# Patient Record
Sex: FEMALE | Race: BLACK OR AFRICAN AMERICAN | NOT HISPANIC OR LATINO | URBAN - METROPOLITAN AREA
[De-identification: names, ages, dates, MRNs, and addresses within clinical notes are randomized per-mention and may not be internally consistent; named-entity substitution may affect disease eponyms.]

---

## 2017-10-15 ENCOUNTER — INPATIENT (INPATIENT)
Facility: HOSPITAL | Age: 72
LOS: 4 days | Discharge: NURSING, REHAB OR LONG-TERM CARE | DRG: 190 | End: 2017-10-20
Attending: INTERNAL MEDICINE | Admitting: INTERNAL MEDICINE
Payer: MEDICAID

## 2017-10-15 VITALS
TEMPERATURE: 103 F | RESPIRATION RATE: 20 BRPM | DIASTOLIC BLOOD PRESSURE: 62 MMHG | HEART RATE: 85 BPM | HEIGHT: 63 IN | WEIGHT: 166.89 LBS | OXYGEN SATURATION: 95 % | SYSTOLIC BLOOD PRESSURE: 107 MMHG

## 2017-10-15 DIAGNOSIS — I10 ESSENTIAL (PRIMARY) HYPERTENSION: ICD-10-CM

## 2017-10-15 DIAGNOSIS — J44.9 CHRONIC OBSTRUCTIVE PULMONARY DISEASE, UNSPECIFIED: ICD-10-CM

## 2017-10-15 DIAGNOSIS — J18.9 PNEUMONIA, UNSPECIFIED ORGANISM: ICD-10-CM

## 2017-10-15 DIAGNOSIS — Z29.9 ENCOUNTER FOR PROPHYLACTIC MEASURES, UNSPECIFIED: ICD-10-CM

## 2017-10-15 DIAGNOSIS — E11.9 TYPE 2 DIABETES MELLITUS WITHOUT COMPLICATIONS: ICD-10-CM

## 2017-10-15 DIAGNOSIS — C34.90 MALIGNANT NEOPLASM OF UNSPECIFIED PART OF UNSPECIFIED BRONCHUS OR LUNG: ICD-10-CM

## 2017-10-15 DIAGNOSIS — Z98.89 OTHER SPECIFIED POSTPROCEDURAL STATES: Chronic | ICD-10-CM

## 2017-10-15 DIAGNOSIS — K21.9 GASTRO-ESOPHAGEAL REFLUX DISEASE WITHOUT ESOPHAGITIS: ICD-10-CM

## 2017-10-15 LAB
ALBUMIN SERPL ELPH-MCNC: 3.5 G/DL — SIGNIFICANT CHANGE UP (ref 3.5–5)
ALP SERPL-CCNC: 69 U/L — SIGNIFICANT CHANGE UP (ref 40–120)
ALT FLD-CCNC: 21 U/L DA — SIGNIFICANT CHANGE UP (ref 10–60)
ANION GAP SERPL CALC-SCNC: 7 MMOL/L — SIGNIFICANT CHANGE UP (ref 5–17)
APPEARANCE UR: CLEAR — SIGNIFICANT CHANGE UP
AST SERPL-CCNC: 22 U/L — SIGNIFICANT CHANGE UP (ref 10–40)
BACTERIA # UR AUTO: ABNORMAL /HPF
BASOPHILS # BLD AUTO: 0 K/UL — SIGNIFICANT CHANGE UP (ref 0–0.2)
BASOPHILS NFR BLD AUTO: 0.2 % — SIGNIFICANT CHANGE UP (ref 0–2)
BILIRUB SERPL-MCNC: 0.5 MG/DL — SIGNIFICANT CHANGE UP (ref 0.2–1.2)
BILIRUB UR-MCNC: NEGATIVE — SIGNIFICANT CHANGE UP
BUN SERPL-MCNC: 17 MG/DL — SIGNIFICANT CHANGE UP (ref 7–18)
CALCIUM SERPL-MCNC: 9 MG/DL — SIGNIFICANT CHANGE UP (ref 8.4–10.5)
CHLORIDE SERPL-SCNC: 105 MMOL/L — SIGNIFICANT CHANGE UP (ref 96–108)
CO2 SERPL-SCNC: 28 MMOL/L — SIGNIFICANT CHANGE UP (ref 22–31)
COLOR SPEC: YELLOW — SIGNIFICANT CHANGE UP
CREAT SERPL-MCNC: 1.27 MG/DL — SIGNIFICANT CHANGE UP (ref 0.5–1.3)
DIFF PNL FLD: ABNORMAL
EOSINOPHIL # BLD AUTO: 0 K/UL — SIGNIFICANT CHANGE UP (ref 0–0.5)
EOSINOPHIL NFR BLD AUTO: 0.4 % — SIGNIFICANT CHANGE UP (ref 0–6)
EPI CELLS # UR: SIGNIFICANT CHANGE UP
GLUCOSE BLDC GLUCOMTR-MCNC: 233 MG/DL — HIGH (ref 70–99)
GLUCOSE BLDC GLUCOMTR-MCNC: 437 MG/DL — HIGH (ref 70–99)
GLUCOSE SERPL-MCNC: 136 MG/DL — HIGH (ref 70–99)
GLUCOSE UR QL: NEGATIVE — SIGNIFICANT CHANGE UP
HCT VFR BLD CALC: 35.4 % — SIGNIFICANT CHANGE UP (ref 34.5–45)
HGB BLD-MCNC: 10.6 G/DL — LOW (ref 11.5–15.5)
KETONES UR-MCNC: NEGATIVE — SIGNIFICANT CHANGE UP
LACTATE SERPL-SCNC: 1.2 MMOL/L — SIGNIFICANT CHANGE UP (ref 0.7–2)
LEUKOCYTE ESTERASE UR-ACNC: ABNORMAL
LYMPHOCYTES # BLD AUTO: 0.7 K/UL — LOW (ref 1–3.3)
LYMPHOCYTES # BLD AUTO: 8 % — LOW (ref 13–44)
MCHC RBC-ENTMCNC: 27.7 PG — SIGNIFICANT CHANGE UP (ref 27–34)
MCHC RBC-ENTMCNC: 30 GM/DL — LOW (ref 32–36)
MCV RBC AUTO: 92.2 FL — SIGNIFICANT CHANGE UP (ref 80–100)
MONOCYTES # BLD AUTO: 0.7 K/UL — SIGNIFICANT CHANGE UP (ref 0–0.9)
MONOCYTES NFR BLD AUTO: 7.2 % — SIGNIFICANT CHANGE UP (ref 2–14)
NEUTROPHILS # BLD AUTO: 7.8 K/UL — HIGH (ref 1.8–7.4)
NEUTROPHILS NFR BLD AUTO: 84.2 % — HIGH (ref 43–77)
NITRITE UR-MCNC: NEGATIVE — SIGNIFICANT CHANGE UP
PH UR: 7 — SIGNIFICANT CHANGE UP (ref 5–8)
PLATELET # BLD AUTO: 144 K/UL — LOW (ref 150–400)
POTASSIUM SERPL-MCNC: 4.2 MMOL/L — SIGNIFICANT CHANGE UP (ref 3.5–5.3)
POTASSIUM SERPL-SCNC: 4.2 MMOL/L — SIGNIFICANT CHANGE UP (ref 3.5–5.3)
PROT SERPL-MCNC: 7.3 G/DL — SIGNIFICANT CHANGE UP (ref 6–8.3)
PROT UR-MCNC: 100
RBC # BLD: 3.84 M/UL — SIGNIFICANT CHANGE UP (ref 3.8–5.2)
RBC # FLD: 15.4 % — HIGH (ref 10.3–14.5)
RBC CASTS # UR COMP ASSIST: ABNORMAL /HPF (ref 0–2)
SODIUM SERPL-SCNC: 140 MMOL/L — SIGNIFICANT CHANGE UP (ref 135–145)
SP GR SPEC: 1 — LOW (ref 1.01–1.02)
UROBILINOGEN FLD QL: NEGATIVE — SIGNIFICANT CHANGE UP
WBC # BLD: 9.3 K/UL — SIGNIFICANT CHANGE UP (ref 3.8–10.5)
WBC # FLD AUTO: 9.3 K/UL — SIGNIFICANT CHANGE UP (ref 3.8–10.5)
WBC UR QL: SIGNIFICANT CHANGE UP /HPF (ref 0–5)

## 2017-10-15 PROCEDURE — 71010: CPT | Mod: 26

## 2017-10-15 PROCEDURE — 74177 CT ABD & PELVIS W/CONTRAST: CPT | Mod: 26

## 2017-10-15 PROCEDURE — 99285 EMERGENCY DEPT VISIT HI MDM: CPT

## 2017-10-15 RX ORDER — PIPERACILLIN AND TAZOBACTAM 4; .5 G/20ML; G/20ML
3.38 INJECTION, POWDER, LYOPHILIZED, FOR SOLUTION INTRAVENOUS EVERY 8 HOURS
Qty: 0 | Refills: 0 | Status: DISCONTINUED | OUTPATIENT
Start: 2017-10-15 | End: 2017-10-16

## 2017-10-15 RX ORDER — IPRATROPIUM/ALBUTEROL SULFATE 18-103MCG
3 AEROSOL WITH ADAPTER (GRAM) INHALATION EVERY 6 HOURS
Qty: 0 | Refills: 0 | Status: DISCONTINUED | OUTPATIENT
Start: 2017-10-15 | End: 2017-10-20

## 2017-10-15 RX ORDER — FOLIC ACID 0.8 MG
1 TABLET ORAL DAILY
Qty: 0 | Refills: 0 | Status: DISCONTINUED | OUTPATIENT
Start: 2017-10-15 | End: 2017-10-20

## 2017-10-15 RX ORDER — INSULIN LISPRO 100/ML
6 VIAL (ML) SUBCUTANEOUS ONCE
Qty: 0 | Refills: 0 | Status: COMPLETED | OUTPATIENT
Start: 2017-10-15 | End: 2017-10-15

## 2017-10-15 RX ORDER — BUDESONIDE AND FORMOTEROL FUMARATE DIHYDRATE 160; 4.5 UG/1; UG/1
2 AEROSOL RESPIRATORY (INHALATION)
Qty: 0 | Refills: 0 | Status: DISCONTINUED | OUTPATIENT
Start: 2017-10-15 | End: 2017-10-20

## 2017-10-15 RX ORDER — SODIUM CHLORIDE 0.65 %
1 AEROSOL, SPRAY (ML) NASAL THREE TIMES A DAY
Qty: 0 | Refills: 0 | Status: DISCONTINUED | OUTPATIENT
Start: 2017-10-15 | End: 2017-10-20

## 2017-10-15 RX ORDER — LACTULOSE 10 G/15ML
10 SOLUTION ORAL
Qty: 0 | Refills: 0 | Status: DISCONTINUED | OUTPATIENT
Start: 2017-10-15 | End: 2017-10-18

## 2017-10-15 RX ORDER — INSULIN LISPRO 100/ML
VIAL (ML) SUBCUTANEOUS
Qty: 0 | Refills: 0 | Status: DISCONTINUED | OUTPATIENT
Start: 2017-10-15 | End: 2017-10-20

## 2017-10-15 RX ORDER — FERROUS SULFATE 325(65) MG
325 TABLET ORAL DAILY
Qty: 0 | Refills: 0 | Status: DISCONTINUED | OUTPATIENT
Start: 2017-10-15 | End: 2017-10-20

## 2017-10-15 RX ORDER — INSULIN LISPRO 100/ML
VIAL (ML) SUBCUTANEOUS
Qty: 0 | Refills: 0 | Status: DISCONTINUED | OUTPATIENT
Start: 2017-10-15 | End: 2017-10-15

## 2017-10-15 RX ORDER — IPRATROPIUM/ALBUTEROL SULFATE 18-103MCG
3 AEROSOL WITH ADAPTER (GRAM) INHALATION
Qty: 0 | Refills: 0 | Status: COMPLETED | OUTPATIENT
Start: 2017-10-15 | End: 2017-10-15

## 2017-10-15 RX ORDER — AZITHROMYCIN 500 MG/1
500 TABLET, FILM COATED ORAL ONCE
Qty: 0 | Refills: 0 | Status: COMPLETED | OUTPATIENT
Start: 2017-10-15 | End: 2017-10-15

## 2017-10-15 RX ORDER — SODIUM CHLORIDE 9 MG/ML
500 INJECTION INTRAMUSCULAR; INTRAVENOUS; SUBCUTANEOUS
Qty: 0 | Refills: 0 | Status: COMPLETED | OUTPATIENT
Start: 2017-10-15 | End: 2017-10-15

## 2017-10-15 RX ORDER — PIPERACILLIN AND TAZOBACTAM 4; .5 G/20ML; G/20ML
3.38 INJECTION, POWDER, LYOPHILIZED, FOR SOLUTION INTRAVENOUS ONCE
Qty: 0 | Refills: 0 | Status: COMPLETED | OUTPATIENT
Start: 2017-10-15 | End: 2017-10-15

## 2017-10-15 RX ORDER — ERGOCALCIFEROL 1.25 MG/1
50000 CAPSULE ORAL
Qty: 0 | Refills: 0 | Status: DISCONTINUED | OUTPATIENT
Start: 2017-10-15 | End: 2017-10-20

## 2017-10-15 RX ORDER — LATANOPROST 0.05 MG/ML
1 SOLUTION/ DROPS OPHTHALMIC; TOPICAL AT BEDTIME
Qty: 0 | Refills: 0 | Status: DISCONTINUED | OUTPATIENT
Start: 2017-10-15 | End: 2017-10-20

## 2017-10-15 RX ORDER — CEFTRIAXONE 500 MG/1
1 INJECTION, POWDER, FOR SOLUTION INTRAMUSCULAR; INTRAVENOUS ONCE
Qty: 0 | Refills: 0 | Status: COMPLETED | OUTPATIENT
Start: 2017-10-15 | End: 2017-10-15

## 2017-10-15 RX ORDER — ASPIRIN/CALCIUM CARB/MAGNESIUM 324 MG
81 TABLET ORAL DAILY
Qty: 0 | Refills: 0 | Status: DISCONTINUED | OUTPATIENT
Start: 2017-10-15 | End: 2017-10-20

## 2017-10-15 RX ORDER — ATORVASTATIN CALCIUM 80 MG/1
20 TABLET, FILM COATED ORAL AT BEDTIME
Qty: 0 | Refills: 0 | Status: DISCONTINUED | OUTPATIENT
Start: 2017-10-15 | End: 2017-10-20

## 2017-10-15 RX ORDER — SODIUM CHLORIDE 9 MG/ML
3 INJECTION INTRAMUSCULAR; INTRAVENOUS; SUBCUTANEOUS ONCE
Qty: 0 | Refills: 0 | Status: COMPLETED | OUTPATIENT
Start: 2017-10-15 | End: 2017-10-15

## 2017-10-15 RX ORDER — ACETAMINOPHEN 500 MG
650 TABLET ORAL ONCE
Qty: 0 | Refills: 0 | Status: COMPLETED | OUTPATIENT
Start: 2017-10-15 | End: 2017-10-15

## 2017-10-15 RX ADMIN — Medication 1 MILLIGRAM(S): at 18:06

## 2017-10-15 RX ADMIN — Medication 40 MILLIGRAM(S): at 21:26

## 2017-10-15 RX ADMIN — Medication 6: at 23:28

## 2017-10-15 RX ADMIN — SODIUM CHLORIDE 2000 MILLILITER(S): 9 INJECTION INTRAMUSCULAR; INTRAVENOUS; SUBCUTANEOUS at 11:10

## 2017-10-15 RX ADMIN — ERGOCALCIFEROL 50000 UNIT(S): 1.25 CAPSULE ORAL at 18:53

## 2017-10-15 RX ADMIN — Medication 1 APPLICATION(S): at 21:26

## 2017-10-15 RX ADMIN — Medication 325 MILLIGRAM(S): at 18:06

## 2017-10-15 RX ADMIN — Medication 1 TABLET(S): at 18:06

## 2017-10-15 RX ADMIN — ATORVASTATIN CALCIUM 20 MILLIGRAM(S): 80 TABLET, FILM COATED ORAL at 21:26

## 2017-10-15 RX ADMIN — Medication 3 MILLILITER(S): at 13:36

## 2017-10-15 RX ADMIN — SODIUM CHLORIDE 2000 MILLILITER(S): 9 INJECTION INTRAMUSCULAR; INTRAVENOUS; SUBCUTANEOUS at 12:47

## 2017-10-15 RX ADMIN — AZITHROMYCIN 250 MILLIGRAM(S): 500 TABLET, FILM COATED ORAL at 13:39

## 2017-10-15 RX ADMIN — SODIUM CHLORIDE 2000 MILLILITER(S): 9 INJECTION INTRAMUSCULAR; INTRAVENOUS; SUBCUTANEOUS at 13:34

## 2017-10-15 RX ADMIN — Medication 2: at 16:35

## 2017-10-15 RX ADMIN — Medication 3 MILLILITER(S): at 13:37

## 2017-10-15 RX ADMIN — LACTULOSE 10 GRAM(S): 10 SOLUTION ORAL at 18:07

## 2017-10-15 RX ADMIN — SODIUM CHLORIDE 2000 MILLILITER(S): 9 INJECTION INTRAMUSCULAR; INTRAVENOUS; SUBCUTANEOUS at 13:35

## 2017-10-15 RX ADMIN — Medication 6 UNIT(S): at 23:30

## 2017-10-15 RX ADMIN — Medication 3 MILLILITER(S): at 20:22

## 2017-10-15 RX ADMIN — PIPERACILLIN AND TAZOBACTAM 200 GRAM(S): 4; .5 INJECTION, POWDER, LYOPHILIZED, FOR SOLUTION INTRAVENOUS at 18:06

## 2017-10-15 RX ADMIN — Medication 125 MILLIGRAM(S): at 12:55

## 2017-10-15 RX ADMIN — PIPERACILLIN AND TAZOBACTAM 25 GRAM(S): 4; .5 INJECTION, POWDER, LYOPHILIZED, FOR SOLUTION INTRAVENOUS at 21:26

## 2017-10-15 RX ADMIN — LATANOPROST 1 DROP(S): 0.05 SOLUTION/ DROPS OPHTHALMIC; TOPICAL at 21:26

## 2017-10-15 RX ADMIN — Medication 650 MILLIGRAM(S): at 11:15

## 2017-10-15 RX ADMIN — BUDESONIDE AND FORMOTEROL FUMARATE DIHYDRATE 2 PUFF(S): 160; 4.5 AEROSOL RESPIRATORY (INHALATION) at 23:28

## 2017-10-15 RX ADMIN — SODIUM CHLORIDE 3 MILLILITER(S): 9 INJECTION INTRAMUSCULAR; INTRAVENOUS; SUBCUTANEOUS at 11:07

## 2017-10-15 RX ADMIN — SODIUM CHLORIDE 2000 MILLILITER(S): 9 INJECTION INTRAMUSCULAR; INTRAVENOUS; SUBCUTANEOUS at 12:35

## 2017-10-15 RX ADMIN — CEFTRIAXONE 100 GRAM(S): 500 INJECTION, POWDER, FOR SOLUTION INTRAMUSCULAR; INTRAVENOUS at 13:20

## 2017-10-15 RX ADMIN — Medication 81 MILLIGRAM(S): at 18:06

## 2017-10-15 NOTE — ED PROVIDER NOTE - PMH
ACS (acute coronary syndrome)    Bronchoalveolar carcinoma    CAD (coronary artery disease)    Cataract    Constipation    COPD (chronic obstructive pulmonary disease)    DM (diabetes mellitus)    GERD (gastroesophageal reflux disease)    HTN (hypertension)    Hypercholesteremia    Lung cancer  wedge resection of left lung 2013  OA (osteoarthritis)    Seborrheic dermatitis

## 2017-10-15 NOTE — PATIENT PROFILE ADULT. - VISION (WITH CORRECTIVE LENSES IF THE PATIENT USUALLY WEARS THEM):
Normal vision: sees adequately in most situations; can see medication labels, newsprint Normal vision: sees adequately in most situations; can see medication labels, newsprint/with glasses

## 2017-10-15 NOTE — H&P ADULT - PROBLEM SELECTOR PLAN 2
-Patient minimally wheezing on exam s/p Solu-medrol 125 in ED  -Will start Solumedrol 40 q8  -Taper steroid as clinically indicated

## 2017-10-15 NOTE — H&P ADULT - PROBLEM SELECTOR PLAN 3
-Started sliding scale and follow FG  -Basal insulin not started as blood glucose is 136  -F/u Hba1c -Holding antihypertensive as patient is on risk of becoming septic and current BP is 110/60, Hr 80

## 2017-10-15 NOTE — H&P ADULT - PROBLEM SELECTOR PLAN 1
-Patient presented with cough and fever, in setting of h/o lung cancer. Will treat for possible obstructive pneumonia.  -Labs negative for leucocytosis but left shift is present  -Lactate 1.2  -CXR showed L lung opacity.   -Started Zosyn  -F/u Blood cultures  -ID consulted (Dr Duval) -Patient presented with cough and fever, in setting of h/o lung cancer. Will treat for possible post-obstructive pneumonia.  -Labs negative for leucocytosis but left shift is present  -qSofa score: 0  -Lactate 1.2  -CXR showed L lung opacity.   -Started Zosyn  -F/u Blood cultures  -F/u Procalcitonin, Legionella and streptococcal antigen  -ID consulted (Dr Duval) -Patient presented with cough and fever, in setting of h/o lung cancer. Will treat for possible post-obstructive pneumonia.  -Labs negative for leucocytosis but left shift is present  -qSofa score: 0  -Lactate 1.2  -CXR showed L lung opacity.   -Started Zosyn  -F/u Blood cultures  -F/u Procalcitonin, Legionella and streptococcal antigen  -Xray showed patchy opacities, patient will need CT chest with contrast to rule out reoccurrence of carcinoma, but could not be done for 48 hours as patient already have CT abdomen with iv contrast done on admission.    -ID consulted (Dr Duval)

## 2017-10-15 NOTE — H&P ADULT - PROBLEM SELECTOR PLAN 5
-S/p resection, currently on chemotherapy -Continue Protonix  -CT scan showed gastric antrum thickening  -Patient will likely need endoscopy, can be done as outpatient.

## 2017-10-15 NOTE — ED PROVIDER NOTE - OBJECTIVE STATEMENT
72 y/o F pt with PMHx of ACS, bronchoalveolar CA, CAD, COPD (3 Ls at home), DM, GERD, HTN, HLD, presents to ED c/o fever, cough producing white sputum, and upper abd pain x yesterday. Pt reports her abd pain is worsened with cough. Pt denies chest pain, palpitations, nausea, vomiting, diarrhea, dysuria, urinary frequency, hematuria, or any other complaints. NKDA.

## 2017-10-15 NOTE — H&P ADULT - ASSESSMENT
71 F from [facility] [place] walks with a walker with PMH of CAD, Bronchoalveolar Ca (s/p Wedge resection in 2013), IDDM, COPD (never intubated on O2 3L), GERD, HLD and HTN was sent from [place] for fever and cough since last 2 days. 71 F from [facility] AL walks with a walker with PMH of CAD, A Fib(not on AC or rate control medication) Bronchoalveolar Ca (s/p Wedge resection in 2013), IDDM, COPD (never intubated on O2 3L), GERD, HLD and HTN was sent from [place] for fever and cough since last 2 days.

## 2017-10-15 NOTE — H&P ADULT - PROBLEM SELECTOR PLAN 4
-Continue Protonix  -CT scan showed gastric antrum thickening  -Patient will likely need endoscopy, can be done as outpatient. -Started sliding scale and follow FG  -Basal insulin not started as blood glucose is 136  -F/u Hba1c

## 2017-10-15 NOTE — ED PROVIDER NOTE - CHPI ED SYMPTOMS NEG
no chest pain, no palpitations, no nausea, no vomiting, no diarrhea, no dysuria, no urinary frequency, no hematuria

## 2017-10-15 NOTE — H&P ADULT - HISTORY OF PRESENT ILLNESS
71 F from [facility], walks with a walker [facility] CAD, Bronchoalveolar Ca (s/p Wedge resection in 2013), IDDM, COPD (occupational induced, never intubated on O2 3L), GERD, HLD and HTN.fever, cough producing white sputum, and upper abd pain x yesterday. 71 F from [facility] walks with a walker with PMH of CAD, Bronchoalveolar Ca (s/p Wedge resection in 2013), IDDM, COPD (never intubated on O2 3L), GERD, HLD and HTN was sent from [place] for fever and cough since last 2 days. Patient states that she started to have cough associated with mild whitish sputum. Patient also had a fever in [place]. Patient also complaints of mild upper abdominal discomfort while coughing.   Denies chest pain, SOB, nausea, vomiting, diarrhea, abdominal pain, diarrhea or constipation.     SH: Lives in [place], non smoker non alcoholic, denies illicit drug use.   FH: Denies significant family history including h/o lung cancer. 71 F from [facility] walks with a walker with PMH of CAD, A Fib(not on AC or rate control medication), Bronchoalveolar Ca (s/p Wedge resection in 2013), IDDM, COPD (never intubated on O2 3L), GERD, HLD and HTN was sent from [place] for fever and cough since last 2 days. Patient states that she started to have cough associated with mild whitish sputum. Patient also had a fever in [place]. Patient also complaints of mild upper abdominal discomfort while coughing.   Denies chest pain, SOB, nausea, vomiting, diarrhea, abdominal pain, diarrhea or constipation.     SH: Lives in [place], non smoker non alcoholic, denies illicit drug use.   FH: Denies significant family history including h/o lung cancer.

## 2017-10-15 NOTE — H&P ADULT - NSHPSOCIALHISTORY_GEN_ALL_CORE
SH: Lives in [place], non smoker non alcoholic, denies illicit drug use.   FH: Denies significant family history including h/o lung cancer.

## 2017-10-15 NOTE — ED PROVIDER NOTE - MEDICAL DECISION MAKING DETAILS
72 y/o F pt with fever/sepsis likely due to PNA. Will do cultures, CXR, UA and reassess. Anticipate admission.

## 2017-10-15 NOTE — H&P ADULT - PROBLEM SELECTOR PLAN 6
-Lovenox,   -Improve score 3, given age, h/o cancer and immobilisation -S/p resection, currently on chemotherapy  -Holding oral medication while inpatient.   -Restart on discharge.

## 2017-10-16 LAB
24R-OH-CALCIDIOL SERPL-MCNC: 56 NG/ML — SIGNIFICANT CHANGE UP (ref 30–80)
ALBUMIN SERPL ELPH-MCNC: 2.9 G/DL — LOW (ref 3.5–5)
ALP SERPL-CCNC: 61 U/L — SIGNIFICANT CHANGE UP (ref 40–120)
ALT FLD-CCNC: 22 U/L DA — SIGNIFICANT CHANGE UP (ref 10–60)
ANION GAP SERPL CALC-SCNC: 7 MMOL/L — SIGNIFICANT CHANGE UP (ref 5–17)
AST SERPL-CCNC: 22 U/L — SIGNIFICANT CHANGE UP (ref 10–40)
BASOPHILS # BLD AUTO: 0 K/UL — SIGNIFICANT CHANGE UP (ref 0–0.2)
BASOPHILS NFR BLD AUTO: 0.2 % — SIGNIFICANT CHANGE UP (ref 0–2)
BILIRUB SERPL-MCNC: 0.3 MG/DL — SIGNIFICANT CHANGE UP (ref 0.2–1.2)
BUN SERPL-MCNC: 21 MG/DL — HIGH (ref 7–18)
CALCIUM SERPL-MCNC: 8.8 MG/DL — SIGNIFICANT CHANGE UP (ref 8.4–10.5)
CHLORIDE SERPL-SCNC: 107 MMOL/L — SIGNIFICANT CHANGE UP (ref 96–108)
CHOLEST SERPL-MCNC: 103 MG/DL — SIGNIFICANT CHANGE UP (ref 10–199)
CO2 SERPL-SCNC: 26 MMOL/L — SIGNIFICANT CHANGE UP (ref 22–31)
CREAT SERPL-MCNC: 1.32 MG/DL — HIGH (ref 0.5–1.3)
CULTURE RESULTS: SIGNIFICANT CHANGE UP
EOSINOPHIL # BLD AUTO: 0 K/UL — SIGNIFICANT CHANGE UP (ref 0–0.5)
EOSINOPHIL NFR BLD AUTO: 0 % — SIGNIFICANT CHANGE UP (ref 0–6)
GLUCOSE BLDC GLUCOMTR-MCNC: 342 MG/DL — HIGH (ref 70–99)
GLUCOSE BLDC GLUCOMTR-MCNC: 428 MG/DL — HIGH (ref 70–99)
GLUCOSE SERPL-MCNC: 294 MG/DL — HIGH (ref 70–99)
HBA1C BLD-MCNC: 6.4 % — HIGH (ref 4–5.6)
HCT VFR BLD CALC: 32.8 % — LOW (ref 34.5–45)
HDLC SERPL-MCNC: 81 MG/DL — SIGNIFICANT CHANGE UP (ref 40–125)
HGB BLD-MCNC: 10.2 G/DL — LOW (ref 11.5–15.5)
LIPID PNL WITH DIRECT LDL SERPL: 16 MG/DL — SIGNIFICANT CHANGE UP
LYMPHOCYTES # BLD AUTO: 0.9 K/UL — LOW (ref 1–3.3)
LYMPHOCYTES # BLD AUTO: 5.6 % — LOW (ref 13–44)
MAGNESIUM SERPL-MCNC: 2.2 MG/DL — SIGNIFICANT CHANGE UP (ref 1.6–2.6)
MCHC RBC-ENTMCNC: 28.6 PG — SIGNIFICANT CHANGE UP (ref 27–34)
MCHC RBC-ENTMCNC: 31 GM/DL — LOW (ref 32–36)
MCV RBC AUTO: 92.4 FL — SIGNIFICANT CHANGE UP (ref 80–100)
MONOCYTES # BLD AUTO: 0.6 K/UL — SIGNIFICANT CHANGE UP (ref 0–0.9)
MONOCYTES NFR BLD AUTO: 3.5 % — SIGNIFICANT CHANGE UP (ref 2–14)
NEUTROPHILS # BLD AUTO: 14.5 K/UL — HIGH (ref 1.8–7.4)
NEUTROPHILS NFR BLD AUTO: 90.7 % — HIGH (ref 43–77)
PHOSPHATE SERPL-MCNC: 2 MG/DL — LOW (ref 2.5–4.5)
PLATELET # BLD AUTO: 128 K/UL — LOW (ref 150–400)
POTASSIUM SERPL-MCNC: 4.3 MMOL/L — SIGNIFICANT CHANGE UP (ref 3.5–5.3)
POTASSIUM SERPL-SCNC: 4.3 MMOL/L — SIGNIFICANT CHANGE UP (ref 3.5–5.3)
PROCALCITONIN SERPL-MCNC: 13.07 NG/ML — HIGH (ref 0–0.04)
PROT SERPL-MCNC: 6.6 G/DL — SIGNIFICANT CHANGE UP (ref 6–8.3)
RBC # BLD: 3.55 M/UL — LOW (ref 3.8–5.2)
RBC # FLD: 15.8 % — HIGH (ref 10.3–14.5)
SODIUM SERPL-SCNC: 140 MMOL/L — SIGNIFICANT CHANGE UP (ref 135–145)
SPECIMEN SOURCE: SIGNIFICANT CHANGE UP
TOTAL CHOLESTEROL/HDL RATIO MEASUREMENT: 1.3 RATIO — LOW (ref 3.3–7.1)
TRIGL SERPL-MCNC: 30 MG/DL — SIGNIFICANT CHANGE UP (ref 10–149)
TSH SERPL-MCNC: 0.33 UU/ML — LOW (ref 0.34–4.82)
VIT B12 SERPL-MCNC: 425 PG/ML — SIGNIFICANT CHANGE UP (ref 243–894)
WBC # BLD: 16 K/UL — HIGH (ref 3.8–10.5)
WBC # FLD AUTO: 16 K/UL — HIGH (ref 3.8–10.5)

## 2017-10-16 RX ORDER — INSULIN LISPRO 100/ML
3 VIAL (ML) SUBCUTANEOUS
Qty: 0 | Refills: 0 | Status: DISCONTINUED | OUTPATIENT
Start: 2017-10-16 | End: 2017-10-17

## 2017-10-16 RX ORDER — BROMPHENIRAMIN/PSEUDOEPHEDRINE 10MG-120MG
20 CAPSULE, EXTENDED RELEASE 12 HR ORAL EVERY 4 HOURS
Qty: 0 | Refills: 0 | Status: DISCONTINUED | OUTPATIENT
Start: 2017-10-16 | End: 2017-10-16

## 2017-10-16 RX ORDER — AZITHROMYCIN 500 MG/1
250 TABLET, FILM COATED ORAL DAILY
Qty: 0 | Refills: 0 | Status: COMPLETED | OUTPATIENT
Start: 2017-10-16 | End: 2017-10-20

## 2017-10-16 RX ORDER — CEFTRIAXONE 500 MG/1
1 INJECTION, POWDER, FOR SOLUTION INTRAMUSCULAR; INTRAVENOUS ONCE
Qty: 0 | Refills: 0 | Status: COMPLETED | OUTPATIENT
Start: 2017-10-16 | End: 2017-10-16

## 2017-10-16 RX ORDER — BENZOCAINE AND MENTHOL 5; 1 G/100ML; G/100ML
1 LIQUID ORAL EVERY 6 HOURS
Qty: 0 | Refills: 0 | Status: DISCONTINUED | OUTPATIENT
Start: 2017-10-16 | End: 2017-10-20

## 2017-10-16 RX ORDER — CEFTRIAXONE 500 MG/1
1 INJECTION, POWDER, FOR SOLUTION INTRAMUSCULAR; INTRAVENOUS EVERY 24 HOURS
Qty: 0 | Refills: 0 | Status: DISCONTINUED | OUTPATIENT
Start: 2017-10-17 | End: 2017-10-20

## 2017-10-16 RX ORDER — CEFTRIAXONE 500 MG/1
INJECTION, POWDER, FOR SOLUTION INTRAMUSCULAR; INTRAVENOUS
Qty: 0 | Refills: 0 | Status: DISCONTINUED | OUTPATIENT
Start: 2017-10-16 | End: 2017-10-20

## 2017-10-16 RX ORDER — INSULIN GLARGINE 100 [IU]/ML
8 INJECTION, SOLUTION SUBCUTANEOUS AT BEDTIME
Qty: 0 | Refills: 0 | Status: DISCONTINUED | OUTPATIENT
Start: 2017-10-16 | End: 2017-10-17

## 2017-10-16 RX ORDER — POTASSIUM PHOSPHATE, MONOBASIC POTASSIUM PHOSPHATE, DIBASIC 236; 224 MG/ML; MG/ML
15 INJECTION, SOLUTION INTRAVENOUS ONCE
Qty: 0 | Refills: 0 | Status: COMPLETED | OUTPATIENT
Start: 2017-10-16 | End: 2017-10-16

## 2017-10-16 RX ADMIN — PIPERACILLIN AND TAZOBACTAM 25 GRAM(S): 4; .5 INJECTION, POWDER, LYOPHILIZED, FOR SOLUTION INTRAVENOUS at 06:09

## 2017-10-16 RX ADMIN — Medication 40 MILLIGRAM(S): at 21:55

## 2017-10-16 RX ADMIN — Medication 325 MILLIGRAM(S): at 13:44

## 2017-10-16 RX ADMIN — BUDESONIDE AND FORMOTEROL FUMARATE DIHYDRATE 2 PUFF(S): 160; 4.5 AEROSOL RESPIRATORY (INHALATION) at 13:43

## 2017-10-16 RX ADMIN — Medication 40 MILLIGRAM(S): at 13:44

## 2017-10-16 RX ADMIN — BUDESONIDE AND FORMOTEROL FUMARATE DIHYDRATE 2 PUFF(S): 160; 4.5 AEROSOL RESPIRATORY (INHALATION) at 21:56

## 2017-10-16 RX ADMIN — LACTULOSE 10 GRAM(S): 10 SOLUTION ORAL at 17:32

## 2017-10-16 RX ADMIN — Medication: at 08:59

## 2017-10-16 RX ADMIN — ATORVASTATIN CALCIUM 20 MILLIGRAM(S): 80 TABLET, FILM COATED ORAL at 21:55

## 2017-10-16 RX ADMIN — CEFTRIAXONE 100 GRAM(S): 500 INJECTION, POWDER, FOR SOLUTION INTRAMUSCULAR; INTRAVENOUS at 17:31

## 2017-10-16 RX ADMIN — PIPERACILLIN AND TAZOBACTAM 25 GRAM(S): 4; .5 INJECTION, POWDER, LYOPHILIZED, FOR SOLUTION INTRAVENOUS at 13:43

## 2017-10-16 RX ADMIN — Medication 81 MILLIGRAM(S): at 14:41

## 2017-10-16 RX ADMIN — Medication 3 MILLILITER(S): at 08:55

## 2017-10-16 RX ADMIN — Medication 4: at 21:57

## 2017-10-16 RX ADMIN — LATANOPROST 1 DROP(S): 0.05 SOLUTION/ DROPS OPHTHALMIC; TOPICAL at 21:56

## 2017-10-16 RX ADMIN — INSULIN GLARGINE 8 UNIT(S): 100 INJECTION, SOLUTION SUBCUTANEOUS at 21:55

## 2017-10-16 RX ADMIN — Medication 4: at 17:32

## 2017-10-16 RX ADMIN — POTASSIUM PHOSPHATE, MONOBASIC POTASSIUM PHOSPHATE, DIBASIC 62.5 MILLIMOLE(S): 236; 224 INJECTION, SOLUTION INTRAVENOUS at 13:43

## 2017-10-16 RX ADMIN — Medication 1 TABLET(S): at 13:45

## 2017-10-16 RX ADMIN — Medication 1 APPLICATION(S): at 06:08

## 2017-10-16 RX ADMIN — LACTULOSE 10 GRAM(S): 10 SOLUTION ORAL at 07:15

## 2017-10-16 RX ADMIN — Medication 3 MILLILITER(S): at 21:06

## 2017-10-16 RX ADMIN — Medication 1 APPLICATION(S): at 13:43

## 2017-10-16 RX ADMIN — Medication 1 APPLICATION(S): at 21:56

## 2017-10-16 RX ADMIN — Medication 3 UNIT(S): at 17:32

## 2017-10-16 RX ADMIN — Medication 1 MILLIGRAM(S): at 13:45

## 2017-10-16 RX ADMIN — Medication 40 MILLIGRAM(S): at 06:08

## 2017-10-16 RX ADMIN — AZITHROMYCIN 250 MILLIGRAM(S): 500 TABLET, FILM COATED ORAL at 17:31

## 2017-10-16 RX ADMIN — Medication 3 MILLILITER(S): at 14:23

## 2017-10-16 RX ADMIN — BENZOCAINE AND MENTHOL 1 LOZENGE: 5; 1 LIQUID ORAL at 17:31

## 2017-10-16 RX ADMIN — Medication 4: at 13:44

## 2017-10-16 NOTE — CONSULT NOTE ADULT - ASSESSMENT
1.	Pneumonia ?post-obstructive vs HCAP  2.	Fevers  3.	Leukocytosis - likely from steroids  ·	continue zosyn 3.375gm IV q8h D2  ·	awaiting culture results 1.	Pneumonia ?post-obstructive vs CAP  2.	Fevers  3.	Leukocytosis - likely from steroids  ·	dc zosyn   ·	started azithromycin 250mg PO daily  ·	started rocephin 1gm IV daily  ·	awaiting culture results

## 2017-10-16 NOTE — PROGRESS NOTE ADULT - PROBLEM SELECTOR PLAN 7
-Lovenox,   -Improve score 3, given age, h/o cancer and immobilisation    DISPOSITION: To be discharged tomorrow to [facility] if clinically stable.

## 2017-10-16 NOTE — PROGRESS NOTE ADULT - ASSESSMENT
71 F from [facility] [place] walks with a walker with PMH of CAD, A Fib(not on AC or rate control medication) Bronchoalveolar Ca (s/p Wedge resection in 2013), IDDM, COPD (never intubated on O2 3L), GERD, HLD and HTN was sent from [place] for fever and cough since last 2 days.

## 2017-10-16 NOTE — CONSULT NOTE ADULT - SUBJECTIVE AND OBJECTIVE BOX
NOT COMPLETE        HPI:  71 F from [facility] [place] walks with a walker with PMH of CAD, A Fib(not on AC or rate control medication), Bronchoalveolar Ca (s/p Wedge resection in ), IDDM, COPD (never intubated on O2 3L), GERD, HLD and HTN was sent from [place] for fever and cough since last 2 days. Patient states that she started to have cough associated with mild whitish sputum. Patient also [place] a fever in [place]. Patient also complaints of mild upper abdominal discomfort while coughing.     ID consult was called to evaluate for post-obstructive pneumonia secondary to history of lung CA.  +CT for L>R basilar opacities.  Has not had any recurrent fevers for past 24 hours; wbc has increased to 16.0, but also started on steroids yesterday.    REVIEW OF SYSTEMS:  [  ] Not able to illicit  General:	  Chest:	  GI:	  :  Skin:	  Musculoskeletal:	  Neuro:    PAST MEDICAL & SURGICAL HISTORY:  Lung cancer: wedge resection of left lung   Seborrheic dermatitis  OA (osteoarthritis)  Hypercholesteremia  HTN (hypertension)  GERD (gastroesophageal reflux disease)  Constipation  DM (diabetes mellitus)  Cataract  CAD (coronary artery disease)  Bronchoalveolar carcinoma  ACS (acute coronary syndrome)  COPD (chronic obstructive pulmonary disease)  S/P ovarian cystectomy      ALLERGIES: No Known Allergies      MEDS:  ALBUTerol/ipratropium for Nebulization 3 milliLiter(s) Nebulizer every 6 hours  aspirin enteric coated 81 milliGRAM(s) Oral daily  atorvastatin 20 milliGRAM(s) Oral at bedtime  buDESOnide 160 MICROgram(s)/formoterol 4.5 MICROgram(s) Inhaler 2 Puff(s) Inhalation two times a day  ergocalciferol 83545 Unit(s) Oral <User Schedule>  ferrous    sulfate 325 milliGRAM(s) Oral daily  folic acid 1 milliGRAM(s) Oral daily  insulin lispro (HumaLOG) corrective regimen sliding scale   SubCutaneous Before meals and at bedtime  lactulose Syrup 10 Gram(s) Oral two times a day  latanoprost 0.005% Ophthalmic Solution 1 Drop(s) Both EYES at bedtime  methylPREDNISolone sodium succinate Injectable 40 milliGRAM(s) IV Push every 8 hours  multivitamin 1 Tablet(s) Oral daily  petrolatum Ophthalmic Ointment 1 Application(s) Both EYES three times a day  piperacillin/tazobactam IVPB. 3.375 Gram(s) IV Intermittent every 8 hours (10/15)  potassium phosphate IVPB 15 milliMole(s) IV Intermittent once  sodium chloride 0.65% Nasal 1 Spray(s) Both Nostrils three times a day PRN      SOCIAL HISTORY:  Smoker: none    FAMILY HISTORY:  No pertinent family history in first degree relatives      VITALS:  Vital Signs Last 24 Hrs  T(C): 36.5 (16 Oct 2017 05:17), Max: 39.2 (15 Oct 2017 10:52)  T(F): 97.7 (16 Oct 2017 05:17), Max: 102.5 (15 Oct 2017 10:52)  HR: 75 (16 Oct 2017 05:17) (18 - 88)  BP: 141/49 (16 Oct 2017 05:17) (107/62 - 141/49)  BP(mean): --  RR: 169 (16 Oct 2017 05:17) (17 - 169)  SpO2: 97% (16 Oct 2017 05:17) (95% - 100%)    PHYSICAL EXAM:  Constitutional:  HEENT:  Neck:  Respiratory:  Cardiovascular:  Gastrointestinal:  Extremities:  Skin:  Ortho:  Neuro:    LABS/DIAGNOSTIC TESTS:                        10.2   16.0  )-----------( 128      ( 16 Oct 2017 07:10 )             32.8     WBC Count: 16.0 K/uL (10-16 @ 07:10)  WBC Count: 9.3 K/uL (10-15 @ 11:29)    10-16    140  |  107  |  21<H>  ----------------------------<  294<H>  4.3   |  26  |  1.32<H> 1.27    Ca    8.8      16 Oct 2017 07:10  Phos  2.0     10-16  Mg     2.2     10-16    TPro  6.6  /  Alb  2.9<L>  /  TBili  0.3  /  DBili  x   /  AST  22  /  ALT  22  /  AlkPhos  61  10-16    Urinalysis Basic - ( 15 Oct 2017 11:48 )    Color: Yellow / Appearance: Clear / S.005 / pH: x  Gluc: x / Ketone: Negative  / Bili: Negative / Urobili: Negative   Blood: x / Protein: 100 / Nitrite: Negative   Leuk Esterase: Small / RBC: 2-5 /HPF / WBC 3-5 /HPF   Sq Epi: x / Non Sq Epi: Few / Bacteria: Few /HPF      LIVER FUNCTIONS - ( 16 Oct 2017 07:10 )  Alb: 2.9 g/dL / Pro: 6.6 g/dL / ALK PHOS: 61 U/L / ALT: 22 U/L DA / AST: 22 U/L / GGT: x             Lactate, Blood: 1.2 mmol/L (10-15 @ 11:29)    HgbA1C - pending      CULTURES:   10/15 BC - pending  10/15 UC - pending      RADIOLOGY:  EXAM:  CT ABDOMEN AND PELVIS IC                        PROCEDURE DATE:  10/15/2017    INTERPRETATION:  CT ABDOMEN AND PELVIS WITH IV CONTRAST    CLINICAL STATEMENT: upper abd pain, fever.    COMPARISON: CT abdomen pelvis 2016.    TECHNIQUE: CT scan of the abdomen and pelvis was performed with IV,   without oral contrast. Multiplanar reformations were made.    FINDINGS:  Heterogeneous opacities in the lung bases, worst in the left lower lobe,   likely infectious/inflammatory. Trace left pleural effusion. Mitral   annular calcifications. Questionable mediastinal lymph node.    The liver, gallbladder, pancreas, spleen and adrenal glands are   unremarkable.    Kidneys enhance symmetrically. No hydroureteronephrosis. Subcentimeter  hypodensity in the left kidney upper pole is too small to characterize.    There is underdistention of the stomach, but there appears to be   thickening of the gastric antrum and soft tissue nodularity; correlation   with upper endoscopy is recommended. GI tract is unobstructed. Fluid in   the right colon; stool in the left colon. Appendix is unremarkable. No   free air or ascites.    No bulky adenopathy. Normal caliber abdominal aorta. Mild atherosclerosis.    Urinary bladder is unremarkable. Uterus and adnexae are grossly   unremarkable. No free pelvic fluid.    Bones are osteopenic with degenerative changes in the spine. Tiny   fat-containing umbilical hernia.    IMPRESSION:  Despite underdistention of the stomach, there appears to be thickening of   the gastric antrum and soft tissue nodularity; correlation with upper   endoscopy is recommended.    Heterogeneous opacities in the lung bases, worst in the left lower lobe,   likely infectious/inflammatory.          XAM:  CHEST SINGLE AP OR PA                        PROCEDURE DATE:  10/15/2017    INTERPRETATION:  PORTABLE CHEST X-RAY    HISTORY: sepsis.    COMPARISON: 2016.    FINDINGS/  IMPRESSION:  Improved, though persistent, left basilar patchy opacities, likely   infectious. Right lung opacities have improved. Mild diffuse interstitial   prominence.    Possible trace left pleural effusion.    No pneumothorax.    The cardiac silhouette is unchanged. HPI:  71 F from [facility] walks with a walker with PMH of CAD, A Fib(not on AC or rate control medication), Bronchoalveolar Ca (s/p Wedge resection in ), IDDM, COPD (never intubated on O2 3L), GERD, HLD and HTN was sent from [place] for fever and cough since last 2 days. Patient states that she started to have cough associated with mild whitish sputum. Patient [place] a fever in [place]. Patient also complaints of mild upper abdominal discomfort while coughing.     ID consult was called to evaluate for post-obstructive pneumonia secondary to history of lung CA.  +CT for L>R basilar opacities.  Has not had any recurrent fevers for past 24 hours; wbc has increased to 16.0, but also started on steroids yesterday.  At present patient is doing better, but still c/o lingering cough with whitish sputum production along with mild dyspnea with exertion..    REVIEW OF SYSTEMS:  [  ] Not able to illicit  General: no fevers no malaise	  Chest: +cough +GARCIA no CP  GI: no nvd no abdominal pain	  : no urinary symptoms   Skin: no rashes	  Musculoskeletal: no LBP no trauma	  Neuro: no ha's no dizziness    PAST MEDICAL & SURGICAL HISTORY:  Lung cancer: wedge resection of left lung   Seborrheic dermatitis  OA (osteoarthritis)  Hypercholesteremia  HTN (hypertension)  GERD (gastroesophageal reflux disease)  Constipation  DM (diabetes mellitus)  Cataract  CAD (coronary artery disease)  Bronchoalveolar carcinoma  ACS (acute coronary syndrome)  COPD (chronic obstructive pulmonary disease)  S/P ovarian cystectomy      ALLERGIES: No Known Allergies      MEDS:  ALBUTerol/ipratropium for Nebulization 3 milliLiter(s) Nebulizer every 6 hours  aspirin enteric coated 81 milliGRAM(s) Oral daily  atorvastatin 20 milliGRAM(s) Oral at bedtime  buDESOnide 160 MICROgram(s)/formoterol 4.5 MICROgram(s) Inhaler 2 Puff(s) Inhalation two times a day  ergocalciferol 63371 Unit(s) Oral <User Schedule>  ferrous    sulfate 325 milliGRAM(s) Oral daily  folic acid 1 milliGRAM(s) Oral daily  insulin lispro (HumaLOG) corrective regimen sliding scale   SubCutaneous Before meals and at bedtime  lactulose Syrup 10 Gram(s) Oral two times a day  latanoprost 0.005% Ophthalmic Solution 1 Drop(s) Both EYES at bedtime  methylPREDNISolone sodium succinate Injectable 40 milliGRAM(s) IV Push every 8 hours  multivitamin 1 Tablet(s) Oral daily  petrolatum Ophthalmic Ointment 1 Application(s) Both EYES three times a day  piperacillin/tazobactam IVPB. 3.375 Gram(s) IV Intermittent every 8 hours (10/15)  potassium phosphate IVPB 15 milliMole(s) IV Intermittent once  sodium chloride 0.65% Nasal 1 Spray(s) Both Nostrils three times a day PRN      SOCIAL HISTORY:  Smoker: none    FAMILY HISTORY:  No pertinent family history in first degree relatives      VITALS:  Vital Signs Last 24 Hrs  T(C): 36.5 (16 Oct 2017 05:17), Max: 39.2 (15 Oct 2017 10:52)  T(F): 97.7 (16 Oct 2017 05:17), Max: 102.5 (15 Oct 2017 10:52)  HR: 75 (16 Oct 2017 05:17) (18 - 88)  BP: 141/49 (16 Oct 2017 05:17) (107/62 - 141/49)  BP(mean): --  RR: 169 (16 Oct 2017 05:17) (17 - 169)  SpO2: 97% (16 Oct 2017 05:17) (95% - 100%)    PHYSICAL EXAM:  Constitutional: well developed female in NAD  HEENT: normocephalic with moist oral mucosa  Neck: supple no LN's no JVD  Respiratory: +rales of right mid to lower lung zones, no BS and dullness of left lung base  Cardiovascular: S1 S2 reg +loud systolic murmur   Gastrointestinal: soft, nondistended abdomen with +BS; nontender  Extremities: no edema  Skin: no rashes  Ortho: no jt swelling  Neuro: AAO x 3    LABS/DIAGNOSTIC TESTS:                        10.2   16.0  )-----------( 128      ( 16 Oct 2017 07:10 )             32.8     WBC Count: 16.0 K/uL (10-16 @ 07:10)  WBC Count: 9.3 K/uL (10-15 @ 11:29)    10-16    140  |  107  |  21<H>  ----------------------------<  294<H>  4.3   |  26  |  1.32<H> 1.27    Ca    8.8      16 Oct 2017 07:10  Phos  2.0     10  Mg     2.2     10-16    TPro  6.6  /  Alb  2.9<L>  /  TBili  0.3  /  DBili  x   /  AST  22  /  ALT  22  /  AlkPhos  61  10-16    Urinalysis Basic - ( 15 Oct 2017 11:48 )    Color: Yellow / Appearance: Clear / S.005 / pH: x  Gluc: x / Ketone: Negative  / Bili: Negative / Urobili: Negative   Blood: x / Protein: 100 / Nitrite: Negative   Leuk Esterase: Small / RBC: 2-5 /HPF / WBC 3-5 /HPF   Sq Epi: x / Non Sq Epi: Few / Bacteria: Few /HPF      LIVER FUNCTIONS - ( 16 Oct 2017 07:10 )  Alb: 2.9 g/dL / Pro: 6.6 g/dL / ALK PHOS: 61 U/L / ALT: 22 U/L DA / AST: 22 U/L / GGT: x             Lactate, Blood: 1.2 mmol/L (10-15 @ 11:29)    Hemoglobin A1C, Whole Blood (10..17 @ 10:58)    Hemoglobin A1C, Whole Blood: 6.4      CULTURES:   10/15 BC - pending  10/15 UC - pending      RADIOLOGY:  EXAM:  CT ABDOMEN AND PELVIS IC                        PROCEDURE DATE:  10/15/2017    INTERPRETATION:  CT ABDOMEN AND PELVIS WITH IV CONTRAST    CLINICAL STATEMENT: upper abd pain, fever.    COMPARISON: CT abdomen pelvis 2016.    TECHNIQUE: CT scan of the abdomen and pelvis was performed with IV,   without oral contrast. Multiplanar reformations were made.    FINDINGS:  Heterogeneous opacities in the lung bases, worst in the left lower lobe,   likely infectious/inflammatory. Trace left pleural effusion. Mitral   annular calcifications. Questionable mediastinal lymph node.    The liver, gallbladder, pancreas, spleen and adrenal glands are   unremarkable.    Kidneys enhance symmetrically. No hydroureteronephrosis. Subcentimeter  hypodensity in the left kidney upper pole is too small to characterize.    There is underdistention of the stomach, but there appears to be   thickening of the gastric antrum and soft tissue nodularity; correlation   with upper endoscopy is recommended. GI tract is unobstructed. Fluid in   the right colon; stool in the left colon. Appendix is unremarkable. No   free air or ascites.    No bulky adenopathy. Normal caliber abdominal aorta. Mild atherosclerosis.    Urinary bladder is unremarkable. Uterus and adnexae are grossly   unremarkable. No free pelvic fluid.    Bones are osteopenic with degenerative changes in the spine. Tiny   fat-containing umbilical hernia.    IMPRESSION:  Despite underdistention of the stomach, there appears to be thickening of   the gastric antrum and soft tissue nodularity; correlation with upper   endoscopy is recommended.    Heterogeneous opacities in the lung bases, worst in the left lower lobe,   likely infectious/inflammatory.          XAM:  CHEST SINGLE AP OR PA                        PROCEDURE DATE:  10/15/2017    INTERPRETATION:  PORTABLE CHEST X-RAY    HISTORY: sepsis.    COMPARISON: 2016.    FINDINGS/  IMPRESSION:  Improved, though persistent, left basilar patchy opacities, likely   infectious. Right lung opacities have improved. Mild diffuse interstitial   prominence.    Possible trace left pleural effusion.    No pneumothorax.    The cardiac silhouette is unchanged. HPI:  71 F from [facility] walks with a walker with PMH of CAD, A Fib(not on AC or rate control medication), Bronchoalveolar Ca (s/p Wedge resection in ), IDDM, COPD (never intubated on O2 3L), GERD, HLD and HTN was sent from [place] for fever and cough since last 2 days. Patient states that she started to have cough associated with mild whitish sputum. Patient also [place] a fever in [place]. Patient also complaints of mild upper abdominal discomfort while coughing.     ID consult was called to evaluate for post-obstructive pneumonia secondary to history of lung CA.  +CT for L>R basilar opacities.  Has not had any recurrent fevers for past 24 hours; wbc has increased to 16.0, but also started on steroids yesterday.  At present patient is doing better, but still c/o lingering cough with whitish sputum production along with mild dyspnea with exertion.  Admits abdominal pain from cough has resolved.    REVIEW OF SYSTEMS:  [  ] Not able to illicit  General: no fevers no malaise	  Chest: +cough +GARCIA no CP  GI: no nvd no abdominal pain	  : no urinary symptoms   Skin: no rashes	  Musculoskeletal: no LBP no trauma	  Neuro: no ha's no dizziness    PAST MEDICAL & SURGICAL HISTORY:  Lung cancer: wedge resection of left lung   Seborrheic dermatitis  OA (osteoarthritis)  Hypercholesteremia  HTN (hypertension)  GERD (gastroesophageal reflux disease)  Constipation  DM (diabetes mellitus)  Cataract  CAD (coronary artery disease)  Bronchoalveolar carcinoma  ACS (acute coronary syndrome)  COPD (chronic obstructive pulmonary disease)  S/P ovarian cystectomy      ALLERGIES: No Known Allergies      MEDS:  ALBUTerol/ipratropium for Nebulization 3 milliLiter(s) Nebulizer every 6 hours  aspirin enteric coated 81 milliGRAM(s) Oral daily  atorvastatin 20 milliGRAM(s) Oral at bedtime  buDESOnide 160 MICROgram(s)/formoterol 4.5 MICROgram(s) Inhaler 2 Puff(s) Inhalation two times a day  ergocalciferol 99807 Unit(s) Oral <User Schedule>  ferrous    sulfate 325 milliGRAM(s) Oral daily  folic acid 1 milliGRAM(s) Oral daily  insulin lispro (HumaLOG) corrective regimen sliding scale   SubCutaneous Before meals and at bedtime  lactulose Syrup 10 Gram(s) Oral two times a day  latanoprost 0.005% Ophthalmic Solution 1 Drop(s) Both EYES at bedtime  methylPREDNISolone sodium succinate Injectable 40 milliGRAM(s) IV Push every 8 hours  multivitamin 1 Tablet(s) Oral daily  petrolatum Ophthalmic Ointment 1 Application(s) Both EYES three times a day  piperacillin/tazobactam IVPB. 3.375 Gram(s) IV Intermittent every 8 hours (10/15)  potassium phosphate IVPB 15 milliMole(s) IV Intermittent once  sodium chloride 0.65% Nasal 1 Spray(s) Both Nostrils three times a day PRN      SOCIAL HISTORY:  Smoker: none    FAMILY HISTORY:  No pertinent family history in first degree relatives      VITALS:  Vital Signs Last 24 Hrs  T(C): 36.5 (16 Oct 2017 05:17), Max: 39.2 (15 Oct 2017 10:52)  T(F): 97.7 (16 Oct 2017 05:17), Max: 102.5 (15 Oct 2017 10:52)  HR: 75 (16 Oct 2017 05:17) (18 - 88)  BP: 141/49 (16 Oct 2017 05:17) (107/62 - 141/49)  BP(mean): --  RR: 169 (16 Oct 2017 05:17) (17 - 169)  SpO2: 97% (16 Oct 2017 05:17) (95% - 100%)    PHYSICAL EXAM:  Constitutional: well developed female in NAD  HEENT: normocephalic with moist oral mucosa  Neck: supple no LN's no JVD  Respiratory: +rales of right mid to lower lung zones, no BS and dullness of left lung base  Cardiovascular: S1 S2 reg +loud systolic murmur   Gastrointestinal: soft, nondistended abdomen with +BS; nontender  Extremities: no edema  Skin: no rashes  Ortho: no jt swelling  Neuro: AAO x 3    LABS/DIAGNOSTIC TESTS:                        10.2   16.0  )-----------( 128      ( 16 Oct 2017 07:10 )             32.8     WBC Count: 16.0 K/uL (10-16 @ 07:10)  WBC Count: 9.3 K/uL (10-15 @ 11:29)    10-16    140  |  107  |  21<H>  ----------------------------<  294<H>  4.3   |  26  |  1.32<H> 1.27    Ca    8.8      16 Oct 2017 07:10  Phos  2.0     10-  Mg     2.2     10-    TPro  6.6  /  Alb  2.9<L>  /  TBili  0.3  /  DBili  x   /  AST  22  /  ALT  22  /  AlkPhos  61  10-    Urinalysis Basic - ( 15 Oct 2017 11:48 )    Color: Yellow / Appearance: Clear / S.005 / pH: x  Gluc: x / Ketone: Negative  / Bili: Negative / Urobili: Negative   Blood: x / Protein: 100 / Nitrite: Negative   Leuk Esterase: Small / RBC: 2-5 /HPF / WBC 3-5 /HPF   Sq Epi: x / Non Sq Epi: Few / Bacteria: Few /HPF      LIVER FUNCTIONS - ( 16 Oct 2017 07:10 )  Alb: 2.9 g/dL / Pro: 6.6 g/dL / ALK PHOS: 61 U/L / ALT: 22 U/L DA / AST: 22 U/L / GGT: x             Lactate, Blood: 1.2 mmol/L (10-15 @ 11:29)    Hemoglobin A1C, Whole Blood (10.16.17 @ 10:58)    Hemoglobin A1C, Whole Blood: 6.4      CULTURES:   10/15 BC - pending  10/15 UC - pending      RADIOLOGY:  EXAM:  CT ABDOMEN AND PELVIS IC                        PROCEDURE DATE:  10/15/2017    INTERPRETATION:  CT ABDOMEN AND PELVIS WITH IV CONTRAST    CLINICAL STATEMENT: upper abd pain, fever.    COMPARISON: CT abdomen pelvis 2016.    TECHNIQUE: CT scan of the abdomen and pelvis was performed with IV,   without oral contrast. Multiplanar reformations were made.    FINDINGS:  Heterogeneous opacities in the lung bases, worst in the left lower lobe,   likely infectious/inflammatory. Trace left pleural effusion. Mitral   annular calcifications. Questionable mediastinal lymph node.    The liver, gallbladder, pancreas, spleen and adrenal glands are   unremarkable.    Kidneys enhance symmetrically. No hydroureteronephrosis. Subcentimeter  hypodensity in the left kidney upper pole is too small to characterize.    There is underdistention of the stomach, but there appears to be   thickening of the gastric antrum and soft tissue nodularity; correlation   with upper endoscopy is recommended. GI tract is unobstructed. Fluid in   the right colon; stool in the left colon. Appendix is unremarkable. No   free air or ascites.    No bulky adenopathy. Normal caliber abdominal aorta. Mild atherosclerosis.    Urinary bladder is unremarkable. Uterus and adnexae are grossly   unremarkable. No free pelvic fluid.    Bones are osteopenic with degenerative changes in the spine. Tiny   fat-containing umbilical hernia.    IMPRESSION:  Despite underdistention of the stomach, there appears to be thickening of   the gastric antrum and soft tissue nodularity; correlation with upper   endoscopy is recommended.    Heterogeneous opacities in the lung bases, worst in the left lower lobe,   likely infectious/inflammatory.          XAM:  CHEST SINGLE AP OR PA                        PROCEDURE DATE:  10/15/2017    INTERPRETATION:  PORTABLE CHEST X-RAY    HISTORY: sepsis.    COMPARISON: 2016.    FINDINGS/  IMPRESSION:  Improved, though persistent, left basilar patchy opacities, likely   infectious. Right lung opacities have improved. Mild diffuse interstitial   prominence.    Possible trace left pleural effusion.    No pneumothorax.    The cardiac silhouette is unchanged. HPI:  71 F from [facility] walks with a walker with PMH of CAD, A Fib(not on AC or rate control medication), Bronchoalveolar Ca (s/p Wedge resection in ), IDDM, COPD (never intubated on O2 3L), GERD, HLD and HTN was sent [place] for fever and cough since last 2 days. Patient states that she started to have cough associated with mild whitish sputum. Patient [place] a fever in [place]. Patient also complaints of mild upper abdominal discomfort while coughing.     ID consult was called to evaluate for post-obstructive pneumonia secondary to history of lung CA.  +CT for L>R basilar opacities.  Has not had any recurrent fevers for past 24 hours; wbc has increased to 16.0, but also started on steroids yesterday.  At present patient is doing better, but still c/o lingering cough with whitish sputum production along with mild dyspnea with exertion.  Admits abdominal pain from cough has resolved.    REVIEW OF SYSTEMS:  [  ] Not able to illicit  General: no fevers no malaise	  Chest: +cough +GARCIA no CP  GI: no nvd no abdominal pain	  : no urinary symptoms   Skin: no rashes	  Musculoskeletal: no LBP no trauma	  Neuro: no ha's no dizziness    PAST MEDICAL & SURGICAL HISTORY:  Lung cancer: wedge resection of left lung   Seborrheic dermatitis  OA (osteoarthritis)  Hypercholesteremia  HTN (hypertension)  GERD (gastroesophageal reflux disease)  Constipation  DM (diabetes mellitus)  Cataract  CAD (coronary artery disease)  Bronchoalveolar carcinoma  ACS (acute coronary syndrome)  COPD (chronic obstructive pulmonary disease)  S/P ovarian cystectomy      ALLERGIES: No Known Allergies      MEDS:  ALBUTerol/ipratropium for Nebulization 3 milliLiter(s) Nebulizer every 6 hours  aspirin enteric coated 81 milliGRAM(s) Oral daily  atorvastatin 20 milliGRAM(s) Oral at bedtime  buDESOnide 160 MICROgram(s)/formoterol 4.5 MICROgram(s) Inhaler 2 Puff(s) Inhalation two times a day  ergocalciferol 28160 Unit(s) Oral <User Schedule>  ferrous    sulfate 325 milliGRAM(s) Oral daily  folic acid 1 milliGRAM(s) Oral daily  insulin lispro (HumaLOG) corrective regimen sliding scale   SubCutaneous Before meals and at bedtime  lactulose Syrup 10 Gram(s) Oral two times a day  latanoprost 0.005% Ophthalmic Solution 1 Drop(s) Both EYES at bedtime  methylPREDNISolone sodium succinate Injectable 40 milliGRAM(s) IV Push every 8 hours  multivitamin 1 Tablet(s) Oral daily  petrolatum Ophthalmic Ointment 1 Application(s) Both EYES three times a day  piperacillin/tazobactam IVPB. 3.375 Gram(s) IV Intermittent every 8 hours (10/15)  potassium phosphate IVPB 15 milliMole(s) IV Intermittent once  sodium chloride 0.65% Nasal 1 Spray(s) Both Nostrils three times a day PRN      SOCIAL HISTORY:  Smoker: none    FAMILY HISTORY:  No pertinent family history in first degree relatives      VITALS:  Vital Signs Last 24 Hrs  T(C): 36.5 (16 Oct 2017 05:17), Max: 39.2 (15 Oct 2017 10:52)  T(F): 97.7 (16 Oct 2017 05:17), Max: 102.5 (15 Oct 2017 10:52)  HR: 75 (16 Oct 2017 05:17) (18 - 88)  BP: 141/49 (16 Oct 2017 05:17) (107/62 - 141/49)  BP(mean): --  RR: 169 (16 Oct 2017 05:17) (17 - 169)  SpO2: 97% (16 Oct 2017 05:17) (95% - 100%)    PHYSICAL EXAM:  Constitutional: well developed female in NAD  HEENT: normocephalic with moist oral mucosa  Neck: supple no LN's no JVD  Respiratory: +rales of right mid to lower lung zones and left mid lung zone; no BS of left lung base  Cardiovascular: S1 S2 reg +loud systolic murmur   Gastrointestinal: soft, nondistended abdomen with +BS; nontender  Extremities: no edema  Skin: no rashes  Ortho: no jt swelling  Neuro: AAO x 3    LABS/DIAGNOSTIC TESTS:                        10.2   16.0  )-----------( 128      ( 16 Oct 2017 07:10 )             32.8     WBC Count: 16.0 K/uL (10-16 @ 07:10)  WBC Count: 9.3 K/uL (10-15 @ 11:29)    10-16    140  |  107  |  21<H>  ----------------------------<  294<H>  4.3   |  26  |  1.32<H> 1.27    Ca    8.8      16 Oct 2017 07:10  Phos  2.0     10-16  Mg     2.2     10-    TPro  6.6  /  Alb  2.9<L>  /  TBili  0.3  /  DBili  x   /  AST  22  /  ALT  22  /  AlkPhos  61  10-    Urinalysis Basic - ( 15 Oct 2017 11:48 )    Color: Yellow / Appearance: Clear / S.005 / pH: x  Gluc: x / Ketone: Negative  / Bili: Negative / Urobili: Negative   Blood: x / Protein: 100 / Nitrite: Negative   Leuk Esterase: Small / RBC: 2-5 /HPF / WBC 3-5 /HPF   Sq Epi: x / Non Sq Epi: Few / Bacteria: Few /HPF      LIVER FUNCTIONS - ( 16 Oct 2017 07:10 )  Alb: 2.9 g/dL / Pro: 6.6 g/dL / ALK PHOS: 61 U/L / ALT: 22 U/L DA / AST: 22 U/L / GGT: x             Lactate, Blood: 1.2 mmol/L (10-15 @ 11:29)    Hemoglobin A1C, Whole Blood (10.16.17 @ 10:58)    Hemoglobin A1C, Whole Blood: 6.4      CULTURES:   10/15 BC - pending  10/15 UC - pending      RADIOLOGY:  EXAM:  CT ABDOMEN AND PELVIS IC                        PROCEDURE DATE:  10/15/2017    INTERPRETATION:  CT ABDOMEN AND PELVIS WITH IV CONTRAST    CLINICAL STATEMENT: upper abd pain, fever.    COMPARISON: CT abdomen pelvis 2016.    TECHNIQUE: CT scan of the abdomen and pelvis was performed with IV,   without oral contrast. Multiplanar reformations were made.    FINDINGS:  Heterogeneous opacities in the lung bases, worst in the left lower lobe,   likely infectious/inflammatory. Trace left pleural effusion. Mitral   annular calcifications. Questionable mediastinal lymph node.    The liver, gallbladder, pancreas, spleen and adrenal glands are   unremarkable.    Kidneys enhance symmetrically. No hydroureteronephrosis. Subcentimeter  hypodensity in the left kidney upper pole is too small to characterize.    There is underdistention of the stomach, but there appears to be   thickening of the gastric antrum and soft tissue nodularity; correlation   with upper endoscopy is recommended. GI tract is unobstructed. Fluid in   the right colon; stool in the left colon. Appendix is unremarkable. No   free air or ascites.    No bulky adenopathy. Normal caliber abdominal aorta. Mild atherosclerosis.    Urinary bladder is unremarkable. Uterus and adnexae are grossly   unremarkable. No free pelvic fluid.    Bones are osteopenic with degenerative changes in the spine. Tiny   fat-containing umbilical hernia.    IMPRESSION:  Despite underdistention of the stomach, there appears to be thickening of   the gastric antrum and soft tissue nodularity; correlation with upper   endoscopy is recommended.    Heterogeneous opacities in the lung bases, worst in the left lower lobe,   likely infectious/inflammatory.          XAM:  CHEST SINGLE AP OR PA                        PROCEDURE DATE:  10/15/2017    INTERPRETATION:  PORTABLE CHEST X-RAY    HISTORY: sepsis.    COMPARISON: 2016.    FINDINGS/  IMPRESSION:  Improved, though persistent, left basilar patchy opacities, likely   infectious. Right lung opacities have improved. Mild diffuse interstitial   prominence.    Possible trace left pleural effusion.    No pneumothorax.    The cardiac silhouette is unchanged.

## 2017-10-16 NOTE — PROGRESS NOTE ADULT - PROBLEM SELECTOR PLAN 1
-Patient presented with cough and fever, in setting of h/o lung cancer. Will treat for possible post-obstructive pneumonia.  -Labs negative for leucocytosis but left shift is present  -qSofa score: 0  -Lactate 1.2  -CXR showed L lung opacity.   -Continue with Zosyn day#2  -Blood cultures negative  -F/u Procalcitonin, Legionella and streptococcal antigen  -Xray showed patchy opacities, patient will need CT chest with contrast to rule out reoccurrence of carcinoma, but could not be done for 48 hours as patient already have CT abdomen with iv contrast done on admission.    -Dr Duval Possible post-obstructive pneumonia vS HCAP  -CXR showed L lung opacity.   -Continue with Zosyn day#2  -Blood cultures negative  -F/u Procalcitonin, Legionella and streptococcal antigen  -Xray showed patchy opacities, patient will need CT chest with contrast to rule out reoccurrence of carcinoma, but could not be done for 48 hours as patient already have CT abdomen with iv contrast done on admission.    -Dr Duval

## 2017-10-16 NOTE — PROGRESS NOTE ADULT - PROBLEM SELECTOR PLAN 6
-S/p resection, currently on chemotherapy  -Holding oral medication while inpatient.   -Restart on discharge.

## 2017-10-16 NOTE — PROGRESS NOTE ADULT - PROBLEM SELECTOR PLAN 2
-Patient minimally wheezing on exam s/p Solu-medrol 125 in ED  -Will start Solumedrol 40 q8  -Taper steroid as clinically indicated -Solumedrol 40 q8  -Taper steroid as clinically indicated

## 2017-10-16 NOTE — PROGRESS NOTE ADULT - SUBJECTIVE AND OBJECTIVE BOX
PGY 1 Note discussed with supervising resident and primary attending    Patient is a 71y old  Female who presents with a chief complaint of Cough and Fever (15 Oct 2017 14:32)      INTERVAL HPI/OVERNIGHT EVENTS: Patient seen ad examined at bedside, no overnight events. Lying comfortably in bed. No wheezing appreciated upon auscultation of lungs. However, inspiratory crackles were appreciated in left lung base.  Procalcitonin levels pending, Streptococcal Ag pending, Legionella Ag        MEDICATIONS  (STANDING):  ALBUTerol/ipratropium for Nebulization 3 milliLiter(s) Nebulizer every 6 hours  aspirin enteric coated 81 milliGRAM(s) Oral daily  atorvastatin 20 milliGRAM(s) Oral at bedtime  buDESOnide 160 MICROgram(s)/formoterol 4.5 MICROgram(s) Inhaler 2 Puff(s) Inhalation two times a day  ergocalciferol 51776 Unit(s) Oral <User Schedule>  ferrous    sulfate 325 milliGRAM(s) Oral daily  folic acid 1 milliGRAM(s) Oral daily  insulin lispro (HumaLOG) corrective regimen sliding scale   SubCutaneous Before meals and at bedtime  lactulose Syrup 10 Gram(s) Oral two times a day  latanoprost 0.005% Ophthalmic Solution 1 Drop(s) Both EYES at bedtime  methylPREDNISolone sodium succinate Injectable 40 milliGRAM(s) IV Push every 8 hours  multivitamin 1 Tablet(s) Oral daily  petrolatum Ophthalmic Ointment 1 Application(s) Both EYES three times a day  piperacillin/tazobactam IVPB. 3.375 Gram(s) IV Intermittent every 8 hours    MEDICATIONS  (PRN):  benzocaine 15 mG/menthol 3.6 mG Lozenge 1 Lozenge Oral every 6 hours PRN Sore Throat  guaiFENesin/dextromethorphan  Syrup 10 milliLiter(s) Oral every 4 hours PRN Cough  sodium chloride 0.65% Nasal 1 Spray(s) Both Nostrils three times a day PRN Nasal Congestion      __________________________________________________  REVIEW OF SYSTEMS:    CONSTITUTIONAL: No fever,   EYES: no acute visual disturbances  NECK: No pain or stiffness  RESPIRATORY: No cough; No shortness of breath  CARDIOVASCULAR: No chest pain, no palpitations  GASTROINTESTINAL: No pain. No nausea or vomiting; No diarrhea   NEUROLOGICAL: No headache or numbness, no tremors  MUSCULOSKELETAL: No joint pain, no muscle pain  GENITOURINARY: no dysuria, no frequency, no hesitancy  PSYCHIATRY: no depression , no anxiety  ALL OTHER  ROS negative        Vital Signs Last 24 Hrs  T(C): 37.5 (16 Oct 2017 14:11), Max: 37.5 (16 Oct 2017 14:11)  T(F): 99.5 (16 Oct 2017 14:11), Max: 99.5 (16 Oct 2017 14:11)  HR: 87 (16 Oct 2017 14:11) (18 - 88)  BP: 159/73 (16 Oct 2017 14:11) (122/54 - 159/73)  BP(mean): --  RR: 16 (16 Oct 2017 14:11) (16 - 169)  SpO2: 95% (16 Oct 2017 14:11) (95% - 100%)    ________________________________________________  PHYSICAL EXAM:  GENERAL: NAD  HEENT: Normocephalic;  conjunctivae and sclerae clear; moist mucous membranes;   NECK : supple  CHEST/LUNG: left lower lung crackles, no wheezing.  HEART: S1 S2  nL, no M/G/R, RRR  ABDOMEN: Soft, Nontender, Nondistended; Bowel sounds present  EXTREMITIES: no cyanosis; no edema; no calf tenderness  SKIN: warm and dry; no rash  NERVOUS SYSTEM:  AAOx3    _________________________________________________  LABS:                        10.2   16.0  )-----------( 128      ( 16 Oct 2017 07:10 )             32.8     10-16    140  |  107  |  21<H>  ----------------------------<  294<H>  4.3   |  26  |  1.32<H>    Ca    8.8      16 Oct 2017 07:10  Phos  2.0     10-  Mg     2.2     10-16    TPro  6.6  /  Alb  2.9<L>  /  TBili  0.3  /  DBili  x   /  AST  22  /  ALT  22  /  AlkPhos  61  10-      Urinalysis Basic - ( 15 Oct 2017 11:48 )    Color: Yellow / Appearance: Clear / S.005 / pH: x  Gluc: x / Ketone: Negative  / Bili: Negative / Urobili: Negative   Blood: x / Protein: 100 / Nitrite: Negative   Leuk Esterase: Small / RBC: 2-5 /HPF / WBC 3-5 /HPF   Sq Epi: x / Non Sq Epi: Few / Bacteria: Few /HPF      CAPILLARY BLOOD GLUCOSE  336 (16 Oct 2017 11:50)  251 (16 Oct 2017 08:41)      POCT Blood Glucose.: 437 mg/dL (15 Oct 2017 21:21)  POCT Blood Glucose.: 233 mg/dL (15 Oct 2017 16:06)        RADIOLOGY & ADDITIONAL TESTS:    Imaging Personally Reviewed:  YES    Consultant(s) Notes Reviewed:   YES    Care Discussed with Consultants : YES    Plan of care was discussed with patient and /or primary care giver; all questions and concerns were addressed and care was aligned with patient's wishes. YES

## 2017-10-16 NOTE — PROGRESS NOTE ADULT - PROBLEM SELECTOR PLAN 3
-Holding antihypertensive as patient is on risk of becoming septic   -Reinstate BP medications if clinically indicated

## 2017-10-16 NOTE — PROGRESS NOTE ADULT - PROBLEM SELECTOR PLAN 5
-Continue Protonix  -CT scan showed gastric antrum thickening  -Patient will likely need endoscopy, can be done as outpatient.

## 2017-10-16 NOTE — PROGRESS NOTE ADULT - PROBLEM SELECTOR PLAN 4
-Started sliding scale and follow FG  -Basal insulin not started as blood glucose was 136 on admission  -Will monitor FS today and start basal insulin if indicated, tomorrow.  -Hba1c: 6.4% -HSS, FS AC/HS  -3U Humalog TID before meals, 8U Lantus at bedtime.  -Hba1c: 6.4%

## 2017-10-17 LAB
ANION GAP SERPL CALC-SCNC: 7 MMOL/L — SIGNIFICANT CHANGE UP (ref 5–17)
ANION GAP SERPL CALC-SCNC: 8 MMOL/L — SIGNIFICANT CHANGE UP (ref 5–17)
BASOPHILS # BLD AUTO: 0 K/UL — SIGNIFICANT CHANGE UP (ref 0–0.2)
BASOPHILS NFR BLD AUTO: 0.1 % — SIGNIFICANT CHANGE UP (ref 0–2)
BUN SERPL-MCNC: 22 MG/DL — HIGH (ref 7–18)
BUN SERPL-MCNC: 23 MG/DL — HIGH (ref 7–18)
CALCIUM SERPL-MCNC: 9.2 MG/DL — SIGNIFICANT CHANGE UP (ref 8.4–10.5)
CALCIUM SERPL-MCNC: 9.9 MG/DL — SIGNIFICANT CHANGE UP (ref 8.4–10.5)
CHLORIDE SERPL-SCNC: 107 MMOL/L — SIGNIFICANT CHANGE UP (ref 96–108)
CHLORIDE SERPL-SCNC: 108 MMOL/L — SIGNIFICANT CHANGE UP (ref 96–108)
CO2 SERPL-SCNC: 25 MMOL/L — SIGNIFICANT CHANGE UP (ref 22–31)
CO2 SERPL-SCNC: 26 MMOL/L — SIGNIFICANT CHANGE UP (ref 22–31)
CREAT SERPL-MCNC: 1.34 MG/DL — HIGH (ref 0.5–1.3)
CREAT SERPL-MCNC: 1.41 MG/DL — HIGH (ref 0.5–1.3)
EOSINOPHIL # BLD AUTO: 0 K/UL — SIGNIFICANT CHANGE UP (ref 0–0.5)
EOSINOPHIL NFR BLD AUTO: 0 % — SIGNIFICANT CHANGE UP (ref 0–6)
GLUCOSE BLDC GLUCOMTR-MCNC: 182 MG/DL — HIGH (ref 70–99)
GLUCOSE BLDC GLUCOMTR-MCNC: 266 MG/DL — HIGH (ref 70–99)
GLUCOSE BLDC GLUCOMTR-MCNC: 287 MG/DL — HIGH (ref 70–99)
GLUCOSE BLDC GLUCOMTR-MCNC: 308 MG/DL — HIGH (ref 70–99)
GLUCOSE BLDC GLUCOMTR-MCNC: 332 MG/DL — HIGH (ref 70–99)
GLUCOSE BLDC GLUCOMTR-MCNC: 333 MG/DL — HIGH (ref 70–99)
GLUCOSE SERPL-MCNC: 346 MG/DL — HIGH (ref 70–99)
GLUCOSE SERPL-MCNC: 359 MG/DL — HIGH (ref 70–99)
HCT VFR BLD CALC: 34.8 % — SIGNIFICANT CHANGE UP (ref 34.5–45)
HGB BLD-MCNC: 10.3 G/DL — LOW (ref 11.5–15.5)
LYMPHOCYTES # BLD AUTO: 0.7 K/UL — LOW (ref 1–3.3)
LYMPHOCYTES # BLD AUTO: 4.2 % — LOW (ref 13–44)
MAGNESIUM SERPL-MCNC: 2.4 MG/DL — SIGNIFICANT CHANGE UP (ref 1.6–2.6)
MCHC RBC-ENTMCNC: 27.6 PG — SIGNIFICANT CHANGE UP (ref 27–34)
MCHC RBC-ENTMCNC: 29.6 GM/DL — LOW (ref 32–36)
MCV RBC AUTO: 93.2 FL — SIGNIFICANT CHANGE UP (ref 80–100)
MONOCYTES # BLD AUTO: 0.3 K/UL — SIGNIFICANT CHANGE UP (ref 0–0.9)
MONOCYTES NFR BLD AUTO: 2 % — SIGNIFICANT CHANGE UP (ref 2–14)
NEUTROPHILS # BLD AUTO: 15.8 K/UL — HIGH (ref 1.8–7.4)
NEUTROPHILS NFR BLD AUTO: 93.6 % — HIGH (ref 43–77)
PHOSPHATE SERPL-MCNC: 3.2 MG/DL — SIGNIFICANT CHANGE UP (ref 2.5–4.5)
PLATELET # BLD AUTO: 157 K/UL — SIGNIFICANT CHANGE UP (ref 150–400)
POTASSIUM SERPL-MCNC: 4.5 MMOL/L — SIGNIFICANT CHANGE UP (ref 3.5–5.3)
POTASSIUM SERPL-MCNC: 5.4 MMOL/L — HIGH (ref 3.5–5.3)
POTASSIUM SERPL-SCNC: 4.5 MMOL/L — SIGNIFICANT CHANGE UP (ref 3.5–5.3)
POTASSIUM SERPL-SCNC: 5.4 MMOL/L — HIGH (ref 3.5–5.3)
RBC # BLD: 3.74 M/UL — LOW (ref 3.8–5.2)
RBC # FLD: 16.2 % — HIGH (ref 10.3–14.5)
S PNEUM AG SER QL: SIGNIFICANT CHANGE UP
SODIUM SERPL-SCNC: 140 MMOL/L — SIGNIFICANT CHANGE UP (ref 135–145)
SODIUM SERPL-SCNC: 141 MMOL/L — SIGNIFICANT CHANGE UP (ref 135–145)
T4 FREE SERPL-MCNC: 0.9 NG/DL — SIGNIFICANT CHANGE UP (ref 0.9–1.8)
WBC # BLD: 16.8 K/UL — HIGH (ref 3.8–10.5)
WBC # FLD AUTO: 16.8 K/UL — HIGH (ref 3.8–10.5)

## 2017-10-17 RX ORDER — INSULIN HUMAN 100 [IU]/ML
10 INJECTION, SOLUTION SUBCUTANEOUS ONCE
Qty: 0 | Refills: 0 | Status: COMPLETED | OUTPATIENT
Start: 2017-10-17 | End: 2017-10-17

## 2017-10-17 RX ORDER — AMLODIPINE BESYLATE 2.5 MG/1
5 TABLET ORAL ONCE
Qty: 0 | Refills: 0 | Status: COMPLETED | OUTPATIENT
Start: 2017-10-17 | End: 2017-10-17

## 2017-10-17 RX ORDER — SODIUM POLYSTYRENE SULFONATE 4.1 MEQ/G
30 POWDER, FOR SUSPENSION ORAL ONCE
Qty: 0 | Refills: 0 | Status: COMPLETED | OUTPATIENT
Start: 2017-10-17 | End: 2017-10-17

## 2017-10-17 RX ORDER — CALCIUM GLUCONATE 100 MG/ML
1 VIAL (ML) INTRAVENOUS ONCE
Qty: 0 | Refills: 0 | Status: COMPLETED | OUTPATIENT
Start: 2017-10-17 | End: 2017-10-17

## 2017-10-17 RX ORDER — INSULIN GLARGINE 100 [IU]/ML
10 INJECTION, SOLUTION SUBCUTANEOUS AT BEDTIME
Qty: 0 | Refills: 0 | Status: DISCONTINUED | OUTPATIENT
Start: 2017-10-17 | End: 2017-10-20

## 2017-10-17 RX ORDER — INSULIN LISPRO 100/ML
5 VIAL (ML) SUBCUTANEOUS
Qty: 0 | Refills: 0 | Status: DISCONTINUED | OUTPATIENT
Start: 2017-10-17 | End: 2017-10-20

## 2017-10-17 RX ORDER — IPRATROPIUM/ALBUTEROL SULFATE 18-103MCG
3 AEROSOL WITH ADAPTER (GRAM) INHALATION ONCE
Qty: 0 | Refills: 0 | Status: COMPLETED | OUTPATIENT
Start: 2017-10-17 | End: 2017-10-17

## 2017-10-17 RX ADMIN — INSULIN HUMAN 10 UNIT(S): 100 INJECTION, SOLUTION SUBCUTANEOUS at 09:30

## 2017-10-17 RX ADMIN — Medication 325 MILLIGRAM(S): at 11:36

## 2017-10-17 RX ADMIN — Medication 3 MILLILITER(S): at 09:44

## 2017-10-17 RX ADMIN — Medication 3 MILLILITER(S): at 20:50

## 2017-10-17 RX ADMIN — ATORVASTATIN CALCIUM 20 MILLIGRAM(S): 80 TABLET, FILM COATED ORAL at 21:21

## 2017-10-17 RX ADMIN — Medication 200 GRAM(S): at 11:34

## 2017-10-17 RX ADMIN — Medication 40 MILLIGRAM(S): at 17:01

## 2017-10-17 RX ADMIN — Medication 1 APPLICATION(S): at 05:05

## 2017-10-17 RX ADMIN — Medication 1 TABLET(S): at 11:36

## 2017-10-17 RX ADMIN — INSULIN GLARGINE 10 UNIT(S): 100 INJECTION, SOLUTION SUBCUTANEOUS at 21:21

## 2017-10-17 RX ADMIN — Medication 1 MILLIGRAM(S): at 11:36

## 2017-10-17 RX ADMIN — CEFTRIAXONE 100 GRAM(S): 500 INJECTION, POWDER, FOR SOLUTION INTRAMUSCULAR; INTRAVENOUS at 17:00

## 2017-10-17 RX ADMIN — Medication 40 MILLIGRAM(S): at 05:05

## 2017-10-17 RX ADMIN — Medication 5 UNIT(S): at 11:35

## 2017-10-17 RX ADMIN — Medication 81 MILLIGRAM(S): at 11:39

## 2017-10-17 RX ADMIN — BUDESONIDE AND FORMOTEROL FUMARATE DIHYDRATE 2 PUFF(S): 160; 4.5 AEROSOL RESPIRATORY (INHALATION) at 11:36

## 2017-10-17 RX ADMIN — LATANOPROST 1 DROP(S): 0.05 SOLUTION/ DROPS OPHTHALMIC; TOPICAL at 21:21

## 2017-10-17 RX ADMIN — Medication 3 UNIT(S): at 08:14

## 2017-10-17 RX ADMIN — Medication 1 APPLICATION(S): at 21:20

## 2017-10-17 RX ADMIN — SODIUM POLYSTYRENE SULFONATE 30 GRAM(S): 4.1 POWDER, FOR SUSPENSION ORAL at 11:33

## 2017-10-17 RX ADMIN — Medication 5 UNIT(S): at 17:01

## 2017-10-17 RX ADMIN — AMLODIPINE BESYLATE 5 MILLIGRAM(S): 2.5 TABLET ORAL at 23:04

## 2017-10-17 RX ADMIN — Medication 4: at 11:34

## 2017-10-17 RX ADMIN — Medication 3 MILLILITER(S): at 14:18

## 2017-10-17 RX ADMIN — AZITHROMYCIN 250 MILLIGRAM(S): 500 TABLET, FILM COATED ORAL at 11:36

## 2017-10-17 RX ADMIN — LACTULOSE 10 GRAM(S): 10 SOLUTION ORAL at 05:05

## 2017-10-17 RX ADMIN — Medication 1: at 17:01

## 2017-10-17 RX ADMIN — Medication 3 MILLILITER(S): at 08:49

## 2017-10-17 RX ADMIN — Medication 1 APPLICATION(S): at 13:10

## 2017-10-17 RX ADMIN — Medication 3: at 21:24

## 2017-10-17 RX ADMIN — Medication 4: at 08:14

## 2017-10-17 RX ADMIN — BUDESONIDE AND FORMOTEROL FUMARATE DIHYDRATE 2 PUFF(S): 160; 4.5 AEROSOL RESPIRATORY (INHALATION) at 21:20

## 2017-10-17 NOTE — PROGRESS NOTE ADULT - SUBJECTIVE AND OBJECTIVE BOX
Patient is a 71y old  Female who presents with a chief complaint of cough productive of white sputum and fever (15 Oct 2017 14:32)    Patient is a 71 year old female, with PMH of CAD, ACS, atrial fibrillation, bronchoaveolar cancer (s/p wedge resection ), DM, COPD (on 3L O2 @ assisted living facility; no past intubations), HLD, HTN, and GERD, admitted to the general medical floor for the treatment of left lower lobe pneumonia from the ED, where she presented with a history of fever and cough productive of white sputum. Patient lives at [facility] and is able to ambulate normally with a walker.     ED - Patient arrived at ED 10-15 from [place] home complaining of fever, cough productive of white sputum, and abdominal     INTERVAL HPI/OVERNIGHT EVENTS:    MEDICATIONS  (STANDING):  ALBUTerol/ipratropium for Nebulization 3 milliLiter(s) Nebulizer every 6 hours  aspirin enteric coated 81 milliGRAM(s) Oral daily  atorvastatin 20 milliGRAM(s) Oral at bedtime  azithromycin   Tablet 250 milliGRAM(s) Oral daily  buDESOnide 160 MICROgram(s)/formoterol 4.5 MICROgram(s) Inhaler 2 Puff(s) Inhalation two times a day  calcium gluconate IVPB 1 Gram(s) IV Intermittent once  cefTRIAXone   IVPB 1 Gram(s) IV Intermittent every 24 hours  cefTRIAXone   IVPB      ergocalciferol 63651 Unit(s) Oral <User Schedule>  ferrous    sulfate 325 milliGRAM(s) Oral daily  folic acid 1 milliGRAM(s) Oral daily  insulin glargine Injectable (LANTUS) 10 Unit(s) SubCutaneous at bedtime  insulin lispro (HumaLOG) corrective regimen sliding scale   SubCutaneous Before meals and at bedtime  insulin lispro Injectable (HumaLOG) 5 Unit(s) SubCutaneous three times a day before meals  insulin regular  human recombinant. 10 Unit(s) SubCutaneous once  lactulose Syrup 10 Gram(s) Oral two times a day  latanoprost 0.005% Ophthalmic Solution 1 Drop(s) Both EYES at bedtime  methylPREDNISolone sodium succinate Injectable 40 milliGRAM(s) IV Push every 8 hours  multivitamin 1 Tablet(s) Oral daily  petrolatum Ophthalmic Ointment 1 Application(s) Both EYES three times a day  sodium polystyrene sulfonate Suspension 30 Gram(s) Oral once    MEDICATIONS  (PRN):  benzocaine 15 mG/menthol 3.6 mG Lozenge 1 Lozenge Oral every 6 hours PRN Sore Throat  guaiFENesin/dextromethorphan  Syrup 10 milliLiter(s) Oral every 4 hours PRN Cough  sodium chloride 0.65% Nasal 1 Spray(s) Both Nostrils three times a day PRN Nasal Congestion      Allergies    No Known Allergies    Intolerances        REVIEW OF SYSTEMS:  CONSTITUTIONAL: No fever, weight loss, or fatigue  EYES: No eye pain, visual disturbances, or discharge  ENMT:  No difficulty hearing, tinnitus, vertigo; No sinus or throat pain  NECK: No pain or stiffness  BREASTS: No pain, masses, or nipple discharge  RESPIRATORY: No cough, wheezing, chills or hemoptysis; No shortness of breath  CARDIOVASCULAR: No chest pain, palpitations, dizziness, or leg swelling  GASTROINTESTINAL: No abdominal or epigastric pain. No nausea, vomiting, or hematemesis; No diarrhea or constipation. No melena or hematochezia.  GENITOURINARY: No dysuria, frequency, hematuria, or incontinence  NEUROLOGICAL: No headaches, memory loss, loss of strength, numbness, or tremors  SKIN: No itching, burning, rashes, or lesions   LYMPH NODES: No enlarged glands  ENDOCRINE: No heat or cold intolerance; No hair loss  MUSCULOSKELETAL: No joint pain or swelling; No muscle, back, or extremity pain  PSYCHIATRIC: No depression, anxiety, mood swings, or difficulty sleeping  HEME/LYMPH: No easy bruising, or bleeding gums  ALLERGY AND IMMUNOLOGIC: No hives or eczema    Vital Signs Last 24 Hrs  T(C): 37 (17 Oct 2017 05:20), Max: 37.5 (16 Oct 2017 14:11)  T(F): 98.6 (17 Oct 2017 05:20), Max: 99.5 (16 Oct 2017 14:11)  HR: 90 (17 Oct 2017 05:20) (87 - 92)  BP: 154/72 (17 Oct 2017 05:20) (140/65 - 159/73)  BP(mean): --  RR: 18 (17 Oct 2017 05:20) (16 - 18)  SpO2: 100% (17 Oct 2017 05:20) (95% - 100%)    PHYSICAL EXAM:  GENERAL: NAD, well-groomed, well-developed  HEAD:  Atraumatic, Normocephalic  EYES: EOMI, PERRLA, conjunctiva and sclera clear  ENMT: No tonsillar erythema, exudates, or enlargement; Moist mucous membranes, Good dentition, No lesions  NECK: Supple, No JVD, Normal thyroid  NERVOUS SYSTEM:  Alert & Oriented X3, Good concentration; Motor Strength 5/5 B/L upper and lower extremities; DTRs 2+ intact and symmetric  CHEST/LUNG: Clear to percussion bilaterally; No rales, rhonchi, wheezing, or rubs  HEART: Regular rate and rhythm; No murmurs, rubs, or gallops  ABDOMEN: Soft, Nontender, Nondistended; Bowel sounds present  EXTREMITIES:  2+ Peripheral Pulses, No clubbing, cyanosis, or edema  LYMPH: No lymphadenopathy noted  SKIN: No rashes or lesions    LABS:                        10.3   16.8  )-----------( 157      ( 17 Oct 2017 06:33 )             34.8     10-    141  |  108  |  22<H>  ----------------------------<  346<H>  5.4<H>   |  26  |  1.34<H>    Ca    9.2      17 Oct 2017 06:33  Phos  3.2     10-  Mg     2.4     10-    TPro  6.6  /  Alb  2.9<L>  /  TBili  0.3  /  DBili  x   /  AST  22  /  ALT  22  /  AlkPhos  61  10-16      Urinalysis Basic - ( 15 Oct 2017 11:48 )    Color: Yellow / Appearance: Clear / S.005 / pH: x  Gluc: x / Ketone: Negative  / Bili: Negative / Urobili: Negative   Blood: x / Protein: 100 / Nitrite: Negative   Leuk Esterase: Small / RBC: 2-5 /HPF / WBC 3-5 /HPF   Sq Epi: x / Non Sq Epi: Few / Bacteria: Few /HPF      CAPILLARY BLOOD GLUCOSE  336 (16 Oct 2017 11:50)      POCT Blood Glucose.: 308 mg/dL (17 Oct 2017 09:11)  POCT Blood Glucose.: 333 mg/dL (17 Oct 2017 08:08)  POCT Blood Glucose.: 342 mg/dL (16 Oct 2017 21:21)  POCT Blood Glucose.: 428 mg/dL (16 Oct 2017 15:48)      RADIOLOGY & ADDITIONAL TESTS:    Imaging Personally Reviewed:  [ ] YES  [ ] NO    Consultant(s) Notes Reviewed:  [ ] YES  [ ] NO    Care Discussed with Consultants/Other Providers [ ] YES  [ ] NO    Plan of Care discussed with Housestaff [ ]YES [ ] NO Patient is a 71y old  Female who presents with a chief complaint of cough productive of white sputum and fever (15 Oct 2017 14:32)    Patient is a 71 year old female, with PMH of CAD, ACS, atrial fibrillation, bronchoaveolar cancer (s/p wedge resection ), DM, COPD (on 3L O2 @ assisted living facility; no past intubations), HLD, HTN, and GERD, admitted to the general medical floor for the treatment of  pneumonia from the ED, where she presented with a history of fever and cough productive of white sputum. Patient lives at [facility] and is able to ambulate normally with a walker.     Hospital Course  ED - Patient arrived at ED 10-15 from [place] home complaining of 1 day history of fever, cough productive of white sputum, and upper abdominal pain that is worsened with cough. Denied CP, palpitations, nausea, vomiting, diarrhea, urinary symptoms. Temperature 102.5 F, HR 85, /62, SPO2 95% on supplemental O2. AOx3, rhonchi b/l, heart RRR, Abdomen soft, non-tender. Patient started on duonebs, iv steroids, fluids. NPO. Lactate 1.2, Hb 10.6, WBC 9.3, UA few bacteria, CXR left basilar patchy opacities and right lung opacities.  CT abdomen - gastric antrum thickening and bibasilar consolidation. IV Zosyn started. Patient started on insulin sliding scale. HTN meds held due to risk of sepsis and normotensive.  Lovenox started. Admitted for pneumonia    INTERVAL HPI/OVERNIGHT EVENTS:  Patient is resting comfortably in bed, eating breakfast. Patient reports that she is feeling better overall. Patient states that she still has a cough, that is still productive of white sputum, but much less than before. Patient states that she experiences shortness of breath when she gets up from the bed, which is similar to her baseline function. Patient states that she had one loose stool last night. Patient denies chest pain, palpitations, headache,  dizziness, urinary symptoms.     Medical History:  Acute coronary syndrome  Coronary artery disefase  Atrial fibrillation (not on AC or rate control medication)  bronchoaveolar cancer (s/p wedge resection in )  Diabetes mellitus (patient states that she does not take insulin at assisted living, only "one pill" for DM)  COPD ( on 3L O2 at assisted living facility, no history of intubations)  Hyperlipidemia  Hypertension  Gerd  Osteoarthritis  Seborrheic dermatitis    MEDICATIONS  (STANDING):  ALBUTerol/ipratropium for Nebulization 3 milliLiter(s) Nebulizer every 6 hours  aspirin enteric coated 81 milliGRAM(s) Oral daily  atorvastatin 20 milliGRAM(s) Oral at bedtime  azithromycin   Tablet 250 milliGRAM(s) Oral daily  buDESOnide 160 MICROgram(s)/formoterol 4.5 MICROgram(s) Inhaler 2 Puff(s) Inhalation two times a day  calcium gluconate IVPB 1 Gram(s) IV Intermittent once  cefTRIAXone   IVPB 1 Gram(s) IV Intermittent every 24 hours  cefTRIAXone   IVPB      ergocalciferol 03256 Unit(s) Oral <User Schedule>  ferrous    sulfate 325 milliGRAM(s) Oral daily  folic acid 1 milliGRAM(s) Oral daily  insulin glargine Injectable (LANTUS) 10 Unit(s) SubCutaneous at bedtime  insulin lispro (HumaLOG) corrective regimen sliding scale   SubCutaneous Before meals and at bedtime  insulin lispro Injectable (HumaLOG) 5 Unit(s) SubCutaneous three times a day before meals  insulin regular  human recombinant. 10 Unit(s) SubCutaneous once  lactulose Syrup 10 Gram(s) Oral two times a day  latanoprost 0.005% Ophthalmic Solution 1 Drop(s) Both EYES at bedtime  methylPREDNISolone sodium succinate Injectable 40 milliGRAM(s) IV Push every 8 hours  multivitamin 1 Tablet(s) Oral daily  petrolatum Ophthalmic Ointment 1 Application(s) Both EYES three times a day  sodium polystyrene sulfonate Suspension 30 Gram(s) Oral once    MEDICATIONS  (PRN):  benzocaine 15 mG/menthol 3.6 mG Lozenge 1 Lozenge Oral every 6 hours PRN Sore Throat  guaiFENesin/dextromethorphan  Syrup 10 milliLiter(s) Oral every 4 hours PRN Cough  sodium chloride 0.65% Nasal 1 Spray(s) Both Nostrils three times a day PRN Nasal Congestion      Allergies:  No known allergies    Surgical History:  S/p ovarian cystectomy  Wedge resection ()    Family History:    Social History:          Vital Signs Last 24 Hrs  T(C): 37 (17 Oct 2017 05:20), Max: 37.5 (16 Oct 2017 14:11)  T(F): 98.6 (17 Oct 2017 05:20), Max: 99.5 (16 Oct 2017 14:11)  HR: 90 (17 Oct 2017 05:20) (87 - 92)  BP: 154/72 (17 Oct 2017 05:20) (140/65 - 159/73)  BP(mean): --  RR: 18 (17 Oct 2017 05:20) (16 - 18)  SpO2: 100% (17 Oct 2017 05:20) (95% - 100%)    PHYSICAL EXAM:  GENERAL: NAD, AOx3  NERVOUS SYSTEM: Good concentration.   CHEST/LUNG: Rales b/l in mid lung fields. no wheezes or rhonchi  HEART: +S1S2, Grade III systolic murmur, RRR  ABDOMEN: BSx4, Soft, non-tender, non-distended  EXTREMITIES:  non-tender, non-erythematous, non-edematous      LABS:                        10.3   16.8  )-----------( 157      ( 17 Oct 2017 06:33 )             34.8     10-    141  |  108  |  22<H>  ----------------------------<  346<H>  5.4<H>   |  26  |  1.34<H>    Ca    9.2      17 Oct 2017 06:33  Phos  3.2     10-17  Mg     2.4     10-17    TPro  6.6  /  Alb  2.9<L>  /  TBili  0.3  /  DBili  x   /  AST  22  /  ALT  22  /  AlkPhos  61  10-16      Urinalysis Basic - ( 15 Oct 2017 11:48 )    Color: Yellow / Appearance: Clear / S.005 / pH: x  Gluc: x / Ketone: Negative  / Bili: Negative / Urobili: Negative   Blood: x / Protein: 100 / Nitrite: Negative   Leuk Esterase: Small / RBC: 2-5 /HPF / WBC 3-5 /HPF   Sq Epi: x / Non Sq Epi: Few / Bacteria: Few /HPF      CAPILLARY BLOOD GLUCOSE  336 (16 Oct 2017 11:50)      POCT Blood Glucose.: 308 mg/dL (17 Oct 2017 09:11)  POCT Blood Glucose.: 333 mg/dL (17 Oct 2017 08:08)  POCT Blood Glucose.: 342 mg/dL (16 Oct 2017 21:21)  POCT Blood Glucose.: 428 mg/dL (16 Oct 2017 15:48)    Urine Culture (10-15) -     RADIOLOGY & ADDITIONAL TESTS: Patient is a 71y old  Female who presents with a chief complaint of cough productive of white sputum and fever (15 Oct 2017 14:32)    Patient is a 71 year old female, with PMH of CAD, ACS, atrial fibrillation, bronchoaveolar cancer (s/p wedge resection ), DM, COPD (on 3L O2 @ assisted living facility; no past intubations), HLD, HTN, and GERD, admitted to the general medical floor for the treatment of  pneumonia from the ED, where she presented with a history of fever and cough productive of white sputum. Patient lives at [facility] and is able to ambulate normally with a walker.     Hospital Course  ED - Patient arrived at ED 10-15 from [place] home complaining of 1 day history of fever, cough productive of white sputum, and upper abdominal pain that is worsened with cough. Denied CP, palpitations, nausea, vomiting, diarrhea, urinary symptoms. Temperature 102.5 F, HR 85, /62, SPO2 95% on supplemental O2. AOx3, rhonchi b/l, heart RRR, Abdomen soft, non-tender. Patient started on duonebs, iv steroids, fluids. NPO. Lactate 1.2, Hb 10.6, WBC 9.3, UA few bacteria, CXR left basilar patchy opacities and right lung opacities.  CT abdomen - gastric antrum thickening and bibasilar consolidation. IV Zosyn started. Patient started on insulin sliding scale. HTN meds held due to risk of sepsis and normotensive.  Lovenox started. Admitted for pneumonia    INTERVAL HPI/OVERNIGHT EVENTS:  Patient is resting comfortably in bed, eating breakfast. Patient reports that she is feeling better overall. Patient states that she still has a cough, that is still productive of white sputum, but much less than before. Patient states that she experiences shortness of breath when she gets up from the bed, which is similar to her baseline function. Patient states that she had one loose stool last night. Patient denies chest pain, palpitations, headache,  dizziness, urinary symptoms.     Medical History:  Acute coronary syndrome  Coronary artery disefase  Atrial fibrillation (not on AC or rate control medication)  bronchoaveolar cancer (s/p wedge resection in )  Diabetes mellitus (patient states that she does not take insulin at assisted living, only "one pill" for DM)  COPD ( on 3L O2 at assisted living facility, no history of intubations)  Hyperlipidemia  Hypertension  Gerd  Osteoarthritis  Seborrheic dermatitis    MEDICATIONS  (STANDING):  ALBUTerol/ipratropium for Nebulization 3 milliLiter(s) Nebulizer every 6 hours  aspirin enteric coated 81 milliGRAM(s) Oral daily  atorvastatin 20 milliGRAM(s) Oral at bedtime  azithromycin   Tablet 250 milliGRAM(s) Oral daily  buDESOnide 160 MICROgram(s)/formoterol 4.5 MICROgram(s) Inhaler 2 Puff(s) Inhalation two times a day  calcium gluconate IVPB 1 Gram(s) IV Intermittent once  cefTRIAXone   IVPB 1 Gram(s) IV Intermittent every 24 hours  cefTRIAXone   IVPB      ergocalciferol 84819 Unit(s) Oral <User Schedule>  ferrous    sulfate 325 milliGRAM(s) Oral daily  folic acid 1 milliGRAM(s) Oral daily  insulin glargine Injectable (LANTUS) 10 Unit(s) SubCutaneous at bedtime  insulin lispro (HumaLOG) corrective regimen sliding scale   SubCutaneous Before meals and at bedtime  insulin lispro Injectable (HumaLOG) 5 Unit(s) SubCutaneous three times a day before meals  insulin regular  human recombinant. 10 Unit(s) SubCutaneous once  lactulose Syrup 10 Gram(s) Oral two times a day  latanoprost 0.005% Ophthalmic Solution 1 Drop(s) Both EYES at bedtime  methylPREDNISolone sodium succinate Injectable 40 milliGRAM(s) IV Push every 8 hours  multivitamin 1 Tablet(s) Oral daily  petrolatum Ophthalmic Ointment 1 Application(s) Both EYES three times a day  sodium polystyrene sulfonate Suspension 30 Gram(s) Oral once    MEDICATIONS  (PRN):  benzocaine 15 mG/menthol 3.6 mG Lozenge 1 Lozenge Oral every 6 hours PRN Sore Throat  guaiFENesin/dextromethorphan  Syrup 10 milliLiter(s) Oral every 4 hours PRN Cough  sodium chloride 0.65% Nasal 1 Spray(s) Both Nostrils three times a day PRN Nasal Congestion      Allergies:  No known allergies    Surgical History:  S/p ovarian cystectomy  Wedge resection ()    Family History:    Social History:          Vital Signs Last 24 Hrs  T(C): 37 (17 Oct 2017 05:20), Max: 37.5 (16 Oct 2017 14:11)  T(F): 98.6 (17 Oct 2017 05:20), Max: 99.5 (16 Oct 2017 14:11)  HR: 90 (17 Oct 2017 05:20) (87 - 92)  BP: 154/72 (17 Oct 2017 05:20) (140/65 - 159/73)  BP(mean): --  RR: 18 (17 Oct 2017 05:20) (16 - 18)  SpO2: 100% (17 Oct 2017 05:20) (95% - 100%)    PHYSICAL EXAM:  GENERAL: NAD, AOx3  NERVOUS SYSTEM: Good concentration.   CHEST/LUNG: Rales b/l in mid lung fields. no wheezes or rhonchi  HEART: +S1S2, Grade III systolic murmur, RRR  ABDOMEN: BSx4, Soft, non-tender, non-distended  EXTREMITIES:  non-tender, non-erythematous, non-edematous      LABS:                        10.3   16.8  )-----------( 157      ( 17 Oct 2017 06:33 )             34.8     10-    141  |  108  |  22<H>  ----------------------------<  346<H>  5.4<H>   |  26  |  1.34<H>    Ca    9.2      17 Oct 2017 06:33  Phos  3.2     10-  Mg     2.4     10-17    TPro  6.6  /  Alb  2.9<L>  /  TBili  0.3  /  DBili  x   /  AST  22  /  ALT  22  /  AlkPhos  61  10-16      Urinalysis Basic - ( 15 Oct 2017 11:48 )  Color: Yellow / Appearance: Clear / S.005 / pH: x  Gluc: x / Ketone: Negative  / Bili: Negative / Urobili: Negative   Blood: x / Protein: 100 / Nitrite: Negative   Leuk Esterase: Small / RBC: 2-5 /HPF / WBC 3-5 /HPF   Sq Epi: x / Non Sq Epi: Few / Bacteria: Few /HPF      CAPILLARY BLOOD GLUCOSE  336 (16 Oct 2017 11:50)      POCT Blood Glucose.: 308 mg/dL (17 Oct 2017 09:11)  POCT Blood Glucose.: 333 mg/dL (17 Oct 2017 08:08)  POCT Blood Glucose.: 342 mg/dL (16 Oct 2017 21:21)  POCT Blood Glucose.: 428 mg/dL (16 Oct 2017 15:48)    BCx2 (10-15) - prelim - no growth  Urine Culture (10-15) - Final - collection contamination, retest recommended.     10-  HbA1c - 6.4  TSH - 0.33      RADIOLOGY & ADDITIONAL TESTS:    Ventricular Rate 83 BPM  Atrial Rate 83 BPM  P-R Interval 138 ms  QRS Duration 82 ms   ms  QTc 418 ms  P Axis 49 degrees  R Axis -46 degrees  T Axis 82 degrees  Diagnosis Line Normal sinus rhythm  Left anterior fascicular block  Abnormal ECG      EXAM:  CT ABDOMEN AND PELVIS IC                        PROCEDURE DATE:  10/15/2017    INTERPRETATION:  CT ABDOMEN AND PELVIS WITH IV CONTRAST  CLINICAL STATEMENT: upper abd pain, fever.  COMPARISON: CT abdomen pelvis 2016.  TECHNIQUE: CT scan of the abdomen and pelvis was performed with IV,   without oral contrast. Multiplanar reformations were made.  FINDINGS:  Heterogeneous opacities in the lung bases, worst in the left lower lobe,   likely infectious/inflammatory. Trace left pleural effusion. Mitral   annular calcifications. Questionable mediastinal lymph node.    The liver, gallbladder, pancreas, spleen and adrenal glands are   unremarkable.    Kidneys enhance symmetrically. No hydroureteronephrosis. Subcentimeter   hypodensity in the left kidney upper pole is too small to characterize.    There is underdistention of the stomach, but there appears to be   thickening of the gastric antrum and soft tissue nodularity; correlation   with upper endoscopy is recommended. GI tract is unobstructed. Fluid in   the right colon; stool in the left colon. Appendix is unremarkable. No   free air or ascites.  No bulky adenopathy. Normal caliber abdominal aorta. Mild atherosclerosis.  Urinary bladder is unremarkable. Uterus and adnexae are grossly   unremarkable. No free pelvic fluid.  Bones are osteopenic with degenerative changes in the spine. Tiny   fat-containing umbilical hernia.    IMPRESSION:  Despite underdistention of the stomach, there appears to be thickening of   the gastric antrum and soft tissue nodularity; correlation with upper   endoscopy is recommended.  Heterogeneous opacities in the lung bases, worst in the left lower lobe,   likely infectious/inflammatory.      Assessment and Plan  Patient is a 71 year old female, with PMH of CAD, ACS, atrial fibrillation (not on home AC or rate control medication), bronchoaveolar cancer (s/p wedge resection ), DM, COPD (on 3L O2 @ assisted living facility; no past intubations), HLD, HTN, and GERD, admitted to the general medical floor for the treatment of  pneumonia from the ED. Patient's vital signs are stable and within normal limits. Cough and sputum production better as per patient. Patient denies HA, dizziness, CP, palpitations, shortness of breath worse than baseline, nausea, vomiting, diarrhea, urinary symptoms. On physical exam, patient is AOx3, NAD, heart is RRR but with Grade III systolic murmur, lung auscultation significant for b/l rales. Leukocytosis (16.8 WBC, neutrophil% 93.6%), patient on steroids. Procalcitonin 13.07. Creatinine 1.34. Patient is a 71y old  Female who presents with a chief complaint of cough productive of white sputum and fever (15 Oct 2017 14:32)    Patient is a 71 year old female, with PMH of CAD, ACS, atrial fibrillation, bronchoaveolar cancer (s/p wedge resection ), DM, COPD (on 3L O2 @ assisted living facility; no past intubations), HLD, HTN, and GERD, admitted to the general medical floor for the treatment of  pneumonia from the ED, where she presented with a history of fever and cough productive of white sputum. Patient lives at [facility] and is able to ambulate normally with a walker.     Hospital Course  ED - Patient arrived at ED 10-15 from [place] home complaining of 1 day history of fever, cough productive of white sputum, and upper abdominal pain that is worsened with cough. Denied CP, palpitations, nausea, vomiting, diarrhea, urinary symptoms. Temperature 102.5 F, HR 85, /62, SPO2 95% on supplemental O2. AOx3, rhonchi b/l, heart RRR, Abdomen soft, non-tender. Patient started on duonebs, iv steroids, fluids. NPO. Lactate 1.2, Hb 10.6, WBC 9.3, UA few bacteria, CXR left basilar patchy opacities and right lung opacities.  CT abdomen - gastric antrum thickening and bibasilar consolidation. IV Zosyn started. Patient started on insulin sliding scale. HTN meds held due to risk of sepsis and normotensive.  Lovenox started. Admitted for pneumonia    INTERVAL HPI/OVERNIGHT EVENTS:  Patient is resting comfortably in bed, eating breakfast. Patient reports that she is feeling better overall. Patient states that she still has a cough, that is still productive of white sputum, but much less than before. Patient states that she experiences shortness of breath when she gets up from the bed, which is similar to her baseline function. Patient states that she had one loose stool last night. Patient denies chest pain, palpitations, headache,  dizziness, urinary symptoms.     Medical History:  Acute coronary syndrome  Coronary artery disefase  Atrial fibrillation (not on AC or rate control medication)  bronchoaveolar cancer (s/p wedge resection in )  Diabetes mellitus (patient states that she does not take insulin at assisted living, only "one pill" for DM)  COPD ( on 3L O2 at assisted living facility, no history of intubations)  Hyperlipidemia  Hypertension  Gerd  Osteoarthritis  Seborrheic dermatitis    MEDICATIONS  (STANDING):  ALBUTerol/ipratropium for Nebulization 3 milliLiter(s) Nebulizer every 6 hours  aspirin enteric coated 81 milliGRAM(s) Oral daily  atorvastatin 20 milliGRAM(s) Oral at bedtime  azithromycin   Tablet 250 milliGRAM(s) Oral daily  buDESOnide 160 MICROgram(s)/formoterol 4.5 MICROgram(s) Inhaler 2 Puff(s) Inhalation two times a day  calcium gluconate IVPB 1 Gram(s) IV Intermittent once  cefTRIAXone   IVPB 1 Gram(s) IV Intermittent every 24 hours  cefTRIAXone   IVPB      ergocalciferol 32360 Unit(s) Oral <User Schedule>  ferrous    sulfate 325 milliGRAM(s) Oral daily  folic acid 1 milliGRAM(s) Oral daily  insulin glargine Injectable (LANTUS) 10 Unit(s) SubCutaneous at bedtime  insulin lispro (HumaLOG) corrective regimen sliding scale   SubCutaneous Before meals and at bedtime  insulin lispro Injectable (HumaLOG) 5 Unit(s) SubCutaneous three times a day before meals  insulin regular  human recombinant. 10 Unit(s) SubCutaneous once  lactulose Syrup 10 Gram(s) Oral two times a day  latanoprost 0.005% Ophthalmic Solution 1 Drop(s) Both EYES at bedtime  methylPREDNISolone sodium succinate Injectable 40 milliGRAM(s) IV Push every 8 hours  multivitamin 1 Tablet(s) Oral daily  petrolatum Ophthalmic Ointment 1 Application(s) Both EYES three times a day  sodium polystyrene sulfonate Suspension 30 Gram(s) Oral once  Enoxaparin q8hr    MEDICATIONS  (PRN):  benzocaine 15 mG/menthol 3.6 mG Lozenge 1 Lozenge Oral every 6 hours PRN Sore Throat  guaiFENesin/dextromethorphan  Syrup 10 milliLiter(s) Oral every 4 hours PRN Cough  sodium chloride 0.65% Nasal 1 Spray(s) Both Nostrils three times a day PRN Nasal Congestion      Allergies:  No known allergies    Surgical History:  S/p ovarian cystectomy  Wedge resection ()    Family History:    Social History:          Vital Signs Last 24 Hrs  T(C): 37 (17 Oct 2017 05:20), Max: 37.5 (16 Oct 2017 14:11)  T(F): 98.6 (17 Oct 2017 05:20), Max: 99.5 (16 Oct 2017 14:11)  HR: 90 (17 Oct 2017 05:20) (87 - 92)  BP: 154/72 (17 Oct 2017 05:20) (140/65 - 159/73)  BP(mean): --  RR: 18 (17 Oct 2017 05:20) (16 - 18)  SpO2: 100% (17 Oct 2017 05:20) (95% - 100%)    PHYSICAL EXAM:  GENERAL: NAD, AOx3  NERVOUS SYSTEM: Good concentration.   CHEST/LUNG: Rales b/l in mid lung fields. no wheezes or rhonchi  HEART: +S1S2, Grade III systolic murmur, RRR  ABDOMEN: BSx4, Soft, non-tender, non-distended  EXTREMITIES:  non-tender, non-erythematous, non-edematous      LABS:                        10.3   16.8  )-----------( 157      ( 17 Oct 2017 06:33 )             34.8     10-17    141  |  108  |  22<H>  ----------------------------<  346<H>  5.4<H>   |  26  |  1.34<H>    Ca    9.2      17 Oct 2017 06:33  Phos  3.2     10-  Mg     2.4     10-17    TPro  6.6  /  Alb  2.9<L>  /  TBili  0.3  /  DBili  x   /  AST  22  /  ALT  22  /  AlkPhos  61  10-16      Urinalysis Basic - ( 15 Oct 2017 11:48 )  Color: Yellow / Appearance: Clear / S.005 / pH: x  Gluc: x / Ketone: Negative  / Bili: Negative / Urobili: Negative   Blood: x / Protein: 100 / Nitrite: Negative   Leuk Esterase: Small / RBC: 2-5 /HPF / WBC 3-5 /HPF   Sq Epi: x / Non Sq Epi: Few / Bacteria: Few /HPF      CAPILLARY BLOOD GLUCOSE  336 (16 Oct 2017 11:50)      POCT Blood Glucose.: 308 mg/dL (17 Oct 2017 09:11)  POCT Blood Glucose.: 333 mg/dL (17 Oct 2017 08:08)  POCT Blood Glucose.: 342 mg/dL (16 Oct 2017 21:21)  POCT Blood Glucose.: 428 mg/dL (16 Oct 2017 15:48)    BCx2 (10-15) - prelim - no growth  Urine Culture (10-15) - Final - collection contamination, retest recommended.     10-16  HbA1c - 6.4  TSH - 0.33      RADIOLOGY & ADDITIONAL TESTS:    Ventricular Rate 83 BPM  Atrial Rate 83 BPM  P-R Interval 138 ms  QRS Duration 82 ms   ms  QTc 418 ms  P Axis 49 degrees  R Axis -46 degrees  T Axis 82 degrees  Diagnosis Line Normal sinus rhythm  Left anterior fascicular block  Abnormal ECG      EXAM:  CT ABDOMEN AND PELVIS IC                        PROCEDURE DATE:  10/15/2017    INTERPRETATION:  CT ABDOMEN AND PELVIS WITH IV CONTRAST  CLINICAL STATEMENT: upper abd pain, fever.  COMPARISON: CT abdomen pelvis 2016.  TECHNIQUE: CT scan of the abdomen and pelvis was performed with IV,   without oral contrast. Multiplanar reformations were made.  FINDINGS:  Heterogeneous opacities in the lung bases, worst in the left lower lobe,   likely infectious/inflammatory. Trace left pleural effusion. Mitral   annular calcifications. Questionable mediastinal lymph node.    The liver, gallbladder, pancreas, spleen and adrenal glands are   unremarkable.    Kidneys enhance symmetrically. No hydroureteronephrosis. Subcentimeter   hypodensity in the left kidney upper pole is too small to characterize.    There is underdistention of the stomach, but there appears to be   thickening of the gastric antrum and soft tissue nodularity; correlation   with upper endoscopy is recommended. GI tract is unobstructed. Fluid in   the right colon; stool in the left colon. Appendix is unremarkable. No   free air or ascites.  No bulky adenopathy. Normal caliber abdominal aorta. Mild atherosclerosis.  Urinary bladder is unremarkable. Uterus and adnexae are grossly   unremarkable. No free pelvic fluid.  Bones are osteopenic with degenerative changes in the spine. Tiny   fat-containing umbilical hernia.    IMPRESSION:  Despite underdistention of the stomach, there appears to be thickening of   the gastric antrum and soft tissue nodularity; correlation with upper   endoscopy is recommended.  Heterogeneous opacities in the lung bases, worst in the left lower lobe,   likely infectious/inflammatory.      Assessment and Plan  Patient is a 71 year old female, with PMH of CAD, ACS, atrial fibrillation (not on home AC or rate control medication), bronchoaveolar cancer (s/p wedge resection ), DM, COPD (on 3L O2 @ assisted living facility; no past intubations), HLD, HTN, and GERD, admitted to the general medical floor for the treatment of  pneumonia from the ED. Patient's vital signs are stable and within normal limits. Cough and sputum production better as per patient. Patient denies HA, dizziness, CP, palpitations, shortness of breath worse than baseline, nausea, vomiting, diarrhea, urinary symptoms. On physical exam, patient is AOx3, NAD, heart is RRR but with Grade III systolic murmur, lung auscultation significant for b/l rales. Leukocytosis (16.8 WBC, neutrophil% 93.6%), patient on steroids. Procalcitonin 13.07. Creatinine 1.34. Potassium 5.4    Pneumonia  - CXR (10-15) - impressive for bilateral basilar consolidation (L>R)  - Temperature of 102.5 on 10-15  - WBC 16.8, Neutrophil % 93.6%  - Patient on glucocorticoid medication  - Procalcitonin 13.07  - History of fever, cough productive of white sputum  - Vital signs stable and within normal limits  - Patient reports that cough and sputum production improving   - Patient denies unusual dyspnea  - bilateral rales on physical examination  - Infectious disease consulted (Dr. Duval)  - Pending BCx2 final results. Prelim - no growth  - UC - final - contaminated  Plan  - Patient clinically improving  - Leukocytosis likely due to glucocorticoid regimen  - Continue azithromycin 250mg  PO and ceftriaxone IV 1 g daily, as per infectious disease recommendation   - guaiFENesin/dextromethorphan  Syrup for cough  - NaCl spray for nasal congestion    Diabetes mellitus  - HbA1c - 6.4  - Blood glucose 300's - 400's  - Patient on glucocorticoid regimen  - Patient on insulin sliding scale  Plan  - Repeat fingersticks  - Continue glucocorticoid regimen  - Increase bedtime Lantus from 8 to 10 units  - Increase pre-meal Humalog insulin to 5 units  - Change insulin sliding scale to moderate dose    Hyperkalemia  - Potassium 5.4  Plan  - Patient given sodium polystrene  - Patient given 10 units recombinant insulin  - Patient given 1g calcium gluconate  - Potassium 4.5 - hyperkalemia resolved    Atrial Fibrillation  - not on any home AC or rate control regimen  - Patient denies palpitations, CP, HA, dizziness  - EKG -   Plan  - aspirin 81 mg per day  -     Systolic Murmur  - Grade III  - Patient dies HA, CP, palpitations, dizziness  Plan  - Transthoracic echocardiogram    Prophylaxis  - Hx of cancer  - Decreased mobility  - Atrial fibrillation  Plan  - Lovenox sub-cutaneous q8hr Patient is a 71y old  Female who presents with a chief complaint of cough productive of white sputum and fever (15 Oct 2017 14:32)    Patient is a 71 year old female, with PMH of CAD, ACS, atrial fibrillation, bronchoaveolar cancer (s/p wedge resection ), DM, COPD (on 3L O2 @ assisted living facility; no past intubations), HLD, HTN, and GERD, admitted to the general medical floor for the treatment of  pneumonia from the ED, where she presented with a history of fever and cough productive of white sputum. Patient lives at [facility] and is able to ambulate normally with a walker.     Hospital Course  ED - Patient arrived at ED 10-15 from [place] home complaining of 1 day history of fever, cough productive of white sputum, and upper abdominal pain that is worsened with cough. Denied CP, palpitations, nausea, vomiting, diarrhea, urinary symptoms. Temperature 102.5 F, HR 85, /62, SPO2 95% on supplemental O2. AOx3, rhonchi b/l, heart RRR, Abdomen soft, non-tender. Patient started on duonebs, iv steroids, fluids. NPO. Lactate 1.2, Hb 10.6, WBC 9.3, UA few bacteria, CXR left basilar patchy opacities and right lung opacities.  CT abdomen - gastric antrum thickening and bibasilar consolidation. IV Zosyn started. Patient started on insulin sliding scale. HTN meds held due to risk of sepsis and normotensive.  Lovenox started. Admitted for pneumonia    INTERVAL HPI/OVERNIGHT EVENTS:  Patient is resting comfortably in bed, eating breakfast. Patient reports that she is feeling better overall. Patient states that she still has a cough, that is still productive of white sputum, but much less than before. Patient states that she experiences shortness of breath when she gets up from the bed, which is similar to her baseline function. Patient states that she had one loose stool last night. Patient denies chest pain, palpitations, headache,  dizziness, urinary symptoms.     Medical History:  Acute coronary syndrome  Coronary artery disefase  Atrial fibrillation (not on AC or rate control medication)  bronchoaveolar cancer (s/p wedge resection in )  Diabetes mellitus (patient states that she does not take insulin at assisted living, only "one pill" for DM)  COPD ( on 3L O2 at assisted living facility, no history of intubations)  Hyperlipidemia  Hypertension  Gerd  Osteoarthritis  Seborrheic dermatitis    MEDICATIONS  (STANDING):  ALBUTerol/ipratropium for Nebulization 3 milliLiter(s) Nebulizer every 6 hours  aspirin enteric coated 81 milliGRAM(s) Oral daily  atorvastatin 20 milliGRAM(s) Oral at bedtime  azithromycin   Tablet 250 milliGRAM(s) Oral daily  buDESOnide 160 MICROgram(s)/formoterol 4.5 MICROgram(s) Inhaler 2 Puff(s) Inhalation two times a day  calcium gluconate IVPB 1 Gram(s) IV Intermittent once  cefTRIAXone   IVPB 1 Gram(s) IV Intermittent every 24 hours  cefTRIAXone   IVPB      ergocalciferol 41385 Unit(s) Oral <User Schedule>  ferrous    sulfate 325 milliGRAM(s) Oral daily  folic acid 1 milliGRAM(s) Oral daily  insulin glargine Injectable (LANTUS) 10 Unit(s) SubCutaneous at bedtime  insulin lispro (HumaLOG) corrective regimen sliding scale   SubCutaneous Before meals and at bedtime  insulin lispro Injectable (HumaLOG) 5 Unit(s) SubCutaneous three times a day before meals  insulin regular  human recombinant. 10 Unit(s) SubCutaneous once  lactulose Syrup 10 Gram(s) Oral two times a day  latanoprost 0.005% Ophthalmic Solution 1 Drop(s) Both EYES at bedtime  methylPREDNISolone sodium succinate Injectable 40 milliGRAM(s) IV Push every 8 hours  multivitamin 1 Tablet(s) Oral daily  petrolatum Ophthalmic Ointment 1 Application(s) Both EYES three times a day  sodium polystyrene sulfonate Suspension 30 Gram(s) Oral once  Enoxaparin q8hr    MEDICATIONS  (PRN):  benzocaine 15 mG/menthol 3.6 mG Lozenge 1 Lozenge Oral every 6 hours PRN Sore Throat  guaiFENesin/dextromethorphan  Syrup 10 milliLiter(s) Oral every 4 hours PRN Cough  sodium chloride 0.65% Nasal 1 Spray(s) Both Nostrils three times a day PRN Nasal Congestion      Allergies:  No known allergies    Surgical History:  S/p ovarian cystectomy  Wedge resection ()    Family History:    Social History:          Vital Signs Last 24 Hrs  T(C): 37 (17 Oct 2017 05:20), Max: 37.5 (16 Oct 2017 14:11)  T(F): 98.6 (17 Oct 2017 05:20), Max: 99.5 (16 Oct 2017 14:11)  HR: 90 (17 Oct 2017 05:20) (87 - 92)  BP: 154/72 (17 Oct 2017 05:20) (140/65 - 159/73)  BP(mean): --  RR: 18 (17 Oct 2017 05:20) (16 - 18)  SpO2: 100% (17 Oct 2017 05:20) (95% - 100%)    PHYSICAL EXAM:  GENERAL: NAD, AOx3  NERVOUS SYSTEM: Good concentration.   CHEST/LUNG: Rales b/l in mid lung fields. no wheezes or rhonchi  HEART: +S1S2, Grade III systolic murmur, RRR  ABDOMEN: BSx4, Soft, non-tender, non-distended  EXTREMITIES:  non-tender, non-erythematous, non-edematous      LABS:                        10.3   16.8  )-----------( 157      ( 17 Oct 2017 06:33 )             34.8     10-17    141  |  108  |  22<H>  ----------------------------<  346<H>  5.4<H>   |  26  |  1.34<H>    Ca    9.2      17 Oct 2017 06:33  Phos  3.2     10-  Mg     2.4     10-17    TPro  6.6  /  Alb  2.9<L>  /  TBili  0.3  /  DBili  x   /  AST  22  /  ALT  22  /  AlkPhos  61  10-16      Urinalysis Basic - ( 15 Oct 2017 11:48 )  Color: Yellow / Appearance: Clear / S.005 / pH: x  Gluc: x / Ketone: Negative  / Bili: Negative / Urobili: Negative   Blood: x / Protein: 100 / Nitrite: Negative   Leuk Esterase: Small / RBC: 2-5 /HPF / WBC 3-5 /HPF   Sq Epi: x / Non Sq Epi: Few / Bacteria: Few /HPF      CAPILLARY BLOOD GLUCOSE  336 (16 Oct 2017 11:50)      POCT Blood Glucose.: 308 mg/dL (17 Oct 2017 09:11)  POCT Blood Glucose.: 333 mg/dL (17 Oct 2017 08:08)  POCT Blood Glucose.: 342 mg/dL (16 Oct 2017 21:21)  POCT Blood Glucose.: 428 mg/dL (16 Oct 2017 15:48)    BCx2 (10-15) - prelim - no growth  Urine Culture (10-15) - Final - collection contamination, retest recommended.     10-16  HbA1c - 6.4  TSH - 0.33      RADIOLOGY & ADDITIONAL TESTS:    Ventricular Rate 83 BPM  Atrial Rate 83 BPM  P-R Interval 138 ms  QRS Duration 82 ms   ms  QTc 418 ms  P Axis 49 degrees  R Axis -46 degrees  T Axis 82 degrees  Diagnosis Line Normal sinus rhythm  Left anterior fascicular block  Abnormal ECG      EXAM:  CT ABDOMEN AND PELVIS IC                        PROCEDURE DATE:  10/15/2017    INTERPRETATION:  CT ABDOMEN AND PELVIS WITH IV CONTRAST  CLINICAL STATEMENT: upper abd pain, fever.  COMPARISON: CT abdomen pelvis 2016.  TECHNIQUE: CT scan of the abdomen and pelvis was performed with IV,   without oral contrast. Multiplanar reformations were made.  FINDINGS:  Heterogeneous opacities in the lung bases, worst in the left lower lobe,   likely infectious/inflammatory. Trace left pleural effusion. Mitral   annular calcifications. Questionable mediastinal lymph node.    The liver, gallbladder, pancreas, spleen and adrenal glands are   unremarkable.    Kidneys enhance symmetrically. No hydroureteronephrosis. Subcentimeter   hypodensity in the left kidney upper pole is too small to characterize.    There is underdistention of the stomach, but there appears to be   thickening of the gastric antrum and soft tissue nodularity; correlation   with upper endoscopy is recommended. GI tract is unobstructed. Fluid in   the right colon; stool in the left colon. Appendix is unremarkable. No   free air or ascites.  No bulky adenopathy. Normal caliber abdominal aorta. Mild atherosclerosis.  Urinary bladder is unremarkable. Uterus and adnexae are grossly   unremarkable. No free pelvic fluid.  Bones are osteopenic with degenerative changes in the spine. Tiny   fat-containing umbilical hernia.    IMPRESSION:  Despite underdistention of the stomach, there appears to be thickening of   the gastric antrum and soft tissue nodularity; correlation with upper   endoscopy is recommended.  Heterogeneous opacities in the lung bases, worst in the left lower lobe,   likely infectious/inflammatory.      Assessment and Plan  Patient is a 71 year old female, with PMH of CAD, ACS, atrial fibrillation (not on home AC or rate control medication), bronchoaveolar cancer (s/p wedge resection ), DM, COPD (on 3L O2 @ assisted living facility; no past intubations), HLD, HTN, and GERD, admitted to the general medical floor for the treatment of  pneumonia from the ED. Patient's vital signs are stable and within normal limits. Cough and sputum production better as per patient. Patient denies HA, dizziness, CP, palpitations, shortness of breath worse than baseline, nausea, vomiting, diarrhea, urinary symptoms. On physical exam, patient is AOx3, NAD, heart is RRR but with Grade III systolic murmur, lung auscultation significant for b/l rales. Leukocytosis (16.8 WBC, neutrophil% 93.6%), patient on steroids. Procalcitonin 13.07. Creatinine 1.34. Potassium 5.4    Pneumonia  - CXR (10-15) - impressive for bilateral basilar consolidation (L>R)  - Temperature of 102.5 on 10-15  - WBC 16.8, Neutrophil % 93.6%  - Patient on glucocorticoid medication  - Procalcitonin 13.07  - History of fever, cough productive of white sputum  - Vital signs stable and within normal limits  - Patient reports that cough and sputum production improving   - Patient denies unusual dyspnea  - bilateral rales on physical examination  - Infectious disease consulted (Dr. Duval)  - Pending BCx2 final results. Prelim - no growth  - UC - final - contaminated  Plan  - Patient clinically improving  - Leukocytosis likely due to glucocorticoid regimen  - Continue azithromycin 250mg  PO and ceftriaxone IV 1 g daily, as per infectious disease recommendation   - guaiFENesin/dextromethorphan  Syrup for cough  - NaCl spray for nasal congestion    COPD  - Patient reports that dyspnea is not worse than baseline  - SPO2 99% on supplemental oxygen  - Lungs clear for wheezes and rhonchi  - Cough improved as per patient  - Sputum is white, less than before  Plant  - Continue albuterol/ipratropium 3 ml q6hrs  - Continue budesonide 160 mcg/formoterol 4.5 mcg 2 puffs bid  - Continue methylprednisolone sodium succinate 40 mg injection q8hrs    Diabetes mellitus  - HbA1c - 6.4  - Blood glucose 300's - 400's  - Patient on glucocorticoid regimen  - Patient on insulin sliding scale  Plan  - Repeat fingersticks  - Continue glucocorticoid regimen  - Increase bedtime Lantus from 8 to 10 units  - Increase pre-meal Humalog insulin to 5 units  - Change insulin sliding scale to moderate dose    Hyperkalemia  - Potassium 5.4  Plan  - Patient given sodium polystrene  - Patient given 10 units recombinant insulin  - Patient given 1g calcium gluconate  - Potassium 4.5 - hyperkalemia resolved    Atrial Fibrillation  - not on any home AC or rate control regimen  - Patient denies palpitations, CP, HA, dizziness  - EKG - NSR, CT interval 140, QRS 86, QT/QTc - 334/430  Plan  - aspirin 81 mg per day    Systolic Murmur  - Grade III  - Patient dies HA, CP, palpitations, dizziness  Plan  - Transthoracic echocardiogram    Hyperlipidemia  - Atorvastatin 20 mg qd before bed    Hypertension  - Monitor blood pressure    Prophylaxis  - Hx of cancer  - Decreased mobility  - Atrial fibrillation  Plan  - Lovenox sub-cutaneous q8hr Patient is a 71y old  Female who presents with a chief complaint of cough productive of white sputum and fever (15 Oct 2017 14:32)    Patient is a 71 year old female, with PMH of CAD, ACS, atrial fibrillation, bronchoaveolar cancer (s/p wedge resection ), DM, COPD (on 3L O2 @ assisted living facility; no past intubations), HLD, HTN, and GERD, admitted to the general medical floor for the treatment of  pneumonia from the ED, where she presented with a history of fever and cough productive of white sputum. Patient lives at [facility] and is able to ambulate normally with a walker.     Hospital Course  ED - Patient arrived at ED 10-15 from [place] home complaining of 1 day history of fever, cough productive of white sputum, and upper abdominal pain that is worsened with cough. Denied CP, palpitations, nausea, vomiting, diarrhea, urinary symptoms. Temperature 102.5 F, HR 85, /62, SPO2 95% on supplemental O2. AOx3, rhonchi b/l, heart RRR, Abdomen soft, non-tender. Patient started on duonebs, iv steroids, fluids. NPO. Lactate 1.2, Hb 10.6, WBC 9.3, UA few bacteria, CXR left basilar patchy opacities and right lung opacities.  CT abdomen - gastric antrum thickening and bibasilar consolidation. IV Zosyn started. Patient started on insulin sliding scale. HTN meds held due to risk of sepsis and normotensive.  Lovenox started. Admitted for pneumonia    INTERVAL HPI/OVERNIGHT EVENTS:  Patient is resting comfortably in bed, eating breakfast. Patient reports that she is feeling better overall. Patient states that she still has a cough, that is still productive of white sputum, but much less than before. Patient states that she experiences shortness of breath when she gets up from the bed, which is similar to her baseline function. Patient states that she had one loose stool last night. Patient denies chest pain, palpitations, headache,  dizziness, urinary symptoms.     Medical History:  Acute coronary syndrome  Coronary artery disefase  Atrial fibrillation (not on AC or rate control medication)  bronchoaveolar cancer (s/p wedge resection in )  Diabetes mellitus (patient states that she does not take insulin at assisted living, only "one pill" for DM)  COPD ( on 3L O2 at assisted living facility, no history of intubations)  Hyperlipidemia  Hypertension  Gerd  Osteoarthritis  Seborrheic dermatitis    MEDICATIONS  (STANDING):  ALBUTerol/ipratropium for Nebulization 3 milliLiter(s) Nebulizer every 6 hours  aspirin enteric coated 81 milliGRAM(s) Oral daily  atorvastatin 20 milliGRAM(s) Oral at bedtime  azithromycin   Tablet 250 milliGRAM(s) Oral daily  buDESOnide 160 MICROgram(s)/formoterol 4.5 MICROgram(s) Inhaler 2 Puff(s) Inhalation two times a day  calcium gluconate IVPB 1 Gram(s) IV Intermittent once  cefTRIAXone   IVPB 1 Gram(s) IV Intermittent every 24 hours  cefTRIAXone   IVPB      ergocalciferol 84887 Unit(s) Oral <User Schedule>  ferrous    sulfate 325 milliGRAM(s) Oral daily  folic acid 1 milliGRAM(s) Oral daily  insulin glargine Injectable (LANTUS) 10 Unit(s) SubCutaneous at bedtime  insulin lispro (HumaLOG) corrective regimen sliding scale   SubCutaneous Before meals and at bedtime  insulin lispro Injectable (HumaLOG) 5 Unit(s) SubCutaneous three times a day before meals  insulin regular  human recombinant. 10 Unit(s) SubCutaneous once  lactulose Syrup 10 Gram(s) Oral two times a day  latanoprost 0.005% Ophthalmic Solution 1 Drop(s) Both EYES at bedtime  methylPREDNISolone sodium succinate Injectable 40 milliGRAM(s) IV Push every 8 hours  multivitamin 1 Tablet(s) Oral daily  petrolatum Ophthalmic Ointment 1 Application(s) Both EYES three times a day  sodium polystyrene sulfonate Suspension 30 Gram(s) Oral once  Enoxaparin q8hr    MEDICATIONS  (PRN):  benzocaine 15 mG/menthol 3.6 mG Lozenge 1 Lozenge Oral every 6 hours PRN Sore Throat  guaiFENesin/dextromethorphan  Syrup 10 milliLiter(s) Oral every 4 hours PRN Cough  sodium chloride 0.65% Nasal 1 Spray(s) Both Nostrils three times a day PRN Nasal Congestion      Allergies:  No known allergies    Surgical History:  S/p ovarian cystectomy  Wedge resection ()    Family History:    Social History:          Vital Signs Last 24 Hrs  T(C): 37 (17 Oct 2017 05:20), Max: 37.5 (16 Oct 2017 14:11)  T(F): 98.6 (17 Oct 2017 05:20), Max: 99.5 (16 Oct 2017 14:11)  HR: 90 (17 Oct 2017 05:20) (87 - 92)  BP: 154/72 (17 Oct 2017 05:20) (140/65 - 159/73)  BP(mean): --  RR: 18 (17 Oct 2017 05:20) (16 - 18)  SpO2: 100% (17 Oct 2017 05:20) (95% - 100%)    PHYSICAL EXAM:  GENERAL: NAD, AOx3  NERVOUS SYSTEM: Good concentration.   CHEST/LUNG: Rales b/l in mid lung fields. no wheezes or rhonchi  HEART: +S1S2, Grade III systolic murmur, RRR  ABDOMEN: BSx4, Soft, non-tender, non-distended  EXTREMITIES:  non-tender, non-erythematous, non-edematous      LABS:                        10.3   16.8  )-----------( 157      ( 17 Oct 2017 06:33 )             34.8     10-17    141  |  108  |  22<H>  ----------------------------<  346<H>  5.4<H>   |  26  |  1.34<H>    Ca    9.2      17 Oct 2017 06:33  Phos  3.2     10-  Mg     2.4     10-17    TPro  6.6  /  Alb  2.9<L>  /  TBili  0.3  /  DBili  x   /  AST  22  /  ALT  22  /  AlkPhos  61  10-16      Urinalysis Basic - ( 15 Oct 2017 11:48 )  Color: Yellow / Appearance: Clear / S.005 / pH: x  Gluc: x / Ketone: Negative  / Bili: Negative / Urobili: Negative   Blood: x / Protein: 100 / Nitrite: Negative   Leuk Esterase: Small / RBC: 2-5 /HPF / WBC 3-5 /HPF   Sq Epi: x / Non Sq Epi: Few / Bacteria: Few /HPF      CAPILLARY BLOOD GLUCOSE  336 (16 Oct 2017 11:50)      POCT Blood Glucose.: 308 mg/dL (17 Oct 2017 09:11)  POCT Blood Glucose.: 333 mg/dL (17 Oct 2017 08:08)  POCT Blood Glucose.: 342 mg/dL (16 Oct 2017 21:21)  POCT Blood Glucose.: 428 mg/dL (16 Oct 2017 15:48)    BCx2 (10-15) - prelim - no growth  Urine Culture (10-15) - Final - collection contamination, retest recommended.     10-16  HbA1c - 6.4  TSH - 0.33      RADIOLOGY & ADDITIONAL TESTS:    Ventricular Rate 83 BPM  Atrial Rate 83 BPM  P-R Interval 138 ms  QRS Duration 82 ms   ms  QTc 418 ms  P Axis 49 degrees  R Axis -46 degrees  T Axis 82 degrees  Diagnosis Line Normal sinus rhythm  Left anterior fascicular block  Abnormal ECG      EXAM:  CT ABDOMEN AND PELVIS IC                        PROCEDURE DATE:  10/15/2017    INTERPRETATION:  CT ABDOMEN AND PELVIS WITH IV CONTRAST  CLINICAL STATEMENT: upper abd pain, fever.  COMPARISON: CT abdomen pelvis 2016.  TECHNIQUE: CT scan of the abdomen and pelvis was performed with IV,   without oral contrast. Multiplanar reformations were made.  FINDINGS:  Heterogeneous opacities in the lung bases, worst in the left lower lobe,   likely infectious/inflammatory. Trace left pleural effusion. Mitral   annular calcifications. Questionable mediastinal lymph node.    The liver, gallbladder, pancreas, spleen and adrenal glands are   unremarkable.    Kidneys enhance symmetrically. No hydroureteronephrosis. Subcentimeter   hypodensity in the left kidney upper pole is too small to characterize.    There is underdistention of the stomach, but there appears to be   thickening of the gastric antrum and soft tissue nodularity; correlation   with upper endoscopy is recommended. GI tract is unobstructed. Fluid in   the right colon; stool in the left colon. Appendix is unremarkable. No   free air or ascites.  No bulky adenopathy. Normal caliber abdominal aorta. Mild atherosclerosis.  Urinary bladder is unremarkable. Uterus and adnexae are grossly   unremarkable. No free pelvic fluid.  Bones are osteopenic with degenerative changes in the spine. Tiny   fat-containing umbilical hernia.    IMPRESSION:  Despite underdistention of the stomach, there appears to be thickening of   the gastric antrum and soft tissue nodularity; correlation with upper   endoscopy is recommended.  Heterogeneous opacities in the lung bases, worst in the left lower lobe,   likely infectious/inflammatory.      Assessment and Plan  Patient is a 71 year old female, with PMH of CAD, ACS, atrial fibrillation (not on home AC or rate control medication), bronchoaveolar cancer (s/p wedge resection ), DM, COPD (on 3L O2 @ assisted living facility; no past intubations), HLD, HTN, and GERD, admitted to the general medical floor for the treatment of  pneumonia from the ED. Patient's vital signs are stable and within normal limits. Cough and sputum production better as per patient. Patient denies HA, dizziness, CP, palpitations, shortness of breath worse than baseline, nausea, vomiting, diarrhea, urinary symptoms. On physical exam, patient is AOx3, NAD, heart is RRR but with Grade III systolic murmur, lung auscultation significant for b/l rales. Leukocytosis (16.8 WBC, neutrophil% 93.6%), patient on steroids. Procalcitonin 13.07. Creatinine 1.34. Potassium 5.4    Pneumonia  - CXR (10-15) - impressive for bilateral basilar consolidation (L>R)  - Temperature of 102.5 on 10-15  - WBC 16.8, Neutrophil % 93.6%  - Patient on glucocorticoid medication  - Procalcitonin 13.07  - History of fever, cough productive of white sputum  - Vital signs stable and within normal limits  - Patient reports that cough and sputum production improving   - Patient denies unusual dyspnea  - bilateral rales on physical examination  - Infectious disease consulted (Dr. Duval)  - Pending BCx2 final results. Prelim - no growth  - UC - final - contaminated  Plan  - Patient clinically improving  - Leukocytosis likely due to glucocorticoid regimen  - Continue azithromycin 250mg  PO and ceftriaxone IV 1 g daily, as per infectious disease recommendation   - guaiFENesin/dextromethorphan  Syrup for cough  - NaCl spray for nasal congestion    COPD  - Patient reports that dyspnea is not worse than baseline  - SPO2 99% on supplemental oxygen  - Lungs clear for wheezes and rhonchi  - Cough improved as per patient  - Sputum is white, less than before  Plant  - Continue albuterol/ipratropium 3 ml q6hrs  - Continue budesonide 160 mcg/formoterol 4.5 mcg 2 puffs bid  - Continue methylprednisolone sodium succinate 40 mg injection q8hrs    Diabetes mellitus  - HbA1c - 6.4  - Blood glucose 300's - 400's  - Patient on glucocorticoid regimen  - Patient on insulin sliding scale  Plan  - Repeat fingersticks  - Continue glucocorticoid regimen  - Increase bedtime Lantus from 8 to 10 units  - Increase pre-meal Humalog insulin to 5 units  - Change insulin sliding scale to moderate dose    Hyperkalemia  - Potassium 5.4  Plan  - Patient given sodium polystrene  - Patient given 10 units recombinant insulin  - Patient given 1g calcium gluconate  - Potassium 4.5 - hyperkalemia resolved    Atrial Fibrillation  - not on any home AC or rate control regimen  - Patient denies palpitations, CP, HA, dizziness  - EKG - NSR, HI interval 140, QRS 86, QT/QTc - 334/430  Plan  - aspirin 81 mg per day    Systolic Murmur  - Grade III  - Patient dies HA, CP, palpitations, dizziness  Plan  - Transthoracic echocardiogram    Hyperlipidemia  - Atorvastatin 20 mg qd before bed    Hypertension  - Monitor blood pressure    Prophylaxis  - Hx of cancer  - Decreased mobility  - Atrial fibrillation  Plan  - Lovenox sub-cutaneous once a day

## 2017-10-18 LAB
GLUCOSE BLDC GLUCOMTR-MCNC: 217 MG/DL — HIGH (ref 70–99)
GLUCOSE BLDC GLUCOMTR-MCNC: 240 MG/DL — HIGH (ref 70–99)
LEGIONELLA AG UR QL: NEGATIVE — SIGNIFICANT CHANGE UP

## 2017-10-18 RX ORDER — VANCOMYCIN HCL 1 G
125 VIAL (EA) INTRAVENOUS EVERY 6 HOURS
Qty: 0 | Refills: 0 | Status: DISCONTINUED | OUTPATIENT
Start: 2017-10-18 | End: 2017-10-18

## 2017-10-18 RX ORDER — ENOXAPARIN SODIUM 100 MG/ML
40 INJECTION SUBCUTANEOUS DAILY
Qty: 0 | Refills: 0 | Status: DISCONTINUED | OUTPATIENT
Start: 2017-10-18 | End: 2017-10-20

## 2017-10-18 RX ADMIN — Medication 5 UNIT(S): at 11:29

## 2017-10-18 RX ADMIN — Medication 1 APPLICATION(S): at 21:47

## 2017-10-18 RX ADMIN — Medication 3 MILLILITER(S): at 15:05

## 2017-10-18 RX ADMIN — BENZOCAINE AND MENTHOL 1 LOZENGE: 5; 1 LIQUID ORAL at 15:12

## 2017-10-18 RX ADMIN — Medication 2: at 11:28

## 2017-10-18 RX ADMIN — Medication 2: at 16:37

## 2017-10-18 RX ADMIN — CEFTRIAXONE 100 GRAM(S): 500 INJECTION, POWDER, FOR SOLUTION INTRAMUSCULAR; INTRAVENOUS at 16:37

## 2017-10-18 RX ADMIN — BUDESONIDE AND FORMOTEROL FUMARATE DIHYDRATE 2 PUFF(S): 160; 4.5 AEROSOL RESPIRATORY (INHALATION) at 21:49

## 2017-10-18 RX ADMIN — Medication 1 TABLET(S): at 11:28

## 2017-10-18 RX ADMIN — Medication 1 APPLICATION(S): at 05:27

## 2017-10-18 RX ADMIN — BENZOCAINE AND MENTHOL 1 LOZENGE: 5; 1 LIQUID ORAL at 21:49

## 2017-10-18 RX ADMIN — INSULIN GLARGINE 10 UNIT(S): 100 INJECTION, SOLUTION SUBCUTANEOUS at 21:48

## 2017-10-18 RX ADMIN — Medication 2: at 21:48

## 2017-10-18 RX ADMIN — Medication 5 UNIT(S): at 16:37

## 2017-10-18 RX ADMIN — LATANOPROST 1 DROP(S): 0.05 SOLUTION/ DROPS OPHTHALMIC; TOPICAL at 21:47

## 2017-10-18 RX ADMIN — Medication 40 MILLIGRAM(S): at 05:27

## 2017-10-18 RX ADMIN — ATORVASTATIN CALCIUM 20 MILLIGRAM(S): 80 TABLET, FILM COATED ORAL at 21:48

## 2017-10-18 RX ADMIN — AZITHROMYCIN 250 MILLIGRAM(S): 500 TABLET, FILM COATED ORAL at 11:28

## 2017-10-18 RX ADMIN — Medication 1 MILLIGRAM(S): at 11:28

## 2017-10-18 RX ADMIN — ENOXAPARIN SODIUM 40 MILLIGRAM(S): 100 INJECTION SUBCUTANEOUS at 11:29

## 2017-10-18 RX ADMIN — Medication 1 APPLICATION(S): at 13:50

## 2017-10-18 RX ADMIN — Medication 81 MILLIGRAM(S): at 11:28

## 2017-10-18 RX ADMIN — BUDESONIDE AND FORMOTEROL FUMARATE DIHYDRATE 2 PUFF(S): 160; 4.5 AEROSOL RESPIRATORY (INHALATION) at 11:29

## 2017-10-18 RX ADMIN — Medication 325 MILLIGRAM(S): at 11:28

## 2017-10-18 RX ADMIN — Medication 5 UNIT(S): at 08:04

## 2017-10-18 RX ADMIN — Medication 4: at 08:04

## 2017-10-18 NOTE — PROGRESS NOTE ADULT - PROBLEM SELECTOR PLAN 2
-Discontinued Solu-medrol as patient improving and sugars uncontrolled . switched to prednisone  - continue with duonebs

## 2017-10-18 NOTE — PROGRESS NOTE ADULT - SUBJECTIVE AND OBJECTIVE BOX
72 y/o female is under our care for pneumonia and fevers.  Has not had any recent fevers and wbc count is stable. Patient reports that she began with diarrhea today and has had 3 episodes already.  Apparently patient was also on lactulose, but it was dc'ed today.  Still has cough with whitish sputum production and dyspnea only with exertion.  Possible dc planning tomorrow if diarrhea improved.    REVIEW OF SYSTEMS:  [  ] Not able to illicit  General: no fevers no malaise	  Chest: +cough +GARCIA no CP  GI: +diarrhea no nv no abdominal pain	  Skin: no rashes		  Neuro: no ha's no dizziness    ALLERGIES: No Known Allergies    MEDS:  azithromycin   Tablet 250 milliGRAM(s) Oral daily  cefTRIAXone   IVPB 1 Gram(s) IV Intermittent every 24 hours  predniSONE   Tablet 40 milliGRAM(s) Oral daily    VITALS:  Vital Signs Last 24 Hrs  T(C): 36.9 (18 Oct 2017 14:37), Max: 36.9 (18 Oct 2017 14:37)  T(F): 98.5 (18 Oct 2017 14:37), Max: 98.5 (18 Oct 2017 14:37)  HR: 94 (18 Oct 2017 14:37) (83 - 102)  BP: 168/78 (18 Oct 2017 14:37) (138/70 - 173/82)  BP(mean): --  RR: 18 (18 Oct 2017 14:37) (18 - 18)  SpO2: 98% (18 Oct 2017 14:37) (98% - 100%)    PHYSICAL EXAM:  HEENT: n/a  Neck: supple no LN's   Respiratory: bilateral diffuse fine rales  Cardiovascular: S1 S2 reg no murmurs   Gastrointestinal: soft, nondistended abdomen with +hyperactive BS; nontender  Extremities: no edema  Skin: no rashes  Ortho: n/a  Neuro: AAO x 3    LABS/DIAGNOSTIC TESTS:  No new labs                        10.3   16.8  )-----------( 157      ( 17 Oct 2017 06:33 )             34.8   10-17 WBC - 16.8 < 16.0 < 9.3    140  |  107  |  23<H>  ----------------------------<  359<H>  4.5   |  25  |  1.41<H>    Ca    9.9      17 Oct 2017 12:04  Phos  3.2     10-17  Mg     2.4     10-17      CULTURES:   Culture - Urine (10.15.17 @ 21:55)    Specimen Source: .Urine Clean Catch (Midstream)    Culture Results:   Culture grew 3 or more types of organisms which indicate  collection contamination; consider recollection only if clinically  indicated.    Culture - Blood (10.15.17 @ 14:23)    Specimen Source: .Blood Blood-Peripheral    Culture Results:   No growth to date.    Culture - Blood (10.15.17 @ 14:23)    Specimen Source: .Blood Blood-Peripheral    Culture Results:   No growth to date.      RADIOLOGY:  EXAM:  CT ABDOMEN AND PELVIS IC                        PROCEDURE DATE:  10/15/2017    INTERPRETATION:  CT ABDOMEN AND PELVIS WITH IV CONTRAST    CLINICAL STATEMENT: upper abd pain, fever.    COMPARISON: CT abdomen pelvis 9/30/2016.    TECHNIQUE: CT scan of the abdomen and pelvis was performed with IV,   without oral contrast. Multiplanar reformations were made.    FINDINGS:  Heterogeneous opacities in the lung bases, worst in the left lower lobe,   likely infectious/inflammatory. Trace left pleural effusion. Mitral   annular calcifications. Questionable mediastinal lymph node.    The liver, gallbladder, pancreas, spleen and adrenal glands are   unremarkable.    Kidneys enhance symmetrically. No hydroureteronephrosis. Subcentimeter  hypodensity in the left kidney upper pole is too small to characterize.    There is underdistention of the stomach, but there appears to be   thickening of the gastric antrum and soft tissue nodularity; correlation   with upper endoscopy is recommended. GI tract is unobstructed. Fluid in   the right colon; stool in the left colon. Appendix is unremarkable. No   free air or ascites.    No bulky adenopathy. Normal caliber abdominal aorta. Mild atherosclerosis.    Urinary bladder is unremarkable. Uterus and adnexae are grossly   unremarkable. No free pelvic fluid.    Bones are osteopenic with degenerative changes in the spine. Tiny   fat-containing umbilical hernia.    IMPRESSION:  Despite underdistention of the stomach, there appears to be thickening of   the gastric antrum and soft tissue nodularity; correlation with upper   endoscopy is recommended.    Heterogeneous opacities in the lung bases, worst in the left lower lobe,   likely infectious/inflammatory.          XAM:  CHEST SINGLE AP OR PA                        PROCEDURE DATE:  10/15/2017    INTERPRETATION:  PORTABLE CHEST X-RAY    HISTORY: sepsis.    COMPARISON: 9/30/2016.    FINDINGS/  IMPRESSION:  Improved, though persistent, left basilar patchy opacities, likely   infectious. Right lung opacities have improved. Mild diffuse interstitial   prominence.    Possible trace left pleural effusion.    No pneumothorax.    The cardiac silhouette is unchanged.

## 2017-10-18 NOTE — PROGRESS NOTE ADULT - PROBLEM SELECTOR PLAN 1
- Improving on rocephin and azithromycin day 4  -CXR showed L lung opacity.   -Blood cultures negative   -ID consulted (Dr Duval)

## 2017-10-18 NOTE — PROGRESS NOTE ADULT - ASSESSMENT
Patient is a 71y old  Female who presents with a chief complaint of Cough and Fever (15 Oct 2017 14:32)      INTERVAL HPI/OVERNIGHT EVENTS:    MEDICATIONS  (STANDING):  ALBUTerol/ipratropium for Nebulization 3 milliLiter(s) Nebulizer every 6 hours  aspirin enteric coated 81 milliGRAM(s) Oral daily  atorvastatin 20 milliGRAM(s) Oral at bedtime  azithromycin   Tablet 250 milliGRAM(s) Oral daily  buDESOnide 160 MICROgram(s)/formoterol 4.5 MICROgram(s) Inhaler 2 Puff(s) Inhalation two times a day  cefTRIAXone   IVPB 1 Gram(s) IV Intermittent every 24 hours  cefTRIAXone   IVPB      enoxaparin Injectable 40 milliGRAM(s) SubCutaneous daily  ergocalciferol 40641 Unit(s) Oral <User Schedule>  ferrous    sulfate 325 milliGRAM(s) Oral daily  folic acid 1 milliGRAM(s) Oral daily  insulin glargine Injectable (LANTUS) 10 Unit(s) SubCutaneous at bedtime  insulin lispro (HumaLOG) corrective regimen sliding scale   SubCutaneous Before meals and at bedtime  insulin lispro Injectable (HumaLOG) 5 Unit(s) SubCutaneous three times a day before meals  lactulose Syrup 10 Gram(s) Oral two times a day  latanoprost 0.005% Ophthalmic Solution 1 Drop(s) Both EYES at bedtime  multivitamin 1 Tablet(s) Oral daily  petrolatum Ophthalmic Ointment 1 Application(s) Both EYES three times a day  predniSONE   Tablet 40 milliGRAM(s) Oral daily    MEDICATIONS  (PRN):  benzocaine 15 mG/menthol 3.6 mG Lozenge 1 Lozenge Oral every 6 hours PRN Sore Throat  guaiFENesin/dextromethorphan  Syrup 10 milliLiter(s) Oral every 4 hours PRN Cough  sodium chloride 0.65% Nasal 1 Spray(s) Both Nostrils three times a day PRN Nasal Congestion      Allergies    No Known Allergies    Intolerances        REVIEW OF SYSTEMS:  CONSTITUTIONAL: No fever, weight loss, or fatigue  EYES: No eye pain, visual disturbances, or discharge  ENMT:  No difficulty hearing, tinnitus, vertigo; No sinus or throat pain  NECK: No pain or stiffness  BREASTS: No pain, masses, or nipple discharge  RESPIRATORY: No cough, wheezing, chills or hemoptysis; No shortness of breath  CARDIOVASCULAR: No chest pain, palpitations, dizziness, or leg swelling  GASTROINTESTINAL: No abdominal or epigastric pain. No nausea, vomiting, or hematemesis; No diarrhea or constipation. No melena or hematochezia.  GENITOURINARY: No dysuria, frequency, hematuria, or incontinence  NEUROLOGICAL: No headaches, memory loss, loss of strength, numbness, or tremors  SKIN: No itching, burning, rashes, or lesions   LYMPH NODES: No enlarged glands  ENDOCRINE: No heat or cold intolerance; No hair loss  MUSCULOSKELETAL: No joint pain or swelling; No muscle, back, or extremity pain  PSYCHIATRIC: No depression, anxiety, mood swings, or difficulty sleeping  HEME/LYMPH: No easy bruising, or bleeding gums  ALLERGY AND IMMUNOLOGIC: No hives or eczema    Vital Signs Last 24 Hrs  T(C): 36.3 (18 Oct 2017 05:30), Max: 36.9 (17 Oct 2017 14:06)  T(F): 97.3 (18 Oct 2017 05:30), Max: 98.5 (17 Oct 2017 14:06)  HR: 83 (18 Oct 2017 05:30) (83 - 110)  BP: 147/62 (18 Oct 2017 05:30) (138/70 - 173/82)  BP(mean): --  RR: 18 (18 Oct 2017 05:30) (17 - 18)  SpO2: 100% (18 Oct 2017 05:30) (100% - 100%)    PHYSICAL EXAM:  GENERAL: NAD, well-groomed, well-developed  HEAD:  Atraumatic, Normocephalic  EYES: EOMI, PERRLA, conjunctiva and sclera clear  ENMT: No tonsillar erythema, exudates, or enlargement; Moist mucous membranes, Good dentition, No lesions  NECK: Supple, No JVD, Normal thyroid  NERVOUS SYSTEM:  Alert & Oriented X3, Good concentration; Motor Strength 5/5 B/L upper and lower extremities; DTRs 2+ intact and symmetric  CHEST/LUNG: Clear to percussion bilaterally; No rales, rhonchi, wheezing, or rubs  HEART: Regular rate and rhythm; No murmurs, rubs, or gallops  ABDOMEN: Soft, Nontender, Nondistended; Bowel sounds present  EXTREMITIES:  2+ Peripheral Pulses, No clubbing, cyanosis, or edema  LYMPH: No lymphadenopathy noted  SKIN: No rashes or lesions    LABS:                        10.3   16.8  )-----------( 157      ( 17 Oct 2017 06:33 )             34.8     10-17    140  |  107  |  23<H>  ----------------------------<  359<H>  4.5   |  25  |  1.41<H>    Ca    9.9      17 Oct 2017 12:04  Phos  3.2     10-17  Mg     2.4     10-17          CAPILLARY BLOOD GLUCOSE  301 (18 Oct 2017 08:07)  332 (17 Oct 2017 11:28)      POCT Blood Glucose.: 287 mg/dL (17 Oct 2017 20:56)  POCT Blood Glucose.: 182 mg/dL (17 Oct 2017 16:45)  POCT Blood Glucose.: 266 mg/dL (17 Oct 2017 13:06)  POCT Blood Glucose.: 332 mg/dL (17 Oct 2017 11:25)      RADIOLOGY & ADDITIONAL TESTS:    Imaging Personally Reviewed:  [ ] YES  [ ] NO    Consultant(s) Notes Reviewed:  [ ] YES  [ ] NO    Care Discussed with Consultants/Other Providers [ ] YES  [ ] NO

## 2017-10-18 NOTE — PROGRESS NOTE ADULT - SUBJECTIVE AND OBJECTIVE BOX
Patient is a 71y old  Female who presents with a chief complaint of cough productive of white sputum and fever (15 Oct 2017 14:32)    Patient is a 71 year old female, with PMH of CAD, ACS, atrial fibrillation, bronchoaveolar cancer (s/p wedge resection 2013), DM, COPD (on 3L O2 @ assisted living facility; no past intubations), HLD, HTN, and GERD, admitted to the general medical floor for the treatment of  pneumonia from the ED, where she presented with a history of fever and cough productive of white sputum. Patient lives at [facility] and is able to ambulate normally with a walker.     Hospital Course  ED - Patient arrived at ED 10-15 from [place] home complaining of 1 day history of fever, cough productive of white sputum, and upper abdominal pain that is worsened with cough. Denied CP, palpitations, nausea, vomiting, diarrhea, urinary symptoms. Temperature 102.5 F, HR 85, /62, SPO2 95% on supplemental O2. AOx3, rhonchi b/l, heart RRR, Abdomen soft, non-tender. Patient started on duonebs, iv steroids, fluids. NPO. Lactate 1.2, Hb 10.6, WBC 9.3, UA few bacteria, CXR left basilar patchy opacities and right lung opacities.  CT abdomen - gastric antrum thickening and bibasilar consolidation. IV Zosyn started. Patient started on insulin sliding scale. HTN meds held due to risk of sepsis and normotensive.  Lovenox started. Admitted for pneumonia    INTERVAL HPI/OVERNIGHT EVENTS:  Patient is resting comfortably, eating breakfast. Patient reports that she is feeling better overall. Patient reports that she has had 8 loose/watery bowel movments since last night until this morning. Patient does not believe stool was bloody. Patient states that she's been feeling a little dizzy when she gets up to move to the bathroom or chair during episodes of diarrhea, but that dizziness has now resolved. Patient denies associated abdominal pain. Patient states that she still has a cough, that is still productive of white sputum, but much less than before. Patient states that she experiences shortness of breath when she gets up from the bed, but is the same as her baseline function. Patient denies chest pain, palpitations, headache,  abdominal pain, urinary symptoms.     Medical History:  Acute coronary syndrome  Coronary artery disefase  Atrial fibrillation (not on AC or rate control medication)  bronchoaveolar cancer (s/p wedge resection in 2013)  Diabetes mellitus (patient states that she does not take insulin at assisted living, only "one pill" for DM)  COPD ( on 3L O2 at assisted living facility, no history of intubations)  Hyperlipidemia  Hypertension  Gerd  Osteoarthritis  Seborrheic dermatitis      MEDICATIONS  (STANDING):  ALBUTerol/ipratropium for Nebulization 3 milliLiter(s) Nebulizer every 6 hours  aspirin enteric coated 81 milliGRAM(s) Oral daily  atorvastatin 20 milliGRAM(s) Oral at bedtime  azithromycin   Tablet 250 milliGRAM(s) Oral daily  buDESOnide 160 MICROgram(s)/formoterol 4.5 MICROgram(s) Inhaler 2 Puff(s) Inhalation two times a day  cefTRIAXone   IVPB 1 Gram(s) IV Intermittent every 24 hours  cefTRIAXone   IVPB      enoxaparin Injectable 40 milliGRAM(s) SubCutaneous daily  ergocalciferol 78250 Unit(s) Oral <User Schedule>  ferrous    sulfate 325 milliGRAM(s) Oral daily  folic acid 1 milliGRAM(s) Oral daily  insulin glargine Injectable (LANTUS) 10 Unit(s) SubCutaneous at bedtime  insulin lispro (HumaLOG) corrective regimen sliding scale   SubCutaneous Before meals and at bedtime  insulin lispro Injectable (HumaLOG) 5 Unit(s) SubCutaneous three times a day before meals  latanoprost 0.005% Ophthalmic Solution 1 Drop(s) Both EYES at bedtime  multivitamin 1 Tablet(s) Oral daily  petrolatum Ophthalmic Ointment 1 Application(s) Both EYES three times a day  predniSONE   Tablet 40 milliGRAM(s) Oral daily    MEDICATIONS  (PRN):  benzocaine 15 mG/menthol 3.6 mG Lozenge 1 Lozenge Oral every 6 hours PRN Sore Throat  guaiFENesin/dextromethorphan  Syrup 10 milliLiter(s) Oral every 4 hours PRN Cough  sodium chloride 0.65% Nasal 1 Spray(s) Both Nostrils three times a day PRN Nasal Congestion      Allergies:  No known allergies    Surgical History:  S/p ovarian cystectomy  Wedge resection (2013)    Family History:    Social History:        Vital Signs Last 24 Hrs  T(C): 36.3 (18 Oct 2017 05:30), Max: 36.9 (17 Oct 2017 14:06)  T(F): 97.3 (18 Oct 2017 05:30), Max: 98.5 (17 Oct 2017 14:06)  HR: 83 (18 Oct 2017 05:30) (83 - 110)  BP: 147/62 (18 Oct 2017 05:30) (138/70 - 173/82)  BP(mean): --  RR: 18 (18 Oct 2017 05:30) (17 - 18)  SpO2: 100% (18 Oct 2017 05:30) (100% - 100%)    PHYSICAL EXAM:  GENERAL: NAD, AOx3  NERVOUS SYSTEM: Good concentration.   CHEST/LUNG: Rales left lower lung field. No wheezes or rhonchi  HEART: +S1S2, Grade III systolic murmur, RRR  ABDOMEN: BSx4, Soft, non-tender, non-distended  EXTREMITIES:  non-tender, non-erythematous, non-edematous        LABS:                        10.3   16.8  )-----------( 157      ( 17 Oct 2017 06:33 )             34.8     10-17    140  |  107  |  23<H>  ----------------------------<  359<H>  4.5   |  25  |  1.41<H>    Ca    9.9      17 Oct 2017 12:04  Phos  3.2     10-17  Mg     2.4     10-17          CAPILLARY BLOOD GLUCOSE  301 (18 Oct 2017 08:07)  332 (17 Oct 2017 11:28)      POCT Blood Glucose.: 287 mg/dL (17 Oct 2017 20:56)  POCT Blood Glucose.: 182 mg/dL (17 Oct 2017 16:45)  POCT Blood Glucose.: 266 mg/dL (17 Oct 2017 13:06)  POCT Blood Glucose.: 332 mg/dL (17 Oct 2017 11:25)    BCx2 (10-15) - prelim - no growth  Urine Culture (10-15) - Final - collection contamination, retest recommended.     10-16  HbA1c - 6.4  TSH - 0.33    10-17 - Rapid HIV - negative    RADIOLOGY & ADDITIONAL TESTS:    Ventricular Rate 83 BPM  Atrial Rate 83 BPM  P-R Interval 138 ms  QRS Duration 82 ms   ms  QTc 418 ms  P Axis 49 degrees  R Axis -46 degrees  T Axis 82 degrees  Diagnosis Line Normal sinus rhythm  Left anterior fascicular block  Abnormal ECG      EXAM:  CT ABDOMEN AND PELVIS IC                        PROCEDURE DATE:  10/15/2017    INTERPRETATION:  CT ABDOMEN AND PELVIS WITH IV CONTRAST  CLINICAL STATEMENT: upper abd pain, fever.  COMPARISON: CT abdomen pelvis 9/30/2016.  TECHNIQUE: CT scan of the abdomen and pelvis was performed with IV,   without oral contrast. Multiplanar reformations were made.  FINDINGS:  Heterogeneous opacities in the lung bases, worst in the left lower lobe,   likely infectious/inflammatory. Trace left pleural effusion. Mitral   annular calcifications. Questionable mediastinal lymph node.    The liver, gallbladder, pancreas, spleen and adrenal glands are   unremarkable.    Kidneys enhance symmetrically. No hydroureteronephrosis. Subcentimeter   hypodensity in the left kidney upper pole is too small to characterize.    There is underdistention of the stomach, but there appears to be   thickening of the gastric antrum and soft tissue nodularity; correlation   with upper endoscopy is recommended. GI tract is unobstructed. Fluid in   the right colon; stool in the left colon. Appendix is unremarkable. No   free air or ascites.  No bulky adenopathy. Normal caliber abdominal aorta. Mild atherosclerosis.  Urinary bladder is unremarkable. Uterus and adnexae are grossly   unremarkable. No free pelvic fluid.  Bones are osteopenic with degenerative changes in the spine. Tiny   fat-containing umbilical hernia.    IMPRESSION:  Despite underdistention of the stomach, there appears to be thickening of   the gastric antrum and soft tissue nodularity; correlation with upper   endoscopy is recommended.  Heterogeneous opacities in the lung bases, worst in the left lower lobe,   likely infectious/inflammatory.      Assessment and Plan  Patient is a 71 year old female, with PMH of CAD, ACS, atrial fibrillation (not on home AC or rate control medication), bronchoaveolar cancer (s/p wedge resection 2013), DM, COPD (on 3L O2 @ assisted living facility; no past intubations), HLD, HTN, and GERD, admitted to the general medical floor for the treatment of  pneumonia from the ED. Patient's vital signs are stable and within normal limits, with one reading of tachycardia 102 bpm last night and BP of 172/82 at the same time. Patient reports that she had 8 episodes of loose or watery stool intermittently last night until this morning, associated with mild dizziness and not associated with any abdominal pain. Cough and sputum production better as per patient. Patient currently denies HA, dizziness, CP, palpitations, shortness of breath worse than baseline, nausea, vomiting, urinary symptoms. On physical exam, patient is AOx3, NAD, heart is RRR but with Grade III systolic murmur, lung auscultation significant for left sided rales, no wheezes or rhonchi. Leukocytosis (16.8 WBC, neutrophil% 93.6%), patient on steroids. Blood glucose levels trending down but still elevated. Procalcitonin 13.07. Creatinine 1.34. Potassium 5.4    Pneumonia  - CXR (10-15) - impressive for bilateral basilar consolidation (L>R)  - Temperature of 102.5 on 10-15  - WBC 16.8, Neutrophil % 93.6%  - Patient on glucocorticoid medication  - Procalcitonin 13.07  - History of fever, cough productive of white sputum  - Vital signs stable and within normal limits  - Patient reports that cough and sputum production improving   - Patient denies unusual dyspnea  - bilateral rales on physical examination  - Infectious disease consulted (Dr. Duval)  - Pending BCx2 final results. Prelim - no growth  - UC - final - contaminated  Plan  - Patient clinically improving  - Leukocytosis likely due to glucocorticoid regimen. Glucocorticoid tapering starting today.  - Continue azithromycin 250mg  PO and ceftriaxone IV 1 g daily, as per infectious disease recommendation. Currently day 3 of 5 of ABx.    - guaiFENesin/dextromethorphan  Syrup for cough  - NaCl spray for nasal congestion    COPD  - Patient reports that dyspnea is not worse than baseline  - SPO2 99%-100% on supplemental oxygen  - Lungs clear for wheezes and rhonchi  - Cough improved as per patient  - Sputum is white, less than before  Plant  - Continue albuterol/ipratropium 3 ml q6hrs  - Continue budesonide 160 mcg/formoterol 4.5 mcg 2 puffs bid  - Begin tapering off of methylprednisolone sodium succinate 40 mg injection q8hrs    Diabetes mellitus  - HbA1c - 6.4  - Blood glucose 300's - 400's  - Patient on glucocorticoid regimen  - Patient on insulin sliding scale  Plan  - Repeat fingersticks  - Glucocorticoid tapering beginning today  - Bedtime  10 units  - Pre-meal Humalog insulin 5 units  - Moderate dose insulin sliding scale    Hyperkalemia  - Potassium 4.5 as of 10-17  Plan  - Hyperkalemia resolved    Atrial Fibrillation  - not on any home AC or rate control regimen  - Patient denies palpitations, CP, HA, dizziness  - EKG - NSR, KS interval 140, QRS 86, QT/QTc - 334/430  Plan  - aspirin 81 mg per day    Systolic Murmur  - Grade III  - Patient dies HA, CP, palpitations, current dizziness  Plan  - Transthoracic echocardiogram    Hyperlipidemia  - Atorvastatin 20 mg qd before bed    Hypertension  - Monitor blood pressure    Prophylaxis  - Hx of cancer  - Decreased mobility  - Atrial fibrillation  Plan  - Lovenox sub-cutaneous once a day Patient is a 71y old  Female who presents with a chief complaint of cough productive of white sputum and fever (15 Oct 2017 14:32)    Patient is a 71 year old female, with PMH of CAD, ACS, atrial fibrillation, bronchoaveolar cancer (s/p wedge resection 2013), DM, COPD (on 3L O2 @ assisted living facility; no past intubations), HLD, HTN, and GERD, admitted to the general medical floor for the treatment of  pneumonia from the ED, where she presented with a history of fever and cough productive of white sputum. Patient lives at [facility] and is able to ambulate normally with a walker.     Hospital Course  ED - Patient arrived at ED 10-15 from [place] home complaining of 1 day history of fever, cough productive of white sputum, and upper abdominal pain that is worsened with cough. Denied CP, palpitations, nausea, vomiting, diarrhea, urinary symptoms. Temperature 102.5 F, HR 85, /62, SPO2 95% on supplemental O2. AOx3, rhonchi b/l, heart RRR, Abdomen soft, non-tender. Patient started on duonebs, iv steroids, fluids. NPO. Lactate 1.2, Hb 10.6, WBC 9.3, UA few bacteria, CXR left basilar patchy opacities and right lung opacities.  CT abdomen - gastric antrum thickening and bibasilar consolidation. IV Zosyn started. Patient started on insulin sliding scale. HTN meds held due to risk of sepsis and normotensive.  Lovenox started. Admitted for pneumonia    INTERVAL HPI/OVERNIGHT EVENTS:  Patient is resting comfortably, eating breakfast. Patient reports that she is feeling better overall. Patient reports that she has had 8 loose/watery bowel movments since last night until this morning. Patient does not believe stool was bloody. Patient states that she's been feeling a little dizzy when she gets up to move to the bathroom or chair during episodes of diarrhea, but that dizziness has now resolved. Patient denies associated abdominal pain. Patient states that she still has a cough, that is still productive of white sputum, but much less than before. Patient states that she experiences shortness of breath when she gets up from the bed, but is the same as her baseline function. Patient denies chest pain, palpitations, headache,  abdominal pain, urinary symptoms.     Medical History:  Acute coronary syndrome  Coronary artery disefase  Atrial fibrillation (not on AC or rate control medication)  bronchoaveolar cancer (s/p wedge resection in 2013)  Diabetes mellitus (patient states that she does not take insulin at assisted living, only "one pill" for DM)  COPD ( on 3L O2 at assisted living facility, no history of intubations)  Hyperlipidemia  Hypertension  Gerd  Osteoarthritis  Seborrheic dermatitis      MEDICATIONS  (STANDING):  ALBUTerol/ipratropium for Nebulization 3 milliLiter(s) Nebulizer every 6 hours  aspirin enteric coated 81 milliGRAM(s) Oral daily  atorvastatin 20 milliGRAM(s) Oral at bedtime  azithromycin   Tablet 250 milliGRAM(s) Oral daily  buDESOnide 160 MICROgram(s)/formoterol 4.5 MICROgram(s) Inhaler 2 Puff(s) Inhalation two times a day  cefTRIAXone   IVPB 1 Gram(s) IV Intermittent every 24 hours  cefTRIAXone   IVPB      enoxaparin Injectable 40 milliGRAM(s) SubCutaneous daily  ergocalciferol 56087 Unit(s) Oral <User Schedule>  ferrous    sulfate 325 milliGRAM(s) Oral daily  folic acid 1 milliGRAM(s) Oral daily  insulin glargine Injectable (LANTUS) 10 Unit(s) SubCutaneous at bedtime  insulin lispro (HumaLOG) corrective regimen sliding scale   SubCutaneous Before meals and at bedtime  insulin lispro Injectable (HumaLOG) 5 Unit(s) SubCutaneous three times a day before meals  latanoprost 0.005% Ophthalmic Solution 1 Drop(s) Both EYES at bedtime  multivitamin 1 Tablet(s) Oral daily  petrolatum Ophthalmic Ointment 1 Application(s) Both EYES three times a day  predniSONE   Tablet 40 milliGRAM(s) Oral daily    MEDICATIONS  (PRN):  benzocaine 15 mG/menthol 3.6 mG Lozenge 1 Lozenge Oral every 6 hours PRN Sore Throat  guaiFENesin/dextromethorphan  Syrup 10 milliLiter(s) Oral every 4 hours PRN Cough  sodium chloride 0.65% Nasal 1 Spray(s) Both Nostrils three times a day PRN Nasal Congestion      Allergies:  No known allergies    Surgical History:  S/p ovarian cystectomy  Wedge resection (2013)    Family History:    Social History:        Vital Signs Last 24 Hrs  T(C): 36.3 (18 Oct 2017 05:30), Max: 36.9 (17 Oct 2017 14:06)  T(F): 97.3 (18 Oct 2017 05:30), Max: 98.5 (17 Oct 2017 14:06)  HR: 83 (18 Oct 2017 05:30) (83 - 110)  BP: 147/62 (18 Oct 2017 05:30) (138/70 - 173/82)  BP(mean): --  RR: 18 (18 Oct 2017 05:30) (17 - 18)  SpO2: 100% (18 Oct 2017 05:30) (100% - 100%)    PHYSICAL EXAM:  GENERAL: NAD, AOx3  NERVOUS SYSTEM: Good concentration.   CHEST/LUNG: Rales left lower lung field. No wheezes or rhonchi  HEART: +S1S2, Grade III systolic murmur, RRR  ABDOMEN: BSx4, Soft, non-tender, non-distended  EXTREMITIES:  non-tender, non-erythematous, non-edematous        LABS:                        10.3   16.8  )-----------( 157      ( 17 Oct 2017 06:33 )             34.8     10-17    140  |  107  |  23<H>  ----------------------------<  359<H>  4.5   |  25  |  1.41<H>    Ca    9.9      17 Oct 2017 12:04  Phos  3.2     10-17  Mg     2.4     10-17          CAPILLARY BLOOD GLUCOSE  301 (18 Oct 2017 08:07)  332 (17 Oct 2017 11:28)      POCT Blood Glucose.: 287 mg/dL (17 Oct 2017 20:56)  POCT Blood Glucose.: 182 mg/dL (17 Oct 2017 16:45)  POCT Blood Glucose.: 266 mg/dL (17 Oct 2017 13:06)  POCT Blood Glucose.: 332 mg/dL (17 Oct 2017 11:25)    BCx2 (10-15) - prelim - no growth  Urine Culture (10-15) - Final - collection contamination, retest recommended.     10-16  HbA1c - 6.4  TSH - 0.33    10-17 - Rapid HIV - negative    RADIOLOGY & ADDITIONAL TESTS:    Ventricular Rate 83 BPM  Atrial Rate 83 BPM  P-R Interval 138 ms  QRS Duration 82 ms   ms  QTc 418 ms  P Axis 49 degrees  R Axis -46 degrees  T Axis 82 degrees  Diagnosis Line Normal sinus rhythm  Left anterior fascicular block  Abnormal ECG      EXAM:  CT ABDOMEN AND PELVIS IC                        PROCEDURE DATE:  10/15/2017    INTERPRETATION:  CT ABDOMEN AND PELVIS WITH IV CONTRAST  CLINICAL STATEMENT: upper abd pain, fever.  COMPARISON: CT abdomen pelvis 9/30/2016.  TECHNIQUE: CT scan of the abdomen and pelvis was performed with IV,   without oral contrast. Multiplanar reformations were made.  FINDINGS:  Heterogeneous opacities in the lung bases, worst in the left lower lobe,   likely infectious/inflammatory. Trace left pleural effusion. Mitral   annular calcifications. Questionable mediastinal lymph node.    The liver, gallbladder, pancreas, spleen and adrenal glands are   unremarkable.    Kidneys enhance symmetrically. No hydroureteronephrosis. Subcentimeter   hypodensity in the left kidney upper pole is too small to characterize.    There is underdistention of the stomach, but there appears to be   thickening of the gastric antrum and soft tissue nodularity; correlation   with upper endoscopy is recommended. GI tract is unobstructed. Fluid in   the right colon; stool in the left colon. Appendix is unremarkable. No   free air or ascites.  No bulky adenopathy. Normal caliber abdominal aorta. Mild atherosclerosis.  Urinary bladder is unremarkable. Uterus and adnexae are grossly   unremarkable. No free pelvic fluid.  Bones are osteopenic with degenerative changes in the spine. Tiny   fat-containing umbilical hernia.    IMPRESSION:  Despite underdistention of the stomach, there appears to be thickening of   the gastric antrum and soft tissue nodularity; correlation with upper   endoscopy is recommended.  Heterogeneous opacities in the lung bases, worst in the left lower lobe,   likely infectious/inflammatory.      Assessment and Plan  Patient is a 71 year old female, with PMH of CAD, ACS, atrial fibrillation (not on home AC or rate control medication), bronchoaveolar cancer (s/p wedge resection 2013), DM, COPD (on 3L O2 @ assisted living facility; no past intubations), HLD, HTN, and GERD, admitted to the general medical floor for the treatment of  pneumonia from the ED. Patient's vital signs are stable and within normal limits, with one reading of tachycardia 102 bpm last night and BP of 172/82 at the same time. Patient reports that she had 8 episodes of loose or watery stool intermittently last night until this morning, associated with mild dizziness and not associated with any abdominal pain. Cough and sputum production better as per patient. Patient currently denies HA, dizziness, CP, palpitations, shortness of breath worse than baseline, nausea, vomiting, urinary symptoms. On physical exam, patient is AOx3, NAD, heart is RRR but with Grade III systolic murmur, lung auscultation significant for left sided rales, no wheezes or rhonchi. Leukocytosis (16.8 WBC, neutrophil% 93.6%), patient on steroids. Blood glucose levels trending down but still elevated. Procalcitonin 13.07. Creatinine 1.34. Potassium 5.4    Pneumonia  - CXR (10-15) - impressive for bilateral basilar consolidation (L>R)  - Temperature of 102.5 on 10-15  - WBC 16.8, Neutrophil % 93.6%  - Patient on glucocorticoid medication  - Procalcitonin 13.07  - History of fever, cough productive of white sputum  - Vital signs stable and within normal limits  - Patient reports that cough and sputum production improving   - Patient denies unusual dyspnea  - bilateral rales on physical examination  - Infectious disease consulted (Dr. Duval)  - Pending BCx2 final results. Prelim - no growth  - UC - final - contaminated  Plan  - Patient clinically improving  - Leukocytosis likely due to glucocorticoid regimen. Glucocorticoid tapering starting today.  - Continue azithromycin 250mg  PO and ceftriaxone IV 1 g daily, as per infectious disease recommendation. Currently day 3 of 5 of ABx.    - guaiFENesin/dextromethorphan  Syrup for cough  - NaCl spray for nasal congestion    COPD  - Patient reports that dyspnea is not worse than baseline  - SPO2 99%-100% on supplemental oxygen  - Lungs clear for wheezes and rhonchi  - Cough improved as per patient  - Sputum is white, less than before  Plant  - Continue albuterol/ipratropium 3 ml q6hrs  - Continue budesonide 160 mcg/formoterol 4.5 mcg 2 puffs bid  - Begin tapering off of glucocorticoids. Solumedrol discontinued. Prednisone 40 mg PO started    Diabetes mellitus  - HbA1c - 6.4  - Blood glucose 300's - 400's  - Patient on glucocorticoid regimen  - Patient on insulin sliding scale  Plan  - Repeat fingersticks  - Glucocorticoid tapering beginning today  - Bedtime  10 units  - Pre-meal Humalog insulin 5 units  - Moderate dose insulin sliding scale    Hyperkalemia  - Potassium 4.5 as of 10-17  Plan  - Hyperkalemia resolved    Atrial Fibrillation  - not on any home AC or rate control regimen  - Patient denies palpitations, CP, HA, dizziness  - EKG - NSR, [place] interval 140, QRS 86, QT/QTc - 334/430  Plan  - aspirin 81 mg per day    Systolic Murmur  - Grade III  - Patient dies HA, CP, palpitations, current dizziness  Plan  - Transthoracic echocardiogram    Hyperlipidemia  - Atorvastatin 20 mg qd before bed    Hypertension  - Monitor blood pressure    GERD  - Gastric antrum thickening on CC  Plan  - outpatient f/u    Prophylaxis  - Hx of cancer  - Decreased mobility  - Atrial fibrillation  Plan  - Lovenox sub-cutaneous once a day Patient is a 71y old  Female who presents with a chief complaint of cough productive of white sputum and fever (15 Oct 2017 14:32)    Patient is a 71 year old female, with PMH of CAD, ACS, atrial fibrillation, bronchoaveolar cancer (s/p wedge resection 2013), DM, COPD (on 3L O2 @ assisted living facility; no past intubations), HLD, HTN, and GERD, admitted to the general medical floor for the treatment of  pneumonia from the ED, where she presented with a history of fever and cough productive of white sputum. Patient lives at [facility] and is able to ambulate normally with a walker.     Hospital Course  ED - Patient arrived at ED 10-15 from [place] home complaining of 1 day history of fever, cough productive of white sputum, and upper abdominal pain that is worsened with cough. Denied CP, palpitations, nausea, vomiting, diarrhea, urinary symptoms. Temperature 102.5 F, HR 85, /62, SPO2 95% on supplemental O2. AOx3, rhonchi b/l, heart RRR, Abdomen soft, non-tender. Patient started on duonebs, iv steroids, fluids. NPO. Lactate 1.2, Hb 10.6, WBC 9.3, UA few bacteria, CXR left basilar patchy opacities and right lung opacities.  CT abdomen - gastric antrum thickening and bibasilar consolidation. IV Zosyn started. Patient started on insulin sliding scale. HTN meds held due to risk of sepsis and normotensive.  Lovenox started. Admitted for pneumonia    INTERVAL HPI/OVERNIGHT EVENTS:  Patient is resting comfortably, eating breakfast. Patient reports that she is feeling better overall. Patient reports that she has had 8 loose/watery bowel movments since last night until this morning. Patient does not believe stool was bloody. Patient states that she's been feeling a little dizzy when she gets up to move to the bathroom or chair during episodes of diarrhea, but that dizziness has now resolved. Patient denies associated abdominal pain. Patient states that she still has a cough, that is still productive of white sputum, but much less than before. Patient states that she experiences shortness of breath when she gets up from the bed, but is the same as her baseline function. Patient denies chest pain, palpitations, headache,  abdominal pain, urinary symptoms.     Medical History:  Acute coronary syndrome  Coronary artery disefase  Atrial fibrillation (not on AC or rate control medication)  bronchoaveolar cancer (s/p wedge resection in 2013)  Diabetes mellitus (patient states that she does not take insulin at assisted living, only "one pill" for DM)  COPD ( on 3L O2 at assisted living facility, no history of intubations)  Hyperlipidemia  Hypertension  Gerd  Osteoarthritis  Seborrheic dermatitis      MEDICATIONS  (STANDING):  ALBUTerol/ipratropium for Nebulization 3 milliLiter(s) Nebulizer every 6 hours  aspirin enteric coated 81 milliGRAM(s) Oral daily  atorvastatin 20 milliGRAM(s) Oral at bedtime  azithromycin   Tablet 250 milliGRAM(s) Oral daily  buDESOnide 160 MICROgram(s)/formoterol 4.5 MICROgram(s) Inhaler 2 Puff(s) Inhalation two times a day  cefTRIAXone   IVPB 1 Gram(s) IV Intermittent every 24 hours  cefTRIAXone   IVPB      enoxaparin Injectable 40 milliGRAM(s) SubCutaneous daily  ergocalciferol 93645 Unit(s) Oral <User Schedule>  ferrous    sulfate 325 milliGRAM(s) Oral daily  folic acid 1 milliGRAM(s) Oral daily  insulin glargine Injectable (LANTUS) 10 Unit(s) SubCutaneous at bedtime  insulin lispro (HumaLOG) corrective regimen sliding scale   SubCutaneous Before meals and at bedtime  insulin lispro Injectable (HumaLOG) 5 Unit(s) SubCutaneous three times a day before meals  latanoprost 0.005% Ophthalmic Solution 1 Drop(s) Both EYES at bedtime  multivitamin 1 Tablet(s) Oral daily  petrolatum Ophthalmic Ointment 1 Application(s) Both EYES three times a day  predniSONE   Tablet 40 milliGRAM(s) Oral daily    MEDICATIONS  (PRN):  benzocaine 15 mG/menthol 3.6 mG Lozenge 1 Lozenge Oral every 6 hours PRN Sore Throat  guaiFENesin/dextromethorphan  Syrup 10 milliLiter(s) Oral every 4 hours PRN Cough  sodium chloride 0.65% Nasal 1 Spray(s) Both Nostrils three times a day PRN Nasal Congestion      Allergies:  No known allergies    Surgical History:  S/p ovarian cystectomy  Wedge resection (2013)    Family History:    Social History:        Vital Signs Last 24 Hrs  T(C): 36.3 (18 Oct 2017 05:30), Max: 36.9 (17 Oct 2017 14:06)  T(F): 97.3 (18 Oct 2017 05:30), Max: 98.5 (17 Oct 2017 14:06)  HR: 83 (18 Oct 2017 05:30) (83 - 110)  BP: 147/62 (18 Oct 2017 05:30) (138/70 - 173/82)  BP(mean): --  RR: 18 (18 Oct 2017 05:30) (17 - 18)  SpO2: 100% (18 Oct 2017 05:30) (100% - 100%)    PHYSICAL EXAM:  GENERAL: NAD, AOx3  NERVOUS SYSTEM: Good concentration.   CHEST/LUNG: Rales left lower lung field. No wheezes or rhonchi  HEART: +S1S2, Grade III systolic murmur, RRR  ABDOMEN: BSx4, Soft, non-tender, non-distended  EXTREMITIES:  non-tender, non-erythematous, non-edematous        LABS:                        10.3   16.8  )-----------( 157      ( 17 Oct 2017 06:33 )             34.8     10-17    140  |  107  |  23<H>  ----------------------------<  359<H>  4.5   |  25  |  1.41<H>    Ca    9.9      17 Oct 2017 12:04  Phos  3.2     10-17  Mg     2.4     10-17          CAPILLARY BLOOD GLUCOSE  301 (18 Oct 2017 08:07)  332 (17 Oct 2017 11:28)      POCT Blood Glucose.: 287 mg/dL (17 Oct 2017 20:56)  POCT Blood Glucose.: 182 mg/dL (17 Oct 2017 16:45)  POCT Blood Glucose.: 266 mg/dL (17 Oct 2017 13:06)  POCT Blood Glucose.: 332 mg/dL (17 Oct 2017 11:25)    BCx2 (10-15) - prelim - no growth  Urine Culture (10-15) - Final - collection contamination, retest recommended.     10-16  HbA1c - 6.4  TSH - 0.33    10-17 - Rapid HIV - negative    RADIOLOGY & ADDITIONAL TESTS:    Ventricular Rate 83 BPM  Atrial Rate 83 BPM  P-R Interval 138 ms  QRS Duration 82 ms   ms  QTc 418 ms  P Axis 49 degrees  R Axis -46 degrees  T Axis 82 degrees  Diagnosis Line Normal sinus rhythm  Left anterior fascicular block  Abnormal ECG      EXAM:  CT ABDOMEN AND PELVIS IC                        PROCEDURE DATE:  10/15/2017    INTERPRETATION:  CT ABDOMEN AND PELVIS WITH IV CONTRAST  CLINICAL STATEMENT: upper abd pain, fever.  COMPARISON: CT abdomen pelvis 9/30/2016.  TECHNIQUE: CT scan of the abdomen and pelvis was performed with IV,   without oral contrast. Multiplanar reformations were made.  FINDINGS:  Heterogeneous opacities in the lung bases, worst in the left lower lobe,   likely infectious/inflammatory. Trace left pleural effusion. Mitral   annular calcifications. Questionable mediastinal lymph node.    The liver, gallbladder, pancreas, spleen and adrenal glands are   unremarkable.    Kidneys enhance symmetrically. No hydroureteronephrosis. Subcentimeter   hypodensity in the left kidney upper pole is too small to characterize.    There is underdistention of the stomach, but there appears to be   thickening of the gastric antrum and soft tissue nodularity; correlation   with upper endoscopy is recommended. GI tract is unobstructed. Fluid in   the right colon; stool in the left colon. Appendix is unremarkable. No   free air or ascites.  No bulky adenopathy. Normal caliber abdominal aorta. Mild atherosclerosis.  Urinary bladder is unremarkable. Uterus and adnexae are grossly   unremarkable. No free pelvic fluid.  Bones are osteopenic with degenerative changes in the spine. Tiny   fat-containing umbilical hernia.    IMPRESSION:  Despite underdistention of the stomach, there appears to be thickening of   the gastric antrum and soft tissue nodularity; correlation with upper   endoscopy is recommended.  Heterogeneous opacities in the lung bases, worst in the left lower lobe,   likely infectious/inflammatory.      Assessment and Plan  Patient is a 71 year old female, with PMH of CAD, ACS, atrial fibrillation (not on home AC or rate control medication), bronchoaveolar cancer (s/p wedge resection 2013), DM, COPD (on 3L O2 @ assisted living facility; no past intubations), HLD, HTN, and GERD, admitted to the general medical floor for the treatment of  pneumonia from the ED. Patient's vital signs are stable and within normal limits, with one reading of tachycardia 102 bpm last night and BP of 172/82 at the same time. Patient reports that she had 8 episodes of loose or watery stool intermittently last night until this morning, associated with mild dizziness and not associated with any abdominal pain. Cough and sputum production better as per patient. Patient currently denies HA, dizziness, CP, palpitations, shortness of breath worse than baseline, nausea, vomiting, urinary symptoms. On physical exam, patient is AOx3, NAD, heart is RRR but with Grade III systolic murmur, lung auscultation significant for left sided rales, no wheezes or rhonchi. Leukocytosis (16.8 WBC, neutrophil% 93.6%), patient on steroids. Blood glucose levels trending down but still elevated. Procalcitonin 13.07. Creatinine 1.34. Potassium 5.4    Pneumonia  - CXR (10-15) - impressive for bilateral basilar consolidation (L>R)  - Temperature of 102.5 on 10-15  - WBC 16.8, Neutrophil % 93.6%  - Patient on glucocorticoid medication  - Procalcitonin 13.07  - History of fever, cough productive of white sputum  - Vital signs stable and within normal limits  - Patient reports that cough and sputum production improving   - Patient denies unusual dyspnea  - bilateral rales on physical examination  - Infectious disease consulted (Dr. Duval)  - Pending BCx2 final results. Prelim - no growth  - UC - final - contaminated  Plan  - Patient clinically improving  - Leukocytosis likely due to glucocorticoid regimen. Glucocorticoid tapering starting today.  - Continue azithromycin 250mg  PO and ceftriaxone IV 1 g daily, as per infectious disease recommendation. Currently day 3 of 5 of ABx.    - guaiFENesin/dextromethorphan  Syrup for cough  - NaCl spray for nasal congestion    COPD  - Patient reports that dyspnea is not worse than baseline  - SPO2 99%-100% on supplemental oxygen  - Lungs clear for wheezes and rhonchi  - Cough improved as per patient  - Sputum is white, less than before  Plant  - Continue albuterol/ipratropium 3 ml q6hrs  - Continue budesonide 160 mcg/formoterol 4.5 mcg 2 puffs bid  - Begin tapering off of glucocorticoids. Solumedrol discontinued. Prednisone 40 mg PO started    Diabetes mellitus  - HbA1c - 6.4  - Blood glucose 300's - 400's  - Patient on glucocorticoid regimen  - Patient on insulin sliding scale  Plan  - Repeat fingersticks  - Glucocorticoid tapering beginning today  - Bedtime  10 units  - Pre-meal Humalog insulin 5 units  - Moderate dose insulin sliding scale    Hyperkalemia  - Potassium 4.5 as of 10-17  Plan  - Hyperkalemia resolved    Atrial Fibrillation  - not on any home AC or rate control regimen  - Patient denies palpitations, CP, HA, dizziness  - EKG - NSR, NV interval 140, QRS 86, QT/QTc - 334/430  Plan  - aspirin 81 mg per day    Systolic Murmur  - Grade III  - Patient dies HA, CP, palpitations, current dizziness  Plan  - Transthoracic echocardiogram    Hyperlipidemia  - Atorvastatin 20 mg qd before bed    Hypertension  - Monitor blood pressure    GERD  - Gastric antrum thickening on CC  Plan  - Continue Protonix  - outpatient f/u    Malignant neoplasm  - History of bronchoalveolar cancer  - s/p wedge resection 2013  Plan  - Restart home chemotherapy drugs at home    Prophylaxis  - Hx of cancer  - Decreased mobility  - Atrial fibrillation  Plan  - Lovenox sub-cutaneous once a day

## 2017-10-18 NOTE — PROGRESS NOTE ADULT - ASSESSMENT
1.	Pneumonia (CAP)  2.	Diarrhea - ?lactulose  3.	Leukocytosis - steroid use  ·	cont azithromycin 250mg PO daily D3  ·	cont rocephin 1gm IV daily D3  ·	possible dc planning tomorrow if diarrhea improves

## 2017-10-18 NOTE — PROGRESS NOTE ADULT - SUBJECTIVE AND OBJECTIVE BOX
Patient is a 71y old  Female who presents with a chief complaint of Cough and Fever (15 Oct 2017 14:32)      INTERVAL HPI/OVERNIGHT EVENTS:No acute events overnight    MEDICATIONS  (STANDING):  ALBUTerol/ipratropium for Nebulization 3 milliLiter(s) Nebulizer every 6 hours  aspirin enteric coated 81 milliGRAM(s) Oral daily  atorvastatin 20 milliGRAM(s) Oral at bedtime  azithromycin   Tablet 250 milliGRAM(s) Oral daily  buDESOnide 160 MICROgram(s)/formoterol 4.5 MICROgram(s) Inhaler 2 Puff(s) Inhalation two times a day  cefTRIAXone   IVPB 1 Gram(s) IV Intermittent every 24 hours  cefTRIAXone   IVPB      enoxaparin Injectable 40 milliGRAM(s) SubCutaneous daily  ergocalciferol 78139 Unit(s) Oral <User Schedule>  ferrous    sulfate 325 milliGRAM(s) Oral daily  folic acid 1 milliGRAM(s) Oral daily  insulin glargine Injectable (LANTUS) 10 Unit(s) SubCutaneous at bedtime  insulin lispro (HumaLOG) corrective regimen sliding scale   SubCutaneous Before meals and at bedtime  insulin lispro Injectable (HumaLOG) 5 Unit(s) SubCutaneous three times a day before meals  lactulose Syrup 10 Gram(s) Oral two times a day  latanoprost 0.005% Ophthalmic Solution 1 Drop(s) Both EYES at bedtime  multivitamin 1 Tablet(s) Oral daily  petrolatum Ophthalmic Ointment 1 Application(s) Both EYES three times a day  predniSONE   Tablet 40 milliGRAM(s) Oral daily    MEDICATIONS  (PRN):  benzocaine 15 mG/menthol 3.6 mG Lozenge 1 Lozenge Oral every 6 hours PRN Sore Throat  guaiFENesin/dextromethorphan  Syrup 10 milliLiter(s) Oral every 4 hours PRN Cough  sodium chloride 0.65% Nasal 1 Spray(s) Both Nostrils three times a day PRN Nasal Congestion      Allergies    No Known Allergies    Intolerances        REVIEW OF SYSTEMS:  CONSTITUTIONAL: No fever, weight loss, or fatigue  EYES: No eye pain, visual disturbances, or discharge  ENMT:  No difficulty hearing, tinnitus, vertigo; No sinus or throat pain  NECK: No pain or stiffness  BREASTS: No pain, masses, or nipple discharge  RESPIRATORY: No cough, wheezing, chills or hemoptysis; No shortness of breath  CARDIOVASCULAR: No chest pain, palpitations, dizziness, or leg swelling  GASTROINTESTINAL: No abdominal or epigastric pain. No nausea, vomiting, or hematemesis; No diarrhea or constipation. No melena or hematochezia.  GENITOURINARY: No dysuria, frequency, hematuria, or incontinence  NEUROLOGICAL: No headaches, memory loss, loss of strength, numbness, or tremors  SKIN: No itching, burning, rashes, or lesions   LYMPH NODES: No enlarged glands  ENDOCRINE: No heat or cold intolerance; No hair loss  MUSCULOSKELETAL: No joint pain or swelling; No muscle, back, or extremity pain  PSYCHIATRIC: No depression, anxiety, mood swings, or difficulty sleeping  HEME/LYMPH: No easy bruising, or bleeding gums  ALLERGY AND IMMUNOLOGIC: No hives or eczema    Vital Signs Last 24 Hrs  T(C): 36.3 (18 Oct 2017 05:30), Max: 36.9 (17 Oct 2017 14:06)  T(F): 97.3 (18 Oct 2017 05:30), Max: 98.5 (17 Oct 2017 14:06)  HR: 83 (18 Oct 2017 05:30) (83 - 110)  BP: 147/62 (18 Oct 2017 05:30) (138/70 - 173/82)  BP(mean): --  RR: 18 (18 Oct 2017 05:30) (17 - 18)  SpO2: 100% (18 Oct 2017 05:30) (100% - 100%)    PHYSICAL EXAM:  GENERAL: NAD, well-groomed, well-developed  HEAD:  Atraumatic, Normocephalic  EYES: EOMI, PERRLA, conjunctiva and sclera clear  ENMT: No tonsillar erythema, exudates, or enlargement; Moist mucous membranes, Good dentition, No lesions  NECK: Supple, No JVD, Normal thyroid  NERVOUS SYSTEM:  Alert & Oriented X3, Good concentration; Motor Strength 5/5 B/L upper and lower extremities; DTRs 2+ intact and symmetric  CHEST/LUNG: Clear to percussion bilaterally; No rales, rhonchi, wheezing, or rubs  HEART: Regular rate and rhythm; No murmurs, rubs, or gallops  ABDOMEN: Soft, Nontender, Nondistended; Bowel sounds present  EXTREMITIES:  2+ Peripheral Pulses, No clubbing, cyanosis, or edema  LYMPH: No lymphadenopathy noted  SKIN: No rashes or lesions    LABS:                        10.3   16.8  )-----------( 157      ( 17 Oct 2017 06:33 )             34.8     10-17    140  |  107  |  23<H>  ----------------------------<  359<H>  4.5   |  25  |  1.41<H>    Ca    9.9      17 Oct 2017 12:04  Phos  3.2     10-17  Mg     2.4     10-17          CAPILLARY BLOOD GLUCOSE  301 (18 Oct 2017 08:07)  332 (17 Oct 2017 11:28)      POCT Blood Glucose.: 287 mg/dL (17 Oct 2017 20:56)  POCT Blood Glucose.: 182 mg/dL (17 Oct 2017 16:45)  POCT Blood Glucose.: 266 mg/dL (17 Oct 2017 13:06)  POCT Blood Glucose.: 332 mg/dL (17 Oct 2017 11:25)      RADIOLOGY & ADDITIONAL TESTS:    Imaging Personally Reviewed:  [ ] YES  [ ] NO    Consultant(s) Notes Reviewed:  [ ] YES  [ ] NO    Care Discussed with Consultants/Other Providers [ ] YES  [ ] NO

## 2017-10-19 DIAGNOSIS — R19.7 DIARRHEA, UNSPECIFIED: ICD-10-CM

## 2017-10-19 LAB
GLUCOSE BLDC GLUCOMTR-MCNC: 218 MG/DL — HIGH (ref 70–99)
GLUCOSE BLDC GLUCOMTR-MCNC: 312 MG/DL — HIGH (ref 70–99)

## 2017-10-19 RX ORDER — VANCOMYCIN HCL 1 G
125 VIAL (EA) INTRAVENOUS EVERY 6 HOURS
Qty: 0 | Refills: 0 | Status: DISCONTINUED | OUTPATIENT
Start: 2017-10-19 | End: 2017-10-20

## 2017-10-19 RX ADMIN — BUDESONIDE AND FORMOTEROL FUMARATE DIHYDRATE 2 PUFF(S): 160; 4.5 AEROSOL RESPIRATORY (INHALATION) at 11:44

## 2017-10-19 RX ADMIN — BUDESONIDE AND FORMOTEROL FUMARATE DIHYDRATE 2 PUFF(S): 160; 4.5 AEROSOL RESPIRATORY (INHALATION) at 21:59

## 2017-10-19 RX ADMIN — ATORVASTATIN CALCIUM 20 MILLIGRAM(S): 80 TABLET, FILM COATED ORAL at 21:59

## 2017-10-19 RX ADMIN — Medication 81 MILLIGRAM(S): at 11:44

## 2017-10-19 RX ADMIN — Medication 125 MILLIGRAM(S): at 12:03

## 2017-10-19 RX ADMIN — Medication 5 UNIT(S): at 11:45

## 2017-10-19 RX ADMIN — Medication 5 UNIT(S): at 17:31

## 2017-10-19 RX ADMIN — CEFTRIAXONE 100 GRAM(S): 500 INJECTION, POWDER, FOR SOLUTION INTRAMUSCULAR; INTRAVENOUS at 18:06

## 2017-10-19 RX ADMIN — ENOXAPARIN SODIUM 40 MILLIGRAM(S): 100 INJECTION SUBCUTANEOUS at 11:44

## 2017-10-19 RX ADMIN — Medication 1 TABLET(S): at 11:44

## 2017-10-19 RX ADMIN — Medication 1 APPLICATION(S): at 14:52

## 2017-10-19 RX ADMIN — Medication 125 MILLIGRAM(S): at 17:30

## 2017-10-19 RX ADMIN — Medication 4: at 17:30

## 2017-10-19 RX ADMIN — Medication 2: at 22:06

## 2017-10-19 RX ADMIN — Medication 325 MILLIGRAM(S): at 11:44

## 2017-10-19 RX ADMIN — Medication 1 MILLIGRAM(S): at 11:43

## 2017-10-19 RX ADMIN — Medication 5 UNIT(S): at 08:00

## 2017-10-19 RX ADMIN — Medication 3 MILLILITER(S): at 15:27

## 2017-10-19 RX ADMIN — LATANOPROST 1 DROP(S): 0.05 SOLUTION/ DROPS OPHTHALMIC; TOPICAL at 22:01

## 2017-10-19 RX ADMIN — Medication 5: at 11:44

## 2017-10-19 RX ADMIN — BENZOCAINE AND MENTHOL 1 LOZENGE: 5; 1 LIQUID ORAL at 11:48

## 2017-10-19 RX ADMIN — Medication 40 MILLIGRAM(S): at 05:21

## 2017-10-19 RX ADMIN — AZITHROMYCIN 250 MILLIGRAM(S): 500 TABLET, FILM COATED ORAL at 11:43

## 2017-10-19 RX ADMIN — INSULIN GLARGINE 10 UNIT(S): 100 INJECTION, SOLUTION SUBCUTANEOUS at 22:04

## 2017-10-19 RX ADMIN — Medication 1 APPLICATION(S): at 21:58

## 2017-10-19 RX ADMIN — Medication 1 APPLICATION(S): at 05:21

## 2017-10-19 NOTE — PROGRESS NOTE ADULT - SUBJECTIVE AND OBJECTIVE BOX
Patient is a 71y old  Female who presents with a chief complaint of Cough and Fever (15 Oct 2017 14:32)      INTERVAL HPI/OVERNIGHT EVENTS:As per patient 7 epsiodes of watery bowel movements yesterday    MEDICATIONS  (STANDING):  ALBUTerol/ipratropium for Nebulization 3 milliLiter(s) Nebulizer every 6 hours  aspirin enteric coated 81 milliGRAM(s) Oral daily  atorvastatin 20 milliGRAM(s) Oral at bedtime  azithromycin   Tablet 250 milliGRAM(s) Oral daily  buDESOnide 160 MICROgram(s)/formoterol 4.5 MICROgram(s) Inhaler 2 Puff(s) Inhalation two times a day  cefTRIAXone   IVPB 1 Gram(s) IV Intermittent every 24 hours  cefTRIAXone   IVPB      enoxaparin Injectable 40 milliGRAM(s) SubCutaneous daily  ergocalciferol 48810 Unit(s) Oral <User Schedule>  ferrous    sulfate 325 milliGRAM(s) Oral daily  folic acid 1 milliGRAM(s) Oral daily  insulin glargine Injectable (LANTUS) 10 Unit(s) SubCutaneous at bedtime  insulin lispro (HumaLOG) corrective regimen sliding scale   SubCutaneous Before meals and at bedtime  insulin lispro Injectable (HumaLOG) 5 Unit(s) SubCutaneous three times a day before meals  latanoprost 0.005% Ophthalmic Solution 1 Drop(s) Both EYES at bedtime  multivitamin 1 Tablet(s) Oral daily  petrolatum Ophthalmic Ointment 1 Application(s) Both EYES three times a day  predniSONE   Tablet 40 milliGRAM(s) Oral daily  vancomycin    Solution 125 milliGRAM(s) Oral every 6 hours    MEDICATIONS  (PRN):  benzocaine 15 mG/menthol 3.6 mG Lozenge 1 Lozenge Oral every 6 hours PRN Sore Throat  guaiFENesin/dextromethorphan  Syrup 10 milliLiter(s) Oral every 4 hours PRN Cough  sodium chloride 0.65% Nasal 1 Spray(s) Both Nostrils three times a day PRN Nasal Congestion        REVIEW OF SYSTEMS:  CONSTITUTIONAL: No fevers overnight   RESPIRATORY:  dry cough +; No shortness of breath  CARDIOVASCULAR: No chest pain, palpitations, dizziness, or leg swelling  GASTROINTESTINAL: No abdominal or epigastric pain. Watery diarrhea +  Patient is a poor historian    Vital Signs Last 24 Hrs  T(C): 36.4 (19 Oct 2017 05:08), Max: 36.9 (18 Oct 2017 14:37)  T(F): 97.6 (19 Oct 2017 05:08), Max: 98.5 (18 Oct 2017 14:37)  HR: 75 (19 Oct 2017 05:08) (75 - 94)  BP: 160/84 (19 Oct 2017 05:08) (150/79 - 168/78)  BP(mean): --  RR: 16 (19 Oct 2017 05:08) (16 - 18)  SpO2: 99% (19 Oct 2017 05:08) (96% - 99%)    PHYSICAL EXAM:  GENERAL: NAD, well-groomed, well-developed  NERVOUS SYSTEM:  Alert & Oriented X3, Good concentration;   CHEST/LUNG: Clear to percussion bilaterally; No rales, rhonchi, wheezing, or rubs  HEART: Regular rate and rhythm; No murmurs, rubs, or gallops  ABDOMEN: Soft, Nontender, Nondistended; Bowel sounds present  EXTREMITIES:  2+ Peripheral Pulses, No clubbing, cyanosis, or edema        CAPILLARY BLOOD GLUCOSE  363 (19 Oct 2017 11:15)  132 (19 Oct 2017 08:00)  240 (18 Oct 2017 21:44)      POCT Blood Glucose.: 240 mg/dL (18 Oct 2017 21:35)  POCT Blood Glucose.: 217 mg/dL (18 Oct 2017 15:49)      RADIOLOGY & ADDITIONAL TESTS:    Imaging Personally Reviewed:  [ ] YES  [ ] NO    Consultant(s) Notes Reviewed:  [ ] YES  [ ] NO    Care Discussed with Consultants/Other Providers [ ] YES  [ ] NO

## 2017-10-19 NOTE — PROGRESS NOTE ADULT - PROBLEM SELECTOR PROBLEM 4
HISTORY OF PRESENT ILLNESS:   This patient presents for discussion of possible radiotherapy for a recently diagnosed adenocarcinoma of the prostate.     Mr. Jacobo initially presented to Dr. Dawn in May of this year for evaluation of intermittent perineal and scrotal pain.  MARILYN revealed a non tender 50 gm prostate without palpable nodules.  PSA drawn in February was elevated at 22.6 ng/ml.  The patient subsequently underwent TRUS and biopsy of his prostate on 7/3/17.  The prostate measured 27 gm.  There were no hypoechoic areas noted.  Biopsies from the Lt. base revealed Deric 9 (4+5) adenocarcinoma involving 30% of the specimen.  Biopsies from the Rt. base, Lt. apex and Lt. mid gland revealed [place] 8 (4+4) adenocarcinoma involving 50% of the specimens.  The Rt. apex revealed [place] 6 (3+3) disease involving < 5% of the specimen.  The patient underwent further work up with CT of the abdomen and pelvis and bone scan on 7/24/17.  CT was essentially negative.  Bone scan revealed focal intense uptake in the Rt. supraorbital region along with a smaller area in the Rt. posterior aspect of T9.  An MRI of the prostate was obtained on 9/8/17.  This revealed a large Lt. sided prostate cancer (PIRADS 5) lesion with extraprostatic extension along the posterior lateral margin and invasion of the Lt. neurovascular bundle.  The was an enlarged Rt. pelvic sidewall node at 1.3 cm and a Lt. internal iliac node at 1.2 x 1.1 cm.  The patient was given a Rx for Casodex earlier this week.  He presents today for discussion of possible radiotherapy.  Today, the patient states he feels well.  No  complaints. He has a history of chronic low back pain.       REVIEW OF SYSTEMS:   Review of Systems   Constitutional: Negative for chills, diaphoresis, fever, malaise/fatigue and weight loss.   Respiratory: Negative for cough, sputum production and shortness of breath.    Cardiovascular: Negative for chest pain and palpitations.    Gastrointestinal: Negative for abdominal pain, diarrhea, nausea and vomiting.   Genitourinary: Negative for dysuria, frequency, hematuria and urgency.         PAST MEDICAL HISTORY:  Past Medical History:   Diagnosis Date    COPD (chronic obstructive pulmonary disease) 7/30/2013    Coronary artery disease 7/30/2013    Emphysema of lung     Glaucoma (increased eye pressure) 8/27/2013    Headache(784.0)     Mitral regurgitation 7/30/2013    Mixed hyperlipidemia 7/30/2013    Neuropathy     Osteoarthritis of spine with radiculopathy, lumbar region 7/21/2015    Other and unspecified hyperlipidemia        PAST SURGICAL HISTORY:  Past Surgical History:   Procedure Laterality Date    COLONOSCOPY N/A 3/21/2016    Procedure: COLONOSCOPY;  Surgeon: Melvin Pearce MD;  Location: [facility];  Service: Endoscopy;  Laterality: N/A;    CORONARY STENT PLACEMENT      times 2    SHOULDER ARTHROSCOPY Left     SPINE SURGERY      neck fusion x2       ALLERGIES:   Review of patient's allergies indicates:   Allergen Reactions    Avelox  [moxifloxacin] Rash       MEDICATIONS:  Current Outpatient Prescriptions   Medication Sig    aspirin (ECOTRIN) 81 MG EC tablet Take by mouth. 1 Tablet, Delayed Release (E.C.) Oral Every day    atorvastatin (LIPITOR) 40 MG tablet Take one tablet by mouth in the evening    bicalutamide (CASODEX) 50 MG Tab Take 1 tablet (50 mg total) by mouth once daily.    calcium-vitamin D (CALCIUM 600 + D,3,) 600 mg(1,500mg) -400 unit Tab Take by mouth. 1 Tablet Oral Twice a day    fluticasone (FLONASE) 50 mcg/actuation nasal spray 1 spray by Each Nare route once daily. (Patient taking differently: 1 spray by Each Nare route daily as needed. )    gabapentin (NEURONTIN) 300 MG capsule Take 300 mg by mouth once daily.     methocarbamol (ROBAXIN) 750 MG Tab Take 1,500 mg (two tablets) four times daily for first 2 days followed by 750mg (one tablet) four times/day.    metoprolol succinate (TOPROL-XL)  50 MG 24 hr tablet Take one tablet by mouth one time daily    nitroGLYCERIN (NITROSTAT) 0.4 MG SL tablet Place 1 tablet (0.4 mg total) under the tongue every 5 (five) minutes as needed for Chest pain.    omeprazole (PRILOSEC) 40 MG capsule Take 1 capsule (40 mg total) by mouth once daily.    travoprost (TRAVATAN Z) 0.004 % Drop Place 1 drop into both eyes every evening. 1 Drops Ophthalmic At bedtime    budesonide-formoterol 160-4.5 mcg (SYMBICORT) 160-4.5 mcg/actuation HFAA Inhale 2 puffs into the lungs every 12 (twelve) hours.     No current facility-administered medications for this visit.        SOCIAL HISTORY:  Social History     Social History    Marital status:      Spouse name: N/A    Number of children: 3    Years of education: N/A     Occupational History    chemical plant      retired     Social History Main Topics    Smoking status: Former Smoker     Packs/day: 0.25     Years: 25.00     Types: Cigarettes    Smokeless tobacco: Never Used      Comment: started back then quit again 6m ago    Alcohol use 0.0 oz/week      Comment: occasionally    Drug use: No    Sexual activity: Yes     Other Topics Concern    Not on file     Social History Narrative    Wife and son       FAMILY HISTORY:  Family History   Problem Relation Age of Onset    Cataracts Mother     Kidney disease Mother     Cancer Father      Stomach    Blindness Maternal Grandmother     Diabetes Sister     Hypertension Sister     Diabetes Sister     Hypertension Sister     Diabetes Sister     Hypertension Sister     Hypertension Sister     Hypertension Sister          PHYSICAL EXAMINATION:  Vitals:    09/22/17 0946   BP: 126/76   Pulse: 77   Resp: 18   Temp: 97 °F (36.1 °C)     Physical Exam   Constitutional: He is well-developed, well-nourished, and in no distress.   Neck: Normal range of motion. Neck supple.   Pulmonary/Chest: Effort normal. No respiratory distress.   Abdominal: Soft. He exhibits no distension.  "      ASSESSMENT/PLAN:  At least stage JAMARCUS (T3a, N1) prostate cancer.     ECO    I had a long discussion with the patient and his wife.  We reviewed the prognostic factors associated with prostate cancer.  The patient understands he is considered to have "high risk" disease.  We discussed the implication of his Deric score as well as the significance of his enlarged pelvic node and bone scan changes.  We discussed the role of radiotherapy in the setting of locally advanced prostate cancer.  Explained that if the bone scan changes are not related to metastatic prostate, I would consider a course of combined external beam therapy and 2 - 3 years of hormonal deprivation therapy.  If the bone scan changes prove to be metastatic prostate cancer, I would not recommend local therapy to the prostate at this time.  Discussed the procedures, risks and benefits of radiotherapy.  Discussed the pros and cons of treatment.  Will plan to obtain plain films of his skull and consider MRI of the T9 vertebral body depending on the result.  The patient was agreeable with the plan. Will obtain plain films of the skull today.      Psychosocial Distress screening score of Distress Score: 0 noted and reviewed. No intervention indicated.      I spent approximately 40 minutes reviewing the available records and evaluating the patient, out of which over 50% of the time was spent face to face with the patient in counseling and coordinating this patient's care.      " Hypertension

## 2017-10-19 NOTE — PROGRESS NOTE ADULT - PROBLEM SELECTOR PLAN 2
Patient reported 7 days of watery diarrhea yesterday and today . Last dose of laxative was 48 hrs ago . will send stool for c diff and satrt po vanc

## 2017-10-19 NOTE — PROGRESS NOTE ADULT - ASSESSMENT
Patient is a 71y old  Female who presents with a chief complaint of Cough and Fever (15 Oct 2017 14:32)

## 2017-10-19 NOTE — PROGRESS NOTE ADULT - SUBJECTIVE AND OBJECTIVE BOX
70 y/o female is under our care for pneumonia.  Patient is still having watery diarrhea. Reports she had about 7 episodes overnight and 1 episode this am. Upon her next episode the specimen will be sent for c. diff.  Denies any abdominal pain. Patient also states that cough is stable with scant whitish sputum production today.    REVIEW OF SYSTEMS:  [  ] Not able to illicit  General: no fevers no malaise	  Chest: +cough no cough no CP  GI: +diarrhea no nv no abdominal pain	  Skin: no rashes		    ALLERGIES: No Known Allergies    MEDS:  azithromycin   Tablet 250 milliGRAM(s) Oral daily  cefTRIAXone   IVPB 1 Gram(s) IV Intermittent every 24 hours  predniSONE   Tablet 40 milliGRAM(s) Oral daily    VITALS:  Vital Signs Last 24 Hrs  T(C): 36.6 (19 Oct 2017 14:38), Max: 36.6 (19 Oct 2017 14:38)  T(F): 97.9 (19 Oct 2017 14:38), Max: 97.9 (19 Oct 2017 14:38)  HR: 78 (19 Oct 2017 14:38) (75 - 90)  BP: 165/78 (19 Oct 2017 14:38) (150/79 - 165/78)  BP(mean): --  RR: 18 (19 Oct 2017 14:38) (16 - 18)  SpO2: 99% (19 Oct 2017 14:38) (96% - 99%)    PHYSICAL EXAM:  HEENT: n/a  Neck: supple no LN's   Respiratory: bilateral diffuse fine rales  Cardiovascular: S1 S2 reg no murmurs   Gastrointestinal: soft, nondistended abdomen with +hyperactive BS; nontender  Extremities: no edema  Skin: no rashes  Ortho: n/a  Neuro: AAO x 3    LABS/DIAGNOSTIC TESTS:  No new labs      CULTURES:   Culture - Urine (10.15.17 @ 21:55)    Specimen Source: .Urine Clean Catch (Midstream)    Culture Results:   Culture grew 3 or more types of organisms which indicate  collection contamination; consider recollection only if clinically  indicated.    Culture - Blood (10.15.17 @ 14:23)    Specimen Source: .Blood Blood-Peripheral    Culture Results:   No growth to date.    Culture - Blood (10.15.17 @ 14:23)    Specimen Source: .Blood Blood-Peripheral    Culture Results:   No growth to date.      RADIOLOGY:  No new studies

## 2017-10-19 NOTE — PROGRESS NOTE ADULT - ASSESSMENT
1.	Pneumonia (CAP)  2.	Diarrhea - R/o C. diff  3.	Leukocytosis   ·	cont azithromycin 250mg PO daily D4, dc this after tomorrow's dose  ·	cont rocephin 1gm IV daily D4  ·	send stool for c.diff if diarrhea reoccurs

## 2017-10-19 NOTE — PROGRESS NOTE ADULT - PROBLEM SELECTOR PLAN 1
- Improving on rocephin and azithromycin day 5  -CXR showed L lung opacity.   -Blood cultures negative   -ID consulted (Dr Duval)

## 2017-10-20 ENCOUNTER — TRANSCRIPTION ENCOUNTER (OUTPATIENT)
Age: 72
End: 2017-10-20

## 2017-10-20 VITALS
HEART RATE: 89 BPM | OXYGEN SATURATION: 100 % | RESPIRATION RATE: 18 BRPM | TEMPERATURE: 98 F | DIASTOLIC BLOOD PRESSURE: 67 MMHG | SYSTOLIC BLOOD PRESSURE: 156 MMHG

## 2017-10-20 LAB
ALBUMIN SERPL ELPH-MCNC: 2.9 G/DL — LOW (ref 3.5–5)
ALP SERPL-CCNC: 70 U/L — SIGNIFICANT CHANGE UP (ref 40–120)
ALT FLD-CCNC: 72 U/L DA — HIGH (ref 10–60)
ANION GAP SERPL CALC-SCNC: 3 MMOL/L — LOW (ref 5–17)
AST SERPL-CCNC: 32 U/L — SIGNIFICANT CHANGE UP (ref 10–40)
BASOPHILS # BLD AUTO: 0.1 K/UL — SIGNIFICANT CHANGE UP (ref 0–0.2)
BASOPHILS NFR BLD AUTO: 0.7 % — SIGNIFICANT CHANGE UP (ref 0–2)
BILIRUB SERPL-MCNC: 0.3 MG/DL — SIGNIFICANT CHANGE UP (ref 0.2–1.2)
BUN SERPL-MCNC: 20 MG/DL — HIGH (ref 7–18)
C DIFF BY PCR RESULT: SIGNIFICANT CHANGE UP
C DIFF TOX GENS STL QL NAA+PROBE: SIGNIFICANT CHANGE UP
CALCIUM SERPL-MCNC: 8.7 MG/DL — SIGNIFICANT CHANGE UP (ref 8.4–10.5)
CHLORIDE SERPL-SCNC: 104 MMOL/L — SIGNIFICANT CHANGE UP (ref 96–108)
CO2 SERPL-SCNC: 30 MMOL/L — SIGNIFICANT CHANGE UP (ref 22–31)
CREAT SERPL-MCNC: 1.04 MG/DL — SIGNIFICANT CHANGE UP (ref 0.5–1.3)
CULTURE RESULTS: SIGNIFICANT CHANGE UP
CULTURE RESULTS: SIGNIFICANT CHANGE UP
EOSINOPHIL # BLD AUTO: 0.1 K/UL — SIGNIFICANT CHANGE UP (ref 0–0.5)
EOSINOPHIL NFR BLD AUTO: 1.2 % — SIGNIFICANT CHANGE UP (ref 0–6)
GLUCOSE BLDC GLUCOMTR-MCNC: 159 MG/DL — HIGH (ref 70–99)
GLUCOSE BLDC GLUCOMTR-MCNC: 242 MG/DL — HIGH (ref 70–99)
GLUCOSE BLDC GLUCOMTR-MCNC: 330 MG/DL — HIGH (ref 70–99)
GLUCOSE SERPL-MCNC: 175 MG/DL — HIGH (ref 70–99)
HCT VFR BLD CALC: 36.8 % — SIGNIFICANT CHANGE UP (ref 34.5–45)
HGB BLD-MCNC: 11.5 G/DL — SIGNIFICANT CHANGE UP (ref 11.5–15.5)
LYMPHOCYTES # BLD AUTO: 1.5 K/UL — SIGNIFICANT CHANGE UP (ref 1–3.3)
LYMPHOCYTES # BLD AUTO: 16.2 % — SIGNIFICANT CHANGE UP (ref 13–44)
MAGNESIUM SERPL-MCNC: 2 MG/DL — SIGNIFICANT CHANGE UP (ref 1.6–2.6)
MCHC RBC-ENTMCNC: 28.9 PG — SIGNIFICANT CHANGE UP (ref 27–34)
MCHC RBC-ENTMCNC: 31.3 GM/DL — LOW (ref 32–36)
MCV RBC AUTO: 92.2 FL — SIGNIFICANT CHANGE UP (ref 80–100)
MONOCYTES # BLD AUTO: 0.7 K/UL — SIGNIFICANT CHANGE UP (ref 0–0.9)
MONOCYTES NFR BLD AUTO: 7.5 % — SIGNIFICANT CHANGE UP (ref 2–14)
NEUTROPHILS # BLD AUTO: 6.8 K/UL — SIGNIFICANT CHANGE UP (ref 1.8–7.4)
NEUTROPHILS NFR BLD AUTO: 74.4 % — SIGNIFICANT CHANGE UP (ref 43–77)
PHOSPHATE SERPL-MCNC: 2.9 MG/DL — SIGNIFICANT CHANGE UP (ref 2.5–4.5)
PLATELET # BLD AUTO: 166 K/UL — SIGNIFICANT CHANGE UP (ref 150–400)
POTASSIUM SERPL-MCNC: 3.2 MMOL/L — LOW (ref 3.5–5.3)
POTASSIUM SERPL-SCNC: 3.2 MMOL/L — LOW (ref 3.5–5.3)
PROT SERPL-MCNC: 6.5 G/DL — SIGNIFICANT CHANGE UP (ref 6–8.3)
RBC # BLD: 3.99 M/UL — SIGNIFICANT CHANGE UP (ref 3.8–5.2)
RBC # FLD: 15.2 % — HIGH (ref 10.3–14.5)
SODIUM SERPL-SCNC: 137 MMOL/L — SIGNIFICANT CHANGE UP (ref 135–145)
SPECIMEN SOURCE: SIGNIFICANT CHANGE UP
SPECIMEN SOURCE: SIGNIFICANT CHANGE UP
WBC # BLD: 9.1 K/UL — SIGNIFICANT CHANGE UP (ref 3.8–10.5)
WBC # FLD AUTO: 9.1 K/UL — SIGNIFICANT CHANGE UP (ref 3.8–10.5)

## 2017-10-20 PROCEDURE — 84145 PROCALCITONIN (PCT): CPT

## 2017-10-20 PROCEDURE — 36415 COLL VENOUS BLD VENIPUNCTURE: CPT

## 2017-10-20 PROCEDURE — 84443 ASSAY THYROID STIM HORMONE: CPT

## 2017-10-20 PROCEDURE — 80061 LIPID PANEL: CPT

## 2017-10-20 PROCEDURE — 80053 COMPREHEN METABOLIC PANEL: CPT

## 2017-10-20 PROCEDURE — 82962 GLUCOSE BLOOD TEST: CPT

## 2017-10-20 PROCEDURE — 82607 VITAMIN B-12: CPT

## 2017-10-20 PROCEDURE — 71045 X-RAY EXAM CHEST 1 VIEW: CPT

## 2017-10-20 PROCEDURE — 87086 URINE CULTURE/COLONY COUNT: CPT

## 2017-10-20 PROCEDURE — 99285 EMERGENCY DEPT VISIT HI MDM: CPT | Mod: 25

## 2017-10-20 PROCEDURE — 94640 AIRWAY INHALATION TREATMENT: CPT

## 2017-10-20 PROCEDURE — 83735 ASSAY OF MAGNESIUM: CPT

## 2017-10-20 PROCEDURE — 74177 CT ABD & PELVIS W/CONTRAST: CPT

## 2017-10-20 PROCEDURE — 94760 N-INVAS EAR/PLS OXIMETRY 1: CPT

## 2017-10-20 PROCEDURE — 81001 URINALYSIS AUTO W/SCOPE: CPT

## 2017-10-20 PROCEDURE — 85027 COMPLETE CBC AUTOMATED: CPT

## 2017-10-20 PROCEDURE — 83605 ASSAY OF LACTIC ACID: CPT

## 2017-10-20 PROCEDURE — 84100 ASSAY OF PHOSPHORUS: CPT

## 2017-10-20 PROCEDURE — 87040 BLOOD CULTURE FOR BACTERIA: CPT

## 2017-10-20 PROCEDURE — 82306 VITAMIN D 25 HYDROXY: CPT

## 2017-10-20 PROCEDURE — 87449 NOS EACH ORGANISM AG IA: CPT

## 2017-10-20 PROCEDURE — 87493 C DIFF AMPLIFIED PROBE: CPT

## 2017-10-20 PROCEDURE — 87899 AGENT NOS ASSAY W/OPTIC: CPT

## 2017-10-20 PROCEDURE — 83036 HEMOGLOBIN GLYCOSYLATED A1C: CPT

## 2017-10-20 PROCEDURE — 80048 BASIC METABOLIC PNL TOTAL CA: CPT

## 2017-10-20 PROCEDURE — 86703 HIV-1/HIV-2 1 RESULT ANTBDY: CPT

## 2017-10-20 PROCEDURE — 84439 ASSAY OF FREE THYROXINE: CPT

## 2017-10-20 PROCEDURE — 93005 ELECTROCARDIOGRAM TRACING: CPT

## 2017-10-20 RX ORDER — IPRATROPIUM BROMIDE 0.2 MG/ML
2 SOLUTION, NON-ORAL INHALATION
Qty: 0 | Refills: 0 | COMMUNITY

## 2017-10-20 RX ORDER — ACETAMINOPHEN 500 MG
2 TABLET ORAL
Qty: 0 | Refills: 0 | COMMUNITY

## 2017-10-20 RX ORDER — LISINOPRIL 2.5 MG/1
1 TABLET ORAL
Qty: 0 | Refills: 0 | COMMUNITY

## 2017-10-20 RX ORDER — BUDESONIDE AND FORMOTEROL FUMARATE DIHYDRATE 160; 4.5 UG/1; UG/1
2 AEROSOL RESPIRATORY (INHALATION)
Qty: 0 | Refills: 0 | COMMUNITY

## 2017-10-20 RX ORDER — METFORMIN HYDROCHLORIDE 850 MG/1
1 TABLET ORAL
Qty: 0 | Refills: 0 | COMMUNITY

## 2017-10-20 RX ORDER — ALBUTEROL 90 UG/1
2 AEROSOL, METERED ORAL
Qty: 0 | Refills: 0 | COMMUNITY

## 2017-10-20 RX ORDER — LATANOPROST 0.05 MG/ML
1 SOLUTION/ DROPS OPHTHALMIC; TOPICAL
Qty: 0 | Refills: 0 | COMMUNITY

## 2017-10-20 RX ORDER — SODIUM CHLORIDE 0.65 %
2 AEROSOL, SPRAY (ML) NASAL
Qty: 0 | Refills: 0 | COMMUNITY

## 2017-10-20 RX ORDER — FERROUS SULFATE 325(65) MG
1 TABLET ORAL
Qty: 0 | Refills: 0 | COMMUNITY

## 2017-10-20 RX ORDER — INSULIN LISPRO 100/ML
6 VIAL (ML) SUBCUTANEOUS ONCE
Qty: 0 | Refills: 0 | Status: COMPLETED | OUTPATIENT
Start: 2017-10-20 | End: 2017-10-20

## 2017-10-20 RX ORDER — INSULIN LISPRO 100/ML
0 VIAL (ML) SUBCUTANEOUS
Qty: 0 | Refills: 0 | COMMUNITY
Start: 2017-10-20

## 2017-10-20 RX ORDER — INSULIN LISPRO 100/ML
5 VIAL (ML) SUBCUTANEOUS
Qty: 0 | Refills: 0 | COMMUNITY
Start: 2017-10-20

## 2017-10-20 RX ORDER — INSULIN GLARGINE 100 [IU]/ML
15 INJECTION, SOLUTION SUBCUTANEOUS
Qty: 0 | Refills: 0 | COMMUNITY
Start: 2017-10-20

## 2017-10-20 RX ORDER — CEFUROXIME AXETIL 250 MG
1 TABLET ORAL
Qty: 5 | Refills: 0 | OUTPATIENT
Start: 2017-10-20 | End: 2017-10-25

## 2017-10-20 RX ORDER — LACTULOSE 10 G/15ML
15 SOLUTION ORAL
Qty: 0 | Refills: 0 | COMMUNITY

## 2017-10-20 RX ORDER — OSIMERTINIB 80 1/1
1 TABLET, FILM COATED ORAL
Qty: 0 | Refills: 0 | COMMUNITY

## 2017-10-20 RX ORDER — POTASSIUM CHLORIDE 20 MEQ
40 PACKET (EA) ORAL EVERY 4 HOURS
Qty: 0 | Refills: 0 | Status: DISCONTINUED | OUTPATIENT
Start: 2017-10-20 | End: 2017-10-20

## 2017-10-20 RX ADMIN — AZITHROMYCIN 250 MILLIGRAM(S): 500 TABLET, FILM COATED ORAL at 12:56

## 2017-10-20 RX ADMIN — Medication 1 TABLET(S): at 12:57

## 2017-10-20 RX ADMIN — Medication 81 MILLIGRAM(S): at 12:56

## 2017-10-20 RX ADMIN — BENZOCAINE AND MENTHOL 1 LOZENGE: 5; 1 LIQUID ORAL at 09:39

## 2017-10-20 RX ADMIN — ENOXAPARIN SODIUM 40 MILLIGRAM(S): 100 INJECTION SUBCUTANEOUS at 13:04

## 2017-10-20 RX ADMIN — Medication 325 MILLIGRAM(S): at 12:56

## 2017-10-20 RX ADMIN — Medication 125 MILLIGRAM(S): at 00:31

## 2017-10-20 RX ADMIN — Medication 5 UNIT(S): at 12:55

## 2017-10-20 RX ADMIN — Medication 40 MILLIGRAM(S): at 06:07

## 2017-10-20 RX ADMIN — Medication 1: at 09:38

## 2017-10-20 RX ADMIN — Medication 1 APPLICATION(S): at 12:58

## 2017-10-20 RX ADMIN — Medication 4: at 12:59

## 2017-10-20 RX ADMIN — Medication 40 MILLIEQUIVALENT(S): at 12:57

## 2017-10-20 RX ADMIN — Medication 6 UNIT(S): at 15:13

## 2017-10-20 RX ADMIN — Medication 1 MILLIGRAM(S): at 12:57

## 2017-10-20 RX ADMIN — Medication 5 UNIT(S): at 09:38

## 2017-10-20 RX ADMIN — Medication 125 MILLIGRAM(S): at 06:08

## 2017-10-20 RX ADMIN — Medication 1 APPLICATION(S): at 06:07

## 2017-10-20 RX ADMIN — BUDESONIDE AND FORMOTEROL FUMARATE DIHYDRATE 2 PUFF(S): 160; 4.5 AEROSOL RESPIRATORY (INHALATION) at 09:39

## 2017-10-20 NOTE — DISCHARGE NOTE ADULT - MEDICATION SUMMARY - MEDICATIONS TO TAKE
I will START or STAY ON the medications listed below when I get home from the hospital:    predniSONE 20 mg oral tablet  -- 2 tab(s) by mouth once a day FOR 5 DAYS   -- Indication: For COPD (chronic obstructive pulmonary disease)    naproxen 250 mg oral tablet  -- 1 tab(s) by mouth 2 times a day   -- Check with your doctor before becoming pregnant.  It is very important that you take or use this exactly as directed.  Do not skip doses or discontinue unless directed by your doctor.  May cause drowsiness or dizziness.  Obtain medical advice before taking any non-prescription drugs as some may affect the action of this medication.  Take with food or milk.    -- Indication: For TMJ pain    Aspir 81 oral delayed release tablet  -- 1 tab(s) by mouth once a day  -- Indication: For CAD    insulin glargine  -- 15 unit(s) subcutaneously once a day (at bedtime)  -- Indication: For DM (diabetes mellitus)    insulin lispro 100 units/mL subcutaneous solution  --  subcutaneous 4 times a day (before meals and at bedtime); 1 Unit(s) if Glucose 151 - 200  2 Unit(s) if Glucose 201 - 250  3 Unit(s) if Glucose 251 - 300  4 Unit(s) if Glucose 301 - 350  5 Unit(s) if Glucose 351 - 400  6 Unit(s) if Glucose GREATER THAN 400  -- Indication: For DM (diabetes mellitus)    insulin lispro 100 units/mL subcutaneous solution  -- 5 unit(s) subcutaneous 3 times a day (before meals)  -- Indication: For DM (diabetes mellitus)    Lipitor 20 mg oral tablet  -- 1 tab(s) by mouth once a day  -- Indication: For HLD     cefuroxime 500 mg oral tablet  -- 1 tab(s) by mouth every 12 hours   -- Finish all this medication unless otherwise directed by prescriber.  Medication should be taken with plenty of water.  Take with food or milk.    -- Indication: For Pna     Multiple Vitamins oral capsule  -- 1 cap(s) by mouth once a day  -- Indication: For supplement     Vitamin D2 50,000 intl units (1.25 mg) oral capsule  -- 1 cap(s) by mouth once a week  -- Indication: For supplement     folic acid 1 mg oral tablet  -- 1 tab(s) by mouth once a day  -- Indication: For supplement

## 2017-10-20 NOTE — DISCHARGE NOTE ADULT - MEDICATION SUMMARY - MEDICATIONS TO STOP TAKING
I will STOP taking the medications listed below when I get home from the hospital:    lactulose 10 g/15 mL oral syrup  -- 15 milliliter(s) by mouth 2 times a day    metFORMIN 500 mg oral tablet  -- 1 tab(s) by mouth 2 times a day

## 2017-10-20 NOTE — DISCHARGE NOTE ADULT - HOSPITAL COURSE
Patient is a 71 year old female, with PMH of CAD, ACS, atrial fibrillation, bronchoaveolar cancer (s/p wedge resection 2013), DM, COPD (on 3L O2 @ assisted living facility; no past intubations), HLD, HTN, and GERD, admitted to the general medical floor for the treatment of bibasilar L>R pneumonia    Hospital Course  ED - Temperature 102.5 F, HR 85, /62, SPO2 95% on supplemental O2. , CXR left basilar patchy opacities and right lung opacities.  CT abdomen - gastric antrum thickening and bibasilar consolidation.   Medical floor -  completed 4 days of rocephin and azithromycin  DM, HbA1c 6.4, managed with basal-bolus insulin sliding scale,   Episode of hyperkalemia, which resolved with no complications.   Several episodes of loose/watery stools 10/18-10/19, resolved after discontinuing lactulose.   Lisinopril restarted 10/19 due to episodes of hypertension. EKG - NSR, Patient's vital signs stabilized to within normal limits,       PNA   -completed 4 days of rocephin and azithromycin  -PNA symptoms greatly improved, COPD symptoms returned to baseline as per patient.       COPD  - Patient states she is at baseline COPD symptoms.  - Glucocorticoid being tapered  - PO2 % on supplemental O2    Atrial Fibrillation  - not on any home AC or rate control regimen  - Patient denies palpitations, chest pain, headache, dizziness  - EKG - NSR, DC interval 140, QRS 86, QT/QTc - 334/430  Plan  - Continue aspirin 81 mg per day    TMJ Pain  Will dc on naproxen for 5 days       Patient stable for discharge per attending . Patient  educated about importance of medication compliance & physician follow up.All concerns & questions have been appropriately adressed. Patient is a 71 year old female, with PMH of CAD, ACS, atrial fibrillation, bronchoaveolar cancer (s/p wedge resection 2013), DM, COPD (on 3L O2 @ assisted living facility; no past intubations), HLD, HTN, and GERD, admitted to the general medical floor for the treatment of bibasilar L>R pneumonia    Hospital Course  ED - Temperature 102.5 F, HR 85, /62, SPO2 95% on supplemental O2. , CXR left basilar patchy opacities and right lung opacities.  CT abdomen - gastric antrum thickening and bibasilar consolidation.   Medical floor -  completed 4 days of rocephin and azithromycin  DM, HbA1c 6.4, managed with basal-bolus insulin sliding scale,   Episode of hyperkalemia, which resolved with no complications.   Several episodes of loose/watery stools 10/18-10/19, resolved after discontinuing lactulose.   Lisinopril restarted 10/19 due to episodes of hypertension. EKG - NSR, Patient's vital signs stabilized to within normal limits,       PNA   -completed 4 days of rocephin and azithromycin  -PNA symptoms greatly improved, COPD symptoms returned to baseline as per patient.       COPD  - Patient states she is at baseline COPD symptoms.  - Glucocorticoid being tapered  - PO2 % on supplemental O2    Atrial Fibrillation  - not on any home AC or rate control regimen  - Patient denies palpitations, chest pain, headache, dizziness  - EKG - NSR, ID interval 140, QRS 86, QT/QTc - 334/430  Plan  - Continue aspirin 81 mg per day    TMJ Pain  Will dc on naproxen for 5 days       Patient stable for discharge per attending . Patient  educated about importance of medication compliance & physician follow up.All concerns & questions have been appropriately adressed.

## 2017-10-20 NOTE — PROGRESS NOTE ADULT - ASSESSMENT
1.	Pneumonia (CAP)  2.	S/p diarrhea/ leukocytosis  ·	cont azithromycin 250mg PO daily, should get day's dose before dc  ·	cont rocephin 1gm IV daily D5, dc on ceftin 500mg PO BID x 5days  ·	reconsult prn

## 2017-10-20 NOTE — PROGRESS NOTE ADULT - SUBJECTIVE AND OBJECTIVE BOX
72 y/o female is under our care for pneumonia.  Patient is doing well and reports that she has not had any diarrhea for almost 24 hours.  Admits breathing continues to improve slightly each day.  Has not had any fevers and wbc count is normal. C. diff PCR was negative.  Due for dc to AL today on PO antibiotics.    REVIEW OF SYSTEMS:  [  ] Not able to illicit  General: no fevers no malaise	  Chest: +cough no cough no CP  GI: no nvd 	  Skin: no rashes		    ALLERGIES: No Known Allergies    MEDS:  azithromycin   Tablet 250 milliGRAM(s) Oral daily  cefTRIAXone   IVPB 1 Gram(s) IV Intermittent every 24 hours  predniSONE   Tablet 40 milliGRAM(s) Oral daily    VITALS:  Vital Signs Last 24 Hrs  T(C): 36.6 (20 Oct 2017 05:06), Max: 36.7 (19 Oct 2017 21:32)  T(F): 97.9 (20 Oct 2017 05:06), Max: 98 (19 Oct 2017 21:32)  HR: 77 (20 Oct 2017 05:06) (77 - 92)  BP: 124/67 (20 Oct 2017 05:06) (124/67 - 165/78)  BP(mean): --  RR: 16 (20 Oct 2017 05:06) (16 - 18)  SpO2: 96% (20 Oct 2017 05:06) (96% - 100%)    PHYSICAL EXAM:  HEENT: n/a  Neck: supple no LN's   Respiratory: bilateral diffuse fine rales  Cardiovascular: S1 S2 reg no murmurs   Gastrointestinal: soft, nondistended abdomen with +BS; nontender  Extremities: no edema  Skin: no rashes  Ortho: n/a  Neuro: AAO x 3    LABS/DIAGNOSTIC TESTS:                          11.5   9.1   )-----------( 166      ( 20 Oct 2017 07:35 )             36.8   10-20    137  |  104  |  20<H>  ----------------------------<  175<H>  3.2<L>   |  30  |  1.04    Ca    8.7      20 Oct 2017 07:35  Phos  2.9     10-20  Mg     2.0     10-20    TPro  6.5  /  Alb  2.9<L>  /  TBili  0.3  /  DBili  x   /  AST  32  /  ALT  72<H>  /  AlkPhos  70  10-20    Clostridium difficile Toxin by PCR (10.20.17 @ 00:10)    Clostridium difficile Toxin by PCR:  NotDete      CULTURES:   Culture - Urine (10.15.17 @ 21:55)    Specimen Source: .Urine Clean Catch (Midstream)    Culture Results:   Culture grew 3 or more types of organisms which indicate  collection contamination; consider recollection only if clinically  indicated.    Culture - Blood (10.15.17 @ 14:23)    Specimen Source: .Blood Blood-Peripheral    Culture Results:   No growth to date.    Culture - Blood (10.15.17 @ 14:23)    Specimen Source: .Blood Blood-Peripheral    Culture Results:   No growth to date.      RADIOLOGY:  No new studies

## 2017-10-20 NOTE — DISCHARGE NOTE ADULT - PATIENT PORTAL LINK FT
“You can access the [facility] Patient Portal, offered by [facility], by registering with the following website: [email or web address]”

## 2017-10-20 NOTE — PROGRESS NOTE ADULT - ATTENDING COMMENTS
I agree with above
PATIENT WAS SEEN AND EXAMINED, CASE D/W STAFF. C.DIFF W/UP IS IN PROGRESS. DC PLAN ON HOLD

## 2017-10-20 NOTE — DISCHARGE NOTE ADULT - CARE PLAN
Principal Discharge DX:	Pneumonia  Goal:	complete resolution of infection  Instructions for follow-up, activity and diet:	continue with ceftin for 5 more days

## 2017-10-24 DIAGNOSIS — M19.90 UNSPECIFIED OSTEOARTHRITIS, UNSPECIFIED SITE: ICD-10-CM

## 2017-10-24 DIAGNOSIS — J44.9 CHRONIC OBSTRUCTIVE PULMONARY DISEASE, UNSPECIFIED: ICD-10-CM

## 2017-10-24 DIAGNOSIS — E87.5 HYPERKALEMIA: ICD-10-CM

## 2017-10-24 DIAGNOSIS — E11.9 TYPE 2 DIABETES MELLITUS WITHOUT COMPLICATIONS: ICD-10-CM

## 2017-10-24 DIAGNOSIS — E78.5 HYPERLIPIDEMIA, UNSPECIFIED: ICD-10-CM

## 2017-10-24 DIAGNOSIS — Z92.21 PERSONAL HISTORY OF ANTINEOPLASTIC CHEMOTHERAPY: ICD-10-CM

## 2017-10-24 DIAGNOSIS — K21.9 GASTRO-ESOPHAGEAL REFLUX DISEASE WITHOUT ESOPHAGITIS: ICD-10-CM

## 2017-10-24 DIAGNOSIS — J18.9 PNEUMONIA, UNSPECIFIED ORGANISM: ICD-10-CM

## 2017-10-24 DIAGNOSIS — L21.9 SEBORRHEIC DERMATITIS, UNSPECIFIED: ICD-10-CM

## 2017-10-24 DIAGNOSIS — H26.9 UNSPECIFIED CATARACT: ICD-10-CM

## 2017-10-24 DIAGNOSIS — I48.91 UNSPECIFIED ATRIAL FIBRILLATION: ICD-10-CM

## 2017-10-24 DIAGNOSIS — I25.10 ATHEROSCLEROTIC HEART DISEASE OF NATIVE CORONARY ARTERY WITHOUT ANGINA PECTORIS: ICD-10-CM

## 2017-10-24 DIAGNOSIS — I10 ESSENTIAL (PRIMARY) HYPERTENSION: ICD-10-CM

## 2017-10-24 DIAGNOSIS — R10.9 UNSPECIFIED ABDOMINAL PAIN: ICD-10-CM

## 2017-10-25 DIAGNOSIS — J44.0 CHRONIC OBSTRUCTIVE PULMONARY DISEASE WITH (ACUTE) LOWER RESPIRATORY INFECTION: ICD-10-CM

## 2017-12-16 ENCOUNTER — INPATIENT (INPATIENT)
Facility: HOSPITAL | Age: 72
LOS: 1 days | Discharge: NURSING, REHAB OR LONG-TERM CARE | DRG: 690 | End: 2017-12-18
Attending: INTERNAL MEDICINE | Admitting: INTERNAL MEDICINE
Payer: MEDICAID

## 2017-12-16 VITALS
RESPIRATION RATE: 18 BRPM | SYSTOLIC BLOOD PRESSURE: 138 MMHG | TEMPERATURE: 98 F | OXYGEN SATURATION: 96 % | WEIGHT: 175.93 LBS | DIASTOLIC BLOOD PRESSURE: 77 MMHG | HEIGHT: 62 IN | HEART RATE: 72 BPM

## 2017-12-16 DIAGNOSIS — Z29.9 ENCOUNTER FOR PROPHYLACTIC MEASURES, UNSPECIFIED: ICD-10-CM

## 2017-12-16 DIAGNOSIS — E11.9 TYPE 2 DIABETES MELLITUS WITHOUT COMPLICATIONS: ICD-10-CM

## 2017-12-16 DIAGNOSIS — N39.0 URINARY TRACT INFECTION, SITE NOT SPECIFIED: ICD-10-CM

## 2017-12-16 DIAGNOSIS — N30.90 CYSTITIS, UNSPECIFIED WITHOUT HEMATURIA: ICD-10-CM

## 2017-12-16 DIAGNOSIS — I10 ESSENTIAL (PRIMARY) HYPERTENSION: ICD-10-CM

## 2017-12-16 DIAGNOSIS — C34.90 MALIGNANT NEOPLASM OF UNSPECIFIED PART OF UNSPECIFIED BRONCHUS OR LUNG: ICD-10-CM

## 2017-12-16 DIAGNOSIS — J44.9 CHRONIC OBSTRUCTIVE PULMONARY DISEASE, UNSPECIFIED: ICD-10-CM

## 2017-12-16 DIAGNOSIS — Z98.89 OTHER SPECIFIED POSTPROCEDURAL STATES: Chronic | ICD-10-CM

## 2017-12-16 LAB
ALBUMIN SERPL ELPH-MCNC: 3.5 G/DL — SIGNIFICANT CHANGE UP (ref 3.5–5)
ALP SERPL-CCNC: 65 U/L — SIGNIFICANT CHANGE UP (ref 40–120)
ALT FLD-CCNC: 21 U/L DA — SIGNIFICANT CHANGE UP (ref 10–60)
ANION GAP SERPL CALC-SCNC: 5 MMOL/L — SIGNIFICANT CHANGE UP (ref 5–17)
APPEARANCE UR: ABNORMAL
AST SERPL-CCNC: 39 U/L — SIGNIFICANT CHANGE UP (ref 10–40)
BACTERIA # UR AUTO: ABNORMAL /HPF
BASOPHILS # BLD AUTO: 0.1 K/UL — SIGNIFICANT CHANGE UP (ref 0–0.2)
BASOPHILS NFR BLD AUTO: 0.6 % — SIGNIFICANT CHANGE UP (ref 0–2)
BILIRUB SERPL-MCNC: 0.6 MG/DL — SIGNIFICANT CHANGE UP (ref 0.2–1.2)
BILIRUB UR-MCNC: NEGATIVE — SIGNIFICANT CHANGE UP
BUN SERPL-MCNC: 18 MG/DL — SIGNIFICANT CHANGE UP (ref 7–18)
CALCIUM SERPL-MCNC: 9.4 MG/DL — SIGNIFICANT CHANGE UP (ref 8.4–10.5)
CHLORIDE SERPL-SCNC: 104 MMOL/L — SIGNIFICANT CHANGE UP (ref 96–108)
CO2 SERPL-SCNC: 28 MMOL/L — SIGNIFICANT CHANGE UP (ref 22–31)
COLOR SPEC: YELLOW — SIGNIFICANT CHANGE UP
CREAT SERPL-MCNC: 1.21 MG/DL — SIGNIFICANT CHANGE UP (ref 0.5–1.3)
DIFF PNL FLD: ABNORMAL
EOSINOPHIL # BLD AUTO: 0.3 K/UL — SIGNIFICANT CHANGE UP (ref 0–0.5)
EOSINOPHIL NFR BLD AUTO: 3.5 % — SIGNIFICANT CHANGE UP (ref 0–6)
GLUCOSE SERPL-MCNC: 100 MG/DL — HIGH (ref 70–99)
GLUCOSE UR QL: NEGATIVE — SIGNIFICANT CHANGE UP
HCT VFR BLD CALC: 39 % — SIGNIFICANT CHANGE UP (ref 34.5–45)
HGB BLD-MCNC: 12.1 G/DL — SIGNIFICANT CHANGE UP (ref 11.5–15.5)
KETONES UR-MCNC: NEGATIVE — SIGNIFICANT CHANGE UP
LEUKOCYTE ESTERASE UR-ACNC: ABNORMAL
LYMPHOCYTES # BLD AUTO: 1.7 K/UL — SIGNIFICANT CHANGE UP (ref 1–3.3)
LYMPHOCYTES # BLD AUTO: 19.2 % — SIGNIFICANT CHANGE UP (ref 13–44)
MAGNESIUM SERPL-MCNC: 1.8 MG/DL — SIGNIFICANT CHANGE UP (ref 1.6–2.6)
MCHC RBC-ENTMCNC: 28.9 PG — SIGNIFICANT CHANGE UP (ref 27–34)
MCHC RBC-ENTMCNC: 31.1 GM/DL — LOW (ref 32–36)
MCV RBC AUTO: 93 FL — SIGNIFICANT CHANGE UP (ref 80–100)
MONOCYTES # BLD AUTO: 0.5 K/UL — SIGNIFICANT CHANGE UP (ref 0–0.9)
MONOCYTES NFR BLD AUTO: 6.3 % — SIGNIFICANT CHANGE UP (ref 2–14)
NEUTROPHILS # BLD AUTO: 6.1 K/UL — SIGNIFICANT CHANGE UP (ref 1.8–7.4)
NEUTROPHILS NFR BLD AUTO: 70.4 % — SIGNIFICANT CHANGE UP (ref 43–77)
NITRITE UR-MCNC: NEGATIVE — SIGNIFICANT CHANGE UP
NT-PROBNP SERPL-SCNC: 111 PG/ML — SIGNIFICANT CHANGE UP (ref 0–125)
PH UR: 6.5 — SIGNIFICANT CHANGE UP (ref 5–8)
PLATELET # BLD AUTO: 183 K/UL — SIGNIFICANT CHANGE UP (ref 150–400)
POTASSIUM SERPL-MCNC: 5 MMOL/L — SIGNIFICANT CHANGE UP (ref 3.5–5.3)
POTASSIUM SERPL-SCNC: 5 MMOL/L — SIGNIFICANT CHANGE UP (ref 3.5–5.3)
PROT SERPL-MCNC: 7.7 G/DL — SIGNIFICANT CHANGE UP (ref 6–8.3)
PROT UR-MCNC: 100
RBC # BLD: 4.2 M/UL — SIGNIFICANT CHANGE UP (ref 3.8–5.2)
RBC # FLD: 13.9 % — SIGNIFICANT CHANGE UP (ref 10.3–14.5)
RBC CASTS # UR COMP ASSIST: ABNORMAL /HPF (ref 0–2)
SODIUM SERPL-SCNC: 137 MMOL/L — SIGNIFICANT CHANGE UP (ref 135–145)
SP GR SPEC: 1.01 — SIGNIFICANT CHANGE UP (ref 1.01–1.02)
UROBILINOGEN FLD QL: NEGATIVE — SIGNIFICANT CHANGE UP
WBC # BLD: 8.6 K/UL — SIGNIFICANT CHANGE UP (ref 3.8–10.5)
WBC # FLD AUTO: 8.6 K/UL — SIGNIFICANT CHANGE UP (ref 3.8–10.5)
WBC UR QL: >50 /HPF (ref 0–5)

## 2017-12-16 PROCEDURE — 71010: CPT | Mod: 26

## 2017-12-16 PROCEDURE — 99285 EMERGENCY DEPT VISIT HI MDM: CPT

## 2017-12-16 RX ORDER — ASPIRIN/CALCIUM CARB/MAGNESIUM 324 MG
81 TABLET ORAL DAILY
Qty: 0 | Refills: 0 | Status: DISCONTINUED | OUTPATIENT
Start: 2017-12-16 | End: 2017-12-18

## 2017-12-16 RX ORDER — PHENAZOPYRIDINE HCL 100 MG
200 TABLET ORAL ONCE
Qty: 0 | Refills: 0 | Status: COMPLETED | OUTPATIENT
Start: 2017-12-16 | End: 2017-12-16

## 2017-12-16 RX ORDER — METFORMIN HYDROCHLORIDE 850 MG/1
500 TABLET ORAL
Qty: 0 | Refills: 0 | Status: DISCONTINUED | OUTPATIENT
Start: 2017-12-16 | End: 2017-12-18

## 2017-12-16 RX ORDER — SODIUM CHLORIDE 9 MG/ML
1000 INJECTION INTRAMUSCULAR; INTRAVENOUS; SUBCUTANEOUS
Qty: 0 | Refills: 0 | Status: DISCONTINUED | OUTPATIENT
Start: 2017-12-16 | End: 2017-12-18

## 2017-12-16 RX ORDER — INFLUENZA VIRUS VACCINE 15; 15; 15; 15 UG/.5ML; UG/.5ML; UG/.5ML; UG/.5ML
0.5 SUSPENSION INTRAMUSCULAR ONCE
Qty: 0 | Refills: 0 | Status: DISCONTINUED | OUTPATIENT
Start: 2017-12-16 | End: 2017-12-18

## 2017-12-16 RX ORDER — IPRATROPIUM/ALBUTEROL SULFATE 18-103MCG
3 AEROSOL WITH ADAPTER (GRAM) INHALATION EVERY 6 HOURS
Qty: 0 | Refills: 0 | Status: DISCONTINUED | OUTPATIENT
Start: 2017-12-16 | End: 2017-12-18

## 2017-12-16 RX ORDER — METOPROLOL TARTRATE 50 MG
12.5 TABLET ORAL
Qty: 0 | Refills: 0 | Status: DISCONTINUED | OUTPATIENT
Start: 2017-12-16 | End: 2017-12-18

## 2017-12-16 RX ORDER — ACETAMINOPHEN 500 MG
650 TABLET ORAL EVERY 6 HOURS
Qty: 0 | Refills: 0 | Status: DISCONTINUED | OUTPATIENT
Start: 2017-12-16 | End: 2017-12-18

## 2017-12-16 RX ORDER — CEFTRIAXONE 500 MG/1
1 INJECTION, POWDER, FOR SOLUTION INTRAMUSCULAR; INTRAVENOUS ONCE
Qty: 0 | Refills: 0 | Status: COMPLETED | OUTPATIENT
Start: 2017-12-16 | End: 2017-12-16

## 2017-12-16 RX ORDER — LATANOPROST 0.05 MG/ML
1 SOLUTION/ DROPS OPHTHALMIC; TOPICAL AT BEDTIME
Qty: 0 | Refills: 0 | Status: DISCONTINUED | OUTPATIENT
Start: 2017-12-16 | End: 2017-12-18

## 2017-12-16 RX ORDER — FOLIC ACID 0.8 MG
1 TABLET ORAL DAILY
Qty: 0 | Refills: 0 | Status: DISCONTINUED | OUTPATIENT
Start: 2017-12-16 | End: 2017-12-18

## 2017-12-16 RX ORDER — ERGOCALCIFEROL 1.25 MG/1
50000 CAPSULE ORAL
Qty: 0 | Refills: 0 | Status: DISCONTINUED | OUTPATIENT
Start: 2017-12-17 | End: 2017-12-18

## 2017-12-16 RX ORDER — ATORVASTATIN CALCIUM 80 MG/1
20 TABLET, FILM COATED ORAL AT BEDTIME
Qty: 0 | Refills: 0 | Status: DISCONTINUED | OUTPATIENT
Start: 2017-12-16 | End: 2017-12-18

## 2017-12-16 RX ORDER — CEFTRIAXONE 500 MG/1
1 INJECTION, POWDER, FOR SOLUTION INTRAMUSCULAR; INTRAVENOUS EVERY 24 HOURS
Qty: 0 | Refills: 0 | Status: DISCONTINUED | OUTPATIENT
Start: 2017-12-17 | End: 2017-12-18

## 2017-12-16 RX ORDER — ENOXAPARIN SODIUM 100 MG/ML
40 INJECTION SUBCUTANEOUS DAILY
Qty: 0 | Refills: 0 | Status: DISCONTINUED | OUTPATIENT
Start: 2017-12-16 | End: 2017-12-18

## 2017-12-16 RX ORDER — SODIUM CHLORIDE 9 MG/ML
1000 INJECTION INTRAMUSCULAR; INTRAVENOUS; SUBCUTANEOUS
Qty: 0 | Refills: 0 | Status: DISCONTINUED | OUTPATIENT
Start: 2017-12-16 | End: 2017-12-16

## 2017-12-16 RX ORDER — OSIMERTINIB 80 1/1
80 TABLET, FILM COATED ORAL DAILY
Qty: 0 | Refills: 0 | Status: DISCONTINUED | OUTPATIENT
Start: 2017-12-16 | End: 2017-12-16

## 2017-12-16 RX ORDER — BUDESONIDE AND FORMOTEROL FUMARATE DIHYDRATE 160; 4.5 UG/1; UG/1
2 AEROSOL RESPIRATORY (INHALATION)
Qty: 0 | Refills: 0 | Status: DISCONTINUED | OUTPATIENT
Start: 2017-12-16 | End: 2017-12-18

## 2017-12-16 RX ORDER — LISINOPRIL 2.5 MG/1
5 TABLET ORAL DAILY
Qty: 0 | Refills: 0 | Status: DISCONTINUED | OUTPATIENT
Start: 2017-12-16 | End: 2017-12-18

## 2017-12-16 RX ORDER — KETOROLAC TROMETHAMINE 30 MG/ML
30 SYRINGE (ML) INJECTION ONCE
Qty: 0 | Refills: 0 | Status: DISCONTINUED | OUTPATIENT
Start: 2017-12-16 | End: 2017-12-16

## 2017-12-16 RX ADMIN — SODIUM CHLORIDE 125 MILLILITER(S): 9 INJECTION INTRAMUSCULAR; INTRAVENOUS; SUBCUTANEOUS at 19:23

## 2017-12-16 RX ADMIN — SODIUM CHLORIDE 125 MILLILITER(S): 9 INJECTION INTRAMUSCULAR; INTRAVENOUS; SUBCUTANEOUS at 15:31

## 2017-12-16 RX ADMIN — Medication 200 MILLIGRAM(S): at 15:31

## 2017-12-16 RX ADMIN — Medication 30 MILLIGRAM(S): at 19:50

## 2017-12-16 RX ADMIN — Medication 30 MILLIGRAM(S): at 19:23

## 2017-12-16 RX ADMIN — CEFTRIAXONE 100 GRAM(S): 500 INJECTION, POWDER, FOR SOLUTION INTRAMUSCULAR; INTRAVENOUS at 19:23

## 2017-12-16 NOTE — H&P ADULT - PMH
· Fluids  · Rest  · Over the counter analgesics  · Follow-up with pcp in 2-3 days or seek medical attention earlier if worsens. Patient will call pcp office for follow up.  · Take prednisone with food and eat a banana a day    
ACS (acute coronary syndrome)    Bronchoalveolar carcinoma    CAD (coronary artery disease)    Cataract    Constipation    COPD (chronic obstructive pulmonary disease)    DM (diabetes mellitus)    GERD (gastroesophageal reflux disease)    HTN (hypertension)    Hypercholesteremia    Lung cancer  wedge resection of left lung 2013  OA (osteoarthritis)    Seborrheic dermatitis

## 2017-12-16 NOTE — ED PROVIDER NOTE - OBJECTIVE STATEMENT
71 Y.O. FEMALE FROM ADULT HOME with h/o NIDDM, last dose this am, c/o dysuria for 11 days, urinary frequency, no fever, chills, n/v, suprapubic/dolly pelvic pain, upon urination, Rt parietal HA, dolly hands cramps upon urination,

## 2017-12-16 NOTE — H&P ADULT - NSHPPHYSICALEXAM_GEN_ALL_CORE
PHYSICAL EXAM:  GENERAL: NAD, well-groomed, well-developed  HEAD:  Atraumatic, Normocephalic  EYES: EOMI, PERRLA, conjunctiva and sclera clear  ENMT: No tonsillar erythema, exudates, or enlargement; Moist mucous membranes, Good dentition, No lesions  NECK: Supple, No JVD, Normal thyroid  NERVOUS SYSTEM:  Alert & Oriented X3, Good concentration; Motor Strength 5/5 B/L upper and lower extremities; DTRs 2+ intact and symmetric  CHEST/LUNG: Clear to percussion bilaterally; No rales, rhonchi, wheezing, or rubs  HEART: Regular rate and rhythm; No murmurs, rubs, or gallops  ABDOMEN: Soft, suprapubic tenderness, Nondistended; Bowel sounds present  EXTREMITIES:  2+ Peripheral Pulses bilaterally, No clubbing, cyanosis, or edema  LYMPH: No lymphadenopathy noted  SKIN: No rashes or lesions    T(C): 36.8 (12-16-17 @ 20:56), Max: 36.8 (12-16-17 @ 19:24)  HR: 58 (12-16-17 @ 20:56) (58 - 80)  BP: 144/50 (12-16-17 @ 20:56) (138/77 - 168/69)  RR: 16 (12-16-17 @ 20:56) (16 - 18)  SpO2: 100% (12-16-17 @ 20:56) (96% - 100%)  Wt(kg): --  I&O's Summary

## 2017-12-16 NOTE — PATIENT PROFILE ADULT. - VISION (WITH CORRECTIVE LENSES IF THE PATIENT USUALLY WEARS THEM):
use glaSSES/Partially impaired: cannot see medication labels or newsprint, but can see obstacles in path, and the surrounding layout; can count fingers at arm's length

## 2017-12-16 NOTE — H&P ADULT - PROBLEM SELECTOR PLAN 3
-Pt is saturating well in room air, no wheezing or rales appreciated on physical exam  -Continue duoneb PRN, symbicort 2 times a day at home dose  -Continue NC 3L at night

## 2017-12-16 NOTE — H&P ADULT - HISTORY OF PRESENT ILLNESS
71 years old female from [facility] assisted living, walks with walker, with PMH of DM, HTN, stage 4 metastatic lung cancer(on 3L O2 at night), HLD, vitamin D deficiency, and remote h/o UTI(2 years ago) came to ED c/o suprapubic tenderness and dysuria for 11 days. Denies having fever, hematuria, nausea, vomiting, 71 years old female from [facility] assisted living, walks with walker, with PMH of DM, HTN, stage 4 metastatic lung cancer(on 3L O2 at night), HLD, vitamin D deficiency, and remote h/o UTI(2 years ago) came to ED c/o suprapubic tenderness and dysuria(painful urination) for 11 days. Pain is constant and sharp, non-radiating. Denies having fever, hematuria, nausea, vomiting, chills, URI symptoms, or any other symptoms.    In ED, pt's VS stable, labs grossly stable with positive UA. Pt is admitted to the medicine floor for UTI, concern for sepsis given immunocompromised status due to diabetes and lung cancer. 71 years old female from [facility] assisted living, walks with walker, with PMH of CAD, ACS, atrial fibrillation, bronchoaveolar cancer (s/p wedge resection 2013), DM(on metformin), COPD (on 3L O2 @ assisted living facility; no past intubations), HLD, GERD, HTN, MI, stage 4 metastatic lung cancer(on Tagrisso), vitamin D deficiency, and remote h/o UTI(2 years ago), recent admission to [facility] for PNA came to ED c/o suprapubic tenderness and dysuria(painful urination) for 11 days. Pain is constant and sharp, non-radiating. Denies having fever, hematuria, nausea, vomiting, chills, URI symptoms, or any other symptoms.    In ED, pt's VS stable, labs grossly stable with positive UA. Pt is admitted to the medicine floor for UTI, concern for sepsis given immunocompromised status due to diabetes and lung cancer.

## 2017-12-16 NOTE — H&P ADULT - ASSESSMENT
Patient is a 71y old  Female who presents with a chief complaint of Suprapubic tenderness and dysuria for 11 days (16 Dec 2017 20:48). Pt is admitted to the medicine floor for UTI, concern for sepsis given immunocompromised status due to diabetes and lung cancer.

## 2017-12-16 NOTE — H&P ADULT - PROBLEM SELECTOR PLAN 1
-Continue Rocephin for UTI  -f/u urine culture  -IV hydration -Continue Rocephin for UTI. Pt is not septic.  -f/u urine culture  -IV hydration

## 2017-12-16 NOTE — H&P ADULT - NSHPLABSRESULTS_GEN_ALL_CORE
LABS:                        12.1   8.6   )-----------( 183      ( 16 Dec 2017 15:40 )             39.0     -    137  |  104  |  18  ----------------------------<  100<H>  5.0   |  28  |  1.21    Ca    9.4      16 Dec 2017 15:40  Mg     1.8         TPro  7.7  /  Alb  3.5  /  TBili  0.6  /  DBili  x   /  AST  39  /  ALT  21  /  AlkPhos  65        Urinalysis Basic - ( 16 Dec 2017 15:40 )    Color: Yellow / Appearance: very cloudy / S.010 / pH: x  Gluc: x / Ketone: Negative  / Bili: Negative / Urobili: Negative   Blood: x / Protein: 100 / Nitrite: Negative   Leuk Esterase: Moderate / RBC: 5-10 /HPF / WBC >50 /HPF   Sq Epi: x / Non Sq Epi: x / Bacteria: Moderate /HPF      CAPILLARY BLOOD GLUCOSE        LIVER FUNCTIONS - ( 16 Dec 2017 15:40 )  Alb: 3.5 g/dL / Pro: 7.7 g/dL / ALK PHOS: 65 U/L / ALT: 21 U/L DA / AST: 39 U/L / GGT: x

## 2017-12-17 LAB
ANION GAP SERPL CALC-SCNC: 7 MMOL/L — SIGNIFICANT CHANGE UP (ref 5–17)
BUN SERPL-MCNC: 17 MG/DL — SIGNIFICANT CHANGE UP (ref 7–18)
CALCIUM SERPL-MCNC: 8.8 MG/DL — SIGNIFICANT CHANGE UP (ref 8.4–10.5)
CHLORIDE SERPL-SCNC: 108 MMOL/L — SIGNIFICANT CHANGE UP (ref 96–108)
CHOLEST SERPL-MCNC: 118 MG/DL — SIGNIFICANT CHANGE UP (ref 10–199)
CO2 SERPL-SCNC: 27 MMOL/L — SIGNIFICANT CHANGE UP (ref 22–31)
CREAT SERPL-MCNC: 1.04 MG/DL — SIGNIFICANT CHANGE UP (ref 0.5–1.3)
GLUCOSE BLDC GLUCOMTR-MCNC: 109 MG/DL — HIGH (ref 70–99)
GLUCOSE BLDC GLUCOMTR-MCNC: 116 MG/DL — HIGH (ref 70–99)
GLUCOSE BLDC GLUCOMTR-MCNC: 123 MG/DL — HIGH (ref 70–99)
GLUCOSE BLDC GLUCOMTR-MCNC: 93 MG/DL — SIGNIFICANT CHANGE UP (ref 70–99)
GLUCOSE SERPL-MCNC: 85 MG/DL — SIGNIFICANT CHANGE UP (ref 70–99)
HCT VFR BLD CALC: 32.9 % — LOW (ref 34.5–45)
HDLC SERPL-MCNC: 66 MG/DL — SIGNIFICANT CHANGE UP (ref 40–125)
HGB BLD-MCNC: 10.3 G/DL — LOW (ref 11.5–15.5)
LIPID PNL WITH DIRECT LDL SERPL: 31 MG/DL — SIGNIFICANT CHANGE UP
MAGNESIUM SERPL-MCNC: 1.7 MG/DL — SIGNIFICANT CHANGE UP (ref 1.6–2.6)
MCHC RBC-ENTMCNC: 29.4 PG — SIGNIFICANT CHANGE UP (ref 27–34)
MCHC RBC-ENTMCNC: 31.4 GM/DL — LOW (ref 32–36)
MCV RBC AUTO: 93.5 FL — SIGNIFICANT CHANGE UP (ref 80–100)
PHOSPHATE SERPL-MCNC: 3.5 MG/DL — SIGNIFICANT CHANGE UP (ref 2.5–4.5)
PLATELET # BLD AUTO: 132 K/UL — LOW (ref 150–400)
POTASSIUM SERPL-MCNC: 4 MMOL/L — SIGNIFICANT CHANGE UP (ref 3.5–5.3)
POTASSIUM SERPL-SCNC: 4 MMOL/L — SIGNIFICANT CHANGE UP (ref 3.5–5.3)
RBC # BLD: 3.52 M/UL — LOW (ref 3.8–5.2)
RBC # FLD: 14.1 % — SIGNIFICANT CHANGE UP (ref 10.3–14.5)
SODIUM SERPL-SCNC: 142 MMOL/L — SIGNIFICANT CHANGE UP (ref 135–145)
TOTAL CHOLESTEROL/HDL RATIO MEASUREMENT: 1.8 RATIO — LOW (ref 3.3–7.1)
TRIGL SERPL-MCNC: 103 MG/DL — SIGNIFICANT CHANGE UP (ref 10–149)
TSH SERPL-MCNC: 0.66 UU/ML — SIGNIFICANT CHANGE UP (ref 0.34–4.82)
VIT B12 SERPL-MCNC: 388 PG/ML — SIGNIFICANT CHANGE UP (ref 243–894)
WBC # BLD: 6.2 K/UL — SIGNIFICANT CHANGE UP (ref 3.8–10.5)
WBC # FLD AUTO: 6.2 K/UL — SIGNIFICANT CHANGE UP (ref 3.8–10.5)

## 2017-12-17 RX ADMIN — BUDESONIDE AND FORMOTEROL FUMARATE DIHYDRATE 2 PUFF(S): 160; 4.5 AEROSOL RESPIRATORY (INHALATION) at 10:36

## 2017-12-17 RX ADMIN — LATANOPROST 1 DROP(S): 0.05 SOLUTION/ DROPS OPHTHALMIC; TOPICAL at 00:14

## 2017-12-17 RX ADMIN — METFORMIN HYDROCHLORIDE 500 MILLIGRAM(S): 850 TABLET ORAL at 17:55

## 2017-12-17 RX ADMIN — BUDESONIDE AND FORMOTEROL FUMARATE DIHYDRATE 2 PUFF(S): 160; 4.5 AEROSOL RESPIRATORY (INHALATION) at 21:18

## 2017-12-17 RX ADMIN — Medication 81 MILLIGRAM(S): at 12:25

## 2017-12-17 RX ADMIN — SODIUM CHLORIDE 75 MILLILITER(S): 9 INJECTION INTRAMUSCULAR; INTRAVENOUS; SUBCUTANEOUS at 00:14

## 2017-12-17 RX ADMIN — LATANOPROST 1 DROP(S): 0.05 SOLUTION/ DROPS OPHTHALMIC; TOPICAL at 21:18

## 2017-12-17 RX ADMIN — Medication 12.5 MILLIGRAM(S): at 05:38

## 2017-12-17 RX ADMIN — Medication 1 MILLIGRAM(S): at 12:25

## 2017-12-17 RX ADMIN — Medication 1 TABLET(S): at 12:25

## 2017-12-17 RX ADMIN — ENOXAPARIN SODIUM 40 MILLIGRAM(S): 100 INJECTION SUBCUTANEOUS at 12:25

## 2017-12-17 RX ADMIN — Medication 650 MILLIGRAM(S): at 23:35

## 2017-12-17 RX ADMIN — CEFTRIAXONE 100 GRAM(S): 500 INJECTION, POWDER, FOR SOLUTION INTRAMUSCULAR; INTRAVENOUS at 17:56

## 2017-12-17 RX ADMIN — Medication 650 MILLIGRAM(S): at 14:30

## 2017-12-17 RX ADMIN — ATORVASTATIN CALCIUM 20 MILLIGRAM(S): 80 TABLET, FILM COATED ORAL at 00:13

## 2017-12-17 RX ADMIN — LISINOPRIL 5 MILLIGRAM(S): 2.5 TABLET ORAL at 05:38

## 2017-12-17 RX ADMIN — Medication 12.5 MILLIGRAM(S): at 17:55

## 2017-12-17 RX ADMIN — ATORVASTATIN CALCIUM 20 MILLIGRAM(S): 80 TABLET, FILM COATED ORAL at 21:18

## 2017-12-17 RX ADMIN — ERGOCALCIFEROL 50000 UNIT(S): 1.25 CAPSULE ORAL at 17:50

## 2017-12-17 RX ADMIN — SODIUM CHLORIDE 75 MILLILITER(S): 9 INJECTION INTRAMUSCULAR; INTRAVENOUS; SUBCUTANEOUS at 10:36

## 2017-12-17 RX ADMIN — Medication 650 MILLIGRAM(S): at 14:01

## 2017-12-17 RX ADMIN — METFORMIN HYDROCHLORIDE 500 MILLIGRAM(S): 850 TABLET ORAL at 10:36

## 2017-12-17 RX ADMIN — BUDESONIDE AND FORMOTEROL FUMARATE DIHYDRATE 2 PUFF(S): 160; 4.5 AEROSOL RESPIRATORY (INHALATION) at 00:14

## 2017-12-18 ENCOUNTER — TRANSCRIPTION ENCOUNTER (OUTPATIENT)
Age: 72
End: 2017-12-18

## 2017-12-18 VITALS
SYSTOLIC BLOOD PRESSURE: 137 MMHG | OXYGEN SATURATION: 100 % | DIASTOLIC BLOOD PRESSURE: 46 MMHG | RESPIRATION RATE: 16 BRPM | HEART RATE: 57 BPM | TEMPERATURE: 98 F

## 2017-12-18 LAB
-  AMIKACIN: SIGNIFICANT CHANGE UP
-  AMIKACIN: SIGNIFICANT CHANGE UP
-  AMPICILLIN/SULBACTAM: SIGNIFICANT CHANGE UP
-  AMPICILLIN/SULBACTAM: SIGNIFICANT CHANGE UP
-  AMPICILLIN: SIGNIFICANT CHANGE UP
-  AMPICILLIN: SIGNIFICANT CHANGE UP
-  AZTREONAM: SIGNIFICANT CHANGE UP
-  AZTREONAM: SIGNIFICANT CHANGE UP
-  CEFAZOLIN: SIGNIFICANT CHANGE UP
-  CEFAZOLIN: SIGNIFICANT CHANGE UP
-  CEFEPIME: SIGNIFICANT CHANGE UP
-  CEFEPIME: SIGNIFICANT CHANGE UP
-  CEFOXITIN: SIGNIFICANT CHANGE UP
-  CEFOXITIN: SIGNIFICANT CHANGE UP
-  CEFTAZIDIME: SIGNIFICANT CHANGE UP
-  CEFTAZIDIME: SIGNIFICANT CHANGE UP
-  CEFTRIAXONE: SIGNIFICANT CHANGE UP
-  CEFTRIAXONE: SIGNIFICANT CHANGE UP
-  CIPROFLOXACIN: SIGNIFICANT CHANGE UP
-  CIPROFLOXACIN: SIGNIFICANT CHANGE UP
-  ERTAPENEM: SIGNIFICANT CHANGE UP
-  ERTAPENEM: SIGNIFICANT CHANGE UP
-  GENTAMICIN: SIGNIFICANT CHANGE UP
-  GENTAMICIN: SIGNIFICANT CHANGE UP
-  IMIPENEM: SIGNIFICANT CHANGE UP
-  LEVOFLOXACIN: SIGNIFICANT CHANGE UP
-  LEVOFLOXACIN: SIGNIFICANT CHANGE UP
-  MEROPENEM: SIGNIFICANT CHANGE UP
-  MEROPENEM: SIGNIFICANT CHANGE UP
-  NITROFURANTOIN: SIGNIFICANT CHANGE UP
-  NITROFURANTOIN: SIGNIFICANT CHANGE UP
-  PIPERACILLIN/TAZOBACTAM: SIGNIFICANT CHANGE UP
-  PIPERACILLIN/TAZOBACTAM: SIGNIFICANT CHANGE UP
-  TOBRAMYCIN: SIGNIFICANT CHANGE UP
-  TOBRAMYCIN: SIGNIFICANT CHANGE UP
-  TRIMETHOPRIM/SULFAMETHOXAZOLE: SIGNIFICANT CHANGE UP
-  TRIMETHOPRIM/SULFAMETHOXAZOLE: SIGNIFICANT CHANGE UP
ANION GAP SERPL CALC-SCNC: 7 MMOL/L — SIGNIFICANT CHANGE UP (ref 5–17)
BUN SERPL-MCNC: 21 MG/DL — HIGH (ref 7–18)
CALCIUM SERPL-MCNC: 9.2 MG/DL — SIGNIFICANT CHANGE UP (ref 8.4–10.5)
CHLORIDE SERPL-SCNC: 108 MMOL/L — SIGNIFICANT CHANGE UP (ref 96–108)
CO2 SERPL-SCNC: 27 MMOL/L — SIGNIFICANT CHANGE UP (ref 22–31)
CREAT SERPL-MCNC: 1.15 MG/DL — SIGNIFICANT CHANGE UP (ref 0.5–1.3)
CULTURE RESULTS: SIGNIFICANT CHANGE UP
GLUCOSE BLDC GLUCOMTR-MCNC: 91 MG/DL — SIGNIFICANT CHANGE UP (ref 70–99)
GLUCOSE BLDC GLUCOMTR-MCNC: 93 MG/DL — SIGNIFICANT CHANGE UP (ref 70–99)
GLUCOSE SERPL-MCNC: 91 MG/DL — SIGNIFICANT CHANGE UP (ref 70–99)
HCT VFR BLD CALC: 35.7 % — SIGNIFICANT CHANGE UP (ref 34.5–45)
HGB BLD-MCNC: 11.2 G/DL — LOW (ref 11.5–15.5)
MAGNESIUM SERPL-MCNC: 1.9 MG/DL — SIGNIFICANT CHANGE UP (ref 1.6–2.6)
MCHC RBC-ENTMCNC: 28.9 PG — SIGNIFICANT CHANGE UP (ref 27–34)
MCHC RBC-ENTMCNC: 31.2 GM/DL — LOW (ref 32–36)
MCV RBC AUTO: 92.4 FL — SIGNIFICANT CHANGE UP (ref 80–100)
METHOD TYPE: SIGNIFICANT CHANGE UP
METHOD TYPE: SIGNIFICANT CHANGE UP
ORGANISM # SPEC MICROSCOPIC CNT: SIGNIFICANT CHANGE UP
PHOSPHATE SERPL-MCNC: 3.9 MG/DL — SIGNIFICANT CHANGE UP (ref 2.5–4.5)
PLATELET # BLD AUTO: 158 K/UL — SIGNIFICANT CHANGE UP (ref 150–400)
POTASSIUM SERPL-MCNC: 4.4 MMOL/L — SIGNIFICANT CHANGE UP (ref 3.5–5.3)
POTASSIUM SERPL-SCNC: 4.4 MMOL/L — SIGNIFICANT CHANGE UP (ref 3.5–5.3)
RBC # BLD: 3.87 M/UL — SIGNIFICANT CHANGE UP (ref 3.8–5.2)
RBC # FLD: 13.8 % — SIGNIFICANT CHANGE UP (ref 10.3–14.5)
SODIUM SERPL-SCNC: 142 MMOL/L — SIGNIFICANT CHANGE UP (ref 135–145)
SPECIMEN SOURCE: SIGNIFICANT CHANGE UP
WBC # BLD: 5.4 K/UL — SIGNIFICANT CHANGE UP (ref 3.8–10.5)
WBC # FLD AUTO: 5.4 K/UL — SIGNIFICANT CHANGE UP (ref 3.8–10.5)

## 2017-12-18 PROCEDURE — 83880 ASSAY OF NATRIURETIC PEPTIDE: CPT

## 2017-12-18 PROCEDURE — 84443 ASSAY THYROID STIM HORMONE: CPT

## 2017-12-18 PROCEDURE — 87186 SC STD MICRODIL/AGAR DIL: CPT

## 2017-12-18 PROCEDURE — 83735 ASSAY OF MAGNESIUM: CPT

## 2017-12-18 PROCEDURE — 80048 BASIC METABOLIC PNL TOTAL CA: CPT

## 2017-12-18 PROCEDURE — 82962 GLUCOSE BLOOD TEST: CPT

## 2017-12-18 PROCEDURE — 80061 LIPID PANEL: CPT

## 2017-12-18 PROCEDURE — 71045 X-RAY EXAM CHEST 1 VIEW: CPT

## 2017-12-18 PROCEDURE — 82607 VITAMIN B-12: CPT

## 2017-12-18 PROCEDURE — 94640 AIRWAY INHALATION TREATMENT: CPT

## 2017-12-18 PROCEDURE — 93005 ELECTROCARDIOGRAM TRACING: CPT

## 2017-12-18 PROCEDURE — 81001 URINALYSIS AUTO W/SCOPE: CPT

## 2017-12-18 PROCEDURE — 99285 EMERGENCY DEPT VISIT HI MDM: CPT | Mod: 25

## 2017-12-18 PROCEDURE — 80053 COMPREHEN METABOLIC PANEL: CPT

## 2017-12-18 PROCEDURE — 84100 ASSAY OF PHOSPHORUS: CPT

## 2017-12-18 PROCEDURE — 87086 URINE CULTURE/COLONY COUNT: CPT

## 2017-12-18 PROCEDURE — 85027 COMPLETE CBC AUTOMATED: CPT

## 2017-12-18 RX ORDER — CEFUROXIME AXETIL 250 MG
2 TABLET ORAL
Qty: 28 | Refills: 0 | OUTPATIENT
Start: 2017-12-18 | End: 2017-12-24

## 2017-12-18 RX ADMIN — Medication 1 TABLET(S): at 12:16

## 2017-12-18 RX ADMIN — Medication 1 MILLIGRAM(S): at 12:16

## 2017-12-18 RX ADMIN — Medication 650 MILLIGRAM(S): at 00:57

## 2017-12-18 RX ADMIN — METFORMIN HYDROCHLORIDE 500 MILLIGRAM(S): 850 TABLET ORAL at 08:21

## 2017-12-18 RX ADMIN — BUDESONIDE AND FORMOTEROL FUMARATE DIHYDRATE 2 PUFF(S): 160; 4.5 AEROSOL RESPIRATORY (INHALATION) at 11:05

## 2017-12-18 RX ADMIN — Medication 12.5 MILLIGRAM(S): at 06:44

## 2017-12-18 RX ADMIN — ENOXAPARIN SODIUM 40 MILLIGRAM(S): 100 INJECTION SUBCUTANEOUS at 12:16

## 2017-12-18 RX ADMIN — Medication 81 MILLIGRAM(S): at 12:16

## 2017-12-18 RX ADMIN — LISINOPRIL 5 MILLIGRAM(S): 2.5 TABLET ORAL at 06:44

## 2017-12-18 RX ADMIN — Medication 1 APPLICATION(S): at 06:44

## 2017-12-18 NOTE — DISCHARGE NOTE ADULT - PATIENT PORTAL LINK FT
“You can access the [facility] Patient Portal, offered by [facility], by registering with the following website: [email or web address]”

## 2017-12-18 NOTE — DISCHARGE NOTE ADULT - HOSPITAL COURSE
71 years old female from [facility] assisted living, walks with walker, with PMH of CAD, ACS, atrial fibrillation, bronchoaveolar cancer (s/p wedge resection 2013), DM(on metformin), COPD (on 3L O2 @ assisted living facility; no past intubations), HLD, GERD, HTN, MI, stage 4 metastatic lung cancer(on Tagrisso), vitamin D deficiency, and remote h/o UTI(2 years ago), recent admission to [facility] for PNA came to ED c/o suprapubic tenderness and dysuria(painful urination) for 11 days. Pain is constant and sharp, non-radiating. Denies having fever, hematuria, nausea, vomiting, chills, URI symptoms, or any other symptoms.    In ED, pt's VS stable, labs grossly stable with positive UA. Pt is admitted to the medicine floor for UTI, concern for sepsis given immunocompromised status due to diabetes and lung cancer.     On the floor, the patient was given antibiotics and the patient states she feels a lot better. She states urination has been less painful.

## 2017-12-18 NOTE — DISCHARGE NOTE ADULT - PLAN OF CARE
To relieve UTI symptoms by treating the infection with antibiotics Please take the prescribed antibiotics  Ceftin 500mg twice a day for 1 week, and follow up with your primary care doctor.

## 2017-12-18 NOTE — DISCHARGE NOTE ADULT - CARE PLAN
Principal Discharge DX:	Cystitis  Goal:	To relieve UTI symptoms by treating the infection with antibiotics  Instructions for follow-up, activity and diet:	Please take the prescribed antibiotics  Ceftin 500mg twice a day for 1 week, and follow up with your primary care doctor.

## 2017-12-18 NOTE — DISCHARGE NOTE ADULT - MEDICATION SUMMARY - MEDICATIONS TO TAKE
I will START or STAY ON the medications listed below when I get home from the hospital:    Aspir 81 oral delayed release tablet  -- 1 tab(s) by mouth once a day  -- Indication: For pain    Tylenol 325 mg oral tablet  -- 2 tab(s) by mouth every 4 hours, As Needed  -- Indication: For pain    lisinopril 5 mg oral tablet  -- 1 tab(s) by mouth once a day  -- Indication: For Hypertension    metFORMIN 500 mg oral tablet  -- 1 tab(s) by mouth 2 times a day  -- Indication: For DM (diabetes mellitus)    Lipitor 20 mg oral tablet  -- 1 tab(s) by mouth once a day  -- Indication: For Cholesterol    metoprolol tartrate 25 mg oral tablet  -- orally once a day  -- Indication: For HTN (hypertension)    Symbicort 160 mcg-4.5 mcg/inh inhalation aerosol  -- 1 puff(s) inhaled 2 times a day  -- Indication: For COPD (chronic obstructive pulmonary disease)    albuterol  -- orally every 6 hours, As Needed  -- Indication: For COPD (chronic obstructive pulmonary disease)    Cepacol Extra Strength Cherry  -- Indication: For soar throat    clobetasol 0.05% topical cream  -- Apply on skin to affected area 2 times a day  -- Indication: For itching    Tagrisso 80 mg oral tablet  -- orally once a day  -- Indication: For Lung cancer    Xalatan 0.005% ophthalmic solution  -- 1 drop(s) to each affected eye once a day (in the evening)  -- Indication: For glaucoma    Calcium 500+D oral tablet, chewable  -- orally once a day  -- Indication: For Nutrition    Multiple Vitamins oral capsule  -- 1 cap(s) by mouth once a day  -- Indication: For Nutrition    Vitamin D2 50,000 intl units (1.25 mg) oral capsule  -- 1 cap(s) by mouth once a week  -- Indication: For Nutrition    folic acid 1 mg oral tablet  -- 1 tab(s) by mouth once a day  -- Indication: For Nutrition

## 2018-07-10 ENCOUNTER — INPATIENT (INPATIENT)
Facility: HOSPITAL | Age: 73
LOS: 2 days | Discharge: NURSING, REHAB OR LONG-TERM CARE | DRG: 871 | End: 2018-07-13
Attending: INTERNAL MEDICINE | Admitting: INTERNAL MEDICINE
Payer: MEDICAID

## 2018-07-10 VITALS
TEMPERATURE: 98 F | WEIGHT: 134.04 LBS | SYSTOLIC BLOOD PRESSURE: 118 MMHG | OXYGEN SATURATION: 97 % | HEIGHT: 63 IN | HEART RATE: 102 BPM | RESPIRATION RATE: 18 BRPM | DIASTOLIC BLOOD PRESSURE: 73 MMHG

## 2018-07-10 DIAGNOSIS — I10 ESSENTIAL (PRIMARY) HYPERTENSION: ICD-10-CM

## 2018-07-10 DIAGNOSIS — C34.90 MALIGNANT NEOPLASM OF UNSPECIFIED PART OF UNSPECIFIED BRONCHUS OR LUNG: ICD-10-CM

## 2018-07-10 DIAGNOSIS — Z98.89 OTHER SPECIFIED POSTPROCEDURAL STATES: Chronic | ICD-10-CM

## 2018-07-10 DIAGNOSIS — A41.9 SEPSIS, UNSPECIFIED ORGANISM: ICD-10-CM

## 2018-07-10 DIAGNOSIS — N39.0 URINARY TRACT INFECTION, SITE NOT SPECIFIED: ICD-10-CM

## 2018-07-10 DIAGNOSIS — J44.9 CHRONIC OBSTRUCTIVE PULMONARY DISEASE, UNSPECIFIED: ICD-10-CM

## 2018-07-10 DIAGNOSIS — E11.9 TYPE 2 DIABETES MELLITUS WITHOUT COMPLICATIONS: ICD-10-CM

## 2018-07-10 DIAGNOSIS — J18.9 PNEUMONIA, UNSPECIFIED ORGANISM: ICD-10-CM

## 2018-07-10 DIAGNOSIS — Z29.9 ENCOUNTER FOR PROPHYLACTIC MEASURES, UNSPECIFIED: ICD-10-CM

## 2018-07-10 LAB
ALBUMIN SERPL ELPH-MCNC: 3.4 G/DL — LOW (ref 3.5–5)
ALP SERPL-CCNC: 72 U/L — SIGNIFICANT CHANGE UP (ref 40–120)
ALT FLD-CCNC: 16 U/L DA — SIGNIFICANT CHANGE UP (ref 10–60)
ANION GAP SERPL CALC-SCNC: 11 MMOL/L — SIGNIFICANT CHANGE UP (ref 5–17)
ANION GAP SERPL CALC-SCNC: 9 MMOL/L — SIGNIFICANT CHANGE UP (ref 5–17)
APPEARANCE UR: CLEAR — SIGNIFICANT CHANGE UP
APTT BLD: 36.6 SEC — SIGNIFICANT CHANGE UP (ref 27.5–37.4)
AST SERPL-CCNC: 19 U/L — SIGNIFICANT CHANGE UP (ref 10–40)
BASOPHILS # BLD AUTO: 0.1 K/UL — SIGNIFICANT CHANGE UP (ref 0–0.2)
BASOPHILS NFR BLD AUTO: 0.4 % — SIGNIFICANT CHANGE UP (ref 0–2)
BILIRUB SERPL-MCNC: 0.5 MG/DL — SIGNIFICANT CHANGE UP (ref 0.2–1.2)
BILIRUB UR-MCNC: NEGATIVE — SIGNIFICANT CHANGE UP
BUN SERPL-MCNC: 19 MG/DL — HIGH (ref 7–18)
BUN SERPL-MCNC: 21 MG/DL — HIGH (ref 7–18)
CALCIUM SERPL-MCNC: 9.2 MG/DL — SIGNIFICANT CHANGE UP (ref 8.4–10.5)
CALCIUM SERPL-MCNC: 9.3 MG/DL — SIGNIFICANT CHANGE UP (ref 8.4–10.5)
CHLORIDE SERPL-SCNC: 102 MMOL/L — SIGNIFICANT CHANGE UP (ref 96–108)
CHLORIDE SERPL-SCNC: 99 MMOL/L — SIGNIFICANT CHANGE UP (ref 96–108)
CO2 SERPL-SCNC: 24 MMOL/L — SIGNIFICANT CHANGE UP (ref 22–31)
CO2 SERPL-SCNC: 29 MMOL/L — SIGNIFICANT CHANGE UP (ref 22–31)
COLOR SPEC: YELLOW — SIGNIFICANT CHANGE UP
CREAT SERPL-MCNC: 1.58 MG/DL — HIGH (ref 0.5–1.3)
CREAT SERPL-MCNC: 1.62 MG/DL — HIGH (ref 0.5–1.3)
DIFF PNL FLD: ABNORMAL
EOSINOPHIL # BLD AUTO: 0 K/UL — SIGNIFICANT CHANGE UP (ref 0–0.5)
EOSINOPHIL NFR BLD AUTO: 0.2 % — SIGNIFICANT CHANGE UP (ref 0–6)
GLUCOSE BLDC GLUCOMTR-MCNC: 147 MG/DL — HIGH (ref 70–99)
GLUCOSE BLDC GLUCOMTR-MCNC: 205 MG/DL — HIGH (ref 70–99)
GLUCOSE BLDC GLUCOMTR-MCNC: 293 MG/DL — HIGH (ref 70–99)
GLUCOSE SERPL-MCNC: 168 MG/DL — HIGH (ref 70–99)
GLUCOSE SERPL-MCNC: 207 MG/DL — HIGH (ref 70–99)
GLUCOSE UR QL: NEGATIVE — SIGNIFICANT CHANGE UP
HCT VFR BLD CALC: 33.6 % — LOW (ref 34.5–45)
HCT VFR BLD CALC: 37.7 % — SIGNIFICANT CHANGE UP (ref 34.5–45)
HGB BLD-MCNC: 10.7 G/DL — LOW (ref 11.5–15.5)
HGB BLD-MCNC: 11.7 G/DL — SIGNIFICANT CHANGE UP (ref 11.5–15.5)
INR BLD: 1.15 RATIO — SIGNIFICANT CHANGE UP (ref 0.88–1.16)
KETONES UR-MCNC: NEGATIVE — SIGNIFICANT CHANGE UP
LACTATE SERPL-SCNC: 1.3 MMOL/L — SIGNIFICANT CHANGE UP (ref 0.7–2)
LEUKOCYTE ESTERASE UR-ACNC: ABNORMAL
LYMPHOCYTES # BLD AUTO: 1.8 K/UL — SIGNIFICANT CHANGE UP (ref 1–3.3)
LYMPHOCYTES # BLD AUTO: 12.3 % — LOW (ref 13–44)
LYMPHOCYTES # BLD AUTO: 8 % — LOW (ref 13–44)
MCHC RBC-ENTMCNC: 28.4 PG — SIGNIFICANT CHANGE UP (ref 27–34)
MCHC RBC-ENTMCNC: 29 PG — SIGNIFICANT CHANGE UP (ref 27–34)
MCHC RBC-ENTMCNC: 31.1 GM/DL — LOW (ref 32–36)
MCHC RBC-ENTMCNC: 31.7 GM/DL — LOW (ref 32–36)
MCV RBC AUTO: 91.3 FL — SIGNIFICANT CHANGE UP (ref 80–100)
MCV RBC AUTO: 91.4 FL — SIGNIFICANT CHANGE UP (ref 80–100)
MONOCYTES # BLD AUTO: 1.1 K/UL — HIGH (ref 0–0.9)
MONOCYTES NFR BLD AUTO: 7.1 % — SIGNIFICANT CHANGE UP (ref 2–14)
MONOCYTES NFR BLD AUTO: 8 % — SIGNIFICANT CHANGE UP (ref 2–14)
NEUTROPHILS # BLD AUTO: 11.9 K/UL — HIGH (ref 1.8–7.4)
NEUTROPHILS NFR BLD AUTO: 80 % — HIGH (ref 43–77)
NEUTROPHILS NFR BLD AUTO: 84 % — HIGH (ref 43–77)
NITRITE UR-MCNC: NEGATIVE — SIGNIFICANT CHANGE UP
PH UR: 5 — SIGNIFICANT CHANGE UP (ref 5–8)
PLATELET # BLD AUTO: 154 K/UL — SIGNIFICANT CHANGE UP (ref 150–400)
PLATELET # BLD AUTO: 181 K/UL — SIGNIFICANT CHANGE UP (ref 150–400)
POTASSIUM SERPL-MCNC: 3.9 MMOL/L — SIGNIFICANT CHANGE UP (ref 3.5–5.3)
POTASSIUM SERPL-MCNC: 4.5 MMOL/L — SIGNIFICANT CHANGE UP (ref 3.5–5.3)
POTASSIUM SERPL-SCNC: 3.9 MMOL/L — SIGNIFICANT CHANGE UP (ref 3.5–5.3)
POTASSIUM SERPL-SCNC: 4.5 MMOL/L — SIGNIFICANT CHANGE UP (ref 3.5–5.3)
PROT SERPL-MCNC: 7.8 G/DL — SIGNIFICANT CHANGE UP (ref 6–8.3)
PROT UR-MCNC: 100
PROTHROM AB SERPL-ACNC: 12.6 SEC — SIGNIFICANT CHANGE UP (ref 9.8–12.7)
RBC # BLD: 3.68 M/UL — LOW (ref 3.8–5.2)
RBC # BLD: 4.12 M/UL — SIGNIFICANT CHANGE UP (ref 3.8–5.2)
RBC # FLD: 13.7 % — SIGNIFICANT CHANGE UP (ref 10.3–14.5)
RBC # FLD: 13.9 % — SIGNIFICANT CHANGE UP (ref 10.3–14.5)
SODIUM SERPL-SCNC: 137 MMOL/L — SIGNIFICANT CHANGE UP (ref 135–145)
SODIUM SERPL-SCNC: 137 MMOL/L — SIGNIFICANT CHANGE UP (ref 135–145)
SP GR SPEC: 1.01 — SIGNIFICANT CHANGE UP (ref 1.01–1.02)
TROPONIN I SERPL-MCNC: <0.015 NG/ML — SIGNIFICANT CHANGE UP (ref 0–0.04)
UROBILINOGEN FLD QL: NEGATIVE — SIGNIFICANT CHANGE UP
WBC # BLD: 14.8 K/UL — HIGH (ref 3.8–10.5)
WBC # BLD: 18.1 K/UL — HIGH (ref 3.8–10.5)
WBC # FLD AUTO: 14.8 K/UL — HIGH (ref 3.8–10.5)
WBC # FLD AUTO: 18.1 K/UL — HIGH (ref 3.8–10.5)

## 2018-07-10 PROCEDURE — 71045 X-RAY EXAM CHEST 1 VIEW: CPT | Mod: 26

## 2018-07-10 PROCEDURE — 71250 CT THORAX DX C-: CPT | Mod: 26

## 2018-07-10 PROCEDURE — 99285 EMERGENCY DEPT VISIT HI MDM: CPT | Mod: 25

## 2018-07-10 RX ORDER — ACETAMINOPHEN 500 MG
650 TABLET ORAL EVERY 6 HOURS
Qty: 0 | Refills: 0 | Status: DISCONTINUED | OUTPATIENT
Start: 2018-07-10 | End: 2018-07-13

## 2018-07-10 RX ORDER — IPRATROPIUM/ALBUTEROL SULFATE 18-103MCG
3 AEROSOL WITH ADAPTER (GRAM) INHALATION EVERY 6 HOURS
Qty: 0 | Refills: 0 | Status: DISCONTINUED | OUTPATIENT
Start: 2018-07-10 | End: 2018-07-13

## 2018-07-10 RX ORDER — ATORVASTATIN CALCIUM 80 MG/1
1 TABLET, FILM COATED ORAL
Qty: 0 | Refills: 0 | COMMUNITY

## 2018-07-10 RX ORDER — HEPARIN SODIUM 5000 [USP'U]/ML
5000 INJECTION INTRAVENOUS; SUBCUTANEOUS EVERY 8 HOURS
Qty: 0 | Refills: 0 | Status: DISCONTINUED | OUTPATIENT
Start: 2018-07-10 | End: 2018-07-13

## 2018-07-10 RX ORDER — BUDESONIDE AND FORMOTEROL FUMARATE DIHYDRATE 160; 4.5 UG/1; UG/1
2 AEROSOL RESPIRATORY (INHALATION)
Qty: 0 | Refills: 0 | Status: DISCONTINUED | OUTPATIENT
Start: 2018-07-10 | End: 2018-07-13

## 2018-07-10 RX ORDER — SODIUM CHLORIDE 9 MG/ML
1000 INJECTION INTRAMUSCULAR; INTRAVENOUS; SUBCUTANEOUS ONCE
Qty: 0 | Refills: 0 | Status: COMPLETED | OUTPATIENT
Start: 2018-07-10 | End: 2018-07-10

## 2018-07-10 RX ORDER — ACETAMINOPHEN 500 MG
975 TABLET ORAL ONCE
Qty: 0 | Refills: 0 | Status: COMPLETED | OUTPATIENT
Start: 2018-07-10 | End: 2018-07-10

## 2018-07-10 RX ORDER — ASPIRIN/CALCIUM CARB/MAGNESIUM 324 MG
81 TABLET ORAL DAILY
Qty: 0 | Refills: 0 | Status: DISCONTINUED | OUTPATIENT
Start: 2018-07-10 | End: 2018-07-13

## 2018-07-10 RX ORDER — CEFTRIAXONE 500 MG/1
1 INJECTION, POWDER, FOR SOLUTION INTRAMUSCULAR; INTRAVENOUS ONCE
Qty: 0 | Refills: 0 | Status: COMPLETED | OUTPATIENT
Start: 2018-07-10 | End: 2018-07-10

## 2018-07-10 RX ORDER — BENZOCAINE AND MENTHOL 5; 1 G/100ML; G/100ML
1 LIQUID ORAL EVERY 4 HOURS
Qty: 0 | Refills: 0 | Status: DISCONTINUED | OUTPATIENT
Start: 2018-07-10 | End: 2018-07-13

## 2018-07-10 RX ORDER — IPRATROPIUM/ALBUTEROL SULFATE 18-103MCG
3 AEROSOL WITH ADAPTER (GRAM) INHALATION ONCE
Qty: 0 | Refills: 0 | Status: COMPLETED | OUTPATIENT
Start: 2018-07-10 | End: 2018-07-10

## 2018-07-10 RX ORDER — FOLIC ACID 0.8 MG
1 TABLET ORAL DAILY
Qty: 0 | Refills: 0 | Status: DISCONTINUED | OUTPATIENT
Start: 2018-07-10 | End: 2018-07-13

## 2018-07-10 RX ORDER — LATANOPROST 0.05 MG/ML
1 SOLUTION/ DROPS OPHTHALMIC; TOPICAL AT BEDTIME
Qty: 0 | Refills: 0 | Status: DISCONTINUED | OUTPATIENT
Start: 2018-07-10 | End: 2018-07-13

## 2018-07-10 RX ORDER — SODIUM CHLORIDE 9 MG/ML
3 INJECTION INTRAMUSCULAR; INTRAVENOUS; SUBCUTANEOUS ONCE
Qty: 0 | Refills: 0 | Status: COMPLETED | OUTPATIENT
Start: 2018-07-10 | End: 2018-07-10

## 2018-07-10 RX ORDER — INSULIN LISPRO 100/ML
VIAL (ML) SUBCUTANEOUS
Qty: 0 | Refills: 0 | Status: DISCONTINUED | OUTPATIENT
Start: 2018-07-10 | End: 2018-07-11

## 2018-07-10 RX ORDER — AZITHROMYCIN 500 MG/1
500 TABLET, FILM COATED ORAL ONCE
Qty: 0 | Refills: 0 | Status: COMPLETED | OUTPATIENT
Start: 2018-07-10 | End: 2018-07-10

## 2018-07-10 RX ORDER — PIPERACILLIN AND TAZOBACTAM 4; .5 G/20ML; G/20ML
3.38 INJECTION, POWDER, LYOPHILIZED, FOR SOLUTION INTRAVENOUS EVERY 8 HOURS
Qty: 0 | Refills: 0 | Status: DISCONTINUED | OUTPATIENT
Start: 2018-07-10 | End: 2018-07-13

## 2018-07-10 RX ORDER — SODIUM CHLORIDE 9 MG/ML
1000 INJECTION INTRAMUSCULAR; INTRAVENOUS; SUBCUTANEOUS
Qty: 0 | Refills: 0 | Status: DISCONTINUED | OUTPATIENT
Start: 2018-07-10 | End: 2018-07-13

## 2018-07-10 RX ADMIN — Medication 81 MILLIGRAM(S): at 12:29

## 2018-07-10 RX ADMIN — Medication 40 MILLIGRAM(S): at 17:03

## 2018-07-10 RX ADMIN — SODIUM CHLORIDE 3000 MILLILITER(S): 9 INJECTION INTRAMUSCULAR; INTRAVENOUS; SUBCUTANEOUS at 01:55

## 2018-07-10 RX ADMIN — HEPARIN SODIUM 5000 UNIT(S): 5000 INJECTION INTRAVENOUS; SUBCUTANEOUS at 22:31

## 2018-07-10 RX ADMIN — Medication 2: at 11:11

## 2018-07-10 RX ADMIN — BENZOCAINE AND MENTHOL 1 LOZENGE: 5; 1 LIQUID ORAL at 11:10

## 2018-07-10 RX ADMIN — SODIUM CHLORIDE 100 MILLILITER(S): 9 INJECTION INTRAMUSCULAR; INTRAVENOUS; SUBCUTANEOUS at 11:09

## 2018-07-10 RX ADMIN — SODIUM CHLORIDE 2000 MILLILITER(S): 9 INJECTION INTRAMUSCULAR; INTRAVENOUS; SUBCUTANEOUS at 01:40

## 2018-07-10 RX ADMIN — LATANOPROST 1 DROP(S): 0.05 SOLUTION/ DROPS OPHTHALMIC; TOPICAL at 22:50

## 2018-07-10 RX ADMIN — BUDESONIDE AND FORMOTEROL FUMARATE DIHYDRATE 2 PUFF(S): 160; 4.5 AEROSOL RESPIRATORY (INHALATION) at 11:09

## 2018-07-10 RX ADMIN — Medication 40 MILLIGRAM(S): at 22:50

## 2018-07-10 RX ADMIN — PIPERACILLIN AND TAZOBACTAM 25 GRAM(S): 4; .5 INJECTION, POWDER, LYOPHILIZED, FOR SOLUTION INTRAVENOUS at 21:35

## 2018-07-10 RX ADMIN — Medication 3 MILLILITER(S): at 01:59

## 2018-07-10 RX ADMIN — PIPERACILLIN AND TAZOBACTAM 25 GRAM(S): 4; .5 INJECTION, POWDER, LYOPHILIZED, FOR SOLUTION INTRAVENOUS at 17:07

## 2018-07-10 RX ADMIN — SODIUM CHLORIDE 3 MILLILITER(S): 9 INJECTION INTRAMUSCULAR; INTRAVENOUS; SUBCUTANEOUS at 01:29

## 2018-07-10 RX ADMIN — CEFTRIAXONE 100 GRAM(S): 500 INJECTION, POWDER, FOR SOLUTION INTRAMUSCULAR; INTRAVENOUS at 02:00

## 2018-07-10 RX ADMIN — AZITHROMYCIN 500 MILLIGRAM(S): 500 TABLET, FILM COATED ORAL at 01:59

## 2018-07-10 RX ADMIN — BENZOCAINE AND MENTHOL 1 LOZENGE: 5; 1 LIQUID ORAL at 17:04

## 2018-07-10 RX ADMIN — HEPARIN SODIUM 5000 UNIT(S): 5000 INJECTION INTRAVENOUS; SUBCUTANEOUS at 17:01

## 2018-07-10 RX ADMIN — Medication 3 MILLILITER(S): at 17:07

## 2018-07-10 RX ADMIN — Medication 1 MILLIGRAM(S): at 12:29

## 2018-07-10 RX ADMIN — Medication 1 APPLICATION(S): at 17:07

## 2018-07-10 RX ADMIN — Medication 975 MILLIGRAM(S): at 01:59

## 2018-07-10 RX ADMIN — Medication 3: at 22:31

## 2018-07-10 RX ADMIN — Medication 3 MILLILITER(S): at 09:22

## 2018-07-10 NOTE — ED ADULT NURSE NOTE - ED STAT RN HANDOFF DETAILS 2
Received patient from CONNIE Van admitted to medicine in no acute respiratory distress, IV hydration in progress. Patient denies any pain or discomfort having lunch at present time .No bed assigned as yet continue to monitor patient.

## 2018-07-10 NOTE — H&P ADULT - PROBLEM SELECTOR PLAN 5
Holding home medication Metformin; prior A1c 6.4 in 2017  - C/w diabetic diet, ISS, and monitor FS  - F/u HgA1c

## 2018-07-10 NOTE — H&P ADULT - NSHPPHYSICALEXAM_GEN_ALL_CORE
T(C): 36.8 (10 Jul 2018 07:23), Max: 38.8 (10 Jul 2018 01:27)  T(F): 98.3 (10 Jul 2018 07:23), Max: 101.8 (10 Jul 2018 01:27)  HR: 83 (10 Jul 2018 07:23) (83 - 102)  BP: 100/40 (10 Jul 2018 07:23) (100/40 - 118/73)  RR: 18 (10 Jul 2018 07:23) (18 - 18)  SpO2: 98% (10 Jul 2018 07:23) (96% - 98%)

## 2018-07-10 NOTE — H&P ADULT - PROBLEM SELECTOR PLAN 2
+UA w/ WBC 11-25 and +ve LE  - Hx of pansensitive Citrobacter/Proteus  - C/o R sided back pain mostly likely pleuritic from lung infection  ***C/w above ABx; Ucx sent after ABx given

## 2018-07-10 NOTE — H&P ADULT - PROBLEM SELECTOR PLAN 1
P/w respiratory complaints, WBC 18K, , low normal BP and Temp 38.2; otherwise lactate WNLs  - S/p 2 L NS and dose of Zithromax + Rocephin  - Infectious source 2/2 CT chest RML PNA and + UA  - C/w Tylenol PRN + Rocephin and Zithromax for likely CAP x 7/10  ***F/u BCx and monitor WBC P/w respiratory complaints, WBC 18K, , low normal BP and Temp 38.2; otherwise lactate WNLs  - S/p 2 L NS and dose of Zithromax + Rocephin  - Infectious source 2/2 CT chest RML PNA and + UA  - C/w Tylenol PRN + Zosyn for likely CAP/UTI x 7/10  ***F/u BCx and monitor WBC

## 2018-07-10 NOTE — ED ADULT NURSE NOTE - OBJECTIVE STATEMENT
Patient brought in from [facility] for breast pain, shortness of breath, history of lung cancer

## 2018-07-10 NOTE — H&P ADULT - NSHPSOCIALHISTORY_GEN_ALL_CORE
From [facility], ambulates w/ walker - Denies ETOH use, illicit drug use, or smoking Hx.

## 2018-07-10 NOTE — PATIENT PROFILE ADULT. - FALL HARM RISK
bones(Osteoporosis,prev fx,steroid use,metastatic bone ca other/shortness of breath/bones(Osteoporosis,prev fx,steroid use,metastatic bone ca

## 2018-07-10 NOTE — ED PROVIDER NOTE - OBJECTIVE STATEMENT
73 y/o F patient with PMHx of lung cancer, HTN, COPD (on 3 L nasal canula at baseline), DM, chronic leg pain due to arthritis, presents to ED c/o fever, productive cough with yellow sputum x 2 days. Patient reports that tonight at midnight, patient notices he started have shaking, chills, and temperature recorded at facility but does not recall number. Patient also notes having some right sided chest pain but denies any calf pain, leg swelling. Patient also denies fever, nausea, vomiting, diarrhea, abd pain, dysuria, urinary frequency, hematuria, numbness, tingling, weakness, or any other complaints. NKDA.

## 2018-07-10 NOTE — ED PROVIDER NOTE - MEDICAL DECISION MAKING DETAILS
73 y/o F patient with history of lung cancer, presents with right sided chest pain, cough and fever. Will obtain sepsis labs, CXR and reassess

## 2018-07-10 NOTE — ED ADULT NURSE REASSESSMENT NOTE - NS ED NURSE REASSESS COMMENT FT1
Received report from CONNIE Sheppard, pt is observed laying in bed, breathing via NC @ 2L/min, A&O x3, able to make needs known. Pt denies any distress at time of assessment, Skin is intact, rt hand #[id number] intact, placed at start of shift. Zosyn administered as ordered. Pt admitted to [facility], report given to CONNIE Lane for [address]. Awaiting transport, nursing monitoring continues.

## 2018-07-10 NOTE — ED ADULT NURSE NOTE - CHIEF COMPLAINT QUOTE
BIBA from [facility] with c/o shaking,fever,rt. breast pain,h/o left lung ca as per patient

## 2018-07-10 NOTE — ED PROVIDER NOTE - ASSOCIATED PAIN OR INJURY
no fever, no nausea, no vomiting, no diarrhea, no abd pain, dysuria, urinary frequency, hematuria, no numbness, no tingling, no weakness

## 2018-07-10 NOTE — H&P ADULT - HISTORY OF PRESENT ILLNESS
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 Otherwise denies SOB, wheezing, chest pain, palpitations, abdominal pain, urinary complaints, or any other complaints.

## 2018-07-10 NOTE — ED PROVIDER NOTE - PROGRESS NOTE DETAILS
labs show wbc 18K, lactate wnl, trop neg  CXR shows R lung infiltrate, CT confirms consolidation likely infectious  patient stable for admission

## 2018-07-10 NOTE — H&P ADULT - ASSESSMENT
71 F w/ PMH of CAD, ?PAF, Bronchoalveolar CA (s/p wedge resection 2013) and complicated w/ Metastatic Disease, DM (on Metformin), COPD (on 3L NC; no past intubations), HLD, GERD, HTN, and H/o UTI presented w/ productive cough of brownish sputum x last night associated w/ shaking, chills, throat discomfort, and R sided chest and back pain - admitted to the medicine floor for Sepsis 2/2 PNA likely CAP and complicated by Hx of Immunocompromised 2/2 DM and Lung CA

## 2018-07-10 NOTE — H&P ADULT - PROBLEM SELECTOR PLAN 7
IMPROVE VTE Individual Risk Assessment    RISK                                                          Points  [] Previous VTE                                           3  [] Thrombophilia                                        2  [] Lower limb paralysis                              2   [x] Current Cancer                                       2   [] Immobilization > 24 hrs                        1  [] ICU/CCU stay > 24 hours                       1  [x] Age > 60                                                   1    IMPROVE VTE Score: 3, DVT PPx w/ HSQ

## 2018-07-10 NOTE — ED ADULT TRIAGE NOTE - CHIEF COMPLAINT QUOTE
BIBA from [facility] for [facility] with c/o shaking,fever,rt. breast pain,h/o left lung ca as per patient

## 2018-07-11 LAB
ANION GAP SERPL CALC-SCNC: 7 MMOL/L — SIGNIFICANT CHANGE UP (ref 5–17)
ANION GAP SERPL CALC-SCNC: 8 MMOL/L — SIGNIFICANT CHANGE UP (ref 5–17)
BASOPHILS # BLD AUTO: 0 K/UL — SIGNIFICANT CHANGE UP (ref 0–0.2)
BASOPHILS NFR BLD AUTO: 0.2 % — SIGNIFICANT CHANGE UP (ref 0–2)
BUN SERPL-MCNC: 20 MG/DL — HIGH (ref 7–18)
BUN SERPL-MCNC: 28 MG/DL — HIGH (ref 7–18)
CALCIUM SERPL-MCNC: 8.8 MG/DL — SIGNIFICANT CHANGE UP (ref 8.4–10.5)
CALCIUM SERPL-MCNC: 9.2 MG/DL — SIGNIFICANT CHANGE UP (ref 8.4–10.5)
CHLORIDE SERPL-SCNC: 103 MMOL/L — SIGNIFICANT CHANGE UP (ref 96–108)
CHLORIDE SERPL-SCNC: 103 MMOL/L — SIGNIFICANT CHANGE UP (ref 96–108)
CHOLEST SERPL-MCNC: 220 MG/DL — HIGH (ref 10–199)
CO2 SERPL-SCNC: 25 MMOL/L — SIGNIFICANT CHANGE UP (ref 22–31)
CO2 SERPL-SCNC: 26 MMOL/L — SIGNIFICANT CHANGE UP (ref 22–31)
CREAT SERPL-MCNC: 1.27 MG/DL — SIGNIFICANT CHANGE UP (ref 0.5–1.3)
CREAT SERPL-MCNC: 1.76 MG/DL — HIGH (ref 0.5–1.3)
CULTURE RESULTS: SIGNIFICANT CHANGE UP
EOSINOPHIL # BLD AUTO: 0 K/UL — SIGNIFICANT CHANGE UP (ref 0–0.5)
EOSINOPHIL NFR BLD AUTO: 0 % — SIGNIFICANT CHANGE UP (ref 0–6)
GLUCOSE BLDC GLUCOMTR-MCNC: 258 MG/DL — HIGH (ref 70–99)
GLUCOSE BLDC GLUCOMTR-MCNC: 345 MG/DL — HIGH (ref 70–99)
GLUCOSE BLDC GLUCOMTR-MCNC: 432 MG/DL — HIGH (ref 70–99)
GLUCOSE BLDC GLUCOMTR-MCNC: 598 MG/DL — CRITICAL HIGH (ref 70–99)
GLUCOSE SERPL-MCNC: 255 MG/DL — HIGH (ref 70–99)
GLUCOSE SERPL-MCNC: 446 MG/DL — HIGH (ref 70–99)
HBA1C BLD-MCNC: 6.3 % — HIGH (ref 4–5.6)
HCT VFR BLD CALC: 35.2 % — SIGNIFICANT CHANGE UP (ref 34.5–45)
HDLC SERPL-MCNC: 44 MG/DL — SIGNIFICANT CHANGE UP (ref 40–125)
HGB BLD-MCNC: 10.9 G/DL — LOW (ref 11.5–15.5)
LIPID PNL WITH DIRECT LDL SERPL: 153 MG/DL — SIGNIFICANT CHANGE UP
LYMPHOCYTES # BLD AUTO: 0.7 K/UL — LOW (ref 1–3.3)
LYMPHOCYTES # BLD AUTO: 7.4 % — LOW (ref 13–44)
MAGNESIUM SERPL-MCNC: 2.1 MG/DL — SIGNIFICANT CHANGE UP (ref 1.6–2.6)
MCHC RBC-ENTMCNC: 28.6 PG — SIGNIFICANT CHANGE UP (ref 27–34)
MCHC RBC-ENTMCNC: 31 GM/DL — LOW (ref 32–36)
MCV RBC AUTO: 92.1 FL — SIGNIFICANT CHANGE UP (ref 80–100)
MONOCYTES # BLD AUTO: 0.1 K/UL — SIGNIFICANT CHANGE UP (ref 0–0.9)
MONOCYTES NFR BLD AUTO: 1.3 % — LOW (ref 2–14)
NEUTROPHILS # BLD AUTO: 8.7 K/UL — HIGH (ref 1.8–7.4)
NEUTROPHILS NFR BLD AUTO: 91.1 % — HIGH (ref 43–77)
PHOSPHATE SERPL-MCNC: 2.7 MG/DL — SIGNIFICANT CHANGE UP (ref 2.5–4.5)
PLATELET # BLD AUTO: 174 K/UL — SIGNIFICANT CHANGE UP (ref 150–400)
POTASSIUM SERPL-MCNC: 4 MMOL/L — SIGNIFICANT CHANGE UP (ref 3.5–5.3)
POTASSIUM SERPL-MCNC: 4.2 MMOL/L — SIGNIFICANT CHANGE UP (ref 3.5–5.3)
POTASSIUM SERPL-SCNC: 4 MMOL/L — SIGNIFICANT CHANGE UP (ref 3.5–5.3)
POTASSIUM SERPL-SCNC: 4.2 MMOL/L — SIGNIFICANT CHANGE UP (ref 3.5–5.3)
RBC # BLD: 3.82 M/UL — SIGNIFICANT CHANGE UP (ref 3.8–5.2)
RBC # FLD: 13.9 % — SIGNIFICANT CHANGE UP (ref 10.3–14.5)
SODIUM SERPL-SCNC: 135 MMOL/L — SIGNIFICANT CHANGE UP (ref 135–145)
SODIUM SERPL-SCNC: 137 MMOL/L — SIGNIFICANT CHANGE UP (ref 135–145)
SPECIMEN SOURCE: SIGNIFICANT CHANGE UP
TOTAL CHOLESTEROL/HDL RATIO MEASUREMENT: 5 RATIO — SIGNIFICANT CHANGE UP (ref 3.3–7.1)
TRIGL SERPL-MCNC: 115 MG/DL — SIGNIFICANT CHANGE UP (ref 10–149)
TSH SERPL-MCNC: 0.18 UU/ML — LOW (ref 0.34–4.82)
WBC # BLD: 9.6 K/UL — SIGNIFICANT CHANGE UP (ref 3.8–10.5)
WBC # FLD AUTO: 9.6 K/UL — SIGNIFICANT CHANGE UP (ref 3.8–10.5)

## 2018-07-11 RX ORDER — DEXTROSE 50 % IN WATER 50 %
25 SYRINGE (ML) INTRAVENOUS ONCE
Qty: 0 | Refills: 0 | Status: DISCONTINUED | OUTPATIENT
Start: 2018-07-11 | End: 2018-07-13

## 2018-07-11 RX ORDER — INSULIN LISPRO 100/ML
6 VIAL (ML) SUBCUTANEOUS ONCE
Qty: 0 | Refills: 0 | Status: COMPLETED | OUTPATIENT
Start: 2018-07-11 | End: 2018-07-11

## 2018-07-11 RX ORDER — INSULIN GLARGINE 100 [IU]/ML
10 INJECTION, SOLUTION SUBCUTANEOUS AT BEDTIME
Qty: 0 | Refills: 0 | Status: DISCONTINUED | OUTPATIENT
Start: 2018-07-11 | End: 2018-07-12

## 2018-07-11 RX ORDER — INSULIN LISPRO 100/ML
VIAL (ML) SUBCUTANEOUS
Qty: 0 | Refills: 0 | Status: DISCONTINUED | OUTPATIENT
Start: 2018-07-11 | End: 2018-07-13

## 2018-07-11 RX ORDER — INSULIN GLARGINE 100 [IU]/ML
5 INJECTION, SOLUTION SUBCUTANEOUS AT BEDTIME
Qty: 0 | Refills: 0 | Status: DISCONTINUED | OUTPATIENT
Start: 2018-07-11 | End: 2018-07-11

## 2018-07-11 RX ORDER — SODIUM CHLORIDE 9 MG/ML
1000 INJECTION, SOLUTION INTRAVENOUS
Qty: 0 | Refills: 0 | Status: DISCONTINUED | OUTPATIENT
Start: 2018-07-11 | End: 2018-07-13

## 2018-07-11 RX ORDER — GLUCAGON INJECTION, SOLUTION 0.5 MG/.1ML
1 INJECTION, SOLUTION SUBCUTANEOUS ONCE
Qty: 0 | Refills: 0 | Status: DISCONTINUED | OUTPATIENT
Start: 2018-07-11 | End: 2018-07-13

## 2018-07-11 RX ORDER — DEXTROSE 50 % IN WATER 50 %
12.5 SYRINGE (ML) INTRAVENOUS ONCE
Qty: 0 | Refills: 0 | Status: DISCONTINUED | OUTPATIENT
Start: 2018-07-11 | End: 2018-07-13

## 2018-07-11 RX ORDER — DEXTROSE 50 % IN WATER 50 %
15 SYRINGE (ML) INTRAVENOUS ONCE
Qty: 0 | Refills: 0 | Status: DISCONTINUED | OUTPATIENT
Start: 2018-07-11 | End: 2018-07-13

## 2018-07-11 RX ADMIN — PIPERACILLIN AND TAZOBACTAM 25 GRAM(S): 4; .5 INJECTION, POWDER, LYOPHILIZED, FOR SOLUTION INTRAVENOUS at 22:01

## 2018-07-11 RX ADMIN — PIPERACILLIN AND TAZOBACTAM 25 GRAM(S): 4; .5 INJECTION, POWDER, LYOPHILIZED, FOR SOLUTION INTRAVENOUS at 06:29

## 2018-07-11 RX ADMIN — Medication 6: at 12:08

## 2018-07-11 RX ADMIN — Medication 1 MILLIGRAM(S): at 11:23

## 2018-07-11 RX ADMIN — Medication 12: at 17:30

## 2018-07-11 RX ADMIN — Medication 1 APPLICATION(S): at 17:48

## 2018-07-11 RX ADMIN — Medication 40 MILLIGRAM(S): at 13:25

## 2018-07-11 RX ADMIN — HEPARIN SODIUM 5000 UNIT(S): 5000 INJECTION INTRAVENOUS; SUBCUTANEOUS at 13:26

## 2018-07-11 RX ADMIN — Medication 650 MILLIGRAM(S): at 18:43

## 2018-07-11 RX ADMIN — INSULIN GLARGINE 5 UNIT(S): 100 INJECTION, SOLUTION SUBCUTANEOUS at 12:30

## 2018-07-11 RX ADMIN — Medication 6 UNIT(S): at 17:31

## 2018-07-11 RX ADMIN — Medication 650 MILLIGRAM(S): at 18:13

## 2018-07-11 RX ADMIN — SODIUM CHLORIDE 100 MILLILITER(S): 9 INJECTION INTRAMUSCULAR; INTRAVENOUS; SUBCUTANEOUS at 06:43

## 2018-07-11 RX ADMIN — Medication 3 MILLILITER(S): at 15:02

## 2018-07-11 RX ADMIN — HEPARIN SODIUM 5000 UNIT(S): 5000 INJECTION INTRAVENOUS; SUBCUTANEOUS at 22:01

## 2018-07-11 RX ADMIN — PIPERACILLIN AND TAZOBACTAM 25 GRAM(S): 4; .5 INJECTION, POWDER, LYOPHILIZED, FOR SOLUTION INTRAVENOUS at 14:25

## 2018-07-11 RX ADMIN — LATANOPROST 1 DROP(S): 0.05 SOLUTION/ DROPS OPHTHALMIC; TOPICAL at 22:02

## 2018-07-11 RX ADMIN — INSULIN GLARGINE 10 UNIT(S): 100 INJECTION, SOLUTION SUBCUTANEOUS at 22:04

## 2018-07-11 RX ADMIN — BENZOCAINE AND MENTHOL 1 LOZENGE: 5; 1 LIQUID ORAL at 10:27

## 2018-07-11 RX ADMIN — Medication 3: at 08:41

## 2018-07-11 RX ADMIN — BUDESONIDE AND FORMOTEROL FUMARATE DIHYDRATE 2 PUFF(S): 160; 4.5 AEROSOL RESPIRATORY (INHALATION) at 22:05

## 2018-07-11 RX ADMIN — BUDESONIDE AND FORMOTEROL FUMARATE DIHYDRATE 2 PUFF(S): 160; 4.5 AEROSOL RESPIRATORY (INHALATION) at 10:30

## 2018-07-11 RX ADMIN — HEPARIN SODIUM 5000 UNIT(S): 5000 INJECTION INTRAVENOUS; SUBCUTANEOUS at 06:29

## 2018-07-11 RX ADMIN — BENZOCAINE AND MENTHOL 1 LOZENGE: 5; 1 LIQUID ORAL at 05:16

## 2018-07-11 RX ADMIN — Medication 40 MILLIGRAM(S): at 22:02

## 2018-07-11 RX ADMIN — Medication 1 APPLICATION(S): at 06:30

## 2018-07-11 RX ADMIN — Medication 81 MILLIGRAM(S): at 11:22

## 2018-07-11 RX ADMIN — Medication 40 MILLIGRAM(S): at 06:30

## 2018-07-11 RX ADMIN — BENZOCAINE AND MENTHOL 1 LOZENGE: 5; 1 LIQUID ORAL at 13:26

## 2018-07-11 NOTE — CONSULT NOTE ADULT - GASTROINTESTINAL DETAILS
soft/nontender/bowel sounds normal nontender/no guarding/no organomegaly/no distention/bowel sounds normal/no rigidity/soft

## 2018-07-11 NOTE — PROGRESS NOTE ADULT - PROBLEM SELECTOR PLAN 1
C/w Tylenol PRN + Zosyn for likely CAP/UTI x 7/10  BCx -ve   monitor WBC C/w Tylenol PRN + Zosyn for likely CAP/UTI x 7/10  BCx -ve   monitor WBC  will plan to dc Friday on augmentin 875mgs po bid x 3 days more

## 2018-07-11 NOTE — CONSULT NOTE ADULT - SUBJECTIVE AND OBJECTIVE BOX
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 Otherwise denies SOB, wheezing, chest pain, palpitations, abdominal pain, urinary complaints, or any other complaints. (10 Jul 2018 08:20)   Patient seen and examined.     PAST MEDICAL & SURGICAL HISTORY:  Lung cancer: wedge resection of left lung   Seborrheic dermatitis  OA (osteoarthritis)  Hypercholesteremia  HTN (hypertension)  GERD (gastroesophageal reflux disease)  Constipation  DM (diabetes mellitus)  Cataract  CAD (coronary artery disease)  Bronchoalveolar carcinoma  ACS (acute coronary syndrome)  COPD (chronic obstructive pulmonary disease)  S/P ovarian cystectomy      Allergies    No Known Allergies    Intolerances        MEDICATIONS  (STANDING):  ALBUTerol/ipratropium for Nebulization 3 milliLiter(s) Nebulizer every 6 hours  aspirin enteric coated 81 milliGRAM(s) Oral daily  benzocaine 15 mG/menthol 3.6 mG Lozenge 1 Lozenge Oral every 4 hours  buDESOnide 160 MICROgram(s)/formoterol 4.5 MICROgram(s) Inhaler 2 Puff(s) Inhalation two times a day  clobetasol 0.05% Cream 1 Application(s) Topical two times a day  folic acid 1 milliGRAM(s) Oral daily  heparin  Injectable 5000 Unit(s) SubCutaneous every 8 hours  insulin glargine Injectable (LANTUS) 5 Unit(s) SubCutaneous at bedtime  insulin lispro (HumaLOG) corrective regimen sliding scale   SubCutaneous Before meals and at bedtime  latanoprost 0.005% Ophthalmic Solution 1 Drop(s) Both EYES at bedtime  methylPREDNISolone sodium succinate Injectable 40 milliGRAM(s) IV Push every 8 hours  piperacillin/tazobactam IVPB. 3.375 Gram(s) IV Intermittent every 8 hours  sodium chloride 0.9%. 1000 milliLiter(s) (100 mL/Hr) IV Continuous <Continuous>      MEDICATIONS  (PRN):  acetaminophen   Tablet 650 milliGRAM(s) Oral every 6 hours PRN For Temp greater than 38 C (100.4 F)  acetaminophen   Tablet. 650 milliGRAM(s) Oral every 6 hours PRN Moderate Pain (4 - 6)   Medications up to date at time of exam.    FAMILY HISTORY:  No pertinent family history in first degree relatives      SOCIAL HISTORY  Smoking History: [   ] smoking/smoke exposure, [   ] former smoker, [ x ] denies smoking  Living Condition: [   ] apartment, [   ] private house  Work History: former   Travel History: denies recent travel  Illicit Substance Use: denies  Alcohol Use: denies    REVIEW OF SYSTEMS:    CONSTITUTIONAL:  denies fevers, chills, sweats, weight loss    HEENT:  denies diplopia or blurred vision, sore throat or runny nose.    CARDIOVASCULAR:  denies pressure, squeezing, tightness, or heaviness about the chest; no palpitations.    RESPIRATORY:  denies SOB, cough, GARCIA, wheezing.    GASTROINTESTINAL:  denies abdominal pain, nausea, vomiting or diarrhea.    GENITOURINARY: denies dysuria, frequency or urgency.    NEUROLOGIC:  denies numbness, tingling, seizures or weakness.    PSYCHIATRIC:  denies disorder of thought or mood.    MSK: denies swelling, redness      PHYSICAL EXAMINATION:    GENERAL: The patient is a well-developed, well-nourished, in no apparent distress.     Vital Signs Last 24 Hrs  T(C): 36.4 (2018 14:24), Max: 36.8 (10 Jul 2018 20:49)  T(F): 97.6 (2018 14:24), Max: 98.3 (10 Jul 2018 20:49)  HR: 97 (2018 14:24) (80 - 97)  BP: 172/57 (2018 14:24) (132/52 - 172/57)  BP(mean): --  RR: 18 (2018 14:24) (18 - 18)  SpO2: 99% (2018 14:24) (95% - 100%)   (if applicable)    Chest Tube (if applicable)    HEENT: Head is normocephalic and atraumatic. .    NECK: Supple, no palpable adenopathy.    LUNGS: Clear to auscultation, no wheezing, rales, or rhonchi. +crackles at bases    HEART: Regular rate and rhythm without murmur.    ABDOMEN: Soft, nontender, and nondistended.  No hepatosplenomegaly is noted.    EXTREMITIES: Without any cyanosis, clubbing, rash, lesions or edema.    NEUROLOGIC: Awake, alert.    SKIN: Warm, dry, good turgor.      LABS:                        10.9   9.6   )-----------( 174      ( 2018 06:35 )             35.2     07-11    137  |  103  |  20<H>  ----------------------------<  255<H>  4.2   |  26  |  1.27    Ca    9.2      2018 06:35  Phos  2.7     0711  Mg     2.1     -11    TPro  7.8  /  Alb  3.4<L>  /  TBili  0.5  /  DBili  x   /  AST  19  /  ALT  16  /  AlkPhos  72  07-10    PT/INR - ( 10 Jul 2018 01:20 )   PT: 12.6 sec;   INR: 1.15 ratio         PTT - ( 10 Jul 2018 01:20 )  PTT:36.6 sec  Urinalysis Basic - ( 10 Jul 2018 05:44 )    Color: Yellow / Appearance: Clear / S.015 / pH: x  Gluc: x / Ketone: Negative  / Bili: Negative / Urobili: Negative   Blood: x / Protein: 100 / Nitrite: Negative   Leuk Esterase: Moderate / RBC: 0-2 /HPF / WBC 11-25 /HPF   Sq Epi: x / Non Sq Epi: Few /HPF / Bacteria: Few /HPF        CARDIAC MARKERS ( 10 Jul 2018 01:20 )  <0.015 ng/mL / x     / x     / x     / x                    MICROBIOLOGY: (if applicable)    RADIOLOGY & ADDITIONAL STUDIES:  EKG:   CXR:  ECHO:    < from: CT Chest No Cont (07.10.18 @ 02:43) >    EXAM:  CT CHEST                            PROCEDURE DATE:  07/10/2018          INTERPRETATION:  CT CHEST WITHOUT CONTRAST    INDICATION: History of lung cancer. Chest pain, cough and fever. Evaluate   for pneumonia versus mass.    TECHNIQUE: Noncontrast CT imaging of the thorax.     COMPARISON: 2016.    FINDINGS:    Lines and tubes: None.  Airways: Tracheobronchial tree is patent.  Lungs and Pleura: There are patchy consolidative and nodular opacity seen   in the right middle lobe. There are multiple additional indeterminate   nodules, for example a millimeter nodule at the right lung base,   subcentimeter nodules in the right upper lobe, and others. There is no   focal dominant mass to suggest specific evidence of lung carcinoma.    Mediastinum and lymph nodes: No mass or hemorrhage. No worrisome   mediastinal adenopathy. No worrisome hilar or axillary adenopathy.  Heart: Normal size. No pericardial effusion.  Vessels: Normal size.    Upper Abdomen: No suspicious abnormalities ofthe visualized portions of   the liver, spleen, adrenal glands, or kidneys.    Bones and soft tissues: No suspicious lesions.    IMPRESSION:    Patchy consolidative and nodular opacities seen in the right middle lobe   are most likely related to infection. However, underlying neoplasm cannot   be excluded on the basis of this examination. A short-term follow-up CT   in 6-8 weeks following treatment is advised.                ANDREY LOVE M.D., ATTENDING RADIOLOGIST  This document has been electronically signed. 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 Otherwise denies SOB, wheezing, chest pain, palpitations, abdominal pain, urinary complaints, or any other complaints.     +suprapubic pain  +opacity RML area noted on CT    SUGGESTION:     - con't with antibiotics as per ID recommendations.    - con't with bronchodilators, o2 supplement as needed.    - patient noted w/ RML opacity on CT, she has hx of bronchoalveolar CA and wedge, pt does not recall which lobe, scars noted on L posterior area, con't to monitor on abx for now.   - DVT and GI prophylaxis. 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 Otherwise denies SOB, wheezing, chest pain, palpitations, abdominal pain, urinary complaints, or any other complaints. (10 Jul 2018 08:20)   Patient seen and examined.     PAST MEDICAL & SURGICAL HISTORY:  Lung cancer: wedge resection of left lung   Seborrheic dermatitis  OA (osteoarthritis)  Hypercholesteremia  HTN (hypertension)  GERD (gastroesophageal reflux disease)  Constipation  DM (diabetes mellitus)  Cataract  CAD (coronary artery disease)  Bronchoalveolar carcinoma  ACS (acute coronary syndrome)  COPD (chronic obstructive pulmonary disease)  S/P ovarian cystectomy      Allergies    No Known Allergies    Intolerances        MEDICATIONS  (STANDING):  ALBUTerol/ipratropium for Nebulization 3 milliLiter(s) Nebulizer every 6 hours  aspirin enteric coated 81 milliGRAM(s) Oral daily  benzocaine 15 mG/menthol 3.6 mG Lozenge 1 Lozenge Oral every 4 hours  buDESOnide 160 MICROgram(s)/formoterol 4.5 MICROgram(s) Inhaler 2 Puff(s) Inhalation two times a day  clobetasol 0.05% Cream 1 Application(s) Topical two times a day  folic acid 1 milliGRAM(s) Oral daily  heparin  Injectable 5000 Unit(s) SubCutaneous every 8 hours  insulin glargine Injectable (LANTUS) 5 Unit(s) SubCutaneous at bedtime  insulin lispro (HumaLOG) corrective regimen sliding scale   SubCutaneous Before meals and at bedtime  latanoprost 0.005% Ophthalmic Solution 1 Drop(s) Both EYES at bedtime  methylPREDNISolone sodium succinate Injectable 40 milliGRAM(s) IV Push every 8 hours  piperacillin/tazobactam IVPB. 3.375 Gram(s) IV Intermittent every 8 hours  sodium chloride 0.9%. 1000 milliLiter(s) (100 mL/Hr) IV Continuous <Continuous>      MEDICATIONS  (PRN):  acetaminophen   Tablet 650 milliGRAM(s) Oral every 6 hours PRN For Temp greater than 38 C (100.4 F)  acetaminophen   Tablet. 650 milliGRAM(s) Oral every 6 hours PRN Moderate Pain (4 - 6)   Medications up to date at time of exam.    FAMILY HISTORY:  No pertinent family history in first degree relatives      SOCIAL HISTORY  Smoking History: [   ] smoking/smoke exposure, [   ] former smoker, [ x ] denies smoking  Living Condition: [   ] apartment, [   ] private house  Work History: former   Travel History: denies recent travel  Illicit Substance Use: denies  Alcohol Use: denies    REVIEW OF SYSTEMS:    CONSTITUTIONAL:  denies fevers, chills, sweats, weight loss    HEENT:  denies diplopia or blurred vision, sore throat or runny nose.    CARDIOVASCULAR:  denies pressure, squeezing, tightness, or heaviness about the chest; no palpitations.    RESPIRATORY:  denies SOB, cough, GARCIA, wheezing.    GASTROINTESTINAL:  denies abdominal pain, nausea, vomiting or diarrhea.    GENITOURINARY: denies dysuria, frequency or urgency.    NEUROLOGIC:  denies numbness, tingling, seizures or weakness.    PSYCHIATRIC:  denies disorder of thought or mood.    MSK: denies swelling, redness      PHYSICAL EXAMINATION:    GENERAL: The patient is a well-developed, well-nourished, in no apparent distress.     Vital Signs Last 24 Hrs  T(C): 36.4 (2018 14:24), Max: 36.8 (10 Jul 2018 20:49)  T(F): 97.6 (2018 14:24), Max: 98.3 (10 Jul 2018 20:49)  HR: 97 (2018 14:24) (80 - 97)  BP: 172/57 (2018 14:24) (132/52 - 172/57)  BP(mean): --  RR: 18 (2018 14:24) (18 - 18)  SpO2: 99% (2018 14:24) (95% - 100%)   (if applicable)    Chest Tube (if applicable)    HEENT: Head is normocephalic and atraumatic. .    NECK: Supple, no palpable adenopathy.    LUNGS: Clear to auscultation, no wheezing, rales, or rhonchi. +crackles at bases    HEART: Regular rate and rhythm without murmur.    ABDOMEN: Soft, nontender, and nondistended.  No hepatosplenomegaly is noted.    EXTREMITIES: Without any cyanosis, clubbing, rash, lesions or edema.    NEUROLOGIC: Awake, alert.    SKIN: Warm, dry, good turgor.      LABS:                        10.9   9.6   )-----------( 174      ( 2018 06:35 )             35.2     07-11    137  |  103  |  20<H>  ----------------------------<  255<H>  4.2   |  26  |  1.27    Ca    9.2      2018 06:35  Phos  2.7     0711  Mg     2.1     -11    TPro  7.8  /  Alb  3.4<L>  /  TBili  0.5  /  DBili  x   /  AST  19  /  ALT  16  /  AlkPhos  72  07-10    PT/INR - ( 10 Jul 2018 01:20 )   PT: 12.6 sec;   INR: 1.15 ratio         PTT - ( 10 Jul 2018 01:20 )  PTT:36.6 sec  Urinalysis Basic - ( 10 Jul 2018 05:44 )    Color: Yellow / Appearance: Clear / S.015 / pH: x  Gluc: x / Ketone: Negative  / Bili: Negative / Urobili: Negative   Blood: x / Protein: 100 / Nitrite: Negative   Leuk Esterase: Moderate / RBC: 0-2 /HPF / WBC 11-25 /HPF   Sq Epi: x / Non Sq Epi: Few /HPF / Bacteria: Few /HPF        CARDIAC MARKERS ( 10 Jul 2018 01:20 )  <0.015 ng/mL / x     / x     / x     / x                    MICROBIOLOGY: (if applicable)    RADIOLOGY & ADDITIONAL STUDIES:  EKG:   CXR:  ECHO:    < from: CT Chest No Cont (07.10.18 @ 02:43) >    EXAM:  CT CHEST                            PROCEDURE DATE:  07/10/2018          INTERPRETATION:  CT CHEST WITHOUT CONTRAST    INDICATION: History of lung cancer. Chest pain, cough and fever. Evaluate   for pneumonia versus mass.    TECHNIQUE: Noncontrast CT imaging of the thorax.     COMPARISON: 2016.    FINDINGS:    Lines and tubes: None.  Airways: Tracheobronchial tree is patent.  Lungs and Pleura: There are patchy consolidative and nodular opacity seen   in the right middle lobe. There are multiple additional indeterminate   nodules, for example a millimeter nodule at the right lung base,   subcentimeter nodules in the right upper lobe, and others. There is no   focal dominant mass to suggest specific evidence of lung carcinoma.    Mediastinum and lymph nodes: No mass or hemorrhage. No worrisome   mediastinal adenopathy. No worrisome hilar or axillary adenopathy.  Heart: Normal size. No pericardial effusion.  Vessels: Normal size.    Upper Abdomen: No suspicious abnormalities ofthe visualized portions of   the liver, spleen, adrenal glands, or kidneys.    Bones and soft tissues: No suspicious lesions.    IMPRESSION:    Patchy consolidative and nodular opacities seen in the right middle lobe   are most likely related to infection. However, underlying neoplasm cannot   be excluded on the basis of this examination. A short-term follow-up CT   in 6-8 weeks following treatment is advised.                ANDREY LOVE M.D., ATTENDING RADIOLOGIST  This document has been electronically signed. 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 Otherwise denies SOB, wheezing, chest pain, palpitations, abdominal pain, urinary complaints, or any other complaints.  + PNA    +suprapubic pain  +opacity RML area noted on CT    SUGGESTION:     - con't with antibiotics as per ID recommendations.    - f/u cultures   - con't with bronchodilators, o2 supplement as needed.    - patient noted w/ RML opacity on CT, she has hx of bronchoalveolar CA and wedge, pt does not recall which lobe, scars noted on L posterior area, con't to monitor on abx for now.   - DVT and GI prophylaxis.

## 2018-07-11 NOTE — CONSULT NOTE ADULT - SUBJECTIVE AND OBJECTIVE BOX
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 pt also endorses increased frequency but denies dysuria. Otherwise denies SOB, wheezing, chest pain, palpitations, abdominal pain, urinary complaints, or any other complaints. on admission pt is was afebrile, leucocytosis 18k CT chest showed Patchy consolidative and nodular opacities seen in the right middle lobe are most likely related to infection. However, underlying neoplasm cannot be excluded on the basis of this examination.   ID was consulted for ABx advise for possible pneumonia.     Patient seen and examined at bed side, Pt is been afebrile, HD stable, leucocytosis  resolved, pt has SOB Likely baseline ( COPD pt on home O2), still has dry cough, pt denies any chest pain, nausea, vomiting diarrhea abdominal pain or dysuria. UA showed pyuria, Blood cx neg, Urine Cx showed contamination.      PAST MEDICAL & SURGICAL HISTORY:  Lung cancer: wedge resection of left lung   Seborrheic dermatitis  OA (osteoarthritis)  Hypercholesteremia  HTN (hypertension)  GERD (gastroesophageal reflux disease)  Constipation  DM (diabetes mellitus)  Cataract  CAD (coronary artery disease)  Bronchoalveolar carcinoma  ACS (acute coronary syndrome)  COPD (chronic obstructive pulmonary disease)  S/P ovarian cystectomy      No Known Allergies      Meds:  acetaminophen   Tablet 650 milliGRAM(s) Oral every 6 hours PRN  acetaminophen   Tablet. 650 milliGRAM(s) Oral every 6 hours PRN  ALBUTerol/ipratropium for Nebulization 3 milliLiter(s) Nebulizer every 6 hours  aspirin enteric coated 81 milliGRAM(s) Oral daily  benzocaine 15 mG/menthol 3.6 mG Lozenge 1 Lozenge Oral every 4 hours  buDESOnide 160 MICROgram(s)/formoterol 4.5 MICROgram(s) Inhaler 2 Puff(s) Inhalation two times a day  clobetasol 0.05% Cream 1 Application(s) Topical two times a day  folic acid 1 milliGRAM(s) Oral daily  heparin  Injectable 5000 Unit(s) SubCutaneous every 8 hours  insulin glargine Injectable (LANTUS) 5 Unit(s) SubCutaneous at bedtime  insulin lispro (HumaLOG) corrective regimen sliding scale   SubCutaneous Before meals and at bedtime  latanoprost 0.005% Ophthalmic Solution 1 Drop(s) Both EYES at bedtime  methylPREDNISolone sodium succinate Injectable 40 milliGRAM(s) IV Push every 8 hours  piperacillin/tazobactam IVPB. 3.375 Gram(s) IV Intermittent every 8 hours  sodium chloride 0.9%. 1000 milliLiter(s) IV Continuous <Continuous>      SOCIAL HISTORY:  Smoker:  NO          ETOH use:  NO                Ilicit Drug use:  NO  Assisted device use (Cane / Walker):  Live assisted living [facility]    FAMILY HISTORY:  No pertinent family history in first degree relatives      VITALS:  Vital Signs Last 24 Hrs  T(C): 36.5 (2018 05:07), Max: 36.9 (10 Jul 2018 16:08)  T(F): 97.7 (2018 05:07), Max: 98.5 (10 Jul 2018 16:08)  HR: 80 (2018 05:07) (80 - 97)  BP: 132/52 (2018 05:07) (132/52 - 161/54)  BP(mean): --  RR: 18 (2018 05:07) (18 - 18)  SpO2: 95% (2018 05:07) (95% - 100%)    LABS/DIAGNOSTIC TESTS:                          10.9   9.6   )-----------( 174      ( 2018 06:35 )             35.2     WBC Count: 9.6 K/uL ( @ 06:35)  WBC Count: 14.8 K/uL (07-10 @ 11:19)  WBC Count: 18.1 K/uL (07-10 @ 01:20)          137  |  103  |  20<H>  ----------------------------<  255<H>  4.2   |  26  |  1.27    Ca    9.2      2018 06:35  Phos  2.7       Mg     2.1         TPro  7.8  /  Alb  3.4<L>  /  TBili  0.5  /  DBili  x   /  AST  19  /  ALT  16  /  AlkPhos  72  07-10      Urinalysis Basic - ( 10 Jul 2018 05:44 )    Color: Yellow / Appearance: Clear / S.015 / pH: x  Gluc: x / Ketone: Negative  / Bili: Negative / Urobili: Negative   Blood: x / Protein: 100 / Nitrite: Negative   Leuk Esterase: Moderate / RBC: 0-2 /HPF / WBC 11-25 /HPF   Sq Epi: x / Non Sq Epi: Few /HPF / Bacteria: Few /HPF        LIVER FUNCTIONS - ( 10 Jul 2018 01:20 )  Alb: 3.4 g/dL / Pro: 7.8 g/dL / ALK PHOS: 72 U/L / ALT: 16 U/L DA / AST: 19 U/L / GGT: x             PT/INR - ( 10 Jul 2018 01:20 )   PT: 12.6 sec;   INR: 1.15 ratio         PTT - ( 10 Jul 2018 01:20 )  PTT:36.6 sec      CULTURES:   .Urine Clean Catch (Midstream)  07-10 @ 11:53   Culture grew 3 or more types of organisms which indicate  collection contamination; consider recollection only if clinically  indicated.  --  --      .Blood Blood-Peripheral  07-10 @ 10:16   No growth to date.  --  --          RADIOLOGY:    < from: CT Chest No Cont (07.10.18 @ 02:43) >  PROCEDURE DATE:  07/10/2018          INTERPRETATION:  CT CHEST WITHOUT CONTRAST    INDICATION: History of lung cancer. Chest pain, cough and fever. Evaluate   for pneumonia versus mass.    TECHNIQUE: Noncontrast CT imaging of the thorax.     COMPARISON: 2016.    FINDINGS:    Lines and tubes: None.  Airways: Tracheobronchial tree is patent.  Lungs and Pleura: There are patchy consolidative and nodular opacity seen   in the right middle lobe. There are multiple additional indeterminate   nodules, for example a millimeter nodule at the right lung base,   subcentimeter nodules in the right upper lobe, and others. There is no   focal dominant mass to suggest specific evidence of lung carcinoma.    Mediastinum and lymph nodes: No mass or hemorrhage. No worrisome   mediastinal adenopathy. No worrisome hilar or axillary adenopathy.  Heart: Normal size. No pericardial effusion.  Vessels: Normal size.    Upper Abdomen: No suspicious abnormalities ofthe visualized portions of   the liver, spleen, adrenal glands, or kidneys.    Bones and soft tissues: No suspicious lesions.    IMPRESSION:    Patchy consolidative and nodular opacities seen in the right middle lobe   are most likely related to infection. However, underlying neoplasm cannot   be excluded on the basis of this examination. A short-term follow-up CT   in 6-8 weeks following treatment is advised.                ANDREY LOVE M.D., ATTENDING RADIOLOGIST  This document has been electronically signed. 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 pt also endorses increased frequency but denies dysuria. Otherwise denies SOB, wheezing, chest pain, palpitations, abdominal pain, urinary complaints, or any other complaints. on admission pt is was afebrile, leucocytosis 18k CT chest showed Patchy consolidative and nodular opacities seen in the right middle lobe are most likely related to infection. However, underlying neoplasm cannot be excluded on the basis of this examination.   ID was consulted for ABx advise for possible pneumonia.     Patient seen and examined at bed side, Pt is been afebrile, HD stable, leucocytosis  resolved, pt has SOB Likely baseline ( COPD pt on home O2), still has dry cough, pt denies any chest pain, nausea, vomiting diarrhea abdominal pain or dysuria. UA showed pyuria, Blood cx neg, Urine Cx showed contamination.  she looks well otherwise and states she is feeling much better, her fevers that she had at the assisted living have gone away.    PAST MEDICAL & SURGICAL HISTORY:  Lung cancer: wedge resection of left lung   Seborrheic dermatitis  OA (osteoarthritis)  Hypercholesteremia  HTN (hypertension)  GERD (gastroesophageal reflux disease)  Constipation  DM (diabetes mellitus)  Cataract  CAD (coronary artery disease)  Bronchoalveolar carcinoma  ACS (acute coronary syndrome)  COPD (chronic obstructive pulmonary disease)  S/P ovarian cystectomy      No Known Allergies      Meds:  acetaminophen   Tablet 650 milliGRAM(s) Oral every 6 hours PRN  acetaminophen   Tablet. 650 milliGRAM(s) Oral every 6 hours PRN  ALBUTerol/ipratropium for Nebulization 3 milliLiter(s) Nebulizer every 6 hours  aspirin enteric coated 81 milliGRAM(s) Oral daily  benzocaine 15 mG/menthol 3.6 mG Lozenge 1 Lozenge Oral every 4 hours  buDESOnide 160 MICROgram(s)/formoterol 4.5 MICROgram(s) Inhaler 2 Puff(s) Inhalation two times a day  clobetasol 0.05% Cream 1 Application(s) Topical two times a day  folic acid 1 milliGRAM(s) Oral daily  heparin  Injectable 5000 Unit(s) SubCutaneous every 8 hours  insulin glargine Injectable (LANTUS) 5 Unit(s) SubCutaneous at bedtime  insulin lispro (HumaLOG) corrective regimen sliding scale   SubCutaneous Before meals and at bedtime  latanoprost 0.005% Ophthalmic Solution 1 Drop(s) Both EYES at bedtime  methylPREDNISolone sodium succinate Injectable 40 milliGRAM(s) IV Push every 8 hours  piperacillin/tazobactam IVPB. 3.375 Gram(s) IV Intermittent every 8 hours  sodium chloride 0.9%. 1000 milliLiter(s) IV Continuous <Continuous>      SOCIAL HISTORY:  Smoker:  NO          ETOH use:  NO                Ilicit Drug use:  NO  Assisted device use (Cane / Walker):  Live assisted living [facility]    FAMILY HISTORY:  No pertinent family history in first degree relatives      VITALS:  Vital Signs Last 24 Hrs  T(C): 36.5 (2018 05:07), Max: 36.9 (10 Jul 2018 16:08)  T(F): 97.7 (2018 05:07), Max: 98.5 (10 Jul 2018 16:08)  HR: 80 (2018 05:07) (80 - 97)  BP: 132/52 (2018 05:07) (132/52 - 161/54)  BP(mean): --  RR: 18 (2018 05:07) (18 - 18)  SpO2: 95% (2018 05:07) (95% - 100%)    LABS/DIAGNOSTIC TESTS:                          10.9   9.6   )-----------( 174      ( 2018 06:35 )             35.2     WBC Count: 9.6 K/uL ( @ 06:35)  WBC Count: 14.8 K/uL (07-10 @ 11:19)  WBC Count: 18.1 K/uL (07-10 @ 01:20)          137  |  103  |  20<H>  ----------------------------<  255<H>  4.2   |  26  |  1.27    Ca    9.2      2018 06:35  Phos  2.7     -  Mg     2.1     -11    TPro  7.8  /  Alb  3.4<L>  /  TBili  0.5  /  DBili  x   /  AST  19  /  ALT  16  /  AlkPhos  72  07-10      Urinalysis Basic - ( 10 Jul 2018 05:44 )    Color: Yellow / Appearance: Clear / S.015 / pH: x  Gluc: x / Ketone: Negative  / Bili: Negative / Urobili: Negative   Blood: x / Protein: 100 / Nitrite: Negative   Leuk Esterase: Moderate / RBC: 0-2 /HPF / WBC 11-25 /HPF   Sq Epi: x / Non Sq Epi: Few /HPF / Bacteria: Few /HPF        LIVER FUNCTIONS - ( 10 Jul 2018 01:20 )  Alb: 3.4 g/dL / Pro: 7.8 g/dL / ALK PHOS: 72 U/L / ALT: 16 U/L DA / AST: 19 U/L / GGT: x             PT/INR - ( 10 Jul 2018 01:20 )   PT: 12.6 sec;   INR: 1.15 ratio         PTT - ( 10 Jul 2018 01:20 )  PTT:36.6 sec      CULTURES:   .Urine Clean Catch (Midstream)  07-10 @ 11:53   Culture grew 3 or more types of organisms which indicate  collection contamination; consider recollection only if clinically  indicated.  --  --      .Blood Blood-Peripheral  07-10 @ 10:16   No growth to date.  --  --          RADIOLOGY:    < from: CT Chest No Cont (07.10.18 @ 02:43) >  PROCEDURE DATE:  07/10/2018          INTERPRETATION:  CT CHEST WITHOUT CONTRAST    INDICATION: History of lung cancer. Chest pain, cough and fever. Evaluate   for pneumonia versus mass.    TECHNIQUE: Noncontrast CT imaging of the thorax.     COMPARISON: 2016.    FINDINGS:    Lines and tubes: None.  Airways: Tracheobronchial tree is patent.  Lungs and Pleura: There are patchy consolidative and nodular opacity seen   in the right middle lobe. There are multiple additional indeterminate   nodules, for example a millimeter nodule at the right lung base,   subcentimeter nodules in the right upper lobe, and others. There is no   focal dominant mass to suggest specific evidence of lung carcinoma.    Mediastinum and lymph nodes: No mass or hemorrhage. No worrisome   mediastinal adenopathy. No worrisome hilar or axillary adenopathy.  Heart: Normal size. No pericardial effusion.  Vessels: Normal size.    Upper Abdomen: No suspicious abnormalities ofthe visualized portions of   the liver, spleen, adrenal glands, or kidneys.    Bones and soft tissues: No suspicious lesions.    IMPRESSION:    Patchy consolidative and nodular opacities seen in the right middle lobe   are most likely related to infection. However, underlying neoplasm cannot   be excluded on the basis of this examination. A short-term follow-up CT   in 6-8 weeks following treatment is advised.                ANDREY LOVE M.D., ATTENDING RADIOLOGIST  This document has been electronically signed. Jul 10 2018  3:03AM                < end of copied text >

## 2018-07-11 NOTE — CONSULT NOTE ADULT - MUSCULOSKELETAL
detailed exam no joint swelling/ROM intact details… no joint swelling/no joint warmth/no joint erythema

## 2018-07-11 NOTE — PROGRESS NOTE ADULT - PROBLEM SELECTOR PLAN 5
Holding home medication Metformin; prior A1c 6.4 in 2017  - C/w diabetic diet, ISS, and monitor FS  - HgA1c 6.3%

## 2018-07-11 NOTE — CONSULT NOTE ADULT - RS GEN PE MLT RESP DETAILS PC
rales/airway patent/no intercostal retractions/rhonchi no intercostal retractions/no wheezes/rales/no rhonchi/airway patent/good air movement

## 2018-07-11 NOTE — PROGRESS NOTE ADULT - SUBJECTIVE AND OBJECTIVE BOX
PGY 1 Note discussed with supervising resident and primary attending    Patient is a 72y old  Female who presents with a chief complaint of Suprapubic tenderness and dysuria for 11 days (10 Jul 2018 08:20)      INTERVAL HPI/OVERNIGHT EVENTS: offers no new complaints; current symptoms resolving    MEDICATIONS  (STANDING):  ALBUTerol/ipratropium for Nebulization 3 milliLiter(s) Nebulizer every 6 hours  aspirin enteric coated 81 milliGRAM(s) Oral daily  benzocaine 15 mG/menthol 3.6 mG Lozenge 1 Lozenge Oral every 4 hours  buDESOnide 160 MICROgram(s)/formoterol 4.5 MICROgram(s) Inhaler 2 Puff(s) Inhalation two times a day  clobetasol 0.05% Cream 1 Application(s) Topical two times a day  dextrose 5%. 1000 milliLiter(s) (50 mL/Hr) IV Continuous <Continuous>  dextrose 50% Injectable 12.5 Gram(s) IV Push once  dextrose 50% Injectable 25 Gram(s) IV Push once  dextrose 50% Injectable 25 Gram(s) IV Push once  folic acid 1 milliGRAM(s) Oral daily  heparin  Injectable 5000 Unit(s) SubCutaneous every 8 hours  insulin glargine Injectable (LANTUS) 10 Unit(s) SubCutaneous at bedtime  insulin lispro (HumaLOG) corrective regimen sliding scale   SubCutaneous three times a day before meals  latanoprost 0.005% Ophthalmic Solution 1 Drop(s) Both EYES at bedtime  methylPREDNISolone sodium succinate Injectable 40 milliGRAM(s) IV Push every 8 hours  piperacillin/tazobactam IVPB. 3.375 Gram(s) IV Intermittent every 8 hours  sodium chloride 0.9%. 1000 milliLiter(s) (100 mL/Hr) IV Continuous <Continuous>    MEDICATIONS  (PRN):  acetaminophen   Tablet 650 milliGRAM(s) Oral every 6 hours PRN For Temp greater than 38 C (100.4 F)  acetaminophen   Tablet. 650 milliGRAM(s) Oral every 6 hours PRN Moderate Pain (4 - 6)  dextrose 40% Gel 15 Gram(s) Oral once PRN Blood Glucose LESS THAN 70 milliGRAM(s)/deciliter  glucagon  Injectable 1 milliGRAM(s) IntraMuscular once PRN Glucose LESS THAN 70 milligrams/deciliter      __________________________________________________  REVIEW OF SYSTEMS:    CONSTITUTIONAL: No fever,   EYES: no acute visual disturbances  NECK: No pain or stiffness  RESPIRATORY: No cough; No shortness of breath  CARDIOVASCULAR: No chest pain, no palpitations  GASTROINTESTINAL: No pain. No nausea or vomiting; No diarrhea   NEUROLOGICAL: No headache or numbness, no tremors  MUSCULOSKELETAL: No joint pain, no muscle pain  GENITOURINARY: no dysuria, no frequency, no hesitancy  PSYCHIATRY: no depression , no anxiety  ALL OTHER  ROS negative        Vital Signs Last 24 Hrs  T(C): 36.7 (2018 21:21), Max: 36.7 (2018 21:21)  T(F): 98 (2018 21:21), Max: 98 (2018 21:21)  HR: 84 (2018 21:21) (80 - 97)  BP: 128/49 (2018 21:21) (128/49 - 172/57)  BP(mean): --  RR: 18 (2018 21:21) (18 - 18)  SpO2: 97% (2018 21:21) (95% - 100%)    ________________________________________________  PHYSICAL EXAM:  GENERAL: NAD  HEENT: Normocephalic;  conjunctivae and sclerae clear; moist mucous membranes;   NECK : supple  CHEST/LUNG: Clear to auscultation bilaterally with good air entry   HEART: S1 S2  regular; no murmurs, gallops or rubs  ABDOMEN: Soft, Nontender, Nondistended; Bowel sounds present  EXTREMITIES: no cyanosis; no edema; no calf tenderness  SKIN: warm and dry; no rash  NERVOUS SYSTEM:  Awake and alert; Oriented  to place, person and time ; no new deficits    _________________________________________________  LABS:                        10.9   9.6   )-----------( 174      ( 2018 06:35 )             35.2     07-11    135  |  103  |  28<H>  ----------------------------<  446<H>  4.0   |  25  |  1.76<H>    Ca    8.8      2018 16:18  Phos  2.7     11  Mg     2.1     -11    TPro  7.8  /  Alb  3.4<L>  /  TBili  0.5  /  DBili  x   /  AST  19  /  ALT  16  /  AlkPhos  72  07-10    PT/INR - ( 10 Jul 2018 01:20 )   PT: 12.6 sec;   INR: 1.15 ratio         PTT - ( 10 Jul 2018 01:20 )  PTT:36.6 sec  Urinalysis Basic - ( 10 Jul 2018 05:44 )    Color: Yellow / Appearance: Clear / S.015 / pH: x  Gluc: x / Ketone: Negative  / Bili: Negative / Urobili: Negative   Blood: x / Protein: 100 / Nitrite: Negative   Leuk Esterase: Moderate / RBC: 0-2 /HPF / WBC 11-25 /HPF   Sq Epi: x / Non Sq Epi: Few /HPF / Bacteria: Few /HPF      CAPILLARY BLOOD GLUCOSE      POCT Blood Glucose.: 345 mg/dL (2018 21:42)  POCT Blood Glucose.: 432 mg/dL (2018 16:52)  POCT Blood Glucose.: 598 mg/dL (2018 11:42)  POCT Blood Glucose.: 258 mg/dL (2018 08:24)  POCT Blood Glucose.: 293 mg/dL (10 Jul 2018 22:15)        RADIOLOGY & ADDITIONAL TESTS:    Imaging Personally Reviewed:  YES/NO    Consultant(s) Notes Reviewed:   YES/ No    Care Discussed with Consultants :     Plan of care was discussed with patient and /or primary care giver; all questions and concerns were addressed and care was aligned with patient's wishes. PGY 1 Note discussed with supervising resident and primary attending    Patient is a 72y old  Female who presents with a chief complaint of Suprapubic tenderness and dysuria for 11 days (10 Jul 2018 08:20)      INTERVAL HPI/OVERNIGHT EVENTS: offers no new complaints; current symptoms resolving    MEDICATIONS  (STANDING):  ALBUTerol/ipratropium for Nebulization 3 milliLiter(s) Nebulizer every 6 hours  aspirin enteric coated 81 milliGRAM(s) Oral daily  benzocaine 15 mG/menthol 3.6 mG Lozenge 1 Lozenge Oral every 4 hours  buDESOnide 160 MICROgram(s)/formoterol 4.5 MICROgram(s) Inhaler 2 Puff(s) Inhalation two times a day  clobetasol 0.05% Cream 1 Application(s) Topical two times a day  dextrose 5%. 1000 milliLiter(s) (50 mL/Hr) IV Continuous <Continuous>  dextrose 50% Injectable 12.5 Gram(s) IV Push once  dextrose 50% Injectable 25 Gram(s) IV Push once  dextrose 50% Injectable 25 Gram(s) IV Push once  folic acid 1 milliGRAM(s) Oral daily  heparin  Injectable 5000 Unit(s) SubCutaneous every 8 hours  insulin glargine Injectable (LANTUS) 10 Unit(s) SubCutaneous at bedtime  insulin lispro (HumaLOG) corrective regimen sliding scale   SubCutaneous three times a day before meals  latanoprost 0.005% Ophthalmic Solution 1 Drop(s) Both EYES at bedtime  methylPREDNISolone sodium succinate Injectable 40 milliGRAM(s) IV Push every 8 hours  piperacillin/tazobactam IVPB. 3.375 Gram(s) IV Intermittent every 8 hours  sodium chloride 0.9%. 1000 milliLiter(s) (100 mL/Hr) IV Continuous <Continuous>    MEDICATIONS  (PRN):  acetaminophen   Tablet 650 milliGRAM(s) Oral every 6 hours PRN For Temp greater than 38 C (100.4 F)  acetaminophen   Tablet. 650 milliGRAM(s) Oral every 6 hours PRN Moderate Pain (4 - 6)  dextrose 40% Gel 15 Gram(s) Oral once PRN Blood Glucose LESS THAN 70 milliGRAM(s)/deciliter  glucagon  Injectable 1 milliGRAM(s) IntraMuscular once PRN Glucose LESS THAN 70 milligrams/deciliter      __________________________________________________  REVIEW OF SYSTEMS:    CONSTITUTIONAL: No fever,   EYES: no acute visual disturbances  NECK: No pain or stiffness  RESPIRATORY: No cough; No shortness of breath  CARDIOVASCULAR: No chest pain, no palpitations  GASTROINTESTINAL: No pain. No nausea or vomiting; No diarrhea   NEUROLOGICAL: No headache or numbness, no tremors  MUSCULOSKELETAL: No joint pain, no muscle pain  GENITOURINARY: no dysuria, no frequency, no hesitancy  PSYCHIATRY: no depression , no anxiety  ALL OTHER  ROS negative        Vital Signs Last 24 Hrs  T(C): 36.7 (2018 21:21), Max: 36.7 (2018 21:21)  T(F): 98 (2018 21:21), Max: 98 (2018 21:21)  HR: 84 (2018 21:21) (80 - 97)  BP: 128/49 (2018 21:21) (128/49 - 172/57)  BP(mean): --  RR: 18 (2018 21:21) (18 - 18)  SpO2: 97% (2018 21:21) (95% - 100%)    ________________________________________________  PHYSICAL EXAM:  GENERAL: NAD  HEENT: Normocephalic;  conjunctivae and sclerae clear; moist mucous membranes;   NECK : supple  CHEST/LUNG: Clear to auscultation bilaterally with good air entry   HEART: S1 S2  regular; no murmurs, gallops or rubs  ABDOMEN: Soft, Nontender, Nondistended; Bowel sounds present  EXTREMITIES: no cyanosis; no edema; no calf tenderness  SKIN: warm and dry; no rash  NERVOUS SYSTEM:  Awake and alert; Oriented  to place, person and time ; no new deficits    _________________________________________________  LABS:                        10.9   9.6   )-----------( 174      ( 2018 06:35 )             35.2     07-11    135  |  103  |  28<H>  ----------------------------<  446<H>  4.0   |  25  |  1.76<H>    Ca    8.8      2018 16:18  Phos  2.7       Mg     2.1     11    TPro  7.8  /  Alb  3.4<L>  /  TBili  0.5  /  DBili  x   /  AST  19  /  ALT  16  /  AlkPhos  72  07-10    PT/INR - ( 10 Jul 2018 01:20 )   PT: 12.6 sec;   INR: 1.15 ratio         PTT - ( 10 Jul 2018 01:20 )  PTT:36.6 sec  Urinalysis Basic - ( 10 Jul 2018 05:44 )    Color: Yellow / Appearance: Clear / S.015 / pH: x  Gluc: x / Ketone: Negative  / Bili: Negative / Urobili: Negative   Blood: x / Protein: 100 / Nitrite: Negative   Leuk Esterase: Moderate / RBC: 0-2 /HPF / WBC 11-25 /HPF   Sq Epi: x / Non Sq Epi: Few /HPF / Bacteria: Few /HPF      CAPILLARY BLOOD GLUCOSE      POCT Blood Glucose.: 345 mg/dL (2018 21:42)  POCT Blood Glucose.: 432 mg/dL (2018 16:52)  POCT Blood Glucose.: 598 mg/dL (2018 11:42)  POCT Blood Glucose.: 258 mg/dL (2018 08:24)  POCT Blood Glucose.: 293 mg/dL (10 Jul 2018 22:15)        RADIOLOGY & ADDITIONAL TESTS:  < from: Xray Chest 1 View AP/PA (07.10.18 @ 00:47) >  IMPRESSION:  Nodular opacities in the right mid to lower lung may be due to pneumonia   versus pulmonary nodules/mass.     Persistent streaky opacities in the left lung base, with superimposed   small nodular opacity appear similar to the prior radiograph.      < end of copied text >    Imaging Personally Reviewed:  YES/NO    Consultant(s) Notes Reviewed:   YES/ No  < from: CT Chest No Cont (07.10.18 @ 02:43) >  IMPRESSION:    Patchy consolidative and nodular opacities seen in the right middle lobe   are most likely related to infection. However, underlying neoplasm cannot   be excluded on the basis of this examination. A short-term follow-up CT   in 6-8 weeks following treatment is advised.      < end of copied text >    Care Discussed with Consultants :     Plan of care was discussed with patient and /or primary care giver; all questions and concerns were addressed and care was aligned with patient's wishes.

## 2018-07-12 ENCOUNTER — TRANSCRIPTION ENCOUNTER (OUTPATIENT)
Age: 73
End: 2018-07-12

## 2018-07-12 LAB
ANION GAP SERPL CALC-SCNC: 6 MMOL/L — SIGNIFICANT CHANGE UP (ref 5–17)
BASOPHILS # BLD AUTO: 0 K/UL — SIGNIFICANT CHANGE UP (ref 0–0.2)
BASOPHILS NFR BLD AUTO: 0.2 % — SIGNIFICANT CHANGE UP (ref 0–2)
BUN SERPL-MCNC: 30 MG/DL — HIGH (ref 7–18)
CALCIUM SERPL-MCNC: 9 MG/DL — SIGNIFICANT CHANGE UP (ref 8.4–10.5)
CHLORIDE SERPL-SCNC: 105 MMOL/L — SIGNIFICANT CHANGE UP (ref 96–108)
CO2 SERPL-SCNC: 27 MMOL/L — SIGNIFICANT CHANGE UP (ref 22–31)
CREAT SERPL-MCNC: 1.45 MG/DL — HIGH (ref 0.5–1.3)
EOSINOPHIL # BLD AUTO: 0 K/UL — SIGNIFICANT CHANGE UP (ref 0–0.5)
EOSINOPHIL NFR BLD AUTO: 0 % — SIGNIFICANT CHANGE UP (ref 0–6)
FOLATE SERPL-MCNC: >20 NG/ML — SIGNIFICANT CHANGE UP
GLUCOSE BLDC GLUCOMTR-MCNC: 244 MG/DL — HIGH (ref 70–99)
GLUCOSE BLDC GLUCOMTR-MCNC: 270 MG/DL — HIGH (ref 70–99)
GLUCOSE BLDC GLUCOMTR-MCNC: 292 MG/DL — HIGH (ref 70–99)
GLUCOSE BLDC GLUCOMTR-MCNC: 344 MG/DL — HIGH (ref 70–99)
GLUCOSE BLDC GLUCOMTR-MCNC: 374 MG/DL — HIGH (ref 70–99)
GLUCOSE SERPL-MCNC: 349 MG/DL — HIGH (ref 70–99)
HCT VFR BLD CALC: 35.7 % — SIGNIFICANT CHANGE UP (ref 34.5–45)
HGB BLD-MCNC: 11 G/DL — LOW (ref 11.5–15.5)
LYMPHOCYTES # BLD AUTO: 0.8 K/UL — LOW (ref 1–3.3)
LYMPHOCYTES # BLD AUTO: 5.9 % — LOW (ref 13–44)
MCHC RBC-ENTMCNC: 28.1 PG — SIGNIFICANT CHANGE UP (ref 27–34)
MCHC RBC-ENTMCNC: 30.8 GM/DL — LOW (ref 32–36)
MCV RBC AUTO: 91.2 FL — SIGNIFICANT CHANGE UP (ref 80–100)
MONOCYTES # BLD AUTO: 0.4 K/UL — SIGNIFICANT CHANGE UP (ref 0–0.9)
MONOCYTES NFR BLD AUTO: 2.9 % — SIGNIFICANT CHANGE UP (ref 2–14)
NEUTROPHILS # BLD AUTO: 11.8 K/UL — HIGH (ref 1.8–7.4)
NEUTROPHILS NFR BLD AUTO: 91 % — HIGH (ref 43–77)
PLATELET # BLD AUTO: 200 K/UL — SIGNIFICANT CHANGE UP (ref 150–400)
POTASSIUM SERPL-MCNC: 4.4 MMOL/L — SIGNIFICANT CHANGE UP (ref 3.5–5.3)
POTASSIUM SERPL-SCNC: 4.4 MMOL/L — SIGNIFICANT CHANGE UP (ref 3.5–5.3)
RBC # BLD: 3.92 M/UL — SIGNIFICANT CHANGE UP (ref 3.8–5.2)
RBC # FLD: 13.8 % — SIGNIFICANT CHANGE UP (ref 10.3–14.5)
SODIUM SERPL-SCNC: 138 MMOL/L — SIGNIFICANT CHANGE UP (ref 135–145)
T3FREE SERPL-MCNC: 1.55 PG/ML — LOW (ref 1.8–4.6)
T4 FREE SERPL-MCNC: 0.9 NG/DL — SIGNIFICANT CHANGE UP (ref 0.9–1.8)
TSH SERPL-MCNC: 0.15 UU/ML — LOW (ref 0.34–4.82)
VIT B12 SERPL-MCNC: 477 PG/ML — SIGNIFICANT CHANGE UP (ref 232–1245)
WBC # BLD: 13 K/UL — HIGH (ref 3.8–10.5)
WBC # FLD AUTO: 13 K/UL — HIGH (ref 3.8–10.5)

## 2018-07-12 RX ORDER — INSULIN GLARGINE 100 [IU]/ML
15 INJECTION, SOLUTION SUBCUTANEOUS AT BEDTIME
Qty: 0 | Refills: 0 | Status: DISCONTINUED | OUTPATIENT
Start: 2018-07-12 | End: 2018-07-13

## 2018-07-12 RX ORDER — INSULIN LISPRO 100/ML
5 VIAL (ML) SUBCUTANEOUS
Qty: 0 | Refills: 0 | Status: DISCONTINUED | OUTPATIENT
Start: 2018-07-12 | End: 2018-07-13

## 2018-07-12 RX ADMIN — PIPERACILLIN AND TAZOBACTAM 25 GRAM(S): 4; .5 INJECTION, POWDER, LYOPHILIZED, FOR SOLUTION INTRAVENOUS at 13:14

## 2018-07-12 RX ADMIN — BUDESONIDE AND FORMOTEROL FUMARATE DIHYDRATE 2 PUFF(S): 160; 4.5 AEROSOL RESPIRATORY (INHALATION) at 13:13

## 2018-07-12 RX ADMIN — Medication 10: at 17:21

## 2018-07-12 RX ADMIN — BUDESONIDE AND FORMOTEROL FUMARATE DIHYDRATE 2 PUFF(S): 160; 4.5 AEROSOL RESPIRATORY (INHALATION) at 21:51

## 2018-07-12 RX ADMIN — Medication 40 MILLIGRAM(S): at 05:48

## 2018-07-12 RX ADMIN — PIPERACILLIN AND TAZOBACTAM 25 GRAM(S): 4; .5 INJECTION, POWDER, LYOPHILIZED, FOR SOLUTION INTRAVENOUS at 21:46

## 2018-07-12 RX ADMIN — Medication 81 MILLIGRAM(S): at 13:14

## 2018-07-12 RX ADMIN — HEPARIN SODIUM 5000 UNIT(S): 5000 INJECTION INTRAVENOUS; SUBCUTANEOUS at 13:14

## 2018-07-12 RX ADMIN — INSULIN GLARGINE 15 UNIT(S): 100 INJECTION, SOLUTION SUBCUTANEOUS at 21:52

## 2018-07-12 RX ADMIN — Medication 40 MILLIGRAM(S): at 13:13

## 2018-07-12 RX ADMIN — PIPERACILLIN AND TAZOBACTAM 25 GRAM(S): 4; .5 INJECTION, POWDER, LYOPHILIZED, FOR SOLUTION INTRAVENOUS at 05:48

## 2018-07-12 RX ADMIN — HEPARIN SODIUM 5000 UNIT(S): 5000 INJECTION INTRAVENOUS; SUBCUTANEOUS at 21:48

## 2018-07-12 RX ADMIN — Medication 1 APPLICATION(S): at 05:50

## 2018-07-12 RX ADMIN — SODIUM CHLORIDE 100 MILLILITER(S): 9 INJECTION INTRAMUSCULAR; INTRAVENOUS; SUBCUTANEOUS at 05:59

## 2018-07-12 RX ADMIN — Medication 1 MILLIGRAM(S): at 13:14

## 2018-07-12 RX ADMIN — HEPARIN SODIUM 5000 UNIT(S): 5000 INJECTION INTRAVENOUS; SUBCUTANEOUS at 05:48

## 2018-07-12 RX ADMIN — Medication 4: at 13:14

## 2018-07-12 RX ADMIN — Medication 6: at 08:41

## 2018-07-12 RX ADMIN — LATANOPROST 1 DROP(S): 0.05 SOLUTION/ DROPS OPHTHALMIC; TOPICAL at 21:47

## 2018-07-12 RX ADMIN — Medication 5 UNIT(S): at 21:51

## 2018-07-12 RX ADMIN — Medication 3 MILLILITER(S): at 21:11

## 2018-07-12 NOTE — PROGRESS NOTE ADULT - PROBLEM SELECTOR PLAN 1
C/w Tylenol PRN + Zosyn for likely CAP/UTI x 7/10  BCx -ve   monitor WBC  will plan to dc Friday on augmentin 875mgs po bid x 3 days more C/w Tylenol PRN + Zosyn for likely CAP/UTI   BCx -ve   monitor WBC  plan to dc Friday on augmentin 875mgs po bid x 3 days more

## 2018-07-12 NOTE — PROGRESS NOTE ADULT - GASTROINTESTINAL DETAILS
no distention/soft/no guarding/nontender/no rebound tenderness/no rigidity no distention/soft/no guarding/nontender/bowel sounds normal/no rebound tenderness/no rigidity

## 2018-07-12 NOTE — DISCHARGE NOTE ADULT - SECONDARY DIAGNOSIS.
UTI (urinary tract infection) COPD (chronic obstructive pulmonary disease) Lung cancer DM (diabetes mellitus) HTN (hypertension)

## 2018-07-12 NOTE — PROGRESS NOTE ADULT - SUBJECTIVE AND OBJECTIVE BOX
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 pt was admitted with Pneumonia.    Patient seen and examined at bed side, Pt complains of Dry cough, fatigue and B/l lower ext pain. she is been afebrile, HD stable, Leucocytosis trending up 9K-->13K. Denies Nausea, vomiting, diarrhea, abdominal pain, sob, chest pain, lower leg swelling, headaches or dysuria. Patient is on IV zosyn Day 3 today for  Pneumonia.     Meds:  piperacillin/tazobactam IVPB. 3.375 Gram(s) IV Intermittent every 8 hours    Allergies    No Known Allergies    Intolerances        VITALS:  Vital Signs Last 24 Hrs  T(C): 36.4 (12 Jul 2018 05:22), Max: 36.7 (11 Jul 2018 21:21)  T(F): 97.5 (12 Jul 2018 05:22), Max: 98 (11 Jul 2018 21:21)  HR: 71 (12 Jul 2018 05:22) (71 - 97)  BP: 147/58 (12 Jul 2018 05:22) (128/49 - 172/57)  BP(mean): --  RR: 18 (12 Jul 2018 05:22) (18 - 18)  SpO2: 100% (12 Jul 2018 05:22) (97% - 100%)    LABS/DIAGNOSTIC TESTS:                          11.0   13.0  )-----------( 200      ( 12 Jul 2018 05:46 )             35.7         07-12    138  |  105  |  30<H>  ----------------------------<  349<H>  4.4   |  27  |  1.45<H>    Ca    9.0      12 Jul 2018 05:46  Phos  2.7     07-11  Mg     2.1     07-11            CULTURES: .Urine Clean Catch (Midstream)  07-10 @ 11:53   Culture grew 3 or more types of organisms which indicate  collection contamination; consider recollection only if clinically  indicated.  --  --      .Blood Blood-Peripheral  07-10 @ 10:16   No growth to date.  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 pt was admitted with Pneumonia.    Patient seen and examined at bed side, Pt complains of Dry cough, fatigue and B/l lower ext pain. she is been afebrile, HD stable, Leucocytosis trending up 9K-->13K. Denies Nausea, vomiting, diarrhea, abdominal pain, sob, chest pain, lower leg swelling, headaches or dysuria. Patient is on IV zosyn Day 3 today for  Pneumonia. she is doing extremely well and has no sob and minimal cough , she has no diarrhea or fevers.    Meds:  piperacillin/tazobactam IVPB. 3.375 Gram(s) IV Intermittent every 8 hours    Allergies    No Known Allergies    Intolerances        VITALS:  Vital Signs Last 24 Hrs  T(C): 36.4 (12 Jul 2018 05:22), Max: 36.7 (11 Jul 2018 21:21)  T(F): 97.5 (12 Jul 2018 05:22), Max: 98 (11 Jul 2018 21:21)  HR: 71 (12 Jul 2018 05:22) (71 - 97)  BP: 147/58 (12 Jul 2018 05:22) (128/49 - 172/57)  BP(mean): --  RR: 18 (12 Jul 2018 05:22) (18 - 18)  SpO2: 100% (12 Jul 2018 05:22) (97% - 100%)    LABS/DIAGNOSTIC TESTS:                          11.0   13.0  )-----------( 200      ( 12 Jul 2018 05:46 )             35.7         07-12    138  |  105  |  30<H>  ----------------------------<  349<H>  4.4   |  27  |  1.45<H>    Ca    9.0      12 Jul 2018 05:46  Phos  2.7     07-11  Mg     2.1     07-11            CULTURES: .Urine Clean Catch (Midstream)  07-10 @ 11:53   Culture grew 3 or more types of organisms which indicate  collection contamination; consider recollection only if clinically  indicated.  --  --      .Blood Blood-Peripheral  07-10 @ 10:16   No growth to date.  --  --            RADIOLOGY:

## 2018-07-12 NOTE — PROGRESS NOTE ADULT - SUBJECTIVE AND OBJECTIVE BOX
Time of Visit:  Patient seen and examined.     MEDICATIONS  (STANDING):  ALBUTerol/ipratropium for Nebulization 3 milliLiter(s) Nebulizer every 6 hours  aspirin enteric coated 81 milliGRAM(s) Oral daily  benzocaine 15 mG/menthol 3.6 mG Lozenge 1 Lozenge Oral every 4 hours  buDESOnide 160 MICROgram(s)/formoterol 4.5 MICROgram(s) Inhaler 2 Puff(s) Inhalation two times a day  clobetasol 0.05% Cream 1 Application(s) Topical two times a day  dextrose 5%. 1000 milliLiter(s) (50 mL/Hr) IV Continuous <Continuous>  dextrose 50% Injectable 12.5 Gram(s) IV Push once  dextrose 50% Injectable 25 Gram(s) IV Push once  dextrose 50% Injectable 25 Gram(s) IV Push once  folic acid 1 milliGRAM(s) Oral daily  heparin  Injectable 5000 Unit(s) SubCutaneous every 8 hours  insulin glargine Injectable (LANTUS) 10 Unit(s) SubCutaneous at bedtime  insulin lispro (HumaLOG) corrective regimen sliding scale   SubCutaneous three times a day before meals  latanoprost 0.005% Ophthalmic Solution 1 Drop(s) Both EYES at bedtime  piperacillin/tazobactam IVPB. 3.375 Gram(s) IV Intermittent every 8 hours  predniSONE   Tablet 40 milliGRAM(s) Oral daily  sodium chloride 0.9%. 1000 milliLiter(s) (100 mL/Hr) IV Continuous <Continuous>      MEDICATIONS  (PRN):  acetaminophen   Tablet 650 milliGRAM(s) Oral every 6 hours PRN For Temp greater than 38 C (100.4 F)  acetaminophen   Tablet. 650 milliGRAM(s) Oral every 6 hours PRN Moderate Pain (4 - 6)  dextrose 40% Gel 15 Gram(s) Oral once PRN Blood Glucose LESS THAN 70 milliGRAM(s)/deciliter  glucagon  Injectable 1 milliGRAM(s) IntraMuscular once PRN Glucose LESS THAN 70 milligrams/deciliter       Medications up to date at time of exam.    PHYSICAL EXAMINATION:  Vital Signs Last 24 Hrs  T(C): 36.4 (12 Jul 2018 05:22), Max: 36.7 (11 Jul 2018 21:21)  T(F): 97.5 (12 Jul 2018 05:22), Max: 98 (11 Jul 2018 21:21)  HR: 71 (12 Jul 2018 05:22) (71 - 97)  BP: 147/58 (12 Jul 2018 05:22) (128/49 - 172/57)  BP(mean): --  RR: 18 (12 Jul 2018 05:22) (18 - 18)  SpO2: 100% (12 Jul 2018 05:22) (97% - 100%)   (if applicable)    General; Awake and not in any distress. No acute distress.     HEENT: Head is normocephalic and atraumatic. Extraocular muscles are intact. Mucous membranes are moist.     NECK: Supple, no palpable adenopathy.    LUNGS: Fair air entrance. Breath sounds with few scattered rales. No use of accessory muscle.     HEART: S1 S2 Regular rate and no click/ rub.     ABDOMEN: Soft, nontender, and nondistended.  No hepatosplenomegaly is noted. Active bowel sounds.     EXTREMITIES: Without any cyanosis, clubbing, rash, lesions or edema.    SKIN: Warm and moist. Non diaphoretic.       LABS:                        11.0   13.0  )-----------( 200      ( 12 Jul 2018 05:46 )             35.7     07-12    138  |  105  |  30<H>  ----------------------------<  349<H>  4.4   |  27  |  1.45<H>    Ca    9.0      12 Jul 2018 05:46  Phos  2.7     07-11  Mg     2.1     07-11  RADIOLOGY & ADDITIONAL STUDIES:  CXR:    < from: Xray Chest 1 View AP/PA (07.10.18 @ 00:47) >  NTERPRETATION:  PORTABLE CHEST X-RAY    HISTORY: chest pain and fever. History of lung cancer.    COMPARISON: 12/16/2017.    FINDINGS:  Nodular opacities in the right mid to lower lung may be due to pneumonia   versus pulmonary nodules/mass.     Persistent streaky opacities in the left lung base, with superimposed   small nodular opacity appear similar to the prior radiograph.    No pneumothorax or pleural effusion.    The cardiac silhouette is not accurately assessed by AP technique. Aortic   calcifications.    IMPRESSION:  Nodular opacities in the right mid to lower lung may be due to pneumonia   versus pulmonary nodules/mass.     Persistent streaky opacities in the left lung base, with superimposed   small nodular opacity appear similar to the prior radiograph.    < end of copied text >  Ct chest: < from: CT Chest No Cont (07.10.18 @ 02:43) >  ECHNIQUE: Noncontrast CT imaging of the thorax.     COMPARISON: 9/30/2016.    FINDINGS:    Lines and tubes: None.  Airways: Tracheobronchial tree is patent.  Lungs and Pleura: There are patchy consolidative and nodular opacity seen   in the right middle lobe. There are multiple additional indeterminate   nodules, for example a millimeter nodule at the right lung base,   subcentimeter nodules in the right upper lobe, and others. There is no   focal dominant mass to suggest specific evidence of lung carcinoma.    Mediastinum and lymph nodes: No mass or hemorrhage. No worrisome   mediastinal adenopathy. No worrisome hilar or axillary adenopathy.  Heart: Normal size. No pericardial effusion.  Vessels: Normal size.    Upper Abdomen: No suspicious abnormalities ofthe visualized portions of   the liver, spleen, adrenal glands, or kidneys.    Bones and soft tissues: No suspicious lesions.    IMPRESSION:    Patchy consolidative and nodular opacities seen in the right middle lobe   are most likely related to infection. However, underlying neoplasm cannot   be excluded on the basis of this examination. A short-term follow-up CT   in 6-8 weeks following treatment is advised.       PAST MEDICAL & SURGICAL HISTORY:  Lung cancer: wedge resection of left lung 2013  Seborrheic dermatitis  OA (osteoarthritis)  Hypercholesteremia  HTN (hypertension)  GERD (gastroesophageal reflux disease)  Constipation  DM (diabetes mellitus)  Cataract  CAD (coronary artery disease)  Bronchoalveolar carcinoma  ACS (acute coronary syndrome)  COPD (chronic obstructive pulmonary disease)  S/P ovarian cystectomy     Impression; 73 Y/O Female with prior mentioned multiple chronic conditions . 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 Presented w/ productive cough of brownish sputum x last night associated w/ shaking, chills, throat discomfort, and R sided chest and back pain. SOB due to + Opacity RML noted on CT. Leukocytosis WBC 14.8, now 13.0. BLD Cx x 2 negative.     Suggestion;  Continue DuoNeb Q 6 hours. Budesonide Twice Daily.  Continue Prednisone 40 mg Daily.  Oxygen supplementation if needed for SOB.  Continue antibiotic as per ID recommendations.  DVT/ GI prophylactic.   Patient noted w/ RML opacity on CT, she has hx of bronchoalveolar CA and wedge, patient does not recall which lobe, scars noted on L posterior area, will continue to monitor on antibiotic for now. Time of Visit:  Patient seen and examined.     MEDICATIONS  (STANDING):  ALBUTerol/ipratropium for Nebulization 3 milliLiter(s) Nebulizer every 6 hours  aspirin enteric coated 81 milliGRAM(s) Oral daily  benzocaine 15 mG/menthol 3.6 mG Lozenge 1 Lozenge Oral every 4 hours  buDESOnide 160 MICROgram(s)/formoterol 4.5 MICROgram(s) Inhaler 2 Puff(s) Inhalation two times a day  clobetasol 0.05% Cream 1 Application(s) Topical two times a day  dextrose 5%. 1000 milliLiter(s) (50 mL/Hr) IV Continuous <Continuous>  dextrose 50% Injectable 12.5 Gram(s) IV Push once  dextrose 50% Injectable 25 Gram(s) IV Push once  dextrose 50% Injectable 25 Gram(s) IV Push once  folic acid 1 milliGRAM(s) Oral daily  heparin  Injectable 5000 Unit(s) SubCutaneous every 8 hours  insulin glargine Injectable (LANTUS) 10 Unit(s) SubCutaneous at bedtime  insulin lispro (HumaLOG) corrective regimen sliding scale   SubCutaneous three times a day before meals  latanoprost 0.005% Ophthalmic Solution 1 Drop(s) Both EYES at bedtime  piperacillin/tazobactam IVPB. 3.375 Gram(s) IV Intermittent every 8 hours  predniSONE   Tablet 40 milliGRAM(s) Oral daily  sodium chloride 0.9%. 1000 milliLiter(s) (100 mL/Hr) IV Continuous <Continuous>      MEDICATIONS  (PRN):  acetaminophen   Tablet 650 milliGRAM(s) Oral every 6 hours PRN For Temp greater than 38 C (100.4 F)  acetaminophen   Tablet. 650 milliGRAM(s) Oral every 6 hours PRN Moderate Pain (4 - 6)  dextrose 40% Gel 15 Gram(s) Oral once PRN Blood Glucose LESS THAN 70 milliGRAM(s)/deciliter  glucagon  Injectable 1 milliGRAM(s) IntraMuscular once PRN Glucose LESS THAN 70 milligrams/deciliter       Medications up to date at time of exam.    PHYSICAL EXAMINATION:  Vital Signs Last 24 Hrs  T(C): 36.4 (12 Jul 2018 05:22), Max: 36.7 (11 Jul 2018 21:21)  T(F): 97.5 (12 Jul 2018 05:22), Max: 98 (11 Jul 2018 21:21)  HR: 71 (12 Jul 2018 05:22) (71 - 97)  BP: 147/58 (12 Jul 2018 05:22) (128/49 - 172/57)  BP(mean): --  RR: 18 (12 Jul 2018 05:22) (18 - 18)  SpO2: 100% (12 Jul 2018 05:22) (97% - 100%)   (if applicable)    General; Awake and not in any distress. No acute distress.     HEENT: Head is normocephalic and atraumatic. Extraocular muscles are intact. Mucous membranes are moist.     NECK: Supple, no palpable adenopathy.    LUNGS: Fair air entrance. Breath sounds with few scattered rales. No use of accessory muscle.     HEART: S1 S2 Regular rate and no click/ rub.     ABDOMEN: Soft, nontender, and nondistended.  No hepatosplenomegaly is noted. Active bowel sounds.     EXTREMITIES: Without any cyanosis, clubbing, rash, lesions or edema.    SKIN: Warm and moist. Non diaphoretic.       LABS:                        11.0   13.0  )-----------( 200      ( 12 Jul 2018 05:46 )             35.7     07-12    138  |  105  |  30<H>  ----------------------------<  349<H>  4.4   |  27  |  1.45<H>    Ca    9.0      12 Jul 2018 05:46  Phos  2.7     07-11  Mg     2.1     07-11  RADIOLOGY & ADDITIONAL STUDIES:  CXR:    < from: Xray Chest 1 View AP/PA (07.10.18 @ 00:47) >  NTERPRETATION:  PORTABLE CHEST X-RAY    HISTORY: chest pain and fever. History of lung cancer.    COMPARISON: 12/16/2017.    FINDINGS:  Nodular opacities in the right mid to lower lung may be due to pneumonia   versus pulmonary nodules/mass.     Persistent streaky opacities in the left lung base, with superimposed   small nodular opacity appear similar to the prior radiograph.    No pneumothorax or pleural effusion.    The cardiac silhouette is not accurately assessed by AP technique. Aortic   calcifications.    IMPRESSION:  Nodular opacities in the right mid to lower lung may be due to pneumonia   versus pulmonary nodules/mass.     Persistent streaky opacities in the left lung base, with superimposed   small nodular opacity appear similar to the prior radiograph.    < end of copied text >  Ct chest: < from: CT Chest No Cont (07.10.18 @ 02:43) >  ECHNIQUE: Noncontrast CT imaging of the thorax.     COMPARISON: 9/30/2016.    FINDINGS:    Lines and tubes: None.  Airways: Tracheobronchial tree is patent.  Lungs and Pleura: There are patchy consolidative and nodular opacity seen   in the right middle lobe. There are multiple additional indeterminate   nodules, for example a millimeter nodule at the right lung base,   subcentimeter nodules in the right upper lobe, and others. There is no   focal dominant mass to suggest specific evidence of lung carcinoma.    Mediastinum and lymph nodes: No mass or hemorrhage. No worrisome   mediastinal adenopathy. No worrisome hilar or axillary adenopathy.  Heart: Normal size. No pericardial effusion.  Vessels: Normal size.    Upper Abdomen: No suspicious abnormalities ofthe visualized portions of   the liver, spleen, adrenal glands, or kidneys.    Bones and soft tissues: No suspicious lesions.    IMPRESSION:    Patchy consolidative and nodular opacities seen in the right middle lobe   are most likely related to infection. However, underlying neoplasm cannot   be excluded on the basis of this examination. A short-term follow-up CT   in 6-8 weeks following treatment is advised.       PAST MEDICAL & SURGICAL HISTORY:  Lung cancer: wedge resection of left lung 2013  Seborrheic dermatitis  OA (osteoarthritis)  Hypercholesteremia  HTN (hypertension)  GERD (gastroesophageal reflux disease)  Constipation  DM (diabetes mellitus)  Cataract  CAD (coronary artery disease)  Bronchoalveolar carcinoma  ACS (acute coronary syndrome)  COPD (chronic obstructive pulmonary disease)  S/P ovarian cystectomy     Impression; 71 Y/O Female with prior mentioned multiple chronic conditions . 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 Presented w/ productive cough of brownish sputum x last night associated w/ shaking, chills, throat discomfort, and R sided chest and back pain. SOB due to + Opacity RML/ PNA  noted on CT. Leukocytosis WBC 14.8, now 13.0. BLD Cx x 2 negative.     Suggestion;  Continue DuoNeb Q 6 hours. Budesonide Twice Daily.  Continue Prednisone 40 mg Daily.  Oxygen supplementation if needed for SOB.  Continue antibiotic as per ID recommendations.  DVT/ GI prophylactic.   Patient noted w/ RML opacity on CT, she has hx of bronchoalveolar CA and wedge, patient does not recall which lobe, scars noted on L posterior area, will continue to monitor on antibiotic for now.    Agree with above assessment and plan as transcribed.

## 2018-07-12 NOTE — DISCHARGE NOTE ADULT - PATIENT PORTAL LINK FT
You can access the [facility] Patient Portal, offered by [facility], by registering with the following website: [email or web address]

## 2018-07-12 NOTE — PROGRESS NOTE ADULT - PROBLEM SELECTOR PLAN 5
Holding home medication Metformin; prior A1c 6.4 in 2017  - C/w diabetic diet, ISS, and monitor FS  - HgA1c 6.3% blood glucose high, likely from steroids  - C/w diabetic diet, ISS , and monitor FS  - HgA1c 6.3% blood glucose high, likely from steroids  - C/w diabetic diet, Lantus increased to 15 units. Humalog 5 units before meals. c/w HSS  - HgA1c 6.3%

## 2018-07-12 NOTE — DISCHARGE NOTE ADULT - CARE PROVIDER_API CALL
Homer Rose (MD), Medicine  [address] [place] [address]  Phone: [phone]  Fax: [phone]

## 2018-07-12 NOTE — DISCHARGE NOTE ADULT - MEDICATION SUMMARY - MEDICATIONS TO TAKE
I will START or STAY ON the medications listed below when I get home from the hospital:    predniSONE 10 mg oral tablet  -- 4 tab(s) by mouth once a day x 11 days  for 2 days, 3 tabs by mouth once a day x 3 days, 2 tabs by mouth once a day x 3 days, 1 tab once a day x 3 days,stop   -- It is very important that you take or use this exactly as directed.  Do not skip doses or discontinue unless directed by your doctor.  Obtain medical advice before taking any non-prescription drugs as some may affect the action of this medication.  Take with food or milk.    -- Indication: For COPD (chronic obstructive pulmonary disease)    Tylenol 325 mg oral tablet  -- 2 tab(s) by mouth every 4 hours, As Needed  -- Indication: For Pain    Aspir 81 oral delayed release tablet  -- 1 tab(s) by mouth once a day  -- Indication: For Prophylactic measure    metFORMIN 500 mg oral tablet  -- 1 tab(s) by mouth 2 times a day  -- Indication: For DM (diabetes mellitus)    Symbicort 160 mcg-4.5 mcg/inh inhalation aerosol  -- 1 puff(s) inhaled 2 times a day  -- Indication: For COPD (chronic obstructive pulmonary disease)    albuterol  -- orally every 6 hours, As Needed  -- Indication: For COPD (chronic obstructive pulmonary disease)    Cepacol Extra Strength Cherry  -- Indication: For Prophylactic measure    clobetasol 0.05% topical cream  -- Apply on skin to affected area 2 times a day  -- Indication: For Prophylactic measure    Tagrisso 80 mg oral tablet  -- orally once a day  -- Indication: For Lung cancer    bisacodyl 5 mg oral delayed release tablet  -- 1 tab(s) by mouth once, As needed, Constipation  -- Indication: For COnstipation    Xalatan 0.005% ophthalmic solution  -- 1 drop(s) to each affected eye once a day (in the evening)  -- Indication: For Glaucoma    Augmentin 875 mg-125 mg oral tablet  -- 875 milligram(s) by mouth 2 times a day   -- Finish all this medication unless otherwise directed by prescriber.  Take with food or milk.    -- Indication: For PNA (pneumonia)    Calcium 500+D oral tablet, chewable  -- orally once a day  -- Indication: For Supplement    Multiple Vitamins oral capsule  -- 1 cap(s) by mouth once a day  -- Indication: For Suplement    Vitamin D2 50,000 intl units (1.25 mg) oral capsule  -- 1 cap(s) by mouth once a week  -- Indication: For Supplement    folic acid 1 mg oral tablet  -- 1 tab(s) by mouth once a day  -- Indication: For Supplement

## 2018-07-12 NOTE — DISCHARGE NOTE ADULT - PLAN OF CARE
prevent Sepsis You were diagnosed with Pneumonia based on xrsy chest and CT chest, which was appropriately treated with IV Zosyn. Continue taking the PO Augmentin as prescribed. Follow up with your Primary Care Doctor after 1 week prevent pyelonephritis You were diagnosed with UTI based on urinalysis and treated with antibiotics. keep taking the prescribed antibiotic and Follow up with your Primary Care Doctor  after 1 week prevent COPD exacerbation You have history of COPD . You were treated with inhaled steroids, Duoneb. oxygen and methyl prednisone. keep taking the prescribed medications and Follow up with your Primary Care Doctor in 2 weeks Remission You have history of Lung cancer. keep taking the prescribed home medication Tagrisso and Follow up with your Primary Care Doctor after 2 weeks HbA1c 6.5% You have history of Diabetes . keep taking the prescribed medications, low carb diet and Follow up with your Primary Care Doctor after 2 weeks BP <140/90 You have history of HTN . keep taking the prescribed medication. low salt diet and Follow up with your Primary Care Doctor after 2 weeks You were diagnosed with Pneumonia based on xrsy chest and CT chest, which was appropriately treated with IV Zosyn. Continue taking the PO Augmentin  for 5 more days as prescribed. Follow up with your Primary Care Doctor after 1 week You were diagnosed with UTI based on urinalysis and treated with antibiotics. keep taking the prescribed antibiotics for 5 more days and Follow up with your Primary Care Doctor  after 1 week You have history of Diabetes . Your HbA1c is 6.3% which is well controlled on metformin which you are taking at home. In the  hospital , your random blood glucose reading were found to be high, which is most likely steroid induced hyperglycemia. You are on steroid sliding dose, so blood glucose will get normalised off the steroids .keep taking the prescribed medications, low carb diet, monitor glucose regularly and Follow up with your Primary Care Doctor after 1 week You have history of HTN . keep taking the prescribed medication. low salt diet and Follow up with your Primary Care Doctor after 1weeks You were diagnosed with Pneumonia based on X-ray chest and CT chest, which was appropriately treated with IV Zosyn. Continue taking the PO Augmentin for 5 more days as prescribed. Follow up with your Primary Care Doctor after 1 week You have history of Lung cancer. keep taking the prescribed home medication Tagrisso and Follow up with your Primary Care Doctor within 2 weeks You have history of Diabetes . Your HbA1c is 6.3% which is well controlled on metformin which you are taking at home. In the  hospital , your random blood glucose reading were found to be high, likely due to steroid-induced hyperglycemia. You will continue to be tapered on PO prednisone as above. Continue to monitor your blood sugar daily, as your sugar levels are expected to come down. Keep taking your medications for diabetes, continue low carb diet, monitor glucose regularly and follow up with your Primary Care Doctor after 1 week. You have history of HTN. Your metoprolol and lisinopril doses were held due to your blood pressure being within normal limits. Continue to monitor your blood pressure daily and see your doctor in one week regarding restarting blood pressure medicatinos. In the meanwhile, continue with low salt intake along with fruit and vegetable intake

## 2018-07-12 NOTE — PROGRESS NOTE ADULT - SUBJECTIVE AND OBJECTIVE BOX
PGY 1 Note discussed with supervising resident and primary attending    Patient is a 72y old  Female who presents with a chief complaint of Suprapubic tenderness and dysuria for 11 days (10 Jul 2018 08:20)      INTERVAL HPI/OVERNIGHT EVENTS: offers no new complaints; current symptoms resolving    MEDICATIONS  (STANDING):  ALBUTerol/ipratropium for Nebulization 3 milliLiter(s) Nebulizer every 6 hours  aspirin enteric coated 81 milliGRAM(s) Oral daily  benzocaine 15 mG/menthol 3.6 mG Lozenge 1 Lozenge Oral every 4 hours  buDESOnide 160 MICROgram(s)/formoterol 4.5 MICROgram(s) Inhaler 2 Puff(s) Inhalation two times a day  clobetasol 0.05% Cream 1 Application(s) Topical two times a day  dextrose 5%. 1000 milliLiter(s) (50 mL/Hr) IV Continuous <Continuous>  dextrose 50% Injectable 12.5 Gram(s) IV Push once  dextrose 50% Injectable 25 Gram(s) IV Push once  dextrose 50% Injectable 25 Gram(s) IV Push once  folic acid 1 milliGRAM(s) Oral daily  heparin  Injectable 5000 Unit(s) SubCutaneous every 8 hours  insulin glargine Injectable (LANTUS) 10 Unit(s) SubCutaneous at bedtime  insulin lispro (HumaLOG) corrective regimen sliding scale   SubCutaneous three times a day before meals  latanoprost 0.005% Ophthalmic Solution 1 Drop(s) Both EYES at bedtime  methylPREDNISolone sodium succinate Injectable 40 milliGRAM(s) IV Push every 8 hours  piperacillin/tazobactam IVPB. 3.375 Gram(s) IV Intermittent every 8 hours  sodium chloride 0.9%. 1000 milliLiter(s) (100 mL/Hr) IV Continuous <Continuous>    MEDICATIONS  (PRN):  acetaminophen   Tablet 650 milliGRAM(s) Oral every 6 hours PRN For Temp greater than 38 C (100.4 F)  acetaminophen   Tablet. 650 milliGRAM(s) Oral every 6 hours PRN Moderate Pain (4 - 6)  dextrose 40% Gel 15 Gram(s) Oral once PRN Blood Glucose LESS THAN 70 milliGRAM(s)/deciliter  glucagon  Injectable 1 milliGRAM(s) IntraMuscular once PRN Glucose LESS THAN 70 milligrams/deciliter      __________________________________________________  REVIEW OF SYSTEMS:    CONSTITUTIONAL: No fever,   EYES: no acute visual disturbances  NECK: No pain or stiffness  RESPIRATORY: No cough; No shortness of breath  CARDIOVASCULAR: No chest pain, no palpitations  GASTROINTESTINAL: No pain. No nausea or vomiting; No diarrhea   NEUROLOGICAL: No headache or numbness, no tremors  MUSCULOSKELETAL: No joint pain, no muscle pain  GENITOURINARY: no dysuria, no frequency, no hesitancy  PSYCHIATRY: no depression , no anxiety  ALL OTHER  ROS negative        Vital Signs Last 24 Hrs  T(C): 36.7 (2018 21:21), Max: 36.7 (2018 21:21)  T(F): 98 (2018 21:21), Max: 98 (2018 21:21)  HR: 84 (2018 21:21) (80 - 97)  BP: 128/49 (2018 21:21) (128/49 - 172/57)  BP(mean): --  RR: 18 (2018 21:21) (18 - 18)  SpO2: 97% (2018 21:21) (95% - 100%)    ________________________________________________  PHYSICAL EXAM:  GENERAL: NAD  HEENT: Normocephalic;  conjunctivae and sclerae clear; moist mucous membranes;   NECK : supple  CHEST/LUNG: Clear to auscultation bilaterally with good air entry   HEART: S1 S2  regular; no murmurs, gallops or rubs  ABDOMEN: Soft, Nontender, Nondistended; Bowel sounds present  EXTREMITIES: no cyanosis; no edema; no calf tenderness  SKIN: warm and dry; no rash  NERVOUS SYSTEM:  Awake and alert; Oriented  to place, person and time ; no new deficits    _________________________________________________  LABS:                        10.9   9.6   )-----------( 174      ( 2018 06:35 )             35.2     07-11    135  |  103  |  28<H>  ----------------------------<  446<H>  4.0   |  25  |  1.76<H>    Ca    8.8      2018 16:18  Phos  2.7       Mg     2.1     11    TPro  7.8  /  Alb  3.4<L>  /  TBili  0.5  /  DBili  x   /  AST  19  /  ALT  16  /  AlkPhos  72  07-10    PT/INR - ( 10 Jul 2018 01:20 )   PT: 12.6 sec;   INR: 1.15 ratio         PTT - ( 10 Jul 2018 01:20 )  PTT:36.6 sec  Urinalysis Basic - ( 10 Jul 2018 05:44 )    Color: Yellow / Appearance: Clear / S.015 / pH: x  Gluc: x / Ketone: Negative  / Bili: Negative / Urobili: Negative   Blood: x / Protein: 100 / Nitrite: Negative   Leuk Esterase: Moderate / RBC: 0-2 /HPF / WBC 11-25 /HPF   Sq Epi: x / Non Sq Epi: Few /HPF / Bacteria: Few /HPF      CAPILLARY BLOOD GLUCOSE      POCT Blood Glucose.: 345 mg/dL (2018 21:42)  POCT Blood Glucose.: 432 mg/dL (2018 16:52)  POCT Blood Glucose.: 598 mg/dL (2018 11:42)  POCT Blood Glucose.: 258 mg/dL (2018 08:24)  POCT Blood Glucose.: 293 mg/dL (10 Jul 2018 22:15)        RADIOLOGY & ADDITIONAL TESTS:  < from: Xray Chest 1 View AP/PA (07.10.18 @ 00:47) >  IMPRESSION:  Nodular opacities in the right mid to lower lung may be due to pneumonia   versus pulmonary nodules/mass.     Persistent streaky opacities in the left lung base, with superimposed   small nodular opacity appear similar to the prior radiograph.      < end of copied text >    Imaging Personally Reviewed:  YES/NO    Consultant(s) Notes Reviewed:   YES/ No  < from: CT Chest No Cont (07.10.18 @ 02:43) >  IMPRESSION:    Patchy consolidative and nodular opacities seen in the right middle lobe   are most likely related to infection. However, underlying neoplasm cannot   be excluded on the basis of this examination. A short-term follow-up CT   in 6-8 weeks following treatment is advised.      < end of copied text >    Care Discussed with Consultants :     Plan of care was discussed with patient and /or primary care giver; all questions and concerns were addressed and care was aligned with patient's wishes. PGY 1 Note discussed with supervising resident and primary attending    Patient is a 72y old  Female who presents with a chief complaint of Suprapubic tenderness and dysuria for 11 days (10 Jul 2018 08:20)      INTERVAL HPI/OVERNIGHT EVENTS: offers no new complaints; current symptoms resolving    MEDICATIONS  (STANDING):  ALBUTerol/ipratropium for Nebulization 3 milliLiter(s) Nebulizer every 6 hours  aspirin enteric coated 81 milliGRAM(s) Oral daily  benzocaine 15 mG/menthol 3.6 mG Lozenge 1 Lozenge Oral every 4 hours  buDESOnide 160 MICROgram(s)/formoterol 4.5 MICROgram(s) Inhaler 2 Puff(s) Inhalation two times a day  clobetasol 0.05% Cream 1 Application(s) Topical two times a day  dextrose 5%. 1000 milliLiter(s) (50 mL/Hr) IV Continuous <Continuous>  dextrose 50% Injectable 12.5 Gram(s) IV Push once  dextrose 50% Injectable 25 Gram(s) IV Push once  dextrose 50% Injectable 25 Gram(s) IV Push once  folic acid 1 milliGRAM(s) Oral daily  heparin  Injectable 5000 Unit(s) SubCutaneous every 8 hours  insulin glargine Injectable (LANTUS) 10 Unit(s) SubCutaneous at bedtime  insulin lispro (HumaLOG) corrective regimen sliding scale   SubCutaneous three times a day before meals  latanoprost 0.005% Ophthalmic Solution 1 Drop(s) Both EYES at bedtime  piperacillin/tazobactam IVPB. 3.375 Gram(s) IV Intermittent every 8 hours  sodium chloride 0.9%. 1000 milliLiter(s) (100 mL/Hr) IV Continuous <Continuous>    MEDICATIONS  (PRN):  acetaminophen   Tablet 650 milliGRAM(s) Oral every 6 hours PRN For Temp greater than 38 C (100.4 F)  acetaminophen   Tablet. 650 milliGRAM(s) Oral every 6 hours PRN Moderate Pain (4 - 6)  dextrose 40% Gel 15 Gram(s) Oral once PRN Blood Glucose LESS THAN 70 milliGRAM(s)/deciliter  glucagon  Injectable 1 milliGRAM(s) IntraMuscular once PRN Glucose LESS THAN 70 milligrams/deciliter  __________________________________________________  REVIEW OF SYSTEMS:    CONSTITUTIONAL: No fever,   EYES: no acute visual disturbances  NECK: No pain or stiffness  RESPIRATORY: No cough; No shortness of breath  CARDIOVASCULAR: No chest pain, no palpitations  GASTROINTESTINAL: No pain. No nausea or vomiting; No diarrhea   NEUROLOGICAL: No headache or numbness, no tremors  MUSCULOSKELETAL: No joint pain, no muscle pain  GENITOURINARY: no dysuria, no frequency, no hesitancy  PSYCHIATRY: no depression , no anxiety  ALL OTHER  ROS negative        Vital Signs Last 24 Hrs  T(C): 36.4 (2018 05:22), Max: 36.7 (2018 21:21)  T(F): 97.5 (2018 05:22), Max: 98 (2018 21:21)  HR: 71 (2018 05:22) (71 - 97)  BP: 147/58 (2018 05:22) (128/49 - 172/57)  BP(mean): --  RR: 18 (2018 05:22) (18 - 18)  SpO2: 100% (2018 05:22) (97% - 100%  PHYSICAL EXAM:  GENERAL: NAD  HEENT: Normocephalic;  conjunctivae and sclerae clear; moist mucous membranes;   NECK : supple  CHEST/LUNG: Clear to auscultation bilaterally with good air entry   HEART: S1 S2  regular; no murmurs, gallops or rubs  ABDOMEN: Soft, Nontender, Nondistended; Bowel sounds present  EXTREMITIES: no cyanosis; no edema; no calf tenderness  SKIN: warm and dry; no rash  NERVOUS SYSTEM:  Awake and alert; Oriented  to place, person and time ; no new deficits    _________________________________________________  LABS:                        10.9   9.6   )-----------( 174      ( 2018 06:35 )             35.2     07-11    135  |  103  |  28<H>  ----------------------------<  446<H>  4.0   |  25  |  1.76<H>    Ca    8.8      2018 16:18  Phos  2.7     07-11  Mg     2.1     07-11    TPro  7.8  /  Alb  3.4<L>  /  TBili  0.5  /  DBili  x   /  AST  19  /  ALT  16  /  AlkPhos  72  07-10    PT/INR - ( 10 Jul 2018 01:20 )   PT: 12.6 sec;   INR: 1.15 ratio         PTT - ( 10 Jul 2018 01:20 )  PTT:36.6 sec  Urinalysis Basic - ( 10 Jul 2018 05:44 )    Color: Yellow / Appearance: Clear / S.015 / pH: x  Gluc: x / Ketone: Negative  / Bili: Negative / Urobili: Negative   Blood: x / Protein: 100 / Nitrite: Negative   Leuk Esterase: Moderate / RBC: 0-2 /HPF / WBC 11-25 /HPF   Sq Epi: x / Non Sq Epi: Few /HPF / Bacteria: Few /HPF      CAPILLARY BLOOD GLUCOSE      POCT Blood Glucose.: 345 mg/dL (2018 21:42)  POCT Blood Glucose.: 432 mg/dL (2018 16:52)  POCT Blood Glucose.: 598 mg/dL (2018 11:42)  POCT Blood Glucose.: 258 mg/dL (2018 08:24)  POCT Blood Glucose.: 293 mg/dL (10 Jul 2018 22:15)        RADIOLOGY & ADDITIONAL TESTS:  < from: Xray Chest 1 View AP/PA (07.10.18 @ 00:47) >  IMPRESSION:  Nodular opacities in the right mid to lower lung may be due to pneumonia   versus pulmonary nodules/mass.     Persistent streaky opacities in the left lung base, with superimposed   small nodular opacity appear similar to the prior radiograph.      < end of copied text >    Imaging Personally Reviewed:  YES/NO    Consultant(s) Notes Reviewed:   YES/ No  < from: CT Chest No Cont (07.10.18 @ 02:43) >  IMPRESSION:    Patchy consolidative and nodular opacities seen in the right middle lobe   are most likely related to infection. However, underlying neoplasm cannot   be excluded on the basis of this examination. A short-term follow-up CT   in 6-8 weeks following treatment is advised.      < end of copied text >    Care Discussed with Consultants :     Plan of care was discussed with patient and /or primary care giver; all questions and concerns were addressed and care was aligned with patient's wishes. PGY 1 Note discussed with supervising resident and primary attending    Patient is a 72y old  Female who presents with a chief complaint of Suprapubic tenderness and dysuria for 11 days (10 Jul 2018 08:20)      INTERVAL HPI/OVERNIGHT EVENTS: offers no new complaints; current symptoms resolving    MEDICATIONS  (STANDING):  ALBUTerol/ipratropium for Nebulization 3 milliLiter(s) Nebulizer every 6 hours  aspirin enteric coated 81 milliGRAM(s) Oral daily  benzocaine 15 mG/menthol 3.6 mG Lozenge 1 Lozenge Oral every 4 hours  buDESOnide 160 MICROgram(s)/formoterol 4.5 MICROgram(s) Inhaler 2 Puff(s) Inhalation two times a day  clobetasol 0.05% Cream 1 Application(s) Topical two times a day  dextrose 5%. 1000 milliLiter(s) (50 mL/Hr) IV Continuous <Continuous>  dextrose 50% Injectable 12.5 Gram(s) IV Push once  dextrose 50% Injectable 25 Gram(s) IV Push once  dextrose 50% Injectable 25 Gram(s) IV Push once  folic acid 1 milliGRAM(s) Oral daily  heparin  Injectable 5000 Unit(s) SubCutaneous every 8 hours  insulin glargine Injectable (LANTUS) 10 Unit(s) SubCutaneous at bedtime  insulin lispro (HumaLOG) corrective regimen sliding scale   SubCutaneous three times a day before meals  latanoprost 0.005% Ophthalmic Solution 1 Drop(s) Both EYES at bedtime  piperacillin/tazobactam IVPB. 3.375 Gram(s) IV Intermittent every 8 hours  sodium chloride 0.9%. 1000 milliLiter(s) (100 mL/Hr) IV Continuous <Continuous>    MEDICATIONS  (PRN):  acetaminophen   Tablet 650 milliGRAM(s) Oral every 6 hours PRN For Temp greater than 38 C (100.4 F)  acetaminophen   Tablet. 650 milliGRAM(s) Oral every 6 hours PRN Moderate Pain (4 - 6)  dextrose 40% Gel 15 Gram(s) Oral once PRN Blood Glucose LESS THAN 70 milliGRAM(s)/deciliter  glucagon  Injectable 1 milliGRAM(s) IntraMuscular once PRN Glucose LESS THAN 70 milligrams/deciliter  __________________________________________________  REVIEW OF SYSTEMS:    CONSTITUTIONAL: No fever,   EYES: no acute visual disturbances  NECK: No pain or stiffness  RESPIRATORY: No cough; No shortness of breath  CARDIOVASCULAR: No chest pain, no palpitations  GASTROINTESTINAL: No pain. No nausea or vomiting; No diarrhea   NEUROLOGICAL: No headache or numbness, no tremors  MUSCULOSKELETAL: No joint pain, no muscle pain  GENITOURINARY: no dysuria, no frequency, no hesitancy  PSYCHIATRY: no depression , no anxiety  ALL OTHER  ROS negative        Vital Signs Last 24 Hrs  T(C): 36.4 (2018 05:22), Max: 36.7 (2018 21:21)  T(F): 97.5 (2018 05:22), Max: 98 (2018 21:21)  HR: 71 (2018 05:22) (71 - 97)  BP: 147/58 (2018 05:22) (128/49 - 172/57)  BP(mean): --  RR: 18 (2018 05:22) (18 - 18)  SpO2: 100% (2018 05:22) (97% - 100%    PHYSICAL EXAM:  GENERAL: NAD  HEENT: Normocephalic;  conjunctivae and sclerae clear; moist mucous membranes;   NECK : supple  CHEST/LUNG: Clear to auscultation bilaterally with good air entry   HEART: S1 S2  regular; no murmurs, gallops or rubs  ABDOMEN: Soft, Nontender, Nondistended; Bowel sounds present  EXTREMITIES: no cyanosis; no edema; no calf tenderness  SKIN: warm and dry; no rash  NERVOUS SYSTEM:  Awake and alert; Oriented  to place, person and time ; no new deficits    _________________________________________________  LABS:                        11.0   13.0  )-----------( 200      ( 2018 05:46 )             35.7   07-12    138  |  105  |  30<H>  ----------------------------<  349<H>  4.4   |  27  |  1.45<H>    Ca    9.0      2018 05:46  Phos  2.7     07-11  Mg     2.1     07-11    CAPILLARY BLOOD GLUCOSE      POCT Blood Glucose.: 270 mg/dL (2018 11:49)  POCT Blood Glucose.: 292 mg/dL (2018 08:11)  POCT Blood Glucose.: 345 mg/dL (2018 21:42)  POCT Blood Glucose.: 432 mg/dL (2018 16:52)    Color: Yellow / Appearance: Clear / S.015 / pH: x  Gluc: x / Ketone: Negative  / Bili: Negative / Urobili: Negative   Blood: x / Protein: 100 / Nitrite: Negative   Leuk Esterase: Moderate / RBC: 0-2 /HPF / WBC 11-25 /HPF   Sq Epi: x / Non Sq Epi: Few /HPF / Bacteria: Few /HPF          RADIOLOGY & ADDITIONAL TESTS:  < from: Xray Chest 1 View AP/PA (07.10.18 @ 00:47) >  IMPRESSION:  Nodular opacities in the right mid to lower lung may be due to pneumonia   versus pulmonary nodules/mass.     Persistent streaky opacities in the left lung base, with superimposed   small nodular opacity appear similar to the prior radiograph.      < end of copied text >    Imaging Personally Reviewed:  YES/NO    Consultant(s) Notes Reviewed:   YES/ No  < from: CT Chest No Cont (07.10.18 @ 02:43) >  IMPRESSION:    Patchy consolidative and nodular opacities seen in the right middle lobe   are most likely related to infection. However, underlying neoplasm cannot   be excluded on the basis of this examination. A short-term follow-up CT   in 6-8 weeks following treatment is advised.      < end of copied text >    Care Discussed with Consultants :     Plan of care was discussed with patient and /or primary care giver; all questions and concerns were addressed and care was aligned with patient's wishes.

## 2018-07-12 NOTE — DISCHARGE NOTE ADULT - HOSPITAL COURSE
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  CXR Nodular opacities in the right mid to lower lung may be due to pneumonia   versus pulmonary nodules/mass.   CT Chest Patchy consolidative and nodular opacities seen in the right middle lobe   are most likely related to infection. However, underlying neoplasm cannot   be excluded on the basis of this examination. A short-term follow-up CT   in 6-8 weeks following treatment is advised. 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 CXR Nodular showed opacities in the right mid to lower lung may be due to pneumonia versus pulmonary nodules/mass. CT Chest showed Patchy consolidative and nodular oppacities seen in the right middle lobe   are most likely related to infection.Patient was admitted to medical floor. blood cultures grow no bacteria. pulmonary and GI were consulted.Patient's condition improved overtime , had no fever, cough or sputum now . She was treated with antibiotics and steroids.   patient is stable to discharge a per attending Dr. Rose

## 2018-07-12 NOTE — PROGRESS NOTE ADULT - PROBLEM SELECTOR PLAN 2
+UA w/ WBC 11-25 and +ve LE  - Hx of pansensitive Citrobacter/Proteus  - C/o R sided back pain mostly likely pleuritic from lung infection  ***C/w above ABx; Ucx sent after ABx given +UA w/ WBC 11-25 and +ve LE  - Hx of pansensitive Citrobacter/Proteus  - C/o R sided back pain mostly likely pleuritic from lung infection  ***C/w above ABx;

## 2018-07-12 NOTE — PROGRESS NOTE ADULT - RESPIRATORY COMMENTS
B/l lower lung fields rales present and some Rhonchi as well, left lateral chest with old scars from previous surgery B/l lower lung fields rales present , left lateral chest with old scars from previous surgery

## 2018-07-12 NOTE — PROGRESS NOTE ADULT - PROBLEM SELECTOR PLAN 7
IMPROVE VTE Individual Risk Assessment    RISK                                                          Points  [] Previous VTE                                           3  [] Thrombophilia                                        2  [] Lower limb paralysis                              2   [x] Current Cancer                                       2   [] Immobilization > 24 hrs                        1  [] ICU/CCU stay > 24 hours                       1  [x] Age > 60                                                   1    IMPROVE VTE Score: 3, DVT PPx w/ HSQ
IMPROVE VTE Individual Risk Assessment    RISK                                                          Points  [] Previous VTE                                           3  [] Thrombophilia                                        2  [] Lower limb paralysis                              2   [x] Current Cancer                                       2   [] Immobilization > 24 hrs                        1  [] ICU/CCU stay > 24 hours                       1  [x] Age > 60                                                   1    IMPROVE VTE Score: 3, DVT PPx w/ HSQ

## 2018-07-12 NOTE — DISCHARGE NOTE ADULT - MEDICATION SUMMARY - MEDICATIONS TO STOP TAKING
I will STOP taking the medications listed below when I get home from the hospital:    lisinopril 5 mg oral tablet  -- 1 tab(s) by mouth once a day    metoprolol tartrate 25 mg oral tablet  -- orally once a day

## 2018-07-12 NOTE — PROGRESS NOTE ADULT - ASSESSMENT
71 F w/ PMH of CAD, ?PAF, Bronchoalveolar CA (s/p wedge resection 2013) and complicated w/ Metastatic Disease, DM (on Metformin), COPD (on 3L NC; no past intubations), HLD, GERD, HTN, and H/o UTI presented w/ productive cough of brownish sputum x last night associated w/ shaking, chills, throat discomfort, and R sided chest and back pain - admitted to the medicine floor for Sepsis 2/2 PNA likely CAP and complicated by Hx of Immunocompromised 2/2 DM and Lung CA
71 F w/ PMH of CAD, ?PAF, Bronchoalveolar CA (s/p wedge resection 2013) and complicated w/ Metastatic Disease, DM (on Metformin), COPD (on 3L NC; no past intubations), HLD, GERD, HTN, and H/o UTI presented w/ productive cough of brownish sputum x last night associated w/ shaking, chills, throat discomfort, and R sided chest and back pain - admitted to the medicine floor for Sepsis 2/2 PNA likely CAP and complicated by Hx of Immunocompromised 2/2 DM and Lung CA
pneumonia - improving  leukocytosis - secondary to steroids    plan - cont zosyn 3.375gms iv q8hrs  can switch to augmentin 875mgs po bid x 3 days  will give dulcolax 5mgs po x 1 dose as she is c/o constipation  reconsult prn

## 2018-07-12 NOTE — PROGRESS NOTE ADULT - PROBLEM SELECTOR PLAN 4
Wheezing on examination  - C/w home ICS + Duoneb + Methylprednisone 40 Q8 chest clear  - C/w home ICS + Duoneb +   Methylprednisone 40 Q8 discontinued

## 2018-07-13 VITALS
TEMPERATURE: 97 F | RESPIRATION RATE: 18 BRPM | SYSTOLIC BLOOD PRESSURE: 166 MMHG | HEART RATE: 68 BPM | WEIGHT: 132.06 LBS | DIASTOLIC BLOOD PRESSURE: 66 MMHG | OXYGEN SATURATION: 99 %

## 2018-07-13 LAB
ALBUMIN SERPL ELPH-MCNC: 2.9 G/DL — LOW (ref 3.5–5)
ALP SERPL-CCNC: 65 U/L — SIGNIFICANT CHANGE UP (ref 40–120)
ALT FLD-CCNC: 38 U/L DA — SIGNIFICANT CHANGE UP (ref 10–60)
ANION GAP SERPL CALC-SCNC: 7 MMOL/L — SIGNIFICANT CHANGE UP (ref 5–17)
AST SERPL-CCNC: 26 U/L — SIGNIFICANT CHANGE UP (ref 10–40)
BASOPHILS # BLD AUTO: 0.1 K/UL — SIGNIFICANT CHANGE UP (ref 0–0.2)
BASOPHILS NFR BLD AUTO: 0.5 % — SIGNIFICANT CHANGE UP (ref 0–2)
BILIRUB SERPL-MCNC: 0.2 MG/DL — SIGNIFICANT CHANGE UP (ref 0.2–1.2)
BUN SERPL-MCNC: 29 MG/DL — HIGH (ref 7–18)
CALCIUM SERPL-MCNC: 8.9 MG/DL — SIGNIFICANT CHANGE UP (ref 8.4–10.5)
CHLORIDE SERPL-SCNC: 103 MMOL/L — SIGNIFICANT CHANGE UP (ref 96–108)
CO2 SERPL-SCNC: 26 MMOL/L — SIGNIFICANT CHANGE UP (ref 22–31)
CREAT SERPL-MCNC: 1.24 MG/DL — SIGNIFICANT CHANGE UP (ref 0.5–1.3)
EOSINOPHIL # BLD AUTO: 0 K/UL — SIGNIFICANT CHANGE UP (ref 0–0.5)
EOSINOPHIL NFR BLD AUTO: 0 % — SIGNIFICANT CHANGE UP (ref 0–6)
GLUCOSE BLDC GLUCOMTR-MCNC: 240 MG/DL — HIGH (ref 70–99)
GLUCOSE BLDC GLUCOMTR-MCNC: 368 MG/DL — HIGH (ref 70–99)
GLUCOSE SERPL-MCNC: 259 MG/DL — HIGH (ref 70–99)
HCT VFR BLD CALC: 32.3 % — LOW (ref 34.5–45)
HGB BLD-MCNC: 10.1 G/DL — LOW (ref 11.5–15.5)
LYMPHOCYTES # BLD AUTO: 1.3 K/UL — SIGNIFICANT CHANGE UP (ref 1–3.3)
LYMPHOCYTES # BLD AUTO: 12.1 % — LOW (ref 13–44)
MCHC RBC-ENTMCNC: 28.4 PG — SIGNIFICANT CHANGE UP (ref 27–34)
MCHC RBC-ENTMCNC: 31.1 GM/DL — LOW (ref 32–36)
MCV RBC AUTO: 91.5 FL — SIGNIFICANT CHANGE UP (ref 80–100)
MONOCYTES # BLD AUTO: 0.8 K/UL — SIGNIFICANT CHANGE UP (ref 0–0.9)
MONOCYTES NFR BLD AUTO: 7.3 % — SIGNIFICANT CHANGE UP (ref 2–14)
NEUTROPHILS # BLD AUTO: 8.6 K/UL — HIGH (ref 1.8–7.4)
NEUTROPHILS NFR BLD AUTO: 80.1 % — HIGH (ref 43–77)
PLATELET # BLD AUTO: 179 K/UL — SIGNIFICANT CHANGE UP (ref 150–400)
POTASSIUM SERPL-MCNC: 3.8 MMOL/L — SIGNIFICANT CHANGE UP (ref 3.5–5.3)
POTASSIUM SERPL-SCNC: 3.8 MMOL/L — SIGNIFICANT CHANGE UP (ref 3.5–5.3)
PROT SERPL-MCNC: 6.8 G/DL — SIGNIFICANT CHANGE UP (ref 6–8.3)
RBC # BLD: 3.54 M/UL — LOW (ref 3.8–5.2)
RBC # FLD: 14.1 % — SIGNIFICANT CHANGE UP (ref 10.3–14.5)
SODIUM SERPL-SCNC: 136 MMOL/L — SIGNIFICANT CHANGE UP (ref 135–145)
WBC # BLD: 10.7 K/UL — HIGH (ref 3.8–10.5)
WBC # FLD AUTO: 10.7 K/UL — HIGH (ref 3.8–10.5)

## 2018-07-13 PROCEDURE — 84481 FREE ASSAY (FT-3): CPT

## 2018-07-13 PROCEDURE — 71045 X-RAY EXAM CHEST 1 VIEW: CPT

## 2018-07-13 PROCEDURE — 85610 PROTHROMBIN TIME: CPT

## 2018-07-13 PROCEDURE — 84100 ASSAY OF PHOSPHORUS: CPT

## 2018-07-13 PROCEDURE — 83605 ASSAY OF LACTIC ACID: CPT

## 2018-07-13 PROCEDURE — 82962 GLUCOSE BLOOD TEST: CPT

## 2018-07-13 PROCEDURE — 80053 COMPREHEN METABOLIC PANEL: CPT

## 2018-07-13 PROCEDURE — 85730 THROMBOPLASTIN TIME PARTIAL: CPT

## 2018-07-13 PROCEDURE — 96375 TX/PRO/DX INJ NEW DRUG ADDON: CPT

## 2018-07-13 PROCEDURE — 83036 HEMOGLOBIN GLYCOSYLATED A1C: CPT

## 2018-07-13 PROCEDURE — 84484 ASSAY OF TROPONIN QUANT: CPT

## 2018-07-13 PROCEDURE — 99285 EMERGENCY DEPT VISIT HI MDM: CPT | Mod: 25

## 2018-07-13 PROCEDURE — 81001 URINALYSIS AUTO W/SCOPE: CPT

## 2018-07-13 PROCEDURE — 85027 COMPLETE CBC AUTOMATED: CPT

## 2018-07-13 PROCEDURE — 83735 ASSAY OF MAGNESIUM: CPT

## 2018-07-13 PROCEDURE — 93005 ELECTROCARDIOGRAM TRACING: CPT

## 2018-07-13 PROCEDURE — 84443 ASSAY THYROID STIM HORMONE: CPT

## 2018-07-13 PROCEDURE — 94640 AIRWAY INHALATION TREATMENT: CPT

## 2018-07-13 PROCEDURE — 96374 THER/PROPH/DIAG INJ IV PUSH: CPT

## 2018-07-13 PROCEDURE — 71250 CT THORAX DX C-: CPT

## 2018-07-13 PROCEDURE — 84439 ASSAY OF FREE THYROXINE: CPT

## 2018-07-13 PROCEDURE — 87086 URINE CULTURE/COLONY COUNT: CPT

## 2018-07-13 PROCEDURE — 80061 LIPID PANEL: CPT

## 2018-07-13 PROCEDURE — 82607 VITAMIN B-12: CPT

## 2018-07-13 PROCEDURE — 82746 ASSAY OF FOLIC ACID SERUM: CPT

## 2018-07-13 PROCEDURE — 80048 BASIC METABOLIC PNL TOTAL CA: CPT

## 2018-07-13 PROCEDURE — 87040 BLOOD CULTURE FOR BACTERIA: CPT

## 2018-07-13 RX ORDER — METOPROLOL TARTRATE 50 MG
0 TABLET ORAL
Qty: 0 | Refills: 0 | COMMUNITY

## 2018-07-13 RX ORDER — LISINOPRIL 2.5 MG/1
1 TABLET ORAL
Qty: 0 | Refills: 0 | COMMUNITY

## 2018-07-13 RX ADMIN — Medication 3 MILLILITER(S): at 08:41

## 2018-07-13 RX ADMIN — Medication 5 UNIT(S): at 12:37

## 2018-07-13 RX ADMIN — Medication 4: at 08:23

## 2018-07-13 RX ADMIN — Medication 81 MILLIGRAM(S): at 12:41

## 2018-07-13 RX ADMIN — BUDESONIDE AND FORMOTEROL FUMARATE DIHYDRATE 2 PUFF(S): 160; 4.5 AEROSOL RESPIRATORY (INHALATION) at 12:41

## 2018-07-13 RX ADMIN — Medication 1 MILLIGRAM(S): at 12:41

## 2018-07-13 RX ADMIN — HEPARIN SODIUM 5000 UNIT(S): 5000 INJECTION INTRAVENOUS; SUBCUTANEOUS at 06:33

## 2018-07-13 RX ADMIN — PIPERACILLIN AND TAZOBACTAM 25 GRAM(S): 4; .5 INJECTION, POWDER, LYOPHILIZED, FOR SOLUTION INTRAVENOUS at 06:35

## 2018-07-13 RX ADMIN — Medication 5 UNIT(S): at 08:23

## 2018-07-13 RX ADMIN — Medication 1 APPLICATION(S): at 06:35

## 2018-07-13 RX ADMIN — Medication 40 MILLIGRAM(S): at 06:33

## 2018-07-13 RX ADMIN — Medication 10: at 12:37

## 2018-07-13 NOTE — PROGRESS NOTE ADULT - SUBJECTIVE AND OBJECTIVE BOX
Patient seen and examined.     MEDICATIONS  (STANDING):  ALBUTerol/ipratropium for Nebulization 3 milliLiter(s) Nebulizer every 6 hours  aspirin enteric coated 81 milliGRAM(s) Oral daily  benzocaine 15 mG/menthol 3.6 mG Lozenge 1 Lozenge Oral every 4 hours  buDESOnide 160 MICROgram(s)/formoterol 4.5 MICROgram(s) Inhaler 2 Puff(s) Inhalation two times a day  clobetasol 0.05% Cream 1 Application(s) Topical two times a day  dextrose 5%. 1000 milliLiter(s) (50 mL/Hr) IV Continuous <Continuous>  dextrose 50% Injectable 12.5 Gram(s) IV Push once  dextrose 50% Injectable 25 Gram(s) IV Push once  dextrose 50% Injectable 25 Gram(s) IV Push once  folic acid 1 milliGRAM(s) Oral daily  heparin  Injectable 5000 Unit(s) SubCutaneous every 8 hours  insulin glargine Injectable (LANTUS) 15 Unit(s) SubCutaneous at bedtime  insulin lispro (HumaLOG) corrective regimen sliding scale   SubCutaneous three times a day before meals  insulin lispro Injectable (HumaLOG) 5 Unit(s) SubCutaneous three times a day before meals  latanoprost 0.005% Ophthalmic Solution 1 Drop(s) Both EYES at bedtime  piperacillin/tazobactam IVPB. 3.375 Gram(s) IV Intermittent every 8 hours  predniSONE   Tablet 40 milliGRAM(s) Oral daily  sodium chloride 0.9%. 1000 milliLiter(s) (100 mL/Hr) IV Continuous <Continuous>      MEDICATIONS  (PRN):  acetaminophen   Tablet 650 milliGRAM(s) Oral every 6 hours PRN For Temp greater than 38 C (100.4 F)  acetaminophen   Tablet. 650 milliGRAM(s) Oral every 6 hours PRN Moderate Pain (4 - 6)  bisacodyl 5 milliGRAM(s) Oral once PRN Constipation  dextrose 40% Gel 15 Gram(s) Oral once PRN Blood Glucose LESS THAN 70 milliGRAM(s)/deciliter  glucagon  Injectable 1 milliGRAM(s) IntraMuscular once PRN Glucose LESS THAN 70 milligrams/deciliter     Medications up to date at time of exam.    PHYSICAL EXAMINATION:  Patient has no new complaints.  GENERAL: The patient is a well-developed, well-nourished, in no apparent distress.     Vital Signs Last 24 Hrs  T(C): 36.2 (13 Jul 2018 05:19), Max: 36.6 (12 Jul 2018 14:42)  T(F): 97.2 (13 Jul 2018 05:19), Max: 97.9 (12 Jul 2018 14:42)  HR: 68 (13 Jul 2018 05:19) (68 - 95)  BP: 166/66 (13 Jul 2018 05:19) (160/61 - 166/66)  BP(mean): --  RR: 18 (13 Jul 2018 05:19) (18 - 18)  SpO2: 99% (13 Jul 2018 05:19) (97% - 99%)   (if applicable)    Chest Tube (if applicable)    HEENT: Head is normocephalic and atraumatic.     NECK: Supple, no palpable adenopathy.    LUNGS: Clear to auscultation, no wheezing, rales, or rhonchi. +B/L crackles at bases    HEART: Regular rate and rhythm without murmur.    ABDOMEN: Soft, nontender, and nondistended.      EXTREMITIES: Without any cyanosis, clubbing, rash, lesions or edema.    NEUROLOGIC: Awake, alert.    SKIN: Warm, dry, good turgor.    LABS:                        10.1   10.7  )-----------( 179      ( 13 Jul 2018 07:20 )             32.3     07-13    136  |  103  |  29<H>  ----------------------------<  259<H>  3.8   |  26  |  1.24    Ca    8.9      13 Jul 2018 07:20    TPro  6.8  /  Alb  2.9<L>  /  TBili  0.2  /  DBili  x   /  AST  26  /  ALT  38  /  AlkPhos  65  07-13        MICROBIOLOGY: (if applicable)    RADIOLOGY & ADDITIONAL STUDIES:  EKG:   CXR:  ECHO:    IMPRESSION: 72y Female PAST MEDICAL & SURGICAL HISTORY:  Lung cancer: wedge resection of left lung 2013  Seborrheic dermatitis  OA (osteoarthritis)  Hypercholesteremia  HTN (hypertension)  GERD (gastroesophageal reflux disease)  Constipation  DM (diabetes mellitus)  Cataract  CAD (coronary artery disease)  Bronchoalveolar carcinoma  ACS (acute coronary syndrome)  COPD (chronic obstructive pulmonary disease)  S/P ovarian cystectomy       71 F w/ PMH of CAD, Paroxysmal Atrial Fibrillation (no Hx of AC), Bronchoalveolar CA (s/p wedge resection 2013) complicated w/ Metastatic Disease, DM (on Metformin), COPD (on 3L NC; no past intubations), HLD, GERD, HTN, and H/o UTI (last December 2017; UCx pansensitive Citrobacter/Proteus) presented w/ productive cough of brownish sputum x last night associated w/ shaking, chills, throat discomfort, and R sided chest and back pain. Otherwise denies SOB, wheezing, chest pain, palpitations, abdominal pain, urinary complaints, or any other complaints.      +PNA  +suprapubic pain  +opacity RML area noted on CT    SUGGESTION:     - con't with antibiotics as per ID recommendations.    - f/u cultures   - con't with bronchodilators, o2 supplement as needed.    - patient noted w/ RML opacity on CT, she has hx of bronchoalveolar CA and wedge, pt unsure which lobe (she believes it is the LLL), scars noted on L posterior and axillary line, con't to monitor on abx for now.   - will need repeat CT Chest to monitor resolution of PNA.   - DVT and GI prophylaxis. Patient seen and examined.     MEDICATIONS  (STANDING):  ALBUTerol/ipratropium for Nebulization 3 milliLiter(s) Nebulizer every 6 hours  aspirin enteric coated 81 milliGRAM(s) Oral daily  benzocaine 15 mG/menthol 3.6 mG Lozenge 1 Lozenge Oral every 4 hours  buDESOnide 160 MICROgram(s)/formoterol 4.5 MICROgram(s) Inhaler 2 Puff(s) Inhalation two times a day  clobetasol 0.05% Cream 1 Application(s) Topical two times a day  dextrose 5%. 1000 milliLiter(s) (50 mL/Hr) IV Continuous <Continuous>  dextrose 50% Injectable 12.5 Gram(s) IV Push once  dextrose 50% Injectable 25 Gram(s) IV Push once  dextrose 50% Injectable 25 Gram(s) IV Push once  folic acid 1 milliGRAM(s) Oral daily  heparin  Injectable 5000 Unit(s) SubCutaneous every 8 hours  insulin glargine Injectable (LANTUS) 15 Unit(s) SubCutaneous at bedtime  insulin lispro (HumaLOG) corrective regimen sliding scale   SubCutaneous three times a day before meals  insulin lispro Injectable (HumaLOG) 5 Unit(s) SubCutaneous three times a day before meals  latanoprost 0.005% Ophthalmic Solution 1 Drop(s) Both EYES at bedtime  piperacillin/tazobactam IVPB. 3.375 Gram(s) IV Intermittent every 8 hours  predniSONE   Tablet 40 milliGRAM(s) Oral daily  sodium chloride 0.9%. 1000 milliLiter(s) (100 mL/Hr) IV Continuous <Continuous>      MEDICATIONS  (PRN):  acetaminophen   Tablet 650 milliGRAM(s) Oral every 6 hours PRN For Temp greater than 38 C (100.4 F)  acetaminophen   Tablet. 650 milliGRAM(s) Oral every 6 hours PRN Moderate Pain (4 - 6)  bisacodyl 5 milliGRAM(s) Oral once PRN Constipation  dextrose 40% Gel 15 Gram(s) Oral once PRN Blood Glucose LESS THAN 70 milliGRAM(s)/deciliter  glucagon  Injectable 1 milliGRAM(s) IntraMuscular once PRN Glucose LESS THAN 70 milligrams/deciliter     Medications up to date at time of exam.    PHYSICAL EXAMINATION:  Patient has no new complaints.  GENERAL: The patient is a well-developed, well-nourished, in no apparent distress.     Vital Signs Last 24 Hrs  T(C): 36.2 (13 Jul 2018 05:19), Max: 36.6 (12 Jul 2018 14:42)  T(F): 97.2 (13 Jul 2018 05:19), Max: 97.9 (12 Jul 2018 14:42)  HR: 68 (13 Jul 2018 05:19) (68 - 95)  BP: 166/66 (13 Jul 2018 05:19) (160/61 - 166/66)  BP(mean): --  RR: 18 (13 Jul 2018 05:19) (18 - 18)  SpO2: 99% (13 Jul 2018 05:19) (97% - 99%)   (if applicable)    Chest Tube (if applicable)    HEENT: Head is normocephalic and atraumatic.     NECK: Supple, no palpable adenopathy.    LUNGS: Clear to auscultation, no wheezing, rales, or rhonchi. +B/L crackles at bases    HEART: Regular rate and rhythm without murmur.    ABDOMEN: Soft, nontender, and nondistended.      EXTREMITIES: Without any cyanosis, clubbing, rash, lesions or edema.    NEUROLOGIC: Awake, alert.    SKIN: Warm, dry, good turgor.    LABS:                        10.1   10.7  )-----------( 179      ( 13 Jul 2018 07:20 )             32.3     07-13    136  |  103  |  29<H>  ----------------------------<  259<H>  3.8   |  26  |  1.24    Ca    8.9      13 Jul 2018 07:20    TPro  6.8  /  Alb  2.9<L>  /  TBili  0.2  /  DBili  x   /  AST  26  /  ALT  38  /  AlkPhos  65  07-13        MICROBIOLOGY: (if applicable)    RADIOLOGY & ADDITIONAL STUDIES:  EKG:   CXR:  ECHO:    IMPRESSION: 72y Female PAST MEDICAL & SURGICAL HISTORY:  Lung cancer: wedge resection of left lung 2013  Seborrheic dermatitis  OA (osteoarthritis)  Hypercholesteremia  HTN (hypertension)  GERD (gastroesophageal reflux disease)  Constipation  DM (diabetes mellitus)  Cataract  CAD (coronary artery disease)  Bronchoalveolar carcinoma  ACS (acute coronary syndrome)  COPD (chronic obstructive pulmonary disease)  S/P ovarian cystectomy       71 F w/ PMH of CAD, Paroxysmal Atrial Fibrillation (no Hx of AC), Bronchoalveolar CA (s/p wedge resection 2013) complicated w/ Metastatic Disease, DM (on Metformin), COPD (on 3L NC; no past intubations), HLD, GERD, HTN, and H/o UTI (last December 2017; UCx pansensitive Citrobacter/Proteus) presented w/ productive cough of brownish sputum x last night associated w/ shaking, chills, throat discomfort, and R sided chest and back pain. Otherwise denies SOB, wheezing, chest pain, palpitations, abdominal pain, urinary complaints, or any other complaints.      +PNA  +suprapubic pain  +opacity RML area noted on CT  +SPRING improving    SUGGESTION:     - con't with antibiotics as per ID recommendations.    - f/u cultures   - con't with bronchodilators, o2 supplement as needed.    - patient noted w/ RML opacity on CT, she has hx of bronchoalveolar CA and wedge, pt unsure which lobe (she believes it is the LLL), scars noted on L posterior and axillary line, con't to monitor on abx for now.   - will need repeat CT Chest to monitor resolution of PNA.   - DVT and GI prophylaxis. Patient seen and examined.     MEDICATIONS  (STANDING):  ALBUTerol/ipratropium for Nebulization 3 milliLiter(s) Nebulizer every 6 hours  aspirin enteric coated 81 milliGRAM(s) Oral daily  benzocaine 15 mG/menthol 3.6 mG Lozenge 1 Lozenge Oral every 4 hours  buDESOnide 160 MICROgram(s)/formoterol 4.5 MICROgram(s) Inhaler 2 Puff(s) Inhalation two times a day  clobetasol 0.05% Cream 1 Application(s) Topical two times a day  dextrose 5%. 1000 milliLiter(s) (50 mL/Hr) IV Continuous <Continuous>  dextrose 50% Injectable 12.5 Gram(s) IV Push once  dextrose 50% Injectable 25 Gram(s) IV Push once  dextrose 50% Injectable 25 Gram(s) IV Push once  folic acid 1 milliGRAM(s) Oral daily  heparin  Injectable 5000 Unit(s) SubCutaneous every 8 hours  insulin glargine Injectable (LANTUS) 15 Unit(s) SubCutaneous at bedtime  insulin lispro (HumaLOG) corrective regimen sliding scale   SubCutaneous three times a day before meals  insulin lispro Injectable (HumaLOG) 5 Unit(s) SubCutaneous three times a day before meals  latanoprost 0.005% Ophthalmic Solution 1 Drop(s) Both EYES at bedtime  piperacillin/tazobactam IVPB. 3.375 Gram(s) IV Intermittent every 8 hours  predniSONE   Tablet 40 milliGRAM(s) Oral daily  sodium chloride 0.9%. 1000 milliLiter(s) (100 mL/Hr) IV Continuous <Continuous>      MEDICATIONS  (PRN):  acetaminophen   Tablet 650 milliGRAM(s) Oral every 6 hours PRN For Temp greater than 38 C (100.4 F)  acetaminophen   Tablet. 650 milliGRAM(s) Oral every 6 hours PRN Moderate Pain (4 - 6)  bisacodyl 5 milliGRAM(s) Oral once PRN Constipation  dextrose 40% Gel 15 Gram(s) Oral once PRN Blood Glucose LESS THAN 70 milliGRAM(s)/deciliter  glucagon  Injectable 1 milliGRAM(s) IntraMuscular once PRN Glucose LESS THAN 70 milligrams/deciliter     Medications up to date at time of exam.    PHYSICAL EXAMINATION:  Patient has no new complaints.  GENERAL: The patient is a well-developed, well-nourished, in no apparent distress.     Vital Signs Last 24 Hrs  T(C): 36.2 (13 Jul 2018 05:19), Max: 36.6 (12 Jul 2018 14:42)  T(F): 97.2 (13 Jul 2018 05:19), Max: 97.9 (12 Jul 2018 14:42)  HR: 68 (13 Jul 2018 05:19) (68 - 95)  BP: 166/66 (13 Jul 2018 05:19) (160/61 - 166/66)  BP(mean): --  RR: 18 (13 Jul 2018 05:19) (18 - 18)  SpO2: 99% (13 Jul 2018 05:19) (97% - 99%)   (if applicable)    Chest Tube (if applicable)    HEENT: Head is normocephalic and atraumatic.     NECK: Supple, no palpable adenopathy.    LUNGS: Clear to auscultation, no wheezing, rales, or rhonchi. +B/L crackles at bases    HEART: Regular rate and rhythm without murmur.    ABDOMEN: Soft, nontender, and nondistended.      EXTREMITIES: Without any cyanosis, clubbing, rash, lesions or edema.    NEUROLOGIC: Awake, alert.    SKIN: Warm, dry, good turgor.    LABS:                        10.1   10.7  )-----------( 179      ( 13 Jul 2018 07:20 )             32.3     07-13    136  |  103  |  29<H>  ----------------------------<  259<H>  3.8   |  26  |  1.24    Ca    8.9      13 Jul 2018 07:20    TPro  6.8  /  Alb  2.9<L>  /  TBili  0.2  /  DBili  x   /  AST  26  /  ALT  38  /  AlkPhos  65  07-13        MICROBIOLOGY: (if applicable)    RADIOLOGY & ADDITIONAL STUDIES:  EKG:   CXR:  ECHO:    IMPRESSION: 72y Female PAST MEDICAL & SURGICAL HISTORY:  Lung cancer: wedge resection of left lung 2013  Seborrheic dermatitis  OA (osteoarthritis)  Hypercholesteremia  HTN (hypertension)  GERD (gastroesophageal reflux disease)  Constipation  DM (diabetes mellitus)  Cataract  CAD (coronary artery disease)  Bronchoalveolar carcinoma  ACS (acute coronary syndrome)  COPD (chronic obstructive pulmonary disease)  S/P ovarian cystectomy       71 F w/ PMH of CAD, Paroxysmal Atrial Fibrillation (no Hx of AC), Bronchoalveolar CA (s/p wedge resection 2013) complicated w/ Metastatic Disease, DM (on Metformin), COPD (on 3L NC; no past intubations), HLD, GERD, HTN, and H/o UTI (last December 2017; UCx pansensitive Citrobacter/Proteus) presented w/ productive cough of brownish sputum x last night associated w/ shaking, chills, throat discomfort, and R sided chest and back pain. Otherwise denies SOB, wheezing, chest pain, palpitations, abdominal pain, urinary complaints, or any other complaints.      +PNA  +suprapubic pain  +opacity RML area noted on CT  +SPRING improving    SUGGESTION:     - con't with antibiotics as per ID recommendations.    - f/u cultures   - con't with bronchodilators, o2 supplement as needed.    - patient noted w/ RML opacity on CT, she has hx of bronchoalveolar CA and wedge, pt unsure which lobe (she believes it is the LLL), scars noted on L posterior and axillary line, con't to monitor on abx for now.   - will need repeat CT Chest to monitor resolution of PNA.   - DVT and GI prophylaxis.     Agree with above assessment and plan as transcribed.

## 2018-07-15 LAB
CULTURE RESULTS: SIGNIFICANT CHANGE UP
CULTURE RESULTS: SIGNIFICANT CHANGE UP
SPECIMEN SOURCE: SIGNIFICANT CHANGE UP
SPECIMEN SOURCE: SIGNIFICANT CHANGE UP

## 2018-08-29 NOTE — PATIENT PROFILE ADULT. - MEDICATIONS BROUGHT TO HOSPITAL, PROFILE
Daily Note     Today's date: 2018  Patient name: Effie Peterson  : 1980  MRN: [id number]  Referring provider: Alfie Houston DO  Dx:   Encounter Diagnosis     ICD-10-CM    1  Strain of lumbar region, initial encounter S39 012A    2  Lumbar strain, initial encounter S39 012A                   Subjective: Patient reported having difficulty with car transfer this morning and altered gait / decreased tolerance to amb due to LB pain  Since seeing MD on , she reported taking Meloxicam once to help with symptoms, however second dose not taking as she does not prefer medication  Objective: See treatment diary below      Assessment: Tolerance to exercise limited by symptoms  Continued instances of numbness in foot during manual piriformis stretch  Responds well to [address]  Plan: Continue per plan of care  Progress treatment as tolerated  Injections scheduled for 9/10, discussed with patient in which she stated high doubt in going through with procedure        Precautions: none    Daily Treatment Diary  Manuals             HS - D/C, piriformis stretch  AN EV                                                                          Exercise Diary                     Bike NP NP           LB stretch pball rollout NP 10"x4 - pain           Piriformis stretch 30"x3 30"x3           LTR 10"x10 10"x10           SKTC 30"x3 30"x3           PPT 3" x 2' 3" hold, 2 min           PPT / march NP NP           PPT with iso hip abd/add 3" x 2' each Blue 3" hold, 2 min ea           Glute bridges NP NV           Clam shells NP NV           Standing 3 way SLR NP NP           Step ups NP NP                                                                                                                                                                                                          Modalities                       MHP prn 10' post 15 min post                                 no

## 2018-10-12 ENCOUNTER — INPATIENT (INPATIENT)
Facility: HOSPITAL | Age: 73
LOS: 3 days | Discharge: HOME | DRG: 392 | End: 2018-10-16
Attending: INTERNAL MEDICINE | Admitting: INTERNAL MEDICINE
Payer: MEDICAID

## 2018-10-12 VITALS
SYSTOLIC BLOOD PRESSURE: 148 MMHG | TEMPERATURE: 97 F | RESPIRATION RATE: 18 BRPM | OXYGEN SATURATION: 100 % | HEART RATE: 82 BPM | WEIGHT: 160.06 LBS | DIASTOLIC BLOOD PRESSURE: 80 MMHG | HEIGHT: 63 IN

## 2018-10-12 DIAGNOSIS — K21.9 GASTRO-ESOPHAGEAL REFLUX DISEASE WITHOUT ESOPHAGITIS: ICD-10-CM

## 2018-10-12 DIAGNOSIS — R10.9 UNSPECIFIED ABDOMINAL PAIN: ICD-10-CM

## 2018-10-12 DIAGNOSIS — R11.2 NAUSEA WITH VOMITING, UNSPECIFIED: ICD-10-CM

## 2018-10-12 DIAGNOSIS — Z98.89 OTHER SPECIFIED POSTPROCEDURAL STATES: Chronic | ICD-10-CM

## 2018-10-12 DIAGNOSIS — E78.00 PURE HYPERCHOLESTEROLEMIA, UNSPECIFIED: ICD-10-CM

## 2018-10-12 DIAGNOSIS — I25.10 ATHEROSCLEROTIC HEART DISEASE OF NATIVE CORONARY ARTERY WITHOUT ANGINA PECTORIS: ICD-10-CM

## 2018-10-12 LAB
ALBUMIN SERPL ELPH-MCNC: 3.4 G/DL — LOW (ref 3.5–5)
ALP SERPL-CCNC: 57 U/L — SIGNIFICANT CHANGE UP (ref 40–120)
ALT FLD-CCNC: 25 U/L DA — SIGNIFICANT CHANGE UP (ref 10–60)
ANION GAP SERPL CALC-SCNC: 7 MMOL/L — SIGNIFICANT CHANGE UP (ref 5–17)
APPEARANCE UR: CLEAR — SIGNIFICANT CHANGE UP
AST SERPL-CCNC: 22 U/L — SIGNIFICANT CHANGE UP (ref 10–40)
BILIRUB SERPL-MCNC: 0.6 MG/DL — SIGNIFICANT CHANGE UP (ref 0.2–1.2)
BILIRUB UR-MCNC: NEGATIVE — SIGNIFICANT CHANGE UP
BUN SERPL-MCNC: 17 MG/DL — SIGNIFICANT CHANGE UP (ref 7–18)
CALCIUM SERPL-MCNC: 9.8 MG/DL — SIGNIFICANT CHANGE UP (ref 8.4–10.5)
CHLORIDE SERPL-SCNC: 96 MMOL/L — SIGNIFICANT CHANGE UP (ref 96–108)
CO2 SERPL-SCNC: 31 MMOL/L — SIGNIFICANT CHANGE UP (ref 22–31)
COLOR SPEC: YELLOW — SIGNIFICANT CHANGE UP
CREAT SERPL-MCNC: 1.36 MG/DL — HIGH (ref 0.5–1.3)
DIFF PNL FLD: NEGATIVE — SIGNIFICANT CHANGE UP
GLUCOSE BLDC GLUCOMTR-MCNC: 102 MG/DL — HIGH (ref 70–99)
GLUCOSE SERPL-MCNC: 164 MG/DL — HIGH (ref 70–99)
GLUCOSE UR QL: NEGATIVE — SIGNIFICANT CHANGE UP
HCT VFR BLD CALC: 34.2 % — LOW (ref 34.5–45)
HGB BLD-MCNC: 10.9 G/DL — LOW (ref 11.5–15.5)
KETONES UR-MCNC: NEGATIVE — SIGNIFICANT CHANGE UP
LEUKOCYTE ESTERASE UR-ACNC: NEGATIVE — SIGNIFICANT CHANGE UP
LIDOCAIN IGE QN: 114 U/L — SIGNIFICANT CHANGE UP (ref 73–393)
MCHC RBC-ENTMCNC: 28.2 PG — SIGNIFICANT CHANGE UP (ref 27–34)
MCHC RBC-ENTMCNC: 31.9 GM/DL — LOW (ref 32–36)
MCV RBC AUTO: 88.5 FL — SIGNIFICANT CHANGE UP (ref 80–100)
NITRITE UR-MCNC: NEGATIVE — SIGNIFICANT CHANGE UP
PH UR: 8 — SIGNIFICANT CHANGE UP (ref 5–8)
PLATELET # BLD AUTO: 274 K/UL — SIGNIFICANT CHANGE UP (ref 150–400)
POTASSIUM SERPL-MCNC: 3.8 MMOL/L — SIGNIFICANT CHANGE UP (ref 3.5–5.3)
POTASSIUM SERPL-SCNC: 3.8 MMOL/L — SIGNIFICANT CHANGE UP (ref 3.5–5.3)
PROT SERPL-MCNC: 6.9 G/DL — SIGNIFICANT CHANGE UP (ref 6–8.3)
PROT UR-MCNC: 30 MG/DL
RBC # BLD: 3.87 M/UL — SIGNIFICANT CHANGE UP (ref 3.8–5.2)
RBC # FLD: 14.3 % — SIGNIFICANT CHANGE UP (ref 10.3–14.5)
SODIUM SERPL-SCNC: 134 MMOL/L — LOW (ref 135–145)
SP GR SPEC: 1.01 — SIGNIFICANT CHANGE UP (ref 1.01–1.02)
UROBILINOGEN FLD QL: NEGATIVE — SIGNIFICANT CHANGE UP
WBC # BLD: 9.1 K/UL — SIGNIFICANT CHANGE UP (ref 3.8–10.5)
WBC # FLD AUTO: 9.1 K/UL — SIGNIFICANT CHANGE UP (ref 3.8–10.5)

## 2018-10-12 PROCEDURE — 99285 EMERGENCY DEPT VISIT HI MDM: CPT

## 2018-10-12 PROCEDURE — 74177 CT ABD & PELVIS W/CONTRAST: CPT | Mod: 26

## 2018-10-12 RX ORDER — ONDANSETRON 8 MG/1
4 TABLET, FILM COATED ORAL ONCE
Qty: 0 | Refills: 0 | Status: COMPLETED | OUTPATIENT
Start: 2018-10-12 | End: 2018-10-12

## 2018-10-12 RX ORDER — SODIUM CHLORIDE 9 MG/ML
1000 INJECTION, SOLUTION INTRAVENOUS
Qty: 0 | Refills: 0 | Status: DISCONTINUED | OUTPATIENT
Start: 2018-10-12 | End: 2018-10-16

## 2018-10-12 RX ORDER — GLUCAGON INJECTION, SOLUTION 0.5 MG/.1ML
1 INJECTION, SOLUTION SUBCUTANEOUS ONCE
Qty: 0 | Refills: 0 | Status: DISCONTINUED | OUTPATIENT
Start: 2018-10-12 | End: 2018-10-16

## 2018-10-12 RX ORDER — SODIUM CHLORIDE 9 MG/ML
1000 INJECTION INTRAMUSCULAR; INTRAVENOUS; SUBCUTANEOUS ONCE
Qty: 0 | Refills: 0 | Status: COMPLETED | OUTPATIENT
Start: 2018-10-12 | End: 2018-10-12

## 2018-10-12 RX ORDER — DEXTROSE 50 % IN WATER 50 %
25 SYRINGE (ML) INTRAVENOUS ONCE
Qty: 0 | Refills: 0 | Status: DISCONTINUED | OUTPATIENT
Start: 2018-10-12 | End: 2018-10-16

## 2018-10-12 RX ORDER — FOLIC ACID 0.8 MG
1 TABLET ORAL DAILY
Qty: 0 | Refills: 0 | Status: DISCONTINUED | OUTPATIENT
Start: 2018-10-12 | End: 2018-10-16

## 2018-10-12 RX ORDER — MORPHINE SULFATE 50 MG/1
4 CAPSULE, EXTENDED RELEASE ORAL ONCE
Qty: 0 | Refills: 0 | Status: DISCONTINUED | OUTPATIENT
Start: 2018-10-12 | End: 2018-10-12

## 2018-10-12 RX ORDER — METOPROLOL TARTRATE 50 MG
25 TABLET ORAL
Qty: 0 | Refills: 0 | Status: DISCONTINUED | OUTPATIENT
Start: 2018-10-12 | End: 2018-10-16

## 2018-10-12 RX ORDER — SODIUM CHLORIDE 9 MG/ML
1000 INJECTION INTRAMUSCULAR; INTRAVENOUS; SUBCUTANEOUS
Qty: 0 | Refills: 0 | Status: DISCONTINUED | OUTPATIENT
Start: 2018-10-12 | End: 2018-10-16

## 2018-10-12 RX ORDER — INSULIN LISPRO 100/ML
VIAL (ML) SUBCUTANEOUS
Qty: 0 | Refills: 0 | Status: DISCONTINUED | OUTPATIENT
Start: 2018-10-12 | End: 2018-10-16

## 2018-10-12 RX ORDER — BUDESONIDE AND FORMOTEROL FUMARATE DIHYDRATE 160; 4.5 UG/1; UG/1
2 AEROSOL RESPIRATORY (INHALATION)
Qty: 0 | Refills: 0 | Status: DISCONTINUED | OUTPATIENT
Start: 2018-10-12 | End: 2018-10-16

## 2018-10-12 RX ORDER — DEXTROSE 50 % IN WATER 50 %
12.5 SYRINGE (ML) INTRAVENOUS ONCE
Qty: 0 | Refills: 0 | Status: DISCONTINUED | OUTPATIENT
Start: 2018-10-12 | End: 2018-10-16

## 2018-10-12 RX ORDER — LATANOPROST 0.05 MG/ML
1 SOLUTION/ DROPS OPHTHALMIC; TOPICAL AT BEDTIME
Qty: 0 | Refills: 0 | Status: DISCONTINUED | OUTPATIENT
Start: 2018-10-12 | End: 2018-10-16

## 2018-10-12 RX ORDER — ASPIRIN/CALCIUM CARB/MAGNESIUM 324 MG
81 TABLET ORAL DAILY
Qty: 0 | Refills: 0 | Status: DISCONTINUED | OUTPATIENT
Start: 2018-10-12 | End: 2018-10-16

## 2018-10-12 RX ORDER — DEXTROSE 50 % IN WATER 50 %
15 SYRINGE (ML) INTRAVENOUS ONCE
Qty: 0 | Refills: 0 | Status: DISCONTINUED | OUTPATIENT
Start: 2018-10-12 | End: 2018-10-16

## 2018-10-12 RX ORDER — ALBUTEROL 90 UG/1
2 AEROSOL, METERED ORAL EVERY 6 HOURS
Qty: 0 | Refills: 0 | Status: DISCONTINUED | OUTPATIENT
Start: 2018-10-12 | End: 2018-10-16

## 2018-10-12 RX ORDER — LISINOPRIL 2.5 MG/1
5 TABLET ORAL DAILY
Qty: 0 | Refills: 0 | Status: DISCONTINUED | OUTPATIENT
Start: 2018-10-12 | End: 2018-10-16

## 2018-10-12 RX ORDER — HEPARIN SODIUM 5000 [USP'U]/ML
5000 INJECTION INTRAVENOUS; SUBCUTANEOUS EVERY 8 HOURS
Qty: 0 | Refills: 0 | Status: DISCONTINUED | OUTPATIENT
Start: 2018-10-12 | End: 2018-10-16

## 2018-10-12 RX ORDER — INSULIN LISPRO 100/ML
VIAL (ML) SUBCUTANEOUS AT BEDTIME
Qty: 0 | Refills: 0 | Status: DISCONTINUED | OUTPATIENT
Start: 2018-10-12 | End: 2018-10-16

## 2018-10-12 RX ADMIN — ONDANSETRON 4 MILLIGRAM(S): 8 TABLET, FILM COATED ORAL at 16:21

## 2018-10-12 RX ADMIN — SODIUM CHLORIDE 1000 MILLILITER(S): 9 INJECTION INTRAMUSCULAR; INTRAVENOUS; SUBCUTANEOUS at 16:47

## 2018-10-12 RX ADMIN — ONDANSETRON 4 MILLIGRAM(S): 8 TABLET, FILM COATED ORAL at 23:06

## 2018-10-12 RX ADMIN — MORPHINE SULFATE 4 MILLIGRAM(S): 50 CAPSULE, EXTENDED RELEASE ORAL at 16:21

## 2018-10-12 RX ADMIN — SODIUM CHLORIDE 1000 MILLILITER(S): 9 INJECTION INTRAMUSCULAR; INTRAVENOUS; SUBCUTANEOUS at 16:20

## 2018-10-12 RX ADMIN — MORPHINE SULFATE 4 MILLIGRAM(S): 50 CAPSULE, EXTENDED RELEASE ORAL at 16:47

## 2018-10-12 RX ADMIN — SODIUM CHLORIDE 1000 MILLILITER(S): 9 INJECTION INTRAMUSCULAR; INTRAVENOUS; SUBCUTANEOUS at 23:06

## 2018-10-12 NOTE — H&P ADULT - NSHPPHYSICALEXAM_GEN_ALL_CORE
Vital Signs Last 24 Hrs  T(C): 36.4 (12 Oct 2018 20:46), Max: 36.4 (12 Oct 2018 16:49)  T(F): 97.6 (12 Oct 2018 20:46), Max: 97.6 (12 Oct 2018 20:46)  HR: 79 (12 Oct 2018 20:46) (79 - 86)  BP: 112/62 (12 Oct 2018 20:46) (112/62 - 148/80)  RR: 18 (12 Oct 2018 20:46) (18 - 18)  SpO2: 96% (12 Oct 2018 20:46) (96% - 100%)

## 2018-10-12 NOTE — H&P ADULT - HISTORY OF PRESENT ILLNESS
Pt is a 72 F, from [facility], w/ PMHx of CAD, Paroxysmal Atrial Fibrillation (no Hx of AC), Bronchoalveolar CA (s/p wedge resection 2013) complicated w/ Metastatic Disease, DM (on Metformin), COPD (on 3L NC; no past intubations), HLD, GERD, HTN, and H/o UTI (last December 2017; UCx pansensitive Citrobacter/Proteus) who presents with Abd pain x 2 weeks.  Pt states 3 weeks prior she developed loss of appetite and diffuse abd discomfort.  She states its not painful, but more of a bloating, "spongy" sensation.  Pt also reports dizziness, recent bilious emesis x 1 week.  Pt states she is on chemotherapy, but she has been taking it for 2 years without similar symptoms.  Pt denies recent illness, sick contacts, or food related illness.  Pt also denies syncope, fall, vertigo, CP, palpitations, diarrhea, fever, HA, or extremity weakness/ numbness.      In the ED, pt presents in no acute distress, and vitals WNL

## 2018-10-12 NOTE — H&P ADULT - PROBLEM SELECTOR PLAN 5
Holding home medication Metformin; prior A1c 6.4 in 2017  - C/w diabetic diet, ISS, and monitor FS  - F/u HgA1c -Holding home medication Metformin  -C/w diabetic diet, ISS, and monitor FS  -F/u HgA1c

## 2018-10-12 NOTE — ED PROVIDER NOTE - OBJECTIVE STATEMENT
72 y.o presenting with 2 weeks of vomiting and left sided abd pain. denies cp, sob, fever, cough. endorse diarrhea+ that has been improving. denies dysuria.

## 2018-10-12 NOTE — ED PROVIDER NOTE - MEDICAL DECISION MAKING DETAILS
Patient presenting with 2 weeks of abd pain, concern for pancreatits vs colitis vs uti vs sbo, will obtain lab, hydration, antiemetic, ct abd

## 2018-10-12 NOTE — ED PROVIDER NOTE - PROGRESS NOTE DETAILS
patient endorses still feeling nauseas, endorses that she vomited x 1. given concern for vomiting, will admit patient for intractable vomiting.

## 2018-10-12 NOTE — ED ADULT NURSE NOTE - NSIMPLEMENTINTERV_GEN_ALL_ED
Implemented All Universal Safety Interventions:  [place] to call system. Call bell, personal items and telephone within reach. Instruct patient to call for assistance. Room bathroom lighting operational. Non-slip footwear when patient is off stretcher. Physically safe environment: no spills, clutter or unnecessary equipment. Stretcher in lowest position, wheels locked, appropriate side rails in place.

## 2018-10-12 NOTE — H&P ADULT - PROBLEM SELECTOR PLAN 1
-abd pain x 3 weeks with assoc nausea and bilious vomiting  -Loss of appetite   -no weight loss  -Lipase WNL  -CT abd neg for acute process  -  -c/w zofran and IV hydration for supportive measures -abd pain x 3 weeks with assoc nausea and bilious vomiting  -Loss of appetite   -no weight loss  -Lipase WNL  -CT abd neg for acute process  -Likely 2/2 side effect of chemo therapy  -c/w zofran and IV hydration for supportive measures

## 2018-10-12 NOTE — H&P ADULT - ASSESSMENT
Pt is a 72 F, from [facility], w/ PMHx of CAD, Paroxysmal Atrial Fibrillation (no Hx of AC), Bronchoalveolar CA (s/p wedge resection 2013) complicated w/ Metastatic Disease, DM (on Metformin), COPD (on 3L NC; no past intubations), HLD, GERD, HTN, and H/o UTI (last December 2017; UCx pansensitive Citrobacter/Proteus) who presents with Abd pain x 2 weeks.  Pt states 3 weeks prior she developed loss of appetite and diffuse abd discomfort.  She states its not painful, but more of a bloating, "spongy" sensation.  Pt also reports dizziness, recent bilious emesis x 1 week.  Pt states she is on chemotherapy, but she has been taking it for 2 years without similar symptoms.  Pt denies recent illness, sick contacts, or food related illness.  Pt also denies syncope, fall, vertigo, CP, palpitations, diarrhea, fever, HA, or extremity weakness/ numbness.      In the ED, pt presents in no acute distress, and vitals WNL Pt is a 72 F, from [facility], w/ PMHx of CAD, Paroxysmal Atrial Fibrillation (no Hx of AC), Bronchoalveolar CA (s/p wedge resection 2013) complicated w/ Metastatic Disease, DM (on Metformin), COPD (on 3L NC; no past intubations), HLD, GERD, HTN, and H/o UTI (last December 2017; UCx pansensitive Citrobacter/Proteus) who presents with Abd pain x 2 weeks.   In the ED, pt presents in no acute distress, and vitals WNL.  Pt remains afebrile w/o leukocytosis.  Remaining labs sig for mild hyponatremia likely due to GI losses.  CT abd neg for acute process.     Pt will be admitted to medicine for management of abd pain, and intractable vomiting.

## 2018-10-12 NOTE — H&P ADULT - RS GEN PE MLT RESP DETAILS PC
good air movement/clear to auscultation bilaterally/breath sounds equal/airway patent/respirations non-labored/no rales/no wheezes/no rhonchi

## 2018-10-13 LAB
ANION GAP SERPL CALC-SCNC: 4 MMOL/L — LOW (ref 5–17)
BUN SERPL-MCNC: 14 MG/DL — SIGNIFICANT CHANGE UP (ref 7–18)
CALCIUM SERPL-MCNC: 9.1 MG/DL — SIGNIFICANT CHANGE UP (ref 8.4–10.5)
CHLORIDE SERPL-SCNC: 104 MMOL/L — SIGNIFICANT CHANGE UP (ref 96–108)
CHOLEST SERPL-MCNC: 160 MG/DL — SIGNIFICANT CHANGE UP (ref 10–199)
CO2 SERPL-SCNC: 29 MMOL/L — SIGNIFICANT CHANGE UP (ref 22–31)
CREAT SERPL-MCNC: 1.2 MG/DL — SIGNIFICANT CHANGE UP (ref 0.5–1.3)
CULTURE RESULTS: NO GROWTH — SIGNIFICANT CHANGE UP
GLUCOSE BLDC GLUCOMTR-MCNC: 152 MG/DL — HIGH (ref 70–99)
GLUCOSE BLDC GLUCOMTR-MCNC: 153 MG/DL — HIGH (ref 70–99)
GLUCOSE BLDC GLUCOMTR-MCNC: 164 MG/DL — HIGH (ref 70–99)
GLUCOSE BLDC GLUCOMTR-MCNC: 96 MG/DL — SIGNIFICANT CHANGE UP (ref 70–99)
GLUCOSE SERPL-MCNC: 85 MG/DL — SIGNIFICANT CHANGE UP (ref 70–99)
HBA1C BLD-MCNC: 6.6 % — HIGH (ref 4–5.6)
HCT VFR BLD CALC: 32 % — LOW (ref 34.5–45)
HDLC SERPL-MCNC: 55 MG/DL — SIGNIFICANT CHANGE UP
HGB BLD-MCNC: 10 G/DL — LOW (ref 11.5–15.5)
LIPID PNL WITH DIRECT LDL SERPL: 71 MG/DL — SIGNIFICANT CHANGE UP
MAGNESIUM SERPL-MCNC: 1.4 MG/DL — LOW (ref 1.6–2.6)
MCHC RBC-ENTMCNC: 28.1 PG — SIGNIFICANT CHANGE UP (ref 27–34)
MCHC RBC-ENTMCNC: 31.3 GM/DL — LOW (ref 32–36)
MCV RBC AUTO: 89.7 FL — SIGNIFICANT CHANGE UP (ref 80–100)
PHOSPHATE SERPL-MCNC: 2.3 MG/DL — LOW (ref 2.5–4.5)
PLATELET # BLD AUTO: 260 K/UL — SIGNIFICANT CHANGE UP (ref 150–400)
POTASSIUM SERPL-MCNC: 4.6 MMOL/L — SIGNIFICANT CHANGE UP (ref 3.5–5.3)
POTASSIUM SERPL-SCNC: 4.6 MMOL/L — SIGNIFICANT CHANGE UP (ref 3.5–5.3)
RBC # BLD: 3.57 M/UL — LOW (ref 3.8–5.2)
RBC # FLD: 14.5 % — SIGNIFICANT CHANGE UP (ref 10.3–14.5)
SODIUM SERPL-SCNC: 137 MMOL/L — SIGNIFICANT CHANGE UP (ref 135–145)
SPECIMEN SOURCE: SIGNIFICANT CHANGE UP
TOTAL CHOLESTEROL/HDL RATIO MEASUREMENT: 2.9 RATIO — LOW (ref 3.3–7.1)
TRIGL SERPL-MCNC: 168 MG/DL — HIGH (ref 10–149)
TSH SERPL-MCNC: 1.58 UU/ML — SIGNIFICANT CHANGE UP (ref 0.34–4.82)
VIT B12 SERPL-MCNC: 623 PG/ML — SIGNIFICANT CHANGE UP (ref 232–1245)
WBC # BLD: 8.4 K/UL — SIGNIFICANT CHANGE UP (ref 3.8–10.5)
WBC # FLD AUTO: 8.4 K/UL — SIGNIFICANT CHANGE UP (ref 3.8–10.5)

## 2018-10-13 PROCEDURE — 93010 ELECTROCARDIOGRAM REPORT: CPT

## 2018-10-13 RX ORDER — SODIUM,POTASSIUM PHOSPHATES 278-250MG
1 POWDER IN PACKET (EA) ORAL
Qty: 0 | Refills: 0 | Status: COMPLETED | OUTPATIENT
Start: 2018-10-13 | End: 2018-10-15

## 2018-10-13 RX ADMIN — HEPARIN SODIUM 5000 UNIT(S): 5000 INJECTION INTRAVENOUS; SUBCUTANEOUS at 15:50

## 2018-10-13 RX ADMIN — Medication 1: at 12:51

## 2018-10-13 RX ADMIN — Medication 25 MILLIGRAM(S): at 06:15

## 2018-10-13 RX ADMIN — Medication 1 MILLIGRAM(S): at 11:20

## 2018-10-13 RX ADMIN — Medication 81 MILLIGRAM(S): at 11:19

## 2018-10-13 RX ADMIN — HEPARIN SODIUM 5000 UNIT(S): 5000 INJECTION INTRAVENOUS; SUBCUTANEOUS at 06:15

## 2018-10-13 RX ADMIN — Medication 1 TABLET(S): at 11:17

## 2018-10-13 RX ADMIN — Medication 1 TABLET(S): at 11:19

## 2018-10-13 RX ADMIN — SODIUM CHLORIDE 65 MILLILITER(S): 9 INJECTION INTRAMUSCULAR; INTRAVENOUS; SUBCUTANEOUS at 11:19

## 2018-10-13 RX ADMIN — SODIUM CHLORIDE 65 MILLILITER(S): 9 INJECTION INTRAMUSCULAR; INTRAVENOUS; SUBCUTANEOUS at 02:29

## 2018-10-13 RX ADMIN — Medication 1 TABLET(S): at 12:51

## 2018-10-13 RX ADMIN — BUDESONIDE AND FORMOTEROL FUMARATE DIHYDRATE 2 PUFF(S): 160; 4.5 AEROSOL RESPIRATORY (INHALATION) at 17:06

## 2018-10-13 RX ADMIN — Medication 1 TABLET(S): at 09:17

## 2018-10-13 RX ADMIN — Medication 1: at 17:04

## 2018-10-13 RX ADMIN — LISINOPRIL 5 MILLIGRAM(S): 2.5 TABLET ORAL at 06:14

## 2018-10-13 RX ADMIN — Medication 1 TABLET(S): at 17:06

## 2018-10-14 LAB
ANION GAP SERPL CALC-SCNC: 7 MMOL/L — SIGNIFICANT CHANGE UP (ref 5–17)
BUN SERPL-MCNC: 14 MG/DL — SIGNIFICANT CHANGE UP (ref 7–18)
CALCIUM SERPL-MCNC: 8.4 MG/DL — SIGNIFICANT CHANGE UP (ref 8.4–10.5)
CHLORIDE SERPL-SCNC: 105 MMOL/L — SIGNIFICANT CHANGE UP (ref 96–108)
CO2 SERPL-SCNC: 30 MMOL/L — SIGNIFICANT CHANGE UP (ref 22–31)
CREAT SERPL-MCNC: 1.28 MG/DL — SIGNIFICANT CHANGE UP (ref 0.5–1.3)
GLUCOSE BLDC GLUCOMTR-MCNC: 106 MG/DL — HIGH (ref 70–99)
GLUCOSE BLDC GLUCOMTR-MCNC: 150 MG/DL — HIGH (ref 70–99)
GLUCOSE BLDC GLUCOMTR-MCNC: 174 MG/DL — HIGH (ref 70–99)
GLUCOSE BLDC GLUCOMTR-MCNC: 313 MG/DL — HIGH (ref 70–99)
GLUCOSE SERPL-MCNC: 100 MG/DL — HIGH (ref 70–99)
HCT VFR BLD CALC: 29.1 % — LOW (ref 34.5–45)
HGB BLD-MCNC: 9.2 G/DL — LOW (ref 11.5–15.5)
MAGNESIUM SERPL-MCNC: 1.2 MG/DL — LOW (ref 1.6–2.6)
MCHC RBC-ENTMCNC: 28.4 PG — SIGNIFICANT CHANGE UP (ref 27–34)
MCHC RBC-ENTMCNC: 31.4 GM/DL — LOW (ref 32–36)
MCV RBC AUTO: 90.4 FL — SIGNIFICANT CHANGE UP (ref 80–100)
PHOSPHATE SERPL-MCNC: 3.9 MG/DL — SIGNIFICANT CHANGE UP (ref 2.5–4.5)
PLATELET # BLD AUTO: 202 K/UL — SIGNIFICANT CHANGE UP (ref 150–400)
POTASSIUM SERPL-MCNC: 3.4 MMOL/L — LOW (ref 3.5–5.3)
POTASSIUM SERPL-SCNC: 3.4 MMOL/L — LOW (ref 3.5–5.3)
RBC # BLD: 3.22 M/UL — LOW (ref 3.8–5.2)
RBC # FLD: 14.5 % — SIGNIFICANT CHANGE UP (ref 10.3–14.5)
SODIUM SERPL-SCNC: 142 MMOL/L — SIGNIFICANT CHANGE UP (ref 135–145)
WBC # BLD: 7.7 K/UL — SIGNIFICANT CHANGE UP (ref 3.8–10.5)
WBC # FLD AUTO: 7.7 K/UL — SIGNIFICANT CHANGE UP (ref 3.8–10.5)

## 2018-10-14 RX ORDER — MAGNESIUM SULFATE 500 MG/ML
2 VIAL (ML) INJECTION ONCE
Qty: 0 | Refills: 0 | Status: COMPLETED | OUTPATIENT
Start: 2018-10-14 | End: 2018-10-14

## 2018-10-14 RX ORDER — POTASSIUM CHLORIDE 20 MEQ
40 PACKET (EA) ORAL EVERY 4 HOURS
Qty: 0 | Refills: 0 | Status: COMPLETED | OUTPATIENT
Start: 2018-10-14 | End: 2018-10-14

## 2018-10-14 RX ADMIN — HEPARIN SODIUM 5000 UNIT(S): 5000 INJECTION INTRAVENOUS; SUBCUTANEOUS at 05:11

## 2018-10-14 RX ADMIN — Medication 40 MILLIEQUIVALENT(S): at 13:47

## 2018-10-14 RX ADMIN — Medication 1 TABLET(S): at 00:09

## 2018-10-14 RX ADMIN — Medication 25 MILLIGRAM(S): at 17:15

## 2018-10-14 RX ADMIN — Medication 1 MILLIGRAM(S): at 12:08

## 2018-10-14 RX ADMIN — LATANOPROST 1 DROP(S): 0.05 SOLUTION/ DROPS OPHTHALMIC; TOPICAL at 21:52

## 2018-10-14 RX ADMIN — LISINOPRIL 5 MILLIGRAM(S): 2.5 TABLET ORAL at 05:10

## 2018-10-14 RX ADMIN — BUDESONIDE AND FORMOTEROL FUMARATE DIHYDRATE 2 PUFF(S): 160; 4.5 AEROSOL RESPIRATORY (INHALATION) at 00:25

## 2018-10-14 RX ADMIN — Medication 81 MILLIGRAM(S): at 12:06

## 2018-10-14 RX ADMIN — Medication 1 TABLET(S): at 12:08

## 2018-10-14 RX ADMIN — Medication 1 TABLET(S): at 09:16

## 2018-10-14 RX ADMIN — Medication 50 GRAM(S): at 12:06

## 2018-10-14 RX ADMIN — HEPARIN SODIUM 5000 UNIT(S): 5000 INJECTION INTRAVENOUS; SUBCUTANEOUS at 12:12

## 2018-10-14 RX ADMIN — Medication 1 TABLET(S): at 17:15

## 2018-10-14 RX ADMIN — Medication 40 MILLIEQUIVALENT(S): at 12:10

## 2018-10-14 RX ADMIN — BUDESONIDE AND FORMOTEROL FUMARATE DIHYDRATE 2 PUFF(S): 160; 4.5 AEROSOL RESPIRATORY (INHALATION) at 21:52

## 2018-10-14 RX ADMIN — Medication 1 TABLET(S): at 12:07

## 2018-10-14 RX ADMIN — HEPARIN SODIUM 5000 UNIT(S): 5000 INJECTION INTRAVENOUS; SUBCUTANEOUS at 21:52

## 2018-10-14 RX ADMIN — Medication 4: at 12:05

## 2018-10-14 RX ADMIN — Medication 0: at 21:52

## 2018-10-14 RX ADMIN — BUDESONIDE AND FORMOTEROL FUMARATE DIHYDRATE 2 PUFF(S): 160; 4.5 AEROSOL RESPIRATORY (INHALATION) at 09:16

## 2018-10-14 RX ADMIN — HEPARIN SODIUM 5000 UNIT(S): 5000 INJECTION INTRAVENOUS; SUBCUTANEOUS at 00:25

## 2018-10-14 RX ADMIN — Medication 25 MILLIGRAM(S): at 05:10

## 2018-10-14 NOTE — ED ADULT NURSE REASSESSMENT NOTE - NS ED NURSE REASSESS COMMENT FT1
Pt. is stable at this time. No complaints voiced. Pt. is on nasal canula with VS stable. Pt. is stable to go to [address] [place] [address] for continued care.
axox3 ,nad , awaiting for bed availability , stable v/s , ate , tolerated well ,endorsed to next nurse in stable condition .

## 2018-10-14 NOTE — PROGRESS NOTE ADULT - SUBJECTIVE AND OBJECTIVE BOX
Patient is a 72y old  Female who presents with a chief complaint of Abd pain (12 Oct 2018 21:52)    PATIENT IS SEEN AND EXAMINED IN MEDICAL FLOOR.    ALLERGIES:  No Known Allergies      VITALS:    Vital Signs Last 24 Hrs  T(C): 36.6 (14 Oct 2018 08:30), Max: 36.7 (13 Oct 2018 16:20)  T(F): 97.9 (14 Oct 2018 08:30), Max: 98 (13 Oct 2018 16:20)  HR: 70 (14 Oct 2018 08:30) (65 - 94)  BP: 130/56 (14 Oct 2018 08:30) (117/54 - 131/44)  BP(mean): --  RR: 20 (14 Oct 2018 08:30) (16 - 20)  SpO2: 97% (14 Oct 2018 08:30) (95% - 100%)    LABS:  CBC Full  -  ( 14 Oct 2018 05:27 )  WBC Count : 7.7 K/uL  Hemoglobin : 9.2 g/dL  Hematocrit : 29.1 %  Platelet Count - Automated : 202 K/uL  Mean Cell Volume : 90.4 fl  Mean Cell Hemoglobin : 28.4 pg  Mean Cell Hemoglobin Concentration : 31.4 gm/dL  Auto Neutrophil # : x  Auto Lymphocyte # : x  Auto Monocyte # : x  Auto Eosinophil # : x  Auto Basophil # : x  Auto Neutrophil % : x  Auto Lymphocyte % : x  Auto Monocyte % : x  Auto Eosinophil % : x  Auto Basophil % : x      10-14    142  |  105  |  14  ----------------------------<  100<H>  3.4<L>   |  30  |  1.28    Ca    8.4      14 Oct 2018 05:27  Phos  3.9     10-14  Mg     1.2     10-14    TPro  6.9  /  Alb  3.4<L>  /  TBili  0.6  /  DBili  x   /  AST  22  /  ALT  25  /  AlkPhos  57  10-12    CAPILLARY BLOOD GLUCOSE    POCT Blood Glucose.: 313 mg/dL (14 Oct 2018 11:50)  POCT Blood Glucose.: 106 mg/dL (14 Oct 2018 08:43)  POCT Blood Glucose.: 164 mg/dL (13 Oct 2018 21:57)  POCT Blood Glucose.: 153 mg/dL (13 Oct 2018 16:58)  POCT Blood Glucose.: 152 mg/dL (13 Oct 2018 12:16)      LIVER FUNCTIONS - ( 12 Oct 2018 15:50 )  Alb: 3.4 g/dL / Pro: 6.9 g/dL / ALK PHOS: 57 U/L / ALT: 25 U/L DA / AST: 22 U/L / GGT: x             .Urine Clean Catch (Midstream)  10-12 @ 23:14   No growth  --  --      MEDICATIONS:    MEDICATIONS  (STANDING):  aspirin enteric coated 81 milliGRAM(s) Oral daily  buDESOnide 160 MICROgram(s)/formoterol 4.5 MICROgram(s) Inhaler 2 Puff(s) Inhalation two times a day  calcium carbonate 1250 mG  + Vitamin D (OsCal 500 + D) 1 Tablet(s) Oral daily  dextrose 5%. 1000 milliLiter(s) (50 mL/Hr) IV Continuous <Continuous>  dextrose 50% Injectable 12.5 Gram(s) IV Push once  dextrose 50% Injectable 25 Gram(s) IV Push once  dextrose 50% Injectable 25 Gram(s) IV Push once  folic acid 1 milliGRAM(s) Oral daily  heparin  Injectable 5000 Unit(s) SubCutaneous every 8 hours  insulin lispro (HumaLOG) corrective regimen sliding scale   SubCutaneous three times a day before meals  insulin lispro (HumaLOG) corrective regimen sliding scale   SubCutaneous at bedtime  latanoprost 0.005% Ophthalmic Solution 1 Drop(s) Both EYES at bedtime  lisinopril 5 milliGRAM(s) Oral daily  metoprolol tartrate 25 milliGRAM(s) Oral two times a day  multivitamin 1 Tablet(s) Oral daily  potassium acid phosphate/sodium acid phosphate tablet (K-PHOS No. 2) 1 Tablet(s) Oral four times a day with meals  potassium chloride   Powder 40 milliEquivalent(s) Oral every 4 hours  sodium chloride 0.9%. 1000 milliLiter(s) (65 mL/Hr) IV Continuous <Continuous>      MEDICATIONS  (PRN):  ALBUTerol    90 MICROgram(s) HFA Inhaler 2 Puff(s) Inhalation every 6 hours PRN Shortness of Breath  dextrose 40% Gel 15 Gram(s) Oral once PRN Blood Glucose LESS THAN 70 milliGRAM(s)/deciLiter  glucagon  Injectable 1 milliGRAM(s) IntraMuscular once PRN Glucose <70 milliGRAM(s)/deciLiter      REVIEW OF SYSTEMS:                           ALL ROS DONE [ X   ]    CONSTITUTIONAL:  LETHARGIC [   ], FEVER [   ], UNRESPONSIVE [   ]  CVS:  CP  [   ], SOB, [   ], PALPITATIONS [   ], DIZZYNESS [   ]  RS: COUGH [   ], SPUTUM [   ]  GI: ABDOMINAL PAIN [   ], NAUSEA [   ], VOMITINGS [   ], DIARRHEA [   ], CONSTIPATION [   ]  :  DYSURIA [   ], NOCTURIA [   ], INCREASED FREQUENCY [   ], DRIBLING [   ],  SKELETAL: PAINFUL JOINTS [   ], SWOLLEN JOINTS [   ], NECK ACHE [   ], LOW BACK ACHE [   ],  SKIN : ULCERS [   ], RASH [   ], ITCHING [   ]  CNS: HEAD ACHE [   ], DOUBLE VISION [   ], BLURRED VISION [   ], AMS / CONFUSION [   ], SEIZURES [   ], WEAKNESS [   ],TINGLING / NUMBNESS [   ]    PHYSICAL EXAMINATION:  GENERAL APPEARANCE: NO DISTRESS  HEENT:  NO PALLOR, NO  JVD,  NO   NODES, NECK SUPPLE  CVS: S1 +, S2 +,   RS: AEEB,  OCCASIONAL  RALES +,  B/L RONCHI  ABD: SOFT, NT, NO, BS +  EXT: NO PE  SKIN: WARM,   SKELETAL:  ROM ACCEPTABLE  CNS:  AAO X 2-3   , NO  DEFICITS    RADIOLOGY :      ASSESSMENT :     Nausea and vomiting  Lung cancer  Seborrheic dermatitis  OA (osteoarthritis)  Hypercholesteremia  HTN (hypertension)  GERD (gastroesophageal reflux disease)  Constipation  DM (diabetes mellitus)  Cataract  CAD (coronary artery disease)  Bronchoalveolar carcinoma  ACS (acute coronary syndrome)  COPD (chronic obstructive pulmonary disease)  S/P ovarian cystectomy      PLAN:  HPI:  Pt is a 72 F, from [facility], w/ PMHx of CAD, Paroxysmal Atrial Fibrillation (no Hx of AC), Bronchoalveolar CA (s/p wedge resection 2013) complicated w/ Metastatic Disease, DM (on Metformin), COPD (on 3L NC; no past intubations), HLD, GERD, HTN, and H/o UTI (last December 2017; UCx pansensitive Citrobacter/Proteus) who presents with Abd pain x 2 weeks.  Pt states 3 weeks prior she developed loss of appetite and diffuse abd discomfort.  She states its not painful, but more of a bloating, "spongy" sensation.  Pt also reports dizziness, recent bilious emesis x 1 week.  Pt states she is on chemotherapy, but she has been taking it for 2 years without similar symptoms.  Pt denies recent illness, sick contacts, or food related illness.  Pt also denies syncope, fall, vertigo, CP, palpitations, diarrhea, fever, HA, or extremity weakness/ numbness.      In the ED, pt presents in no acute distress, and vitals WNL (12 Oct 2018 21:52)    - GASTROENTERITIS WITH INTRACTABLE VOMITINGS ON IVF, ZOFRAN, ADVANCE DIET AS TOLERATED  - METASTATIC LUNG CANCER ON ORAL CHEMOTHERAPY  - DC PLAN IN AM BACK TO [facility]  - GI AND DVT PROPHYLAXIS  - DR. BENTON

## 2018-10-15 ENCOUNTER — TRANSCRIPTION ENCOUNTER (OUTPATIENT)
Age: 73
End: 2018-10-15

## 2018-10-15 LAB
ANION GAP SERPL CALC-SCNC: 7 MMOL/L — SIGNIFICANT CHANGE UP (ref 5–17)
BUN SERPL-MCNC: 13 MG/DL — SIGNIFICANT CHANGE UP (ref 7–18)
CALCIUM SERPL-MCNC: 8.8 MG/DL — SIGNIFICANT CHANGE UP (ref 8.4–10.5)
CHLORIDE SERPL-SCNC: 104 MMOL/L — SIGNIFICANT CHANGE UP (ref 96–108)
CO2 SERPL-SCNC: 29 MMOL/L — SIGNIFICANT CHANGE UP (ref 22–31)
CREAT SERPL-MCNC: 1 MG/DL — SIGNIFICANT CHANGE UP (ref 0.5–1.3)
GLUCOSE BLDC GLUCOMTR-MCNC: 132 MG/DL — HIGH (ref 70–99)
GLUCOSE BLDC GLUCOMTR-MCNC: 185 MG/DL — HIGH (ref 70–99)
GLUCOSE BLDC GLUCOMTR-MCNC: 219 MG/DL — HIGH (ref 70–99)
GLUCOSE BLDC GLUCOMTR-MCNC: 246 MG/DL — HIGH (ref 70–99)
GLUCOSE SERPL-MCNC: 140 MG/DL — HIGH (ref 70–99)
HCT VFR BLD CALC: 29.9 % — LOW (ref 34.5–45)
HGB BLD-MCNC: 9.1 G/DL — LOW (ref 11.5–15.5)
MAGNESIUM SERPL-MCNC: 1.9 MG/DL — SIGNIFICANT CHANGE UP (ref 1.6–2.6)
MCHC RBC-ENTMCNC: 28 PG — SIGNIFICANT CHANGE UP (ref 27–34)
MCHC RBC-ENTMCNC: 30.6 GM/DL — LOW (ref 32–36)
MCV RBC AUTO: 91.3 FL — SIGNIFICANT CHANGE UP (ref 80–100)
PHOSPHATE SERPL-MCNC: 3.5 MG/DL — SIGNIFICANT CHANGE UP (ref 2.5–4.5)
PLATELET # BLD AUTO: 195 K/UL — SIGNIFICANT CHANGE UP (ref 150–400)
POTASSIUM SERPL-MCNC: 4.4 MMOL/L — SIGNIFICANT CHANGE UP (ref 3.5–5.3)
POTASSIUM SERPL-SCNC: 4.4 MMOL/L — SIGNIFICANT CHANGE UP (ref 3.5–5.3)
RBC # BLD: 3.27 M/UL — LOW (ref 3.8–5.2)
RBC # FLD: 14.6 % — HIGH (ref 10.3–14.5)
SODIUM SERPL-SCNC: 140 MMOL/L — SIGNIFICANT CHANGE UP (ref 135–145)
WBC # BLD: 6.8 K/UL — SIGNIFICANT CHANGE UP (ref 3.8–10.5)
WBC # FLD AUTO: 6.8 K/UL — SIGNIFICANT CHANGE UP (ref 3.8–10.5)

## 2018-10-15 RX ORDER — OSIMERTINIB 80 1/1
80 TABLET, FILM COATED ORAL
Qty: 0 | Refills: 0 | Status: DISCONTINUED | OUTPATIENT
Start: 2018-10-15 | End: 2018-10-15

## 2018-10-15 RX ADMIN — Medication 25 MILLIGRAM(S): at 06:13

## 2018-10-15 RX ADMIN — Medication 2: at 12:15

## 2018-10-15 RX ADMIN — Medication 1: at 17:00

## 2018-10-15 RX ADMIN — LISINOPRIL 5 MILLIGRAM(S): 2.5 TABLET ORAL at 06:13

## 2018-10-15 RX ADMIN — Medication 1 TABLET(S): at 09:01

## 2018-10-15 RX ADMIN — HEPARIN SODIUM 5000 UNIT(S): 5000 INJECTION INTRAVENOUS; SUBCUTANEOUS at 06:13

## 2018-10-15 RX ADMIN — Medication 25 MILLIGRAM(S): at 17:01

## 2018-10-15 RX ADMIN — Medication 1 TABLET(S): at 14:50

## 2018-10-15 RX ADMIN — HEPARIN SODIUM 5000 UNIT(S): 5000 INJECTION INTRAVENOUS; SUBCUTANEOUS at 21:42

## 2018-10-15 RX ADMIN — HEPARIN SODIUM 5000 UNIT(S): 5000 INJECTION INTRAVENOUS; SUBCUTANEOUS at 14:51

## 2018-10-15 RX ADMIN — LATANOPROST 1 DROP(S): 0.05 SOLUTION/ DROPS OPHTHALMIC; TOPICAL at 21:43

## 2018-10-15 RX ADMIN — BUDESONIDE AND FORMOTEROL FUMARATE DIHYDRATE 2 PUFF(S): 160; 4.5 AEROSOL RESPIRATORY (INHALATION) at 21:43

## 2018-10-15 RX ADMIN — BUDESONIDE AND FORMOTEROL FUMARATE DIHYDRATE 2 PUFF(S): 160; 4.5 AEROSOL RESPIRATORY (INHALATION) at 13:00

## 2018-10-15 RX ADMIN — Medication 81 MILLIGRAM(S): at 14:49

## 2018-10-15 RX ADMIN — Medication 1 MILLIGRAM(S): at 14:50

## 2018-10-15 NOTE — PROGRESS NOTE ADULT - ASSESSMENT
Pt is a 72 F, from [facility] [place], w/ PMHx of CAD, Paroxysmal Atrial Fibrillation (no Hx of AC), Bronchoalveolar CA (s/p wedge resection 2013) complicated w/ Metastatic Disease, DM (on Metformin), COPD (on 3L NC; no past intubations), HLD, GERD, HTN, and H/o UTI (last December 2017; UCx pansensitive Citrobacter/Proteus) who presents with Abd pain x 2 weeks.   In the ED, pt presents in no acute distress, and vitals WNL.  Pt remains afebrile w/o leukocytosis.  Remaining labs sig for mild hyponatremia likely due to GI losses.  CT abd neg for acute process.     Pt will be admitted to medicine for management of abd pain, and intractable vomiting.    Discharge to [facility] tomorrow

## 2018-10-15 NOTE — DISCHARGE NOTE ADULT - PATIENT PORTAL LINK FT
You can access the [facility] Patient Portal, offered by [facility], by registering with the following website: [email or web address]. [facility]

## 2018-10-15 NOTE — DISCHARGE NOTE ADULT - PLAN OF CARE
Tolerated Diet CT scan of the abdomen: No bowel obstruction.  8 mm nodule right lung base without significant change compared to prior CT chests from 7/10/2018. Refer to CT chest report 7/10/2018. Neoplasm not excluded She has metastatic lung cancer on oral chemo. Advanced diet due to improvement and tolerated successfully. [facility] as per attending Continue with your blood sugar medication.  You must maintain a healthy diet that consist of low sugar, low fat, low sodium diet. Exercise frequently if possible. Consider repeating your Hemoglobin A1c within 3 months after discharge to monitor your average blood glucose control. Follow up with primary care physician in one week after discharge. Continue with blood pressure medication. Maintain a healthy diet that consist of low sugar, low fat, low sodium diet. Exercise frequently if possible.  Follow up with primary care physician in one week after discharge. Continue with home medications Osimertinib/Tagrisso Continue with cholesterol medications. Maintain a healthy diet that consist of low sugar, low fat, low sodium diet. Exercise frequently if possible.  Follow up with primary care physician in one week after discharge.  Diet suggested: DASH Diet that Emphasizes vegetables, fruits, and fat-free or low-fat dairy products. Includes whole grains, fish, poultry, beans, seeds, nuts, and vegetable oils. Limits sodium, sweets, sugary beverages, and red meats.

## 2018-10-15 NOTE — PROGRESS NOTE ADULT - SUBJECTIVE AND OBJECTIVE BOX
==================PGY 2 Note===================   Discussed with primary attending    ================CHIEF COMPLAINT===============  Patient is a 72y old  Female who presents with a chief complaint of Abd pain (15 Oct 2018 07:48)        =========INTERVAL HPI/OVERNIGHT EVENTS=========  Offers no new complaints      ============CURRENT MEDICATIONS===============    MEDICATIONS  (STANDING):  aspirin enteric coated 81 milliGRAM(s) Oral daily  buDESOnide 160 MICROgram(s)/formoterol 4.5 MICROgram(s) Inhaler 2 Puff(s) Inhalation two times a day  calcium carbonate 1250 mG  + Vitamin D (OsCal 500 + D) 1 Tablet(s) Oral daily  dextrose 5%. 1000 milliLiter(s) (50 mL/Hr) IV Continuous <Continuous>  dextrose 50% Injectable 12.5 Gram(s) IV Push once  dextrose 50% Injectable 25 Gram(s) IV Push once  dextrose 50% Injectable 25 Gram(s) IV Push once  folic acid 1 milliGRAM(s) Oral daily  heparin  Injectable 5000 Unit(s) SubCutaneous every 8 hours  insulin lispro (HumaLOG) corrective regimen sliding scale   SubCutaneous three times a day before meals  insulin lispro (HumaLOG) corrective regimen sliding scale   SubCutaneous at bedtime  latanoprost 0.005% Ophthalmic Solution 1 Drop(s) Both EYES at bedtime  lisinopril 5 milliGRAM(s) Oral daily  metoprolol tartrate 25 milliGRAM(s) Oral two times a day  multivitamin 1 Tablet(s) Oral daily  Osimertinib(Tagrisso) 80 milliGRAM(s) 80 milliGRAM(s) Oral <User Schedule>  sodium chloride 0.9%. 1000 milliLiter(s) (65 mL/Hr) IV Continuous <Continuous>    MEDICATIONS  (PRN):  ALBUTerol    90 MICROgram(s) HFA Inhaler 2 Puff(s) Inhalation every 6 hours PRN Shortness of Breath  dextrose 40% Gel 15 Gram(s) Oral once PRN Blood Glucose LESS THAN 70 milliGRAM(s)/deciLiter  glucagon  Injectable 1 milliGRAM(s) IntraMuscular once PRN Glucose <70 milliGRAM(s)/deciLiter        ============REVIEW OF SYSTEMS==================    CONSTITUTIONAL: No fever  EYES: no acute visual disturbances  NECK: No pain or stiffness  RESPIRATORY: No cough; No shortness of breath  CARDIOVASCULAR: No chest pain, no palpitations  GASTROINTESTINAL: No pain. No nausea or vomiting; No diarrhea   NEUROLOGICAL: No headache or numbness, no tremors  MUSCULOSKELETAL: No joint pain, no muscle pain  GENITOURINARY: no dysuria, no frequency, no hesitancy  PSYCHIATRY: no depression , no anxiety  ALL OTHER  ROS negative      ================VITALS SIGNS=====================  Vital Signs Last 24 Hrs  T(C): 37 (15 Oct 2018 14:22), Max: 37 (15 Oct 2018 14:22)  T(F): 98.6 (15 Oct 2018 14:22), Max: 98.6 (15 Oct 2018 14:22)  HR: 88 (15 Oct 2018 14:22) (72 - 88)  BP: 155/77 (15 Oct 2018 14:22) (113/57 - 155/77)  BP(mean): --  RR: 18 (15 Oct 2018 14:22) (17 - 18)  SpO2: 100% (15 Oct 2018 14:22) (98% - 100%)    ===============PHYSICAL EXAM====================    GENERAL: NAD  HEENT: Normocephalic;  conjunctivae and sclerae clear; moist mucous membranes;   NECK : supple  CHEST/LUNG: Clear to auscultation bilaterally with good air entry   HEART: S1 S2  regular; no murmurs, gallops or rubs  ABDOMEN: Soft, Nontender, Nondistended; Bowel sounds present  EXTREMITIES: no cyanosis; no edema; no calf tenderness  SKIN: warm and dry; no rash  NERVOUS SYSTEM:  Awake and alert; Oriented  to place, person and time    ==============LABORATORIES======================  LABS:                        9.1    6.8   )-----------( 195      ( 15 Oct 2018 08:13 )             29.9     10-15    140  |  104  |  13  ----------------------------<  140<H>  4.4   |  29  |  1.00    Ca    8.8      15 Oct 2018 08:13  Phos  3.5     10-15  Mg     1.9     10-15          CAPILLARY BLOOD GLUCOSE      POCT Blood Glucose.: 246 mg/dL (15 Oct 2018 11:24)  POCT Blood Glucose.: 132 mg/dL (15 Oct 2018 07:41)  POCT Blood Glucose.: 174 mg/dL (14 Oct 2018 21:48)  POCT Blood Glucose.: 150 mg/dL (14 Oct 2018 16:51)      =============INPUTS/OUPUTS=====================        RADIOLOGY & ADDITIONAL TESTS:    Imaging Personally Reviewed:  YES    Consultant(s) Notes Reviewed:   YES    Care Discussed with Consultants : YES    Plan of care was discussed with patient  and /or primary care giver; all questions and concerns were addressed and care was aligned with patient's wishes. Time was allowed for questions that were answered to the best of my abilities

## 2018-10-15 NOTE — DISCHARGE NOTE ADULT - MEDICATION SUMMARY - MEDICATIONS TO TAKE
I will START or STAY ON the medications listed below when I get home from the hospital:    Aspir 81 oral delayed release tablet  -- 1 tab(s) by mouth once a day  -- Indication: For CAD (coronary artery disease)    Tylenol 325 mg oral tablet  -- 2 tab(s) by mouth every 4 hours, As Needed  -- Indication: For Pain    lisinopril 5 mg oral tablet  -- 1 tab(s) by mouth once a day  -- Indication: For HTN (hypertension)    metFORMIN 500 mg oral tablet  -- 1 tab(s) by mouth 2 times a day  -- Indication: For DM (diabetes mellitus)    metoprolol tartrate 25 mg oral tablet  -- 1 tab(s) by mouth 2 times a day  -- Indication: For HTN (hypertension)    Symbicort 160 mcg-4.5 mcg/inh inhalation aerosol  -- 1 puff(s) inhaled 2 times a day  -- Indication: For COPD    albuterol  -- orally every 6 hours, As Needed  -- Indication: For COPD    Cepacol Extra Strength Cherry  -- Indication: For Cough    clobetasol 0.05% topical cream  -- Apply on skin to affected area 2 times a day  -- Indication: For Skin    Tagrisso 80 mg oral tablet  -- orally once a day  -- Indication: For CAncer    bisacodyl 5 mg oral delayed release tablet  -- 1 tab(s) by mouth once, As needed, Constipation  -- Indication: For Constipation    Xalatan 0.005% ophthalmic solution  -- 1 drop(s) to each affected eye once a day (in the evening)  -- Indication: For Eye Drops    Calcium 500+D oral tablet, chewable  -- orally once a day  -- Indication: For vitamin d    Multiple Vitamins oral capsule  -- 1 cap(s) by mouth once a day  -- Indication: For Vitamin supplement    Vitamin D2 50,000 intl units (1.25 mg) oral capsule  -- 1 cap(s) by mouth once a week  -- Indication: For Vitamin supplement    folic acid 1 mg oral tablet  -- 1 tab(s) by mouth once a day  -- Indication: For Vitamin supplement

## 2018-10-15 NOTE — DISCHARGE NOTE ADULT - CARE PLAN
Principal Discharge DX:	Intractable vomiting with nausea, unspecified vomiting type  Secondary Diagnosis:	DM (diabetes mellitus)  Secondary Diagnosis:	HTN (hypertension)  Secondary Diagnosis:	Lung cancer  Secondary Diagnosis:	Hypercholesteremia Principal Discharge DX:	Intractable vomiting with nausea, unspecified vomiting type  Goal:	Tolerated Diet  Assessment and plan of treatment:	CT scan of the abdomen: No bowel obstruction.  8 mm nodule right lung base without significant change compared to prior CT chests from 7/10/2018. Refer to CT chest report 7/10/2018. Neoplasm not excluded She has metastatic lung cancer on oral chemo. Advanced diet due to improvement and tolerated successfully. [facility] as per attending  Secondary Diagnosis:	DM (diabetes mellitus)  Assessment and plan of treatment:	Continue with your blood sugar medication.  You must maintain a healthy diet that consist of low sugar, low fat, low sodium diet. Exercise frequently if possible. Consider repeating your Hemoglobin A1c within 3 months after discharge to monitor your average blood glucose control. Follow up with primary care physician in one week after discharge.  Secondary Diagnosis:	HTN (hypertension)  Assessment and plan of treatment:	Continue with blood pressure medication. Maintain a healthy diet that consist of low sugar, low fat, low sodium diet. Exercise frequently if possible.  Follow up with primary care physician in one week after discharge.  Secondary Diagnosis:	Lung cancer  Assessment and plan of treatment:	Continue with home medications Osimertinib/Tagrisso  Secondary Diagnosis:	Hypercholesteremia  Assessment and plan of treatment:	Continue with cholesterol medications. Maintain a healthy diet that consist of low sugar, low fat, low sodium diet. Exercise frequently if possible.  Follow up with primary care physician in one week after discharge.  Diet suggested: DASH Diet that Emphasizes vegetables, fruits, and fat-free or low-fat dairy products. Includes whole grains, fish, poultry, beans, seeds, nuts, and vegetable oils. Limits sodium, sweets, sugary beverages, and red meats.

## 2018-10-15 NOTE — DISCHARGE NOTE ADULT - HOSPITAL COURSE
Background  and course of admission:    Pt is a 72 F, from [facility], w/ PMHx of CAD, Paroxysmal Atrial Fibrillation (no Hx of AC), Bronchoalveolar CA (s/p wedge resection 2013) complicated w/ Metastatic Disease, DM (on Metformin), COPD (on 3L NC; no past intubations), HLD, GERD, HTN, and H/o UTI (last December 2017; UCx pansensitive Citrobacter/Proteus) who presents with Abd pain x 2 weeks.  Pt states 3 weeks prior she developed loss of appetite and diffuse abd discomfort.  She states its not painful, but more of a bloating, "spongy" sensation.  Pt also reports dizziness, recent bilious emesis x 1 week.  Pt states she is on chemotherapy, but she has been taking it for 2 years without similar symptoms.      In the ED, pt presents in no acute distress, and vitals WNL (12 Oct 2018 21:52). Admitted for gastroenteritis. Management conversantly She has metastatic lung cancer on oral chemo. Advanced diet due to improvement and tolerated successfully. D/C plan ot [facility] assisted living as per attending    Given patient's improved clinical status and current hemodynamic stability, decision was made to discharge.  Please refer to patient's complete medical chart with documents for a full hospital course, for this is only a brief summary. Background  and course of admission:    Pt is a 72 F, from [facility], w/ PMHx of CAD, Paroxysmal Atrial Fibrillation (no Hx of AC), Bronchoalveolar CA (s/p wedge resection 2013) complicated w/ Metastatic Disease, DM (on Metformin), COPD (on 3L NC; no past intubations), HLD, GERD, HTN, and H/o UTI (last December 2017; UCx pansensitive Citrobacter/Proteus) who presents with Abd pain x 2 weeks.  Pt states 3 weeks prior she developed loss of appetite and diffuse abd discomfort.  She states its not painful, but more of a bloating, "spongy" sensation.  Pt also reports dizziness, recent bilious emesis x 1 week.  Pt states she is on chemotherapy, but she has been taking it for 2 years without similar symptoms.      In the ED, pt presents in no acute distress, and vitals WNL (12 Oct 2018 21:52). Admitted for gastroenteritis. Management conversantly. CT scan of the abdomen: No bowel obstruction.  8 mm nodule right lung base without significant change compared to prior CT chests from 7/10/2018. Refer to CT chest report 7/10/2018. Neoplasm not excluded She has metastatic lung cancer on oral chemo. Advanced diet due to improvement and tolerated successfully. D/C plan ot [facility] assisted living as per attending    Given patient's improved clinical status and current hemodynamic stability, decision was made to discharge.  Please refer to patient's complete medical chart with documents for a full hospital course, for this is only a brief summary.

## 2018-10-15 NOTE — DISCHARGE NOTE ADULT - MEDICATION SUMMARY - MEDICATIONS TO STOP TAKING
I will STOP taking the medications listed below when I get home from the hospital:    Augmentin 875 mg-125 mg oral tablet  -- 875 milligram(s) by mouth 2 times a day   -- Finish all this medication unless otherwise directed by prescriber.  Take with food or milk.    predniSONE 10 mg oral tablet  -- 4 tab(s) by mouth once a day x  2 days, 3 tabs by mouth once a day x 3 days, 2 tabs by mouth once a day x 3 days, 1 tab once a day x 3 days,stop   -- It is very important that you take or use this exactly as directed.  Do not skip doses or discontinue unless directed by your doctor.  Obtain medical advice before taking any non-prescription drugs as some may affect the action of this medication.  Take with food or milk.

## 2018-10-15 NOTE — PROGRESS NOTE ADULT - PROBLEM SELECTOR PLAN 1
Patient came with Abd pain x 3 weeks with assoc nausea and bilious vomiting  -Loss of appetite   -no weight loss  -Lipase WNL  -CT abd neg for acute process  -Likely 2/2 side effect of chemo therapy  -c/w zofran and IV hydration for supportive measures  -Tolerating diet

## 2018-10-16 VITALS
OXYGEN SATURATION: 98 % | TEMPERATURE: 98 F | DIASTOLIC BLOOD PRESSURE: 62 MMHG | HEART RATE: 92 BPM | RESPIRATION RATE: 18 BRPM | SYSTOLIC BLOOD PRESSURE: 151 MMHG

## 2018-10-16 LAB
GLUCOSE BLDC GLUCOMTR-MCNC: 121 MG/DL — HIGH (ref 70–99)
GLUCOSE BLDC GLUCOMTR-MCNC: 162 MG/DL — HIGH (ref 70–99)

## 2018-10-16 PROCEDURE — 87086 URINE CULTURE/COLONY COUNT: CPT

## 2018-10-16 PROCEDURE — 74177 CT ABD & PELVIS W/CONTRAST: CPT

## 2018-10-16 PROCEDURE — 84443 ASSAY THYROID STIM HORMONE: CPT

## 2018-10-16 PROCEDURE — 82962 GLUCOSE BLOOD TEST: CPT

## 2018-10-16 PROCEDURE — 83690 ASSAY OF LIPASE: CPT

## 2018-10-16 PROCEDURE — 96375 TX/PRO/DX INJ NEW DRUG ADDON: CPT

## 2018-10-16 PROCEDURE — 96374 THER/PROPH/DIAG INJ IV PUSH: CPT | Mod: XU

## 2018-10-16 PROCEDURE — 80053 COMPREHEN METABOLIC PANEL: CPT

## 2018-10-16 PROCEDURE — 81001 URINALYSIS AUTO W/SCOPE: CPT

## 2018-10-16 PROCEDURE — 84100 ASSAY OF PHOSPHORUS: CPT

## 2018-10-16 PROCEDURE — 99285 EMERGENCY DEPT VISIT HI MDM: CPT | Mod: 25

## 2018-10-16 PROCEDURE — 80048 BASIC METABOLIC PNL TOTAL CA: CPT

## 2018-10-16 PROCEDURE — 80061 LIPID PANEL: CPT

## 2018-10-16 PROCEDURE — 83735 ASSAY OF MAGNESIUM: CPT

## 2018-10-16 PROCEDURE — 93005 ELECTROCARDIOGRAM TRACING: CPT

## 2018-10-16 PROCEDURE — 83036 HEMOGLOBIN GLYCOSYLATED A1C: CPT

## 2018-10-16 PROCEDURE — 82607 VITAMIN B-12: CPT

## 2018-10-16 PROCEDURE — 85027 COMPLETE CBC AUTOMATED: CPT

## 2018-10-16 PROCEDURE — 94640 AIRWAY INHALATION TREATMENT: CPT

## 2018-10-16 RX ADMIN — LISINOPRIL 5 MILLIGRAM(S): 2.5 TABLET ORAL at 07:57

## 2018-10-16 RX ADMIN — HEPARIN SODIUM 5000 UNIT(S): 5000 INJECTION INTRAVENOUS; SUBCUTANEOUS at 06:34

## 2018-10-16 RX ADMIN — Medication 25 MILLIGRAM(S): at 07:57

## 2018-10-16 RX ADMIN — Medication 81 MILLIGRAM(S): at 11:49

## 2018-10-16 RX ADMIN — Medication 1 TABLET(S): at 11:49

## 2018-10-16 RX ADMIN — Medication 1: at 07:56

## 2018-10-16 RX ADMIN — BUDESONIDE AND FORMOTEROL FUMARATE DIHYDRATE 2 PUFF(S): 160; 4.5 AEROSOL RESPIRATORY (INHALATION) at 11:49

## 2018-10-16 RX ADMIN — Medication 1 MILLIGRAM(S): at 11:49

## 2019-02-16 ENCOUNTER — INPATIENT (INPATIENT)
Facility: HOSPITAL | Age: 74
LOS: 2 days | Discharge: NURSING, REHAB OR LONG-TERM CARE | DRG: 309 | End: 2019-02-19
Attending: INTERNAL MEDICINE | Admitting: INTERNAL MEDICINE
Payer: MEDICAID

## 2019-02-16 VITALS
WEIGHT: 158.07 LBS | RESPIRATION RATE: 17 BRPM | HEART RATE: 147 BPM | HEIGHT: 63 IN | TEMPERATURE: 98 F | OXYGEN SATURATION: 97 % | SYSTOLIC BLOOD PRESSURE: 121 MMHG | DIASTOLIC BLOOD PRESSURE: 53 MMHG

## 2019-02-16 DIAGNOSIS — I48.91 UNSPECIFIED ATRIAL FIBRILLATION: ICD-10-CM

## 2019-02-16 DIAGNOSIS — Z29.9 ENCOUNTER FOR PROPHYLACTIC MEASURES, UNSPECIFIED: ICD-10-CM

## 2019-02-16 DIAGNOSIS — I25.10 ATHEROSCLEROTIC HEART DISEASE OF NATIVE CORONARY ARTERY WITHOUT ANGINA PECTORIS: ICD-10-CM

## 2019-02-16 DIAGNOSIS — I10 ESSENTIAL (PRIMARY) HYPERTENSION: ICD-10-CM

## 2019-02-16 DIAGNOSIS — E11.9 TYPE 2 DIABETES MELLITUS WITHOUT COMPLICATIONS: ICD-10-CM

## 2019-02-16 DIAGNOSIS — C34.90 MALIGNANT NEOPLASM OF UNSPECIFIED PART OF UNSPECIFIED BRONCHUS OR LUNG: ICD-10-CM

## 2019-02-16 DIAGNOSIS — Z98.89 OTHER SPECIFIED POSTPROCEDURAL STATES: Chronic | ICD-10-CM

## 2019-02-16 DIAGNOSIS — I24.9 ACUTE ISCHEMIC HEART DISEASE, UNSPECIFIED: ICD-10-CM

## 2019-02-16 LAB
ALBUMIN SERPL ELPH-MCNC: 3.5 G/DL — SIGNIFICANT CHANGE UP (ref 3.5–5)
ALP SERPL-CCNC: 70 U/L — SIGNIFICANT CHANGE UP (ref 40–120)
ALT FLD-CCNC: 15 U/L DA — SIGNIFICANT CHANGE UP (ref 10–60)
ANION GAP SERPL CALC-SCNC: 10 MMOL/L — SIGNIFICANT CHANGE UP (ref 5–17)
APTT BLD: 34.8 SEC — SIGNIFICANT CHANGE UP (ref 27.5–36.3)
AST SERPL-CCNC: 20 U/L — SIGNIFICANT CHANGE UP (ref 10–40)
BASOPHILS # BLD AUTO: 0.02 K/UL — SIGNIFICANT CHANGE UP (ref 0–0.2)
BASOPHILS NFR BLD AUTO: 0.4 % — SIGNIFICANT CHANGE UP (ref 0–2)
BILIRUB SERPL-MCNC: 0.3 MG/DL — SIGNIFICANT CHANGE UP (ref 0.2–1.2)
BUN SERPL-MCNC: 14 MG/DL — SIGNIFICANT CHANGE UP (ref 7–18)
CALCIUM SERPL-MCNC: 8.4 MG/DL — SIGNIFICANT CHANGE UP (ref 8.4–10.5)
CHLORIDE SERPL-SCNC: 103 MMOL/L — SIGNIFICANT CHANGE UP (ref 96–108)
CK MB BLD-MCNC: 2.2 % — SIGNIFICANT CHANGE UP (ref 0–3.5)
CK MB CFR SERPL CALC: 4.2 NG/ML — HIGH (ref 0–3.6)
CK SERPL-CCNC: 195 U/L — SIGNIFICANT CHANGE UP (ref 21–215)
CO2 SERPL-SCNC: 23 MMOL/L — SIGNIFICANT CHANGE UP (ref 22–31)
CREAT SERPL-MCNC: 1.03 MG/DL — SIGNIFICANT CHANGE UP (ref 0.5–1.3)
EOSINOPHIL # BLD AUTO: 0.1 K/UL — SIGNIFICANT CHANGE UP (ref 0–0.5)
EOSINOPHIL NFR BLD AUTO: 1.8 % — SIGNIFICANT CHANGE UP (ref 0–6)
GLUCOSE SERPL-MCNC: 191 MG/DL — HIGH (ref 70–99)
HCT VFR BLD CALC: 38.8 % — SIGNIFICANT CHANGE UP (ref 34.5–45)
HGB BLD-MCNC: 12 G/DL — SIGNIFICANT CHANGE UP (ref 11.5–15.5)
IMM GRANULOCYTES NFR BLD AUTO: 0.4 % — SIGNIFICANT CHANGE UP (ref 0–1.5)
INR BLD: 0.96 RATIO — SIGNIFICANT CHANGE UP (ref 0.88–1.16)
LYMPHOCYTES # BLD AUTO: 1.56 K/UL — SIGNIFICANT CHANGE UP (ref 1–3.3)
LYMPHOCYTES # BLD AUTO: 27.5 % — SIGNIFICANT CHANGE UP (ref 13–44)
MCHC RBC-ENTMCNC: 28.2 PG — SIGNIFICANT CHANGE UP (ref 27–34)
MCHC RBC-ENTMCNC: 30.9 GM/DL — LOW (ref 32–36)
MCV RBC AUTO: 91.1 FL — SIGNIFICANT CHANGE UP (ref 80–100)
MONOCYTES # BLD AUTO: 0.33 K/UL — SIGNIFICANT CHANGE UP (ref 0–0.9)
MONOCYTES NFR BLD AUTO: 5.8 % — SIGNIFICANT CHANGE UP (ref 2–14)
NEUTROPHILS # BLD AUTO: 3.64 K/UL — SIGNIFICANT CHANGE UP (ref 1.8–7.4)
NEUTROPHILS NFR BLD AUTO: 64.1 % — SIGNIFICANT CHANGE UP (ref 43–77)
NRBC # BLD: 0 /100 WBCS — SIGNIFICANT CHANGE UP (ref 0–0)
PLATELET # BLD AUTO: 173 K/UL — SIGNIFICANT CHANGE UP (ref 150–400)
POTASSIUM SERPL-MCNC: 4.3 MMOL/L — SIGNIFICANT CHANGE UP (ref 3.5–5.3)
POTASSIUM SERPL-SCNC: 4.3 MMOL/L — SIGNIFICANT CHANGE UP (ref 3.5–5.3)
PROT SERPL-MCNC: 6.8 G/DL — SIGNIFICANT CHANGE UP (ref 6–8.3)
PROTHROM AB SERPL-ACNC: 10.6 SEC — SIGNIFICANT CHANGE UP (ref 10–12.9)
RBC # BLD: 4.26 M/UL — SIGNIFICANT CHANGE UP (ref 3.8–5.2)
RBC # FLD: 14.3 % — SIGNIFICANT CHANGE UP (ref 10.3–14.5)
SODIUM SERPL-SCNC: 136 MMOL/L — SIGNIFICANT CHANGE UP (ref 135–145)
TROPONIN I SERPL-MCNC: 0.52 NG/ML — HIGH (ref 0–0.04)
TROPONIN I SERPL-MCNC: <0.015 NG/ML — SIGNIFICANT CHANGE UP (ref 0–0.04)
WBC # BLD: 5.67 K/UL — SIGNIFICANT CHANGE UP (ref 3.8–10.5)
WBC # FLD AUTO: 5.67 K/UL — SIGNIFICANT CHANGE UP (ref 3.8–10.5)

## 2019-02-16 PROCEDURE — 99285 EMERGENCY DEPT VISIT HI MDM: CPT

## 2019-02-16 PROCEDURE — 71045 X-RAY EXAM CHEST 1 VIEW: CPT | Mod: 26

## 2019-02-16 RX ORDER — PANTOPRAZOLE SODIUM 20 MG/1
40 TABLET, DELAYED RELEASE ORAL
Qty: 0 | Refills: 0 | Status: DISCONTINUED | OUTPATIENT
Start: 2019-02-16 | End: 2019-02-19

## 2019-02-16 RX ORDER — LISINOPRIL 2.5 MG/1
5 TABLET ORAL DAILY
Qty: 0 | Refills: 0 | Status: DISCONTINUED | OUTPATIENT
Start: 2019-02-16 | End: 2019-02-19

## 2019-02-16 RX ORDER — ASPIRIN/CALCIUM CARB/MAGNESIUM 324 MG
81 TABLET ORAL DAILY
Qty: 0 | Refills: 0 | Status: DISCONTINUED | OUTPATIENT
Start: 2019-02-16 | End: 2019-02-19

## 2019-02-16 RX ORDER — METOPROLOL TARTRATE 50 MG
25 TABLET ORAL
Qty: 0 | Refills: 0 | Status: DISCONTINUED | OUTPATIENT
Start: 2019-02-16 | End: 2019-02-19

## 2019-02-16 RX ORDER — FOLIC ACID 0.8 MG
1 TABLET ORAL DAILY
Qty: 0 | Refills: 0 | Status: DISCONTINUED | OUTPATIENT
Start: 2019-02-16 | End: 2019-02-19

## 2019-02-16 RX ORDER — LATANOPROST 0.05 MG/ML
1 SOLUTION/ DROPS OPHTHALMIC; TOPICAL AT BEDTIME
Qty: 0 | Refills: 0 | Status: DISCONTINUED | OUTPATIENT
Start: 2019-02-16 | End: 2019-02-19

## 2019-02-16 RX ORDER — ENOXAPARIN SODIUM 100 MG/ML
75 INJECTION SUBCUTANEOUS ONCE
Qty: 0 | Refills: 0 | Status: COMPLETED | OUTPATIENT
Start: 2019-02-16 | End: 2019-02-16

## 2019-02-16 RX ORDER — ATORVASTATIN CALCIUM 80 MG/1
40 TABLET, FILM COATED ORAL AT BEDTIME
Qty: 0 | Refills: 0 | Status: DISCONTINUED | OUTPATIENT
Start: 2019-02-16 | End: 2019-02-19

## 2019-02-16 RX ADMIN — Medication 25 MILLIGRAM(S): at 17:20

## 2019-02-16 RX ADMIN — ATORVASTATIN CALCIUM 40 MILLIGRAM(S): 80 TABLET, FILM COATED ORAL at 23:02

## 2019-02-16 RX ADMIN — ENOXAPARIN SODIUM 75 MILLIGRAM(S): 100 INJECTION SUBCUTANEOUS at 17:20

## 2019-02-16 NOTE — H&P ADULT - PROBLEM SELECTOR PLAN 1
Comes in with Afib with RVR , PAF history in past , not on ac ; however EKG looks more like sinus arrythmia , no afib with rvr   Converted after amiodarone, cardizem *1   CHADSVASC 3  Will give full dose levonox   Will start on lopressor for now   Monitor on tele   T1 negative   Follow up T2, T3   Obtain Echocardiogram   Consult cardio

## 2019-02-16 NOTE — ED PROVIDER NOTE - CRITICAL CARE PROVIDED
interpretation of diagnostic studies/additional history taking/consultation with other physicians/documentation/direct patient care (not related to procedure)

## 2019-02-16 NOTE — ED ADULT TRIAGE NOTE - CHIEF COMPLAINT QUOTE
EMS Notification-palpitations since breakfast ,rapid Afib per ems ,amiodarone 10 mg ivp over 10 min given

## 2019-02-16 NOTE — H&P ADULT - ASSESSMENT
Pt is a 73 F, from [facility], w/ PMHx of CAD, Paroxysmal Atrial Fibrillation (no Hx of AC), 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   In Ed , BP : 121/53 mm hg , Hr : 147 , Temp : 98 F   EMS noticed patient was Afib with rVR in Ed and was given amiodarone and was then rate controlled. Patient was again in RVR in Ed was given Cardizem and then was in sinus and rate controlled.   Cbc WNL  ; Cmp WNL  T1 negative   CXR negative     Will admit to telemetry for Afib with RVR. no

## 2019-02-16 NOTE — ED ADULT NURSE NOTE - NSIMPLEMENTINTERV_GEN_ALL_ED
Implemented All Fall Risk Interventions:  [place] to call system. Call bell, personal items and telephone within reach. Instruct patient to call for assistance. Room bathroom lighting operational. Non-slip footwear when patient is off stretcher. Physically safe environment: no spills, clutter or unnecessary equipment. Stretcher in lowest position, wheels locked, appropriate side rails in place. Provide visual cue, wrist band, yellow gown, etc. Monitor gait and stability. Monitor for mental status changes and reorient to person, place, and time. Review medications for side effects contributing to fall risk. Reinforce activity limits and safety measures with patient and family.

## 2019-02-16 NOTE — H&P ADULT - HISTORY OF PRESENT ILLNESS
Pt is a 73 F, from [facility], w/ PMHx of CAD, Paroxysmal Atrial Fibrillation (no Hx of AC), 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 Pain was left sided, 5/10 in intensity, with no radiation, not altered with coughing or deep inspiration. Patient reports feeling like this before once in her  life and reports that she had a "mini heart attack" at that time. Pain started this morning and was associated with headache and nausea. Also feeling palpitations. No dizziness , no sob , no vomiting , diarrhea , fever or any other symptoms.     In Ed , BP : 121/53 mm hg , Hr : 147 , Temp : 98 F   EMS noticed patient was Afib with rVR in Ed and was given amiodarone and was then rate controlled. Patient was again in RVR in Ed was given Cardizem and then was in sinus and rate controlled.   Cbc WNL  ; Cmp WNL  T1 negative   CXR negative

## 2019-02-16 NOTE — H&P ADULT - NSHPLABSRESULTS_GEN_ALL_CORE
12.0   5.67  )-----------( 173      ( 16 Feb 2019 10:00 )             38.8       02-16    136  |  103  |  14  ----------------------------<  191<H>  4.3   |  23  |  1.03    Ca    8.4      16 Feb 2019 10:00    TPro  6.8  /  Alb  3.5  /  TBili  0.3  /  DBili  x   /  AST  20  /  ALT  15  /  AlkPhos  70  02-16      < from: Xray Chest 1 View-PORTABLE IMMEDIATE (02.16.19 @ 10:12) >      There are no lung consolidations. Chronic lung changes are noted. The   cardiac and mediastinal contours are prominent, which may be due to   magnification from AP technique and shallow inspiration. The osseous   structures are intact.    IMPRESSION:    No lung consolidations.    < end of copied text >

## 2019-02-16 NOTE — ED PROVIDER NOTE - OBJECTIVE STATEMENT
74 y/o F pt w/ PMHx of DM, HTN, Lung CA presents to ED c/o palpitations, dizziness, near syncope x today. EMS was called, on arrival EMS noticed Pt to be in rapid atrial fibrillation. Pt given 150mg Amiodarone IV, minimal improvement PTA. NKDA

## 2019-02-16 NOTE — H&P ADULT - NSHPPHYSICALEXAM_GEN_ALL_CORE
PHYSICAL EXAM:  GENERAL: lying comfortably in bed on nasal canula   HEENT: Normocephalic;  conjunctivae and sclerae clear; moist mucous membranes;   NECK : supple  CHEST/LUNG: Clear to auscultation bilaterally with good air entry   HEART: S1 S2  regular; no murmurs, gallops or rubs  ABDOMEN: Soft, Nontender, Nondistended; Bowel sounds present  EXTREMITIES: no cyanosis; no edema; no calf tenderness  SKIN: warm and dry; no rash  NERVOUS SYSTEM:  Awake and alert; Oriented  to place, person and time ; no new deficits

## 2019-02-16 NOTE — ED ADULT TRIAGE NOTE - HEIGHT IN INCHES
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 All questions answered.  - Patient has elected for proximal leg amputation.  [facility] note appreciated.  - Will cont to follow, will sign off after amputation. 3

## 2019-02-16 NOTE — ED PROVIDER NOTE - CLINICAL SUMMARY MEDICAL DECISION MAKING FREE TEXT BOX
72 y/o F pt w/ rapid atrial fibrillation, 15mg Cardizem, successful rate control, Pt admitted, telemetry spoke with Dr. Charmaine MARTINEZ 72 y/o F pt w/ rapid atrial fibrillation, 15mg Cardizem, successful rate control, Pt admitted, telemetry spoke with MAR. Pt given lovenox SubQ 72 y/o F pt w/ rapid atrial fibrillation, 15mg Cardizem, successful rate control, Pt admitted, telemetry case discussed with MAR. Pt given lovenox SubQ 74 y/o F pt w/ rapid atrial fibrillation, 15mg IV Cardizem, successful rate control, Pt admitted, telemetry case discussed with MAR. Pt given lovenox SubQ

## 2019-02-16 NOTE — ED ADULT TRIAGE NOTE - NS ED TRIAGE AVPU SCALE
Alert-The patient is alert, awake and responds to voice. The patient is oriented to time, place, and person. The triage nurse is able to obtain subjective information.
normal behavior/normal affect/appropriately anxious

## 2019-02-16 NOTE — H&P ADULT - PROBLEM SELECTOR PLAN 7
IMPROVE VTE Individual Risk Assessment          RISK                                                          Points  [  ] Previous VTE                                                3  [  ] Thrombophilia                                             2  [  ] Lower limb paralysis                                   2        (unable to hold up >15 seconds)    [  ] Current Cancer                                             2         (within 6 months)  [ x ] Immobilization > 24 hrs                              1  [  ] ICU/CCU stay > 24 hours                             1  [x  ] Age > 60                                                         1    IMPROVE VTE Score: 2   Full dose Levonox

## 2019-02-16 NOTE — H&P ADULT - PROBLEM SELECTOR PLAN 2
Chest pain , likely from tachycardia   Given risk factors would r/o ACS   T1 negative   Follow up T2 , T3   however EKG looks more like sinus arrythmia , no afib with rvr   Will start on asa, statin , b-blocker  Echo   Cardio consult

## 2019-02-17 LAB
ALBUMIN SERPL ELPH-MCNC: 3.7 G/DL — SIGNIFICANT CHANGE UP (ref 3.5–5)
ALP SERPL-CCNC: 71 U/L — SIGNIFICANT CHANGE UP (ref 40–120)
ALT FLD-CCNC: 18 U/L DA — SIGNIFICANT CHANGE UP (ref 10–60)
ANION GAP SERPL CALC-SCNC: 6 MMOL/L — SIGNIFICANT CHANGE UP (ref 5–17)
AST SERPL-CCNC: 21 U/L — SIGNIFICANT CHANGE UP (ref 10–40)
BASOPHILS # BLD AUTO: 0.03 K/UL — SIGNIFICANT CHANGE UP (ref 0–0.2)
BASOPHILS NFR BLD AUTO: 0.4 % — SIGNIFICANT CHANGE UP (ref 0–2)
BILIRUB SERPL-MCNC: 0.4 MG/DL — SIGNIFICANT CHANGE UP (ref 0.2–1.2)
BUN SERPL-MCNC: 16 MG/DL — SIGNIFICANT CHANGE UP (ref 7–18)
CALCIUM SERPL-MCNC: 9.7 MG/DL — SIGNIFICANT CHANGE UP (ref 8.4–10.5)
CHLORIDE SERPL-SCNC: 104 MMOL/L — SIGNIFICANT CHANGE UP (ref 96–108)
CHOLEST SERPL-MCNC: 197 MG/DL — SIGNIFICANT CHANGE UP (ref 10–199)
CO2 SERPL-SCNC: 29 MMOL/L — SIGNIFICANT CHANGE UP (ref 22–31)
CREAT SERPL-MCNC: 1.07 MG/DL — SIGNIFICANT CHANGE UP (ref 0.5–1.3)
EOSINOPHIL # BLD AUTO: 0.12 K/UL — SIGNIFICANT CHANGE UP (ref 0–0.5)
EOSINOPHIL NFR BLD AUTO: 1.7 % — SIGNIFICANT CHANGE UP (ref 0–6)
GLUCOSE BLDC GLUCOMTR-MCNC: 123 MG/DL — HIGH (ref 70–99)
GLUCOSE BLDC GLUCOMTR-MCNC: 131 MG/DL — HIGH (ref 70–99)
GLUCOSE BLDC GLUCOMTR-MCNC: 91 MG/DL — SIGNIFICANT CHANGE UP (ref 70–99)
GLUCOSE BLDC GLUCOMTR-MCNC: 97 MG/DL — SIGNIFICANT CHANGE UP (ref 70–99)
GLUCOSE SERPL-MCNC: 106 MG/DL — HIGH (ref 70–99)
HBA1C BLD-MCNC: 6.1 % — HIGH (ref 4–5.6)
HCT VFR BLD CALC: 40.1 % — SIGNIFICANT CHANGE UP (ref 34.5–45)
HDLC SERPL-MCNC: 60 MG/DL — SIGNIFICANT CHANGE UP
HGB BLD-MCNC: 12.6 G/DL — SIGNIFICANT CHANGE UP (ref 11.5–15.5)
IMM GRANULOCYTES NFR BLD AUTO: 0.3 % — SIGNIFICANT CHANGE UP (ref 0–1.5)
LIPID PNL WITH DIRECT LDL SERPL: 98 MG/DL — SIGNIFICANT CHANGE UP
LYMPHOCYTES # BLD AUTO: 2.11 K/UL — SIGNIFICANT CHANGE UP (ref 1–3.3)
LYMPHOCYTES # BLD AUTO: 30.3 % — SIGNIFICANT CHANGE UP (ref 13–44)
MAGNESIUM SERPL-MCNC: 1.9 MG/DL — SIGNIFICANT CHANGE UP (ref 1.6–2.6)
MCHC RBC-ENTMCNC: 28.3 PG — SIGNIFICANT CHANGE UP (ref 27–34)
MCHC RBC-ENTMCNC: 31.4 GM/DL — LOW (ref 32–36)
MCV RBC AUTO: 89.9 FL — SIGNIFICANT CHANGE UP (ref 80–100)
MONOCYTES # BLD AUTO: 0.5 K/UL — SIGNIFICANT CHANGE UP (ref 0–0.9)
MONOCYTES NFR BLD AUTO: 7.2 % — SIGNIFICANT CHANGE UP (ref 2–14)
NEUTROPHILS # BLD AUTO: 4.19 K/UL — SIGNIFICANT CHANGE UP (ref 1.8–7.4)
NEUTROPHILS NFR BLD AUTO: 60.1 % — SIGNIFICANT CHANGE UP (ref 43–77)
NRBC # BLD: 0 /100 WBCS — SIGNIFICANT CHANGE UP (ref 0–0)
PHOSPHATE SERPL-MCNC: 3.4 MG/DL — SIGNIFICANT CHANGE UP (ref 2.5–4.5)
PLATELET # BLD AUTO: 235 K/UL — SIGNIFICANT CHANGE UP (ref 150–400)
POTASSIUM SERPL-MCNC: 4.8 MMOL/L — SIGNIFICANT CHANGE UP (ref 3.5–5.3)
POTASSIUM SERPL-SCNC: 4.8 MMOL/L — SIGNIFICANT CHANGE UP (ref 3.5–5.3)
PROT SERPL-MCNC: 7.5 G/DL — SIGNIFICANT CHANGE UP (ref 6–8.3)
RBC # BLD: 4.46 M/UL — SIGNIFICANT CHANGE UP (ref 3.8–5.2)
RBC # FLD: 14.4 % — SIGNIFICANT CHANGE UP (ref 10.3–14.5)
SODIUM SERPL-SCNC: 139 MMOL/L — SIGNIFICANT CHANGE UP (ref 135–145)
TOTAL CHOLESTEROL/HDL RATIO MEASUREMENT: 3.3 RATIO — SIGNIFICANT CHANGE UP (ref 3.3–7.1)
TRIGL SERPL-MCNC: 195 MG/DL — HIGH (ref 10–149)
TROPONIN I SERPL-MCNC: 0.37 NG/ML — HIGH (ref 0–0.04)
TSH SERPL-MCNC: 1.48 UU/ML — SIGNIFICANT CHANGE UP (ref 0.34–4.82)
WBC # BLD: 6.97 K/UL — SIGNIFICANT CHANGE UP (ref 3.8–10.5)
WBC # FLD AUTO: 6.97 K/UL — SIGNIFICANT CHANGE UP (ref 3.8–10.5)

## 2019-02-17 PROCEDURE — 99223 1ST HOSP IP/OBS HIGH 75: CPT

## 2019-02-17 PROCEDURE — 70450 CT HEAD/BRAIN W/O DYE: CPT | Mod: 26

## 2019-02-17 RX ORDER — INSULIN LISPRO 100/ML
VIAL (ML) SUBCUTANEOUS
Qty: 0 | Refills: 0 | Status: DISCONTINUED | OUTPATIENT
Start: 2019-02-17 | End: 2019-02-19

## 2019-02-17 RX ORDER — ACETAMINOPHEN 500 MG
650 TABLET ORAL EVERY 6 HOURS
Qty: 0 | Refills: 0 | Status: DISCONTINUED | OUTPATIENT
Start: 2019-02-17 | End: 2019-02-19

## 2019-02-17 RX ADMIN — Medication 1 TABLET(S): at 11:53

## 2019-02-17 RX ADMIN — Medication 650 MILLIGRAM(S): at 23:00

## 2019-02-17 RX ADMIN — Medication 650 MILLIGRAM(S): at 22:11

## 2019-02-17 RX ADMIN — Medication 81 MILLIGRAM(S): at 11:53

## 2019-02-17 RX ADMIN — PANTOPRAZOLE SODIUM 40 MILLIGRAM(S): 20 TABLET, DELAYED RELEASE ORAL at 06:05

## 2019-02-17 RX ADMIN — Medication 25 MILLIGRAM(S): at 17:14

## 2019-02-17 RX ADMIN — Medication 25 MILLIGRAM(S): at 06:05

## 2019-02-17 RX ADMIN — LISINOPRIL 5 MILLIGRAM(S): 2.5 TABLET ORAL at 06:05

## 2019-02-17 RX ADMIN — Medication 1 MILLIGRAM(S): at 11:53

## 2019-02-17 RX ADMIN — ATORVASTATIN CALCIUM 40 MILLIGRAM(S): 80 TABLET, FILM COATED ORAL at 22:09

## 2019-02-17 NOTE — PROGRESS NOTE ADULT - SUBJECTIVE AND OBJECTIVE BOX
SUBJECTIVE / OVERNIGHT EVENTS: pt denies chest pain , c/o head ache with ringing in rt ear new from before      MEDICATIONS  (STANDING):  aspirin enteric coated 81 milliGRAM(s) Oral daily  atorvastatin 40 milliGRAM(s) Oral at bedtime  folic acid 1 milliGRAM(s) Oral daily  latanoprost 0.005% Ophthalmic Solution 1 Drop(s) Both EYES at bedtime  lisinopril 5 milliGRAM(s) Oral daily  metoprolol tartrate 25 milliGRAM(s) Oral two times a day  multivitamin 1 Tablet(s) Oral daily  pantoprazole    Tablet 40 milliGRAM(s) Oral before breakfast    MEDICATIONS  (PRN):  ]Vital Signs Last 24 Hrs  T(C): 36.6 (17 Feb 2019 05:15), Max: 36.8 (16 Feb 2019 15:44)  T(F): 97.8 (17 Feb 2019 05:15), Max: 98.2 (16 Feb 2019 15:44)  HR: 64 (17 Feb 2019 05:15) (62 - 70)  BP: 146/53 (17 Feb 2019 05:15) (128/55 - 169/57)  BP(mean): --  RR: 18 (17 Feb 2019 05:15) (14 - 18)  SpO2: 95% (17 Feb 2019 05:15) (95% - 100%)      CAPILLARY BLOOD GLUCOSE      POCT Blood Glucose.: 97 mg/dL (17 Feb 2019 08:07)    I&O's Summary      Constitutional: No fever, fatigue  Skin: No rash.  Eyes: No recent vision problems or eye pain.  ENT: ringing in ears +  Cardiovascular: No chest pain or palpation.  Respiratory: No cough, shortness of breath, congestion, or wheezing.  Gastrointestinal: No abdominal pain, nausea, vomiting, or diarrhea.  Genitourinary: No dysuria.  Musculoskeletal: No joint swelling.  Neurologic: ha+    PHYSICAL EXAM:  GENERAL: NAD  EYES: EOMI, PERRLA  NECK: Supple, No JVD  CHEST/LUNG: cta dolly   HEART:  S1 , S2 +  ABDOMEN: soft , bs+  EXTREMITIES:  trace edema  NEUROLOGY:alert awake oriented    no neck stiffness      LABS:                        12.6   6.97  )-----------( 235      ( 17 Feb 2019 08:09 )             40.1     02-17    139  |  104  |  16  ----------------------------<  106<H>  4.8   |  29  |  1.07    Ca    9.7      17 Feb 2019 08:09  Phos  3.4     02-17  Mg     1.9     02-17    TPro  7.5  /  Alb  3.7  /  TBili  0.4  /  DBili  x   /  AST  21  /  ALT  18  /  AlkPhos  71  02-17    PT/INR - ( 16 Feb 2019 10:00 )   PT: 10.6 sec;   INR: 0.96 ratio         PTT - ( 16 Feb 2019 10:00 )  PTT:34.8 sec  CARDIAC MARKERS ( 17 Feb 2019 00:55 )  0.373 ng/mL / x     / x     / x     / x      CARDIAC MARKERS ( 16 Feb 2019 17:39 )  0.515 ng/mL / x     / 195 U/L / x     / 4.2 ng/mL  CARDIAC MARKERS ( 16 Feb 2019 10:00 )  <0.015 ng/mL / x     / x     / x     / x              RADIOLOGY & ADDITIONAL TESTS:    Imaging Personally Reviewed:    Consultant(s) Notes Reviewed:      Care Discussed with Consultants/Other Providers:

## 2019-02-17 NOTE — CONSULT NOTE ADULT - SUBJECTIVE AND OBJECTIVE BOX
2-week history of sharp frontal headache, 6/10, sometimes radiating up to crown, has waxed and waned and has responded to acetaminophen and rest  Right ear "whooshing" sound persistent over past 2 weeks  Exam: Diminished hearing on right    Recs:  CTA Head/Neck to evaluate for aneurysm/stenosis, with adequate hydration (GFR is 51 - CTA can typically be done for GFR > 40 as long as there is hydration)  BP control: Control to within 120/80  Provide nasal spray for sinus congestion - may be contributing to headaches  For chronic b/l foot numbness, check the following neuropathy labs: Vit B12, Folate, YEIMY, ESR, CRP, SPEP (HbA1c is high at 6.1%, may be contributing to peripheral neuropathy)        NOTE TO BE COMPLETED    Neurology Consult    Patient is a 73y old  Female who presents with a chief complaint of chest pain (17 Feb 2019 10:18)      HPI:  Pt is a 73 F, from [facility], w/ PMHx of CAD, Paroxysmal Atrial Fibrillation (no Hx of AC), 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 Pain was left sided, 5/10 in intensity, with no radiation, not altered with coughing or deep inspiration. Patient reports feeling like this before once in her  life and reports that she had a "mini heart attack" at that time. Pain started this morning and was associated with headache and nausea. Also feeling palpitations. No dizziness , no sob , no vomiting , diarrhea , fever or any other symptoms.     In Ed , BP : 121/53 mm hg , Hr : 147 , Temp : 98 F   EMS noticed patient was Afib with rVR in Ed and was given amiodarone and was then rate controlled. Patient was again in RVR in Ed was given Cardizem and then was in sinus and rate controlled.   Cbc WNL  ; Cmp WNL  T1 negative   CXR negative (16 Feb 2019 12:42)      PAST MEDICAL & SURGICAL HISTORY:  Lung cancer: wedge resection of left lung 2013  Seborrheic dermatitis  OA (osteoarthritis)  Hypercholesteremia  HTN (hypertension)  GERD (gastroesophageal reflux disease)  Constipation  DM (diabetes mellitus)  Cataract  CAD (coronary artery disease)  Bronchoalveolar carcinoma  ACS (acute coronary syndrome)  COPD (chronic obstructive pulmonary disease)  S/P ovarian cystectomy      FAMILY HISTORY:  Family history of prostate cancer (Father)      Social History: (-) x 3    Allergies    No Known Allergies    Intolerances        MEDICATIONS  (STANDING):  aspirin enteric coated 81 milliGRAM(s) Oral daily  atorvastatin 40 milliGRAM(s) Oral at bedtime  folic acid 1 milliGRAM(s) Oral daily  insulin lispro (HumaLOG) corrective regimen sliding scale   SubCutaneous Before meals and at bedtime  latanoprost 0.005% Ophthalmic Solution 1 Drop(s) Both EYES at bedtime  lisinopril 5 milliGRAM(s) Oral daily  metoprolol tartrate 25 milliGRAM(s) Oral two times a day  multivitamin 1 Tablet(s) Oral daily  pantoprazole    Tablet 40 milliGRAM(s) Oral before breakfast    MEDICATIONS  (PRN):  acetaminophen   Tablet .. 650 milliGRAM(s) Oral every 6 hours PRN Mild Pain (1 - 3), Moderate Pain (4 - 6)      Review of systems:    Constitutional: No fever, weight loss or fatigue    Eyes: No eye pain or discharge  ENMT:  No difficulty hearing; No sinus or throat pain  Neck: No pain or stiffness  Respiratory: No cough, wheezing, chills or hemoptysis  Cardiovascular: No chest pain, palpitations, shortness of breath, dyspnea on exertion  Gastrointestinal: No abdominal pain, nausea, vomiting or hematemesis; No diarrhea or constipation.   Genitourinary: No dysuria, frequency, hematuria or incontinence  Neurological: As per HPI  Skin: No rashes or lesions   Endocrine: No heat or cold intolerance; No hair loss  Musculoskeletal: No joint pain or swelling  Psychiatric: No depression, anxiety, mood swings  Heme/Lymph: No easy bruising or bleeding gums    Vital Signs Last 24 Hrs  T(C): 36.5 (17 Feb 2019 21:36), Max: 36.7 (17 Feb 2019 10:35)  T(F): 97.7 (17 Feb 2019 21:36), Max: 98.1 (17 Feb 2019 10:35)  HR: 68 (17 Feb 2019 21:36) (64 - 78)  BP: 153/68 (17 Feb 2019 21:36) (138/55 - 157/62)  BP(mean): --  RR: 18 (17 Feb 2019 21:36) (18 - 18)  SpO2: 96% (17 Feb 2019 21:36) (95% - 99%)    Neurologic Examination:  General:  Appearance is consistent with chronologic age.  No abnormal facies.   General: The patient is oriented to person, place, time and date.  Recent and remote memory intact.  Fund of knowledge is intact and normal.  Language with normal repetition, comprehension and naming.  Nondysarthric.    Cranial nerves: intact VA, VFF.  EOMI w/o nystagmus, skew or reported double vision.  PERRL.  No ptosis/weakness of eyelid closure.  Facial sensation is normal with normal bite.  No facial asymmetry.  Hearing grossly intact b/l.  Palate elevates midline.  Tongue midline.  Motor examination:   Normal tone, bulk and range of motion.  No tenderness, twitching, tremors or involuntary movements.  Formal Muscle Strength Testing: (MRC grade R/L) 5/5 UE; 5/5 LE.  No observable drift.  Reflexes:   2+ b/l pectoralis, biceps, triceps, brachioradialis, patella and Achilles.  Plantar response downgoing b/l.  Jaw jerk, Sue, clonus absent.  Sensory examination:   Intact to light touch and pinprick, pain, temperature and proprioception and vibration in all extremities.  Cerebellum:   FTN/HKS intact with normal LENNIE in all limbs.  No dysmetria or dysdiadokinesia.  Gait narrow based and normal.    Labs:   CBC Full  -  ( 17 Feb 2019 08:09 )  WBC Count : 6.97 K/uL  Hemoglobin : 12.6 g/dL  Hematocrit : 40.1 %  Platelet Count - Automated : 235 K/uL  Mean Cell Volume : 89.9 fl  Mean Cell Hemoglobin : 28.3 pg  Mean Cell Hemoglobin Concentration : 31.4 gm/dL  Auto Neutrophil # : 4.19 K/uL  Auto Lymphocyte # : 2.11 K/uL  Auto Monocyte # : 0.50 K/uL  Auto Eosinophil # : 0.12 K/uL  Auto Basophil # : 0.03 K/uL  Auto Neutrophil % : 60.1 %  Auto Lymphocyte % : 30.3 %  Auto Monocyte % : 7.2 %  Auto Eosinophil % : 1.7 %  Auto Basophil % : 0.4 %    02-17    139  |  104  |  16  ----------------------------<  106<H>  4.8   |  29  |  1.07    Ca    9.7      17 Feb 2019 08:09  Phos  3.4     02-17  Mg     1.9     02-17    TPro  7.5  /  Alb  3.7  /  TBili  0.4  /  DBili  x   /  AST  21  /  ALT  18  /  AlkPhos  71  02-17    LIVER FUNCTIONS - ( 17 Feb 2019 08:09 )  Alb: 3.7 g/dL / Pro: 7.5 g/dL / ALK PHOS: 71 U/L / ALT: 18 U/L DA / AST: 21 U/L / GGT: x           PT/INR - ( 16 Feb 2019 10:00 )   PT: 10.6 sec;   INR: 0.96 ratio         PTT - ( 16 Feb 2019 10:00 )  PTT:34.8 sec        Neuroimaging:  NCHCT: CT Head No Cont:   ******PRELIMINARY REPORT******    ******PRELIMINARY REPORT******          EXAM:  CT BRAIN                            PROCEDURE DATE:  02/17/2019    ******PRELIMINARY REPORT******    ******PRELIMINARY REPORT******              INTERPRETATION:  [facility] RADIOLOGIST PRELIMINARY REPORT    EXAM:    CT Head Without Contrast     EXAM DATE/TIME:    2/17/2019 7:50 PM     CLINICAL HISTORY:    73 years old, female; Signs and symptoms; Other: HA, weakness;   Additional   info: PT. Was admitted for atrial fibrillation. Also having headache and   feeling light headed. PT. Is located on [address] [place] [address]. [phone]     TECHNIQUE:    Axial computed tomography images of the head/brain without contrast.    All CT scans at this facility use at least one of these dose   optimization   techniques: automated exposure control; mA and/or kV adjustment per   patient   size (includes targeted exams where dose is matched to clinical   indication); or   iterative reconstruction.    Coronal and sagittal reformatted images were created and reviewed.     COMPARISON:    No relevant prior studies available.     FINDINGS:    Brain:  Mild periventricular white matter hypoattenuation most likely   reflects   chronic small vessel ischemic change.    Ventricles:  Mild cerebral atrophy with compensatory dilation of the   ventricles.    Bones/joints: Unremarkable. No acute fracture.    Sinuses: Visualized sinuses are unremarkable. No acute sinusitis.    Mastoid air cells: Visualized mastoid air cells are unremarkable. No   mastoid   effusion.    Soft tissues: Unremarkable.     IMPRESSION:   No acute intracranial findings.          ******PRELIMINARY REPORT******    ******PRELIMINARY REPORT******              ZAK SALOMON                (02-17-19 @ 19:58)    CT Angiography/Perfusion:  MRI Brain NC:  MRA Head/Neck:  EEG:    Assessment:  This is a 73y Female with h/o     Plan:   -   02-17-19 @ 23:04          Please contact the Neurology consult service with any questions.    Eddie Ramos MD  Neurology Attending  [facility] 2-week history of sharp frontal headache, 6/10, sometimes radiating up to crown, has waxed and waned and has responded to acetaminophen and rest  Right ear "whooshing" sound persistent over past 2 weeks  Episodes of dizziness over the past year  Exam: Diminished hearing on right, Mild nystagmus on rightward gaze, Negative [place]-Hallpike b/l    Recs:  - CTA Head/Neck to evaluate for aneurysm/stenosis, with adequate hydration (GFR is 51 - CTA can typically be done for GFR > 40 as long as there is hydration)  - BP control: Control to within 120/80  - Alternative diagnosis: Meniere disease (has triad of episodic vertigo, sensorineural hearing loss, and tinnitus) - Use Meclizine 12.5mg TID PRN dizziness/vertigo, and consider adding a standing antihypertensive that also helps treat Meniere disease (e.g., triamterene/hydrochlorothiazide)  - Provide nasal spray for sinus congestion - may be contributing to headaches  - For chronic b/l foot numbness, check the following neuropathy labs: Vit B12, Folate, YEIMY, ESR, CRP, SPEP (HbA1c is high at 6.1%, may be contributing to peripheral neuropathy)      NOTE TO BE COMPLETED    Neurology Consult    Patient is a 73y old  Female who presents with a chief complaint of chest pain (17 Feb 2019 10:18)      HPI:  Pt is a 73 F, from [facility], w/ PMHx of CAD, Paroxysmal Atrial Fibrillation (no Hx of AC), 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 Pain was left sided, 5/10 in intensity, with no radiation, not altered with coughing or deep inspiration. Patient reports feeling like this before once in her  life and reports that she had a "mini heart attack" at that time. Pain started this morning and was associated with headache and nausea. Also feeling palpitations. No dizziness , no sob , no vomiting , diarrhea , fever or any other symptoms.     In Ed , BP : 121/53 mm hg , Hr : 147 , Temp : 98 F   EMS noticed patient was Afib with rVR in Ed and was given amiodarone and was then rate controlled. Patient was again in RVR in Ed was given Cardizem and then was in sinus and rate controlled.   Cbc WNL  ; Cmp WNL  T1 negative   CXR negative (16 Feb 2019 12:42)      PAST MEDICAL & SURGICAL HISTORY:  Lung cancer: wedge resection of left lung 2013  Seborrheic dermatitis  OA (osteoarthritis)  Hypercholesteremia  HTN (hypertension)  GERD (gastroesophageal reflux disease)  Constipation  DM (diabetes mellitus)  Cataract  CAD (coronary artery disease)  Bronchoalveolar carcinoma  ACS (acute coronary syndrome)  COPD (chronic obstructive pulmonary disease)  S/P ovarian cystectomy      FAMILY HISTORY:  Family history of prostate cancer (Father)      Social History: (-) x 3    Allergies    No Known Allergies    Intolerances        MEDICATIONS  (STANDING):  aspirin enteric coated 81 milliGRAM(s) Oral daily  atorvastatin 40 milliGRAM(s) Oral at bedtime  folic acid 1 milliGRAM(s) Oral daily  insulin lispro (HumaLOG) corrective regimen sliding scale   SubCutaneous Before meals and at bedtime  latanoprost 0.005% Ophthalmic Solution 1 Drop(s) Both EYES at bedtime  lisinopril 5 milliGRAM(s) Oral daily  metoprolol tartrate 25 milliGRAM(s) Oral two times a day  multivitamin 1 Tablet(s) Oral daily  pantoprazole    Tablet 40 milliGRAM(s) Oral before breakfast    MEDICATIONS  (PRN):  acetaminophen   Tablet .. 650 milliGRAM(s) Oral every 6 hours PRN Mild Pain (1 - 3), Moderate Pain (4 - 6)      Review of systems:    Constitutional: No fever, weight loss or fatigue    Eyes: No eye pain or discharge  ENMT:  No difficulty hearing; No sinus or throat pain  Neck: No pain or stiffness  Respiratory: No cough, wheezing, chills or hemoptysis  Cardiovascular: No chest pain, palpitations, shortness of breath, dyspnea on exertion  Gastrointestinal: No abdominal pain, nausea, vomiting or hematemesis; No diarrhea or constipation.   Genitourinary: No dysuria, frequency, hematuria or incontinence  Neurological: As per HPI  Skin: No rashes or lesions   Endocrine: No heat or cold intolerance; No hair loss  Musculoskeletal: No joint pain or swelling  Psychiatric: No depression, anxiety, mood swings  Heme/Lymph: No easy bruising or bleeding gums    Vital Signs Last 24 Hrs  T(C): 36.5 (17 Feb 2019 21:36), Max: 36.7 (17 Feb 2019 10:35)  T(F): 97.7 (17 Feb 2019 21:36), Max: 98.1 (17 Feb 2019 10:35)  HR: 68 (17 Feb 2019 21:36) (64 - 78)  BP: 153/68 (17 Feb 2019 21:36) (138/55 - 157/62)  BP(mean): --  RR: 18 (17 Feb 2019 21:36) (18 - 18)  SpO2: 96% (17 Feb 2019 21:36) (95% - 99%)    Neurologic Examination:  General:  Appearance is consistent with chronologic age.  No abnormal facies.   General: The patient is oriented to person, place, time and date.  Recent and remote memory intact.  Fund of knowledge is intact and normal.  Language with normal repetition, comprehension and naming.  Nondysarthric.    Cranial nerves: intact VA, VFF.  EOMI w/o nystagmus, skew or reported double vision.  PERRL.  No ptosis/weakness of eyelid closure.  Facial sensation is normal with normal bite.  No facial asymmetry.  Hearing grossly intact b/l.  Palate elevates midline.  Tongue midline.  Motor examination:   Normal tone, bulk and range of motion.  No tenderness, twitching, tremors or involuntary movements.  Formal Muscle Strength Testing: (MRC grade R/L) 5/5 UE; 5/5 LE.  No observable drift.  Reflexes:   2+ b/l pectoralis, biceps, triceps, brachioradialis, patella and Achilles.  Plantar response downgoing b/l.  Jaw jerk, Sue, clonus absent.  Sensory examination:   Intact to light touch and pinprick, pain, temperature and proprioception and vibration in all extremities.  Cerebellum:   FTN/HKS intact with normal LENNIE in all limbs.  No dysmetria or dysdiadokinesia.  Gait narrow based and normal.    Labs:   CBC Full  -  ( 17 Feb 2019 08:09 )  WBC Count : 6.97 K/uL  Hemoglobin : 12.6 g/dL  Hematocrit : 40.1 %  Platelet Count - Automated : 235 K/uL  Mean Cell Volume : 89.9 fl  Mean Cell Hemoglobin : 28.3 pg  Mean Cell Hemoglobin Concentration : 31.4 gm/dL  Auto Neutrophil # : 4.19 K/uL  Auto Lymphocyte # : 2.11 K/uL  Auto Monocyte # : 0.50 K/uL  Auto Eosinophil # : 0.12 K/uL  Auto Basophil # : 0.03 K/uL  Auto Neutrophil % : 60.1 %  Auto Lymphocyte % : 30.3 %  Auto Monocyte % : 7.2 %  Auto Eosinophil % : 1.7 %  Auto Basophil % : 0.4 %    02-17    139  |  104  |  16  ----------------------------<  106<H>  4.8   |  29  |  1.07    Ca    9.7      17 Feb 2019 08:09  Phos  3.4     02-17  Mg     1.9     02-17    TPro  7.5  /  Alb  3.7  /  TBili  0.4  /  DBili  x   /  AST  21  /  ALT  18  /  AlkPhos  71  02-17    LIVER FUNCTIONS - ( 17 Feb 2019 08:09 )  Alb: 3.7 g/dL / Pro: 7.5 g/dL / ALK PHOS: 71 U/L / ALT: 18 U/L DA / AST: 21 U/L / GGT: x           PT/INR - ( 16 Feb 2019 10:00 )   PT: 10.6 sec;   INR: 0.96 ratio         PTT - ( 16 Feb 2019 10:00 )  PTT:34.8 sec        Neuroimaging:  NCHCT: CT Head No Cont:   ******PRELIMINARY REPORT******    ******PRELIMINARY REPORT******          EXAM:  CT BRAIN                            PROCEDURE DATE:  02/17/2019    ******PRELIMINARY REPORT******    ******PRELIMINARY REPORT******              INTERPRETATION:  [facility] RADIOLOGIST PRELIMINARY REPORT    EXAM:    CT Head Without Contrast     EXAM DATE/TIME:    2/17/2019 7:50 PM     CLINICAL HISTORY:    73 years old, female; Signs and symptoms; Other: HA, weakness;   Additional   info: PT. Was admitted for atrial fibrillation. Also having headache and   feeling light headed. [address] [phone]     TECHNIQUE:    Axial computed tomography images of the head/brain without contrast.    All CT scans at this facility use at least one of these dose   optimization   techniques: automated exposure control; mA and/or kV adjustment per   patient   size (includes targeted exams where dose is matched to clinical   indication); or   iterative reconstruction.    Coronal and sagittal reformatted images were created and reviewed.     COMPARISON:    No relevant prior studies available.     FINDINGS:    Brain:  Mild periventricular white matter hypoattenuation most likely   reflects   chronic small vessel ischemic change.    Ventricles:  Mild cerebral atrophy with compensatory dilation of the   ventricles.    Bones/joints: Unremarkable. No acute fracture.    Sinuses: Visualized sinuses are unremarkable. No acute sinusitis.    Mastoid air cells: Visualized mastoid air cells are unremarkable. No   mastoid   effusion.    Soft tissues: Unremarkable.     IMPRESSION:   No acute intracranial findings.          ******PRELIMINARY REPORT******    ******PRELIMINARY REPORT******              ZAK SALOMON                (02-17-19 @ 19:58)    CT Angiography/Perfusion:  MRI Brain NC:  MRA Head/Neck:  EEG:    Assessment:  This is a 73y Female with h/o     Plan:   -   02-17-19 @ 23:04          Please contact the Neurology consult service with any questions.    Eddie Ramos MD  Neurology Attending  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 Pain was left sided, 5/10 in intensity, with no radiation, not altered with coughing or deep inspiration. Patient reports feeling like this before once in her  life and reports that she had a "mini heart attack" at that time. Pain started this morning and was associated with headache and nausea. Also feeling palpitations. No dizziness , no sob , no vomiting , diarrhea , fever or any other symptoms.     The patient endorses a 2-week history of sharp frontal headache, rated 6/10, sometimes radiating up to crown, which has waxed and waned. It has improved with acetaminophen and with rest. She endorses a "whooshing" sound in her right ear over the same duration. She also reports episodes of dizziness over the past year. There is no nausea or vomiting associated with headaches, although she does have some sensitivity to light during headaches. She says that she has become more sensitive to loud sounds in her assisted living facility over the past two years, but this is not directly related to her headache.    PAST MEDICAL & SURGICAL HISTORY:  Lung cancer: wedge resection of left lung 2013  Seborrheic dermatitis  OA (osteoarthritis)  Hypercholesteremia  HTN (hypertension)  GERD (gastroesophageal reflux disease)  Constipation  DM (diabetes mellitus)  Cataract  CAD (coronary artery disease)  Bronchoalveolar carcinoma  ACS (acute coronary syndrome)  COPD (chronic obstructive pulmonary disease)  S/P ovarian cystectomy    FAMILY HISTORY:  Family history of prostate cancer (Father)    Social History: (-) x 3    Allergies  No Known Allergies    MEDICATIONS  (STANDING):  aspirin enteric coated 81 milliGRAM(s) Oral daily  atorvastatin 40 milliGRAM(s) Oral at bedtime  folic acid 1 milliGRAM(s) Oral daily  insulin lispro (HumaLOG) corrective regimen sliding scale   SubCutaneous Before meals and at bedtime  latanoprost 0.005% Ophthalmic Solution 1 Drop(s) Both EYES at bedtime  lisinopril 5 milliGRAM(s) Oral daily  metoprolol tartrate 25 milliGRAM(s) Oral two times a day  multivitamin 1 Tablet(s) Oral daily  pantoprazole    Tablet 40 milliGRAM(s) Oral before breakfast    MEDICATIONS  (PRN):  acetaminophen   Tablet .. 650 milliGRAM(s) Oral every 6 hours PRN Mild Pain (1 - 3), Moderate Pain (4 - 6)    Review of systems:    Constitutional: No fever, weight loss or fatigue    Eyes: No eye discharge  ENMT:  Hearing changes as in HPI; No sinus or throat pain  Neck: No pain or stiffness  Respiratory: No cough, wheezing, chills or hemoptysis  Cardiovascular: No chest pain, palpitations, shortness of breath, dyspnea on exertion  Gastrointestinal: No abdominal pain, nausea, vomiting or hematemesis; No diarrhea or constipation.   Genitourinary: No dysuria, frequency, hematuria or incontinence  Neurological: As per HPI  Skin: No rashes or lesions   Endocrine: No heat or cold intolerance; No hair loss  Musculoskeletal: No joint pain or swelling  Psychiatric: No depression, anxiety, mood swings  Heme/Lymph: No easy bruising or bleeding      Vital Signs Last 24 Hrs  T(C): 36.5 (17 Feb 2019 21:36), Max: 36.7 (17 Feb 2019 10:35)  T(F): 97.7 (17 Feb 2019 21:36), Max: 98.1 (17 Feb 2019 10:35)  HR: 68 (17 Feb 2019 21:36) (64 - 78)  BP: 153/68 (17 Feb 2019 21:36) (138/55 - 157/62)  RR: 18 (17 Feb 2019 21:36) (18 - 18)  SpO2: 96% (17 Feb 2019 21:36) (95% - 99%)    General Exam:  General: No acute distress  Respiratory: CTAB/l.  No crackles, rhonchi, or wheezes.  Cardiovascular: RRR, No murmurs, Full b/l radial and pedal pulses    Neurological Exam:  General / Mental Status: Oriented to person, place, and time.  No dysarthria or aphasia present.  Naming and repetition intact.  Cranial Nerves: PERRLA, EOMI x 2, VFF x 4, No diplopia.  Mild nystagmus on rightward gaze, but not on leftward gaze.  B/l V1-V3 equal to light touch and pinprick.  Symmetric facial movement and palate elevation.  Hearing to finger rub diminished on right, intact on left.  Negative [place]-Hallpike b/l.  5/5 strength with b/l sternocleidomastoid and trapezius.  Midline tongue protrusion with no atrophy or fasciculations.  Motor: Normal bulk and tone in all four extremities.  5/5 strength throughout all four extremities.  No downward drift, rigidity, spasticity, or tremors in any of the four extremities.  Sensation: Diminished to light touch and pinprick in b/l lower extremities distal to the knees, including b/l feet. Intact to light touch and pinprick in b/l upper extremities, as well as b/l lower extremities proximal to the knees.  Coordination: No dysmetria with b/l finger-to-nose and heel-to-shin tests.  Normal rapid alternating movements b/l.  Reflexes: 2+ and symmetric at b/l biceps, triceps, brachioradialis, patellae, and ankles.  Toes flexor b/l.  Gait and Romberg deferred per patient request.      Labs:   CBC Full  -  ( 17 Feb 2019 08:09 )  WBC Count : 6.97 K/uL  Hemoglobin : 12.6 g/dL  Hematocrit : 40.1 %  Platelet Count - Automated : 235 K/uL  Mean Cell Volume : 89.9 fl  Mean Cell Hemoglobin : 28.3 pg  Mean Cell Hemoglobin Concentration : 31.4 gm/dL  Auto Neutrophil # : 4.19 K/uL  Auto Lymphocyte # : 2.11 K/uL  Auto Monocyte # : 0.50 K/uL  Auto Eosinophil # : 0.12 K/uL  Auto Basophil # : 0.03 K/uL  Auto Neutrophil % : 60.1 %  Auto Lymphocyte % : 30.3 %  Auto Monocyte % : 7.2 %  Auto Eosinophil % : 1.7 %  Auto Basophil % : 0.4 %    02-17    139  |  104  |  16  ----------------------------<  106<H>  4.8   |  29  |  1.07    Ca    9.7      17 Feb 2019 08:09  Phos  3.4     02-17  Mg     1.9     02-17    TPro  7.5  /  Alb  3.7  /  TBili  0.4  /  DBili  x   /  AST  21  /  ALT  18  /  AlkPhos  71  02-17    PT/INR - ( 16 Feb 2019 10:00 )   PT: 10.6 sec;   INR: 0.96 ratio    PTT - ( 16 Feb 2019 10:00 )  PTT:34.8 sec        Neuroimaging:    CT BRAIN (02/17/2019): No acute intracranial abnormality      Assessment:  73 RHF with new frontal headache, improving, and right ear tinnitus, which may be secondary to hypertension, vascular abnormality, or Meniere disease.      Recommendations:    1. CT Angiogram Head and Neck to evaluate for aneurysm/stenosis, with adequate hydration (GFR is 51 - CTA can typically be done for GFR > 40 with appropriate hydration)    2. Hypertension: Control BP to within 120/80    3. The may meet criteria for Meniere disease, with the triad of episodic vertigo, sensorineural hearing loss, and tinnitus (although it is not certain if her "dizziness" is true vertigo).  - Treat with Meclizine 12.5mg TID PRN dizziness/vertigo.  - Consider adding a standing antihypertensive that also helps treat Meniere disease (e.g., triamterene/hydrochlorothiazide).    4. Provide nasal spray for sinus congestion, which may be contributing to headaches (patient reports benefit from using "nasal drops" at home prior to hospitalization).    5. For chronic b/l foot numbness, check the following neuropathy labs: Vit B12, Folate, YEIMY, ESR, CRP, SPEP (HbA1c is high at 6.1%, may be contributing to peripheral neuropathy).      Please contact the Neurology consult service with any questions.    Eddie Ramos MD  Neurology Attending  [facility]

## 2019-02-18 LAB
ALBUMIN SERPL ELPH-MCNC: 3.3 G/DL — LOW (ref 3.5–5)
ALP SERPL-CCNC: 64 U/L — SIGNIFICANT CHANGE UP (ref 40–120)
ALT FLD-CCNC: 16 U/L DA — SIGNIFICANT CHANGE UP (ref 10–60)
ANION GAP SERPL CALC-SCNC: 5 MMOL/L — SIGNIFICANT CHANGE UP (ref 5–17)
AST SERPL-CCNC: 14 U/L — SIGNIFICANT CHANGE UP (ref 10–40)
BASOPHILS # BLD AUTO: 0.02 K/UL — SIGNIFICANT CHANGE UP (ref 0–0.2)
BASOPHILS NFR BLD AUTO: 0.3 % — SIGNIFICANT CHANGE UP (ref 0–2)
BILIRUB SERPL-MCNC: 0.3 MG/DL — SIGNIFICANT CHANGE UP (ref 0.2–1.2)
BUN SERPL-MCNC: 22 MG/DL — HIGH (ref 7–18)
CALCIUM SERPL-MCNC: 9.4 MG/DL — SIGNIFICANT CHANGE UP (ref 8.4–10.5)
CHLORIDE SERPL-SCNC: 105 MMOL/L — SIGNIFICANT CHANGE UP (ref 96–108)
CO2 SERPL-SCNC: 29 MMOL/L — SIGNIFICANT CHANGE UP (ref 22–31)
CREAT SERPL-MCNC: 1.06 MG/DL — SIGNIFICANT CHANGE UP (ref 0.5–1.3)
EOSINOPHIL # BLD AUTO: 0.17 K/UL — SIGNIFICANT CHANGE UP (ref 0–0.5)
EOSINOPHIL NFR BLD AUTO: 2.9 % — SIGNIFICANT CHANGE UP (ref 0–6)
ERYTHROCYTE [SEDIMENTATION RATE] IN BLOOD: 12 MM/HR — SIGNIFICANT CHANGE UP (ref 0–20)
GLUCOSE BLDC GLUCOMTR-MCNC: 104 MG/DL — HIGH (ref 70–99)
GLUCOSE BLDC GLUCOMTR-MCNC: 105 MG/DL — HIGH (ref 70–99)
GLUCOSE BLDC GLUCOMTR-MCNC: 125 MG/DL — HIGH (ref 70–99)
GLUCOSE BLDC GLUCOMTR-MCNC: 154 MG/DL — HIGH (ref 70–99)
GLUCOSE SERPL-MCNC: 107 MG/DL — HIGH (ref 70–99)
HCT VFR BLD CALC: 36.3 % — SIGNIFICANT CHANGE UP (ref 34.5–45)
HGB BLD-MCNC: 11.3 G/DL — LOW (ref 11.5–15.5)
IMM GRANULOCYTES NFR BLD AUTO: 0.2 % — SIGNIFICANT CHANGE UP (ref 0–1.5)
LYMPHOCYTES # BLD AUTO: 1.67 K/UL — SIGNIFICANT CHANGE UP (ref 1–3.3)
LYMPHOCYTES # BLD AUTO: 28.1 % — SIGNIFICANT CHANGE UP (ref 13–44)
MAGNESIUM SERPL-MCNC: 1.9 MG/DL — SIGNIFICANT CHANGE UP (ref 1.6–2.6)
MCHC RBC-ENTMCNC: 28.3 PG — SIGNIFICANT CHANGE UP (ref 27–34)
MCHC RBC-ENTMCNC: 31.1 GM/DL — LOW (ref 32–36)
MCV RBC AUTO: 91 FL — SIGNIFICANT CHANGE UP (ref 80–100)
MONOCYTES # BLD AUTO: 0.5 K/UL — SIGNIFICANT CHANGE UP (ref 0–0.9)
MONOCYTES NFR BLD AUTO: 8.4 % — SIGNIFICANT CHANGE UP (ref 2–14)
NEUTROPHILS # BLD AUTO: 3.58 K/UL — SIGNIFICANT CHANGE UP (ref 1.8–7.4)
NEUTROPHILS NFR BLD AUTO: 60.1 % — SIGNIFICANT CHANGE UP (ref 43–77)
NRBC # BLD: 0 /100 WBCS — SIGNIFICANT CHANGE UP (ref 0–0)
PHOSPHATE SERPL-MCNC: 3.9 MG/DL — SIGNIFICANT CHANGE UP (ref 2.5–4.5)
PLATELET # BLD AUTO: 176 K/UL — SIGNIFICANT CHANGE UP (ref 150–400)
POTASSIUM SERPL-MCNC: 4.5 MMOL/L — SIGNIFICANT CHANGE UP (ref 3.5–5.3)
POTASSIUM SERPL-SCNC: 4.5 MMOL/L — SIGNIFICANT CHANGE UP (ref 3.5–5.3)
PROT SERPL-MCNC: 6.6 G/DL — SIGNIFICANT CHANGE UP (ref 6–8.3)
RBC # BLD: 3.99 M/UL — SIGNIFICANT CHANGE UP (ref 3.8–5.2)
RBC # FLD: 14.2 % — SIGNIFICANT CHANGE UP (ref 10.3–14.5)
SODIUM SERPL-SCNC: 139 MMOL/L — SIGNIFICANT CHANGE UP (ref 135–145)
WBC # BLD: 5.95 K/UL — SIGNIFICANT CHANGE UP (ref 3.8–10.5)
WBC # FLD AUTO: 5.95 K/UL — SIGNIFICANT CHANGE UP (ref 3.8–10.5)

## 2019-02-18 RX ORDER — ALBUTEROL 90 UG/1
0 AEROSOL, METERED ORAL
Qty: 0 | Refills: 0 | COMMUNITY

## 2019-02-18 RX ORDER — FOLIC ACID 0.8 MG
1 TABLET ORAL
Qty: 0 | Refills: 0 | COMMUNITY

## 2019-02-18 RX ORDER — BUDESONIDE AND FORMOTEROL FUMARATE DIHYDRATE 160; 4.5 UG/1; UG/1
1 AEROSOL RESPIRATORY (INHALATION)
Qty: 0 | Refills: 0 | COMMUNITY

## 2019-02-18 RX ORDER — METOPROLOL TARTRATE 50 MG
1 TABLET ORAL
Qty: 0 | Refills: 0 | COMMUNITY

## 2019-02-18 RX ORDER — BENZOCAINE 10 %
0 GEL (GRAM) MUCOUS MEMBRANE
Qty: 0 | Refills: 0 | COMMUNITY

## 2019-02-18 RX ORDER — OSIMERTINIB 80 1/1
0 TABLET, FILM COATED ORAL
Qty: 0 | Refills: 0 | COMMUNITY

## 2019-02-18 RX ORDER — ACETAMINOPHEN 500 MG
2 TABLET ORAL
Qty: 0 | Refills: 0 | COMMUNITY

## 2019-02-18 RX ORDER — LISINOPRIL 2.5 MG/1
1 TABLET ORAL
Qty: 0 | Refills: 0 | COMMUNITY

## 2019-02-18 RX ORDER — ERGOCALCIFEROL 1.25 MG/1
1 CAPSULE ORAL
Qty: 0 | Refills: 0 | COMMUNITY

## 2019-02-18 RX ORDER — MECLIZINE HCL 12.5 MG
12.5 TABLET ORAL THREE TIMES A DAY
Qty: 0 | Refills: 0 | Status: DISCONTINUED | OUTPATIENT
Start: 2019-02-18 | End: 2019-02-19

## 2019-02-18 RX ORDER — ENOXAPARIN SODIUM 100 MG/ML
70 INJECTION SUBCUTANEOUS
Qty: 0 | Refills: 0 | Status: DISCONTINUED | OUTPATIENT
Start: 2019-02-18 | End: 2019-02-18

## 2019-02-18 RX ORDER — ASPIRIN/CALCIUM CARB/MAGNESIUM 324 MG
1 TABLET ORAL
Qty: 0 | Refills: 0 | COMMUNITY

## 2019-02-18 RX ORDER — METFORMIN HYDROCHLORIDE 850 MG/1
1 TABLET ORAL
Qty: 0 | Refills: 0 | COMMUNITY

## 2019-02-18 RX ORDER — APIXABAN 2.5 MG/1
5 TABLET, FILM COATED ORAL EVERY 12 HOURS
Qty: 0 | Refills: 0 | Status: DISCONTINUED | OUTPATIENT
Start: 2019-02-18 | End: 2019-02-19

## 2019-02-18 RX ORDER — SODIUM CHLORIDE 0.65 %
1 AEROSOL, SPRAY (ML) NASAL DAILY
Qty: 0 | Refills: 0 | Status: DISCONTINUED | OUTPATIENT
Start: 2019-02-18 | End: 2019-02-19

## 2019-02-18 RX ORDER — APIXABAN 2.5 MG/1
1 TABLET, FILM COATED ORAL
Qty: 60 | Refills: 0
Start: 2019-02-18 | End: 2019-03-19

## 2019-02-18 RX ORDER — LATANOPROST 0.05 MG/ML
1 SOLUTION/ DROPS OPHTHALMIC; TOPICAL
Qty: 0 | Refills: 0 | COMMUNITY

## 2019-02-18 RX ORDER — OMEPRAZOLE 10 MG/1
1 CAPSULE, DELAYED RELEASE ORAL
Qty: 0 | Refills: 0 | COMMUNITY

## 2019-02-18 RX ADMIN — ATORVASTATIN CALCIUM 40 MILLIGRAM(S): 80 TABLET, FILM COATED ORAL at 22:16

## 2019-02-18 RX ADMIN — Medication 25 MILLIGRAM(S): at 06:05

## 2019-02-18 RX ADMIN — Medication 25 MILLIGRAM(S): at 17:55

## 2019-02-18 RX ADMIN — Medication 1 TABLET(S): at 13:02

## 2019-02-18 RX ADMIN — Medication 12.5 MILLIGRAM(S): at 22:16

## 2019-02-18 RX ADMIN — LISINOPRIL 5 MILLIGRAM(S): 2.5 TABLET ORAL at 06:05

## 2019-02-18 RX ADMIN — ENOXAPARIN SODIUM 70 MILLIGRAM(S): 100 INJECTION SUBCUTANEOUS at 18:20

## 2019-02-18 RX ADMIN — Medication 1: at 22:18

## 2019-02-18 RX ADMIN — Medication 81 MILLIGRAM(S): at 13:02

## 2019-02-18 RX ADMIN — PANTOPRAZOLE SODIUM 40 MILLIGRAM(S): 20 TABLET, DELAYED RELEASE ORAL at 06:05

## 2019-02-18 RX ADMIN — LATANOPROST 1 DROP(S): 0.05 SOLUTION/ DROPS OPHTHALMIC; TOPICAL at 22:16

## 2019-02-18 RX ADMIN — Medication 650 MILLIGRAM(S): at 08:02

## 2019-02-18 RX ADMIN — Medication 650 MILLIGRAM(S): at 06:05

## 2019-02-18 RX ADMIN — Medication 650 MILLIGRAM(S): at 18:50

## 2019-02-18 RX ADMIN — Medication 650 MILLIGRAM(S): at 17:55

## 2019-02-18 RX ADMIN — Medication 12.5 MILLIGRAM(S): at 15:44

## 2019-02-18 RX ADMIN — Medication 1 MILLIGRAM(S): at 13:02

## 2019-02-18 RX ADMIN — Medication 12.5 MILLIGRAM(S): at 12:13

## 2019-02-18 NOTE — CONSULT NOTE ADULT - SUBJECTIVE AND OBJECTIVE BOX
Requesting Physician : Dr. Rose    Reason for Consultation: Chest pain     HISTORY OF PRESENT ILLNESS:  74 yo F from [facility] assisted living with history of PAF (not on ac; unknown reason), metastatic bronchoalveolar CA, ? history of CAD (pt. reports mild MI two years ago), HTN, DM, HLD, COPD who is being seen for chest pain.  The patient reports the onset of chest pain one week ago.  Pain is described as sharp and occurred after eating.  Pain was non-exertional but was associated with palpitations.  The patient was admitted and found to have trace troponin with negative CPK.  Cardiology consulted to further evaluate.  Pt. currently chest pain free.         PAST MEDICAL & SURGICAL HISTORY:  Lung cancer: wedge resection of left lung 2013  Seborrheic dermatitis  OA (osteoarthritis)  Hypercholesteremia  HTN (hypertension)  GERD (gastroesophageal reflux disease)  Constipation  DM (diabetes mellitus)  Cataract  CAD (coronary artery disease)  Bronchoalveolar carcinoma  ACS (acute coronary syndrome)  COPD (chronic obstructive pulmonary disease)  S/P ovarian cystectomy          MEDICATIONS:  MEDICATIONS  (STANDING):  aspirin enteric coated 81 milliGRAM(s) Oral daily  atorvastatin 40 milliGRAM(s) Oral at bedtime  enoxaparin Injectable 70 milliGRAM(s) SubCutaneous two times a day  folic acid 1 milliGRAM(s) Oral daily  insulin lispro (HumaLOG) corrective regimen sliding scale   SubCutaneous Before meals and at bedtime  latanoprost 0.005% Ophthalmic Solution 1 Drop(s) Both EYES at bedtime  lisinopril 5 milliGRAM(s) Oral daily  meclizine 12.5 milliGRAM(s) Oral three times a day  metoprolol tartrate 25 milliGRAM(s) Oral two times a day  multivitamin 1 Tablet(s) Oral daily  pantoprazole    Tablet 40 milliGRAM(s) Oral before breakfast      Allergies    No Known Allergies    Intolerances        FAMILY HISTORY:  Family history of prostate cancer (Father)    Non-contributary for premature coronary disease or sudden cardiac death    SOCIAL HISTORY:    [x ] Non-smoker  [ ] Smoker  [ ] Alcohol      REVIEW OF SYSTEMS:  [x ]chest pain  [  ]shortness of breath  [  ]palpitations  [  ]syncope  [ ]near syncope [ ]upper extremity weakness   [ ] lower extremity weakness  [  ]diplopia  [  ]altered mental status   [  ]fevers  [ ]chills [ ]nausea  [ ]vomitting  [  ]dysphagia    [ ]abdominal pain  [ ]melena  [ ]BRBPR    [  ]epistaxis  [  ]rash    [ ]lower extremity edema        [x ] All others negative	  [ ] Unable to obtain    PHYSICAL EXAM:  T(C): 36.7 (02-18-19 @ 06:16), Max: 36.7 (02-17-19 @ 10:35)  HR: 58 (02-18-19 @ 08:54) (58 - 84)  BP: 122/55 (02-18-19 @ 08:54) (122/55 - 171/72)  RR: 18 (02-18-19 @ 08:54) (17 - 18)  SpO2: 97% (02-18-19 @ 08:54) (95% - 99%)  Wt(kg): --  I&O's Summary        	  Lymphatic: No lymphadenopathy , no edema  Cardiovascular: Normal S1 S2,RRR,  No JVD, No murmurs , Peripheral pulses palpable 2+ bilaterally  Respiratory: Lungs clear to auscultation, normal effort 	  Gastrointestinal:  Soft, Non-tender, + BS	  Skin: No rashes, No ecchymoses, No cyanosis, warm to touch        TELEMETRY: SR, PAT	    ECG:  NSR	  RADIOLOGY:  OTHER:     DIAGNOSTIC TESTING:  [ ] Echocardiogram: < from: Transthoracic Echocardiogram (03.02.16 @ 15:24) >  Conclusions:  1. Mitral annular calcification.  2. Mild left atrial enlargement.  3. Normal left ventricular internal dimensions and wall  thicknesses.  4. Endocardium not well visualized; grossly normal left  ventricular function. Mild diastolic dysfunction (Stage I).  Regional wall motion could not be accurately assessed.  5.Normal right ventricular size and function.    < end of copied text >    [ ]  Catheterization:  [ ] Stress Test:    	  	  LABS:	 	    CARDIAC MARKERS:  CARDIAC MARKERS ( 17 Feb 2019 00:55 )  0.373 ng/mL / x     / x     / x     / x      CARDIAC MARKERS ( 16 Feb 2019 17:39 )  0.515 ng/mL / x     / 195 U/L / x     / 4.2 ng/mL  CARDIAC MARKERS ( 16 Feb 2019 10:00 )  <0.015 ng/mL / x     / x     / x     / x                                  11.3   5.95  )-----------( 176      ( 18 Feb 2019 07:19 )             36.3     02-18    139  |  105  |  22<H>  ----------------------------<  107<H>  4.5   |  29  |  1.06    Ca    9.4      18 Feb 2019 07:19  Phos  3.9     02-18  Mg     1.9     02-18    TPro  6.6  /  Alb  3.3<L>  /  TBili  0.3  /  DBili  x   /  AST  14  /  ALT  16  /  AlkPhos  64  02-18    proBNP:   Lipid Profile:   HgA1c: Hemoglobin A1C, Whole Blood: 6.1 % (02-17 @ 12:07)    TSH:     ASSESSMENT/PLAN:  74 yo F from [facility] assisted living with history of PAF (not on ac; unknown reason), metastatic bronchoalveolar CA, ? history of CAD (pt. reports mild MI two years ago), HTN, DM, HLD, COPD who is being seen for chest pain.    -pt. with trace elevation in troponin with negative CPK and no ecg changes - therefore do not suspect acs  -given above and risk factors such as CA, would consider ruling out PE  -obtain outpatient records  -would determine overall prognosis and goals of care given metastatic CA  -check TTE  -further cardiac workup pending above    Jameel Cunha MD

## 2019-02-18 NOTE — PROGRESS NOTE ADULT - ASSESSMENT
Pt is a 73 F, from [facility], w/ PMHx of CAD, Paroxysmal Atrial Fibrillation (no Hx of AC), 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   In Ed , BP : 121/53 mm hg , Hr : 147 , Temp : 98 F   EMS noticed patient was Afib with rVR in Ed and was given amiodarone and was then rate controlled. Patient was again in RVR in Ed was given Cardizem and then was in sinus and rate controlled.   Cbc WNL  ; Cmp WNL  T1 negative   CXR negative     Will admit to telemetry for Afib with RVR.
Pt is a 73 F, from [facility], w/ PMHx of CAD, Paroxysmal Atrial Fibrillation (no Hx of AC), 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   In Ed , BP : 121/53 mm hg , Hr : 147 , Temp : 98 F   EMS noticed patient was Afib with rVR in Ed and was given amiodarone and was then rate controlled. Patient was again in RVR in Ed was given Cardizem and then was in sinus and rate controlled.   Cbc WNL  ; Cmp WNL  T1 negative   CXR negative     Admitted to telemetry for Afib with RVR.

## 2019-02-18 NOTE — PROGRESS NOTE ADULT - PROBLEM SELECTOR PLAN 2
pt chest pain free at present
Chest pain resolved  Cardiac enzyme trended down to 0.373   EKG looks more like sinus arrythmia , no afib with rvr   on asa, statin , b-blocker  FU Echo   Cardio consult

## 2019-02-18 NOTE — PROGRESS NOTE ADULT - PROBLEM SELECTOR PLAN 1
in sinus   cards f/u   ? ac after discuss with cards
Comes in with Afib with RVR , PAF history in past , not on ac ; however EKG looks more like sinus arrythmia , no afib with rvr   Converted after amiodarone, cardizem *1   CHADSVASC 3  on full dose levonox   on lopressor for now   Monitor on tele   FU Echocardiogram   Consult cardio

## 2019-02-18 NOTE — PROGRESS NOTE ADULT - SUBJECTIVE AND OBJECTIVE BOX
PGY 1 Note discussed with supervising resident and primary attending    Patient is a 73y old  Female who presents with a chief complaint of chest pain (18 Feb 2019 09:59)      INTERVAL HPI/OVERNIGHT EVENTS: no acute overnight events, she complaints of tinnitus     MEDICATIONS  (STANDING):  aspirin enteric coated 81 milliGRAM(s) Oral daily  atorvastatin 40 milliGRAM(s) Oral at bedtime  enoxaparin Injectable 70 milliGRAM(s) SubCutaneous two times a day  folic acid 1 milliGRAM(s) Oral daily  insulin lispro (HumaLOG) corrective regimen sliding scale   SubCutaneous Before meals and at bedtime  latanoprost 0.005% Ophthalmic Solution 1 Drop(s) Both EYES at bedtime  lisinopril 5 milliGRAM(s) Oral daily  meclizine 12.5 milliGRAM(s) Oral three times a day  metoprolol tartrate 25 milliGRAM(s) Oral two times a day  multivitamin 1 Tablet(s) Oral daily  pantoprazole    Tablet 40 milliGRAM(s) Oral before breakfast    MEDICATIONS  (PRN):  acetaminophen   Tablet .. 650 milliGRAM(s) Oral every 6 hours PRN Mild Pain (1 - 3), Moderate Pain (4 - 6)      __________________________________________________  REVIEW OF SYSTEMS:    CONSTITUTIONAL: No fever,   EYES: no acute visual disturbances  NECK: No pain or stiffness  RESPIRATORY: No cough; No shortness of breath  CARDIOVASCULAR: No chest pain, no palpitations  GASTROINTESTINAL: No pain. No nausea or vomiting; No diarrhea   NEUROLOGICAL: No headache or numbness, no tremors  MUSCULOSKELETAL: No joint pain, no muscle pain  GENITOURINARY: no dysuria, no frequency, no hesitancy  PSYCHIATRY: no depression , no anxiety  ALL OTHER  ROS negative        Vital Signs Last 24 Hrs  T(C): 36.7 (18 Feb 2019 06:16), Max: 36.7 (18 Feb 2019 02:46)  T(F): 98 (18 Feb 2019 06:16), Max: 98.1 (18 Feb 2019 02:46)  HR: 58 (18 Feb 2019 08:54) (58 - 84)  BP: 122/55 (18 Feb 2019 08:54) (122/55 - 171/72)  BP(mean): --  RR: 18 (18 Feb 2019 08:54) (17 - 18)  SpO2: 97% (18 Feb 2019 08:54) (95% - 99%)    ________________________________________________  PHYSICAL EXAM:  GENERAL: NAD  HEENT: Normocephalic;  conjunctivae and sclerae clear; moist mucous membranes;   NECK : supple  CHEST/LUNG: Clear to auscultation bilaterally with good air entry   HEART: S1 S2  regular; no murmurs, gallops or rubs  ABDOMEN: Soft, Nontender, Nondistended; Bowel sounds present  EXTREMITIES: no cyanosis; no edema; no calf tenderness  SKIN: warm and dry; no rash  NERVOUS SYSTEM:  Awake and alert; Oriented  to place, person and time ; no new deficits    _________________________________________________  LABS:                        11.3   5.95  )-----------( 176      ( 18 Feb 2019 07:19 )             36.3     02-18    139  |  105  |  22<H>  ----------------------------<  107<H>  4.5   |  29  |  1.06    Ca    9.4      18 Feb 2019 07:19  Phos  3.9     02-18  Mg     1.9     02-18    TPro  6.6  /  Alb  3.3<L>  /  TBili  0.3  /  DBili  x   /  AST  14  /  ALT  16  /  AlkPhos  64  02-18        CAPILLARY BLOOD GLUCOSE      POCT Blood Glucose.: 104 mg/dL (18 Feb 2019 08:08)  POCT Blood Glucose.: 123 mg/dL (17 Feb 2019 21:55)  POCT Blood Glucose.: 91 mg/dL (17 Feb 2019 16:56)  POCT Blood Glucose.: 131 mg/dL (17 Feb 2019 11:59)        RADIOLOGY & ADDITIONAL TESTS:    Imaging Personally Reviewed:  YES/NO    Consultant(s) Notes Reviewed:   YES/ No    Care Discussed with Consultants :     Plan of care was discussed with patient and /or primary care giver; all questions and concerns were addressed and care was aligned with patient's wishes. PGY 1 Note discussed with supervising resident and primary attending    Patient is a 73y old  Female who presents with a chief complaint of chest pain (18 Feb 2019 09:59)      INTERVAL HPI/OVERNIGHT EVENTS: no acute overnight events, she complaints of tinnitus bothering her a lot, started on meclizine.     MEDICATIONS  (STANDING):  aspirin enteric coated 81 milliGRAM(s) Oral daily  atorvastatin 40 milliGRAM(s) Oral at bedtime  enoxaparin Injectable 70 milliGRAM(s) SubCutaneous two times a day  folic acid 1 milliGRAM(s) Oral daily  insulin lispro (HumaLOG) corrective regimen sliding scale   SubCutaneous Before meals and at bedtime  latanoprost 0.005% Ophthalmic Solution 1 Drop(s) Both EYES at bedtime  lisinopril 5 milliGRAM(s) Oral daily  meclizine 12.5 milliGRAM(s) Oral three times a day  metoprolol tartrate 25 milliGRAM(s) Oral two times a day  multivitamin 1 Tablet(s) Oral daily  pantoprazole    Tablet 40 milliGRAM(s) Oral before breakfast    MEDICATIONS  (PRN):  acetaminophen   Tablet .. 650 milliGRAM(s) Oral every 6 hours PRN Mild Pain (1 - 3), Moderate Pain (4 - 6)      __________________________________________________  REVIEW OF SYSTEMS:    CONSTITUTIONAL: No fever, +tinnitus  EYES: no acute visual disturbances  NECK: No pain or stiffness  RESPIRATORY: No cough; No shortness of breath  CARDIOVASCULAR: No chest pain, no palpitations  GASTROINTESTINAL: No pain. No nausea or vomiting; No diarrhea   NEUROLOGICAL: No headache or numbness, no tremors  MUSCULOSKELETAL: No joint pain, no muscle pain  GENITOURINARY: no dysuria, no frequency, no hesitancy  PSYCHIATRY: no depression , no anxiety  ALL OTHER  ROS negative        Vital Signs Last 24 Hrs  T(C): 36.7 (18 Feb 2019 06:16), Max: 36.7 (18 Feb 2019 02:46)  T(F): 98 (18 Feb 2019 06:16), Max: 98.1 (18 Feb 2019 02:46)  HR: 58 (18 Feb 2019 08:54) (58 - 84)  BP: 122/55 (18 Feb 2019 08:54) (122/55 - 171/72)  BP(mean): --  RR: 18 (18 Feb 2019 08:54) (17 - 18)  SpO2: 97% (18 Feb 2019 08:54) (95% - 99%)    ________________________________________________  PHYSICAL EXAM:  GENERAL: NAD  HEENT: Normocephalic;  conjunctivae and sclerae clear; moist mucous membranes;   NECK : supple  CHEST/LUNG: Clear to auscultation bilaterally with good air entry   HEART: S1 S2  regular; no murmurs, gallops or rubs  ABDOMEN: Soft, Nontender, Nondistended; Bowel sounds present  EXTREMITIES: no cyanosis; no edema; no calf tenderness  SKIN: warm and dry; no rash  NERVOUS SYSTEM:  Awake and alert; Oriented  to place, person and time ; no new deficits    _________________________________________________  LABS:                        11.3   5.95  )-----------( 176      ( 18 Feb 2019 07:19 )             36.3     02-18    139  |  105  |  22<H>  ----------------------------<  107<H>  4.5   |  29  |  1.06    Ca    9.4      18 Feb 2019 07:19  Phos  3.9     02-18  Mg     1.9     02-18    TPro  6.6  /  Alb  3.3<L>  /  TBili  0.3  /  DBili  x   /  AST  14  /  ALT  16  /  AlkPhos  64  02-18        CAPILLARY BLOOD GLUCOSE      POCT Blood Glucose.: 104 mg/dL (18 Feb 2019 08:08)  POCT Blood Glucose.: 123 mg/dL (17 Feb 2019 21:55)  POCT Blood Glucose.: 91 mg/dL (17 Feb 2019 16:56)  POCT Blood Glucose.: 131 mg/dL (17 Feb 2019 11:59)        RADIOLOGY & ADDITIONAL TESTS:    Imaging Personally Reviewed:  YES    Consultant(s) Notes Reviewed:    YES    Care Discussed with Consultants :  YES    Plan of care was discussed with patient and /or primary care giver; all questions and concerns were addressed and care was aligned with patient's wishes.

## 2019-02-18 NOTE — PROGRESS NOTE ADULT - PROBLEM SELECTOR PLAN 3
s/p Resection at home , 6 years ago   On home O2   Will c/w O2   c/w home medications , [place]
s/p Resection at home , 6 years ago   On home O2   Will c/w O2   c/w home medications , [place]

## 2019-02-18 NOTE — PROGRESS NOTE ADULT - PROBLEM SELECTOR PLAN 7
IMPROVE VTE Individual Risk Assessment          RISK                                                          Points  [  ] Previous VTE                                                3  [  ] Thrombophilia                                             2  [  ] Lower limb paralysis                                   2        (unable to hold up >15 seconds)    [  ] Current Cancer                                             2         (within 6 months)  [ x ] Immobilization > 24 hrs                              1  [  ] ICU/CCU stay > 24 hours                             1  [x  ] Age > 60                                                         1    IMPROVE VTE Score: 2   Full dose Levonox
IMPROVE VTE Individual Risk Assessment          RISK                                                          Points  [  ] Previous VTE                                                3  [  ] Thrombophilia                                             2  [  ] Lower limb paralysis                                   2        (unable to hold up >15 seconds)    [  ] Current Cancer                                             2         (within 6 months)  [ x ] Immobilization > 24 hrs                              1  [  ] ICU/CCU stay > 24 hours                             1  [x  ] Age > 60                                                         1    IMPROVE VTE Score: 2   Full dose Levonox

## 2019-02-19 ENCOUNTER — TRANSCRIPTION ENCOUNTER (OUTPATIENT)
Age: 74
End: 2019-02-19

## 2019-02-19 VITALS
HEART RATE: 79 BPM | DIASTOLIC BLOOD PRESSURE: 42 MMHG | OXYGEN SATURATION: 98 % | TEMPERATURE: 97 F | SYSTOLIC BLOOD PRESSURE: 112 MMHG | RESPIRATION RATE: 16 BRPM

## 2019-02-19 LAB
% ALBUMIN: 58.4 % — SIGNIFICANT CHANGE UP
% ALPHA 1: 4.5 % — SIGNIFICANT CHANGE UP
% ALPHA 2: 12.2 % — SIGNIFICANT CHANGE UP
% BETA: 11.1 % — SIGNIFICANT CHANGE UP
% GAMMA: 13.8 % — SIGNIFICANT CHANGE UP
ALBUMIN SERPL ELPH-MCNC: 3.6 G/DL — SIGNIFICANT CHANGE UP (ref 3.6–5.5)
ALBUMIN/GLOB SERPL ELPH: 1.4 RATIO — SIGNIFICANT CHANGE UP
ALPHA1 GLOB SERPL ELPH-MCNC: 0.3 G/DL — SIGNIFICANT CHANGE UP (ref 0.1–0.4)
ALPHA2 GLOB SERPL ELPH-MCNC: 0.8 G/DL — SIGNIFICANT CHANGE UP (ref 0.5–1)
B-GLOBULIN SERPL ELPH-MCNC: 0.7 G/DL — SIGNIFICANT CHANGE UP (ref 0.5–1)
CRP SERPL-MCNC: <0.1 MG/DL — SIGNIFICANT CHANGE UP (ref 0–0.4)
FOLATE SERPL-MCNC: >20 NG/ML — SIGNIFICANT CHANGE UP
GAMMA GLOBULIN: 0.9 G/DL — SIGNIFICANT CHANGE UP (ref 0.6–1.6)
GLUCOSE BLDC GLUCOMTR-MCNC: 210 MG/DL — HIGH (ref 70–99)
GLUCOSE BLDC GLUCOMTR-MCNC: 92 MG/DL — SIGNIFICANT CHANGE UP (ref 70–99)
INTERPRETATION SERPL IFE-IMP: SIGNIFICANT CHANGE UP
PROT PATTERN SERPL ELPH-IMP: SIGNIFICANT CHANGE UP
PROT SERPL-MCNC: 6.2 G/DL — SIGNIFICANT CHANGE UP (ref 6–8.3)
PROT SERPL-MCNC: 6.2 G/DL — SIGNIFICANT CHANGE UP (ref 6–8.3)
VIT B12 SERPL-MCNC: 343 PG/ML — SIGNIFICANT CHANGE UP (ref 232–1245)

## 2019-02-19 PROCEDURE — 83036 HEMOGLOBIN GLYCOSYLATED A1C: CPT

## 2019-02-19 PROCEDURE — 85652 RBC SED RATE AUTOMATED: CPT

## 2019-02-19 PROCEDURE — 70498 CT ANGIOGRAPHY NECK: CPT

## 2019-02-19 PROCEDURE — 84484 ASSAY OF TROPONIN QUANT: CPT

## 2019-02-19 PROCEDURE — 84165 PROTEIN E-PHORESIS SERUM: CPT

## 2019-02-19 PROCEDURE — 86334 IMMUNOFIX E-PHORESIS SERUM: CPT

## 2019-02-19 PROCEDURE — 70450 CT HEAD/BRAIN W/O DYE: CPT

## 2019-02-19 PROCEDURE — 93306 TTE W/DOPPLER COMPLETE: CPT

## 2019-02-19 PROCEDURE — 70496 CT ANGIOGRAPHY HEAD: CPT

## 2019-02-19 PROCEDURE — 70498 CT ANGIOGRAPHY NECK: CPT | Mod: 26

## 2019-02-19 PROCEDURE — 80061 LIPID PANEL: CPT

## 2019-02-19 PROCEDURE — 96374 THER/PROPH/DIAG INJ IV PUSH: CPT

## 2019-02-19 PROCEDURE — 70496 CT ANGIOGRAPHY HEAD: CPT | Mod: 26

## 2019-02-19 PROCEDURE — 83735 ASSAY OF MAGNESIUM: CPT

## 2019-02-19 PROCEDURE — 80053 COMPREHEN METABOLIC PANEL: CPT

## 2019-02-19 PROCEDURE — 36415 COLL VENOUS BLD VENIPUNCTURE: CPT

## 2019-02-19 PROCEDURE — 85730 THROMBOPLASTIN TIME PARTIAL: CPT

## 2019-02-19 PROCEDURE — 93005 ELECTROCARDIOGRAM TRACING: CPT

## 2019-02-19 PROCEDURE — 82746 ASSAY OF FOLIC ACID SERUM: CPT

## 2019-02-19 PROCEDURE — 82607 VITAMIN B-12: CPT

## 2019-02-19 PROCEDURE — 86140 C-REACTIVE PROTEIN: CPT

## 2019-02-19 PROCEDURE — 84443 ASSAY THYROID STIM HORMONE: CPT

## 2019-02-19 PROCEDURE — 82553 CREATINE MB FRACTION: CPT

## 2019-02-19 PROCEDURE — 85610 PROTHROMBIN TIME: CPT

## 2019-02-19 PROCEDURE — 84155 ASSAY OF PROTEIN SERUM: CPT

## 2019-02-19 PROCEDURE — 99285 EMERGENCY DEPT VISIT HI MDM: CPT | Mod: 25

## 2019-02-19 PROCEDURE — 82962 GLUCOSE BLOOD TEST: CPT

## 2019-02-19 PROCEDURE — 84100 ASSAY OF PHOSPHORUS: CPT

## 2019-02-19 PROCEDURE — 86038 ANTINUCLEAR ANTIBODIES: CPT

## 2019-02-19 PROCEDURE — 82550 ASSAY OF CK (CPK): CPT

## 2019-02-19 PROCEDURE — 85027 COMPLETE CBC AUTOMATED: CPT

## 2019-02-19 PROCEDURE — 71045 X-RAY EXAM CHEST 1 VIEW: CPT

## 2019-02-19 RX ORDER — MECLIZINE HCL 12.5 MG
1 TABLET ORAL
Qty: 90 | Refills: 0 | OUTPATIENT
Start: 2019-02-19 | End: 2019-03-20

## 2019-02-19 RX ORDER — ERGOCALCIFEROL 1.25 MG/1
1 CAPSULE ORAL
Qty: 4 | Refills: 0
Start: 2019-02-19 | End: 2019-03-20

## 2019-02-19 RX ORDER — METOPROLOL TARTRATE 50 MG
1 TABLET ORAL
Qty: 0 | Refills: 0 | DISCHARGE
Start: 2019-02-19

## 2019-02-19 RX ORDER — METFORMIN HYDROCHLORIDE 850 MG/1
1 TABLET ORAL
Qty: 60 | Refills: 0
Start: 2019-02-19 | End: 2019-03-20

## 2019-02-19 RX ORDER — ASPIRIN/CALCIUM CARB/MAGNESIUM 324 MG
1 TABLET ORAL
Qty: 0 | Refills: 0 | DISCHARGE
Start: 2019-02-19

## 2019-02-19 RX ORDER — FOLIC ACID 0.8 MG
1 TABLET ORAL
Qty: 0 | Refills: 0 | DISCHARGE
Start: 2019-02-19

## 2019-02-19 RX ORDER — METOPROLOL TARTRATE 50 MG
0.5 TABLET ORAL
Qty: 0 | Refills: 0 | DISCHARGE
Start: 2019-02-19

## 2019-02-19 RX ORDER — LATANOPROST 0.05 MG/ML
1 SOLUTION/ DROPS OPHTHALMIC; TOPICAL
Qty: 0 | Refills: 0 | DISCHARGE
Start: 2019-02-19

## 2019-02-19 RX ORDER — LISINOPRIL 2.5 MG/1
1 TABLET ORAL
Qty: 0 | Refills: 0 | COMMUNITY
Start: 2019-02-19

## 2019-02-19 RX ADMIN — Medication 25 MILLIGRAM(S): at 06:08

## 2019-02-19 RX ADMIN — LISINOPRIL 5 MILLIGRAM(S): 2.5 TABLET ORAL at 06:07

## 2019-02-19 RX ADMIN — Medication 81 MILLIGRAM(S): at 11:08

## 2019-02-19 RX ADMIN — Medication 2: at 11:31

## 2019-02-19 RX ADMIN — PANTOPRAZOLE SODIUM 40 MILLIGRAM(S): 20 TABLET, DELAYED RELEASE ORAL at 06:07

## 2019-02-19 RX ADMIN — Medication 12.5 MILLIGRAM(S): at 13:35

## 2019-02-19 RX ADMIN — Medication 1 TABLET(S): at 11:08

## 2019-02-19 RX ADMIN — Medication 1 MILLIGRAM(S): at 11:08

## 2019-02-19 RX ADMIN — APIXABAN 5 MILLIGRAM(S): 2.5 TABLET, FILM COATED ORAL at 06:07

## 2019-02-19 RX ADMIN — Medication 12.5 MILLIGRAM(S): at 06:07

## 2019-02-19 NOTE — DISCHARGE NOTE ADULT - PLAN OF CARE
rate control and prevent complication You presented with chest pain. You were found to have atrial fibrillation with RVR meaning your heart rate was very high. Your cardiac enzymes were slightly high due to stress in your heart. Cardiologist was consulted. Please continue with your anticoagulation medication, eliquis and rate control medication, lopressor as instructed in the medication reconciliation and follow up with your primary care physician. Please continue taking your home meds. Follow up with your primary care physician in one week after discharge. Continue with your blood pressure medication and statin as instructed. Maintain a healthy diet and exercise frequently if possible. Lose weight. Follow up with your primary care physician in one week after discharge. Continue with your blood sugar medication. HbAIC was 6.1.  You must maintain a healthy diet that consist of low sugar, low fat, low sodium diet. Exercise frequently if possible. Consider repeating your Hemoglobin A1c within 3 months after discharge to monitor your average blood glucose control. Follow up with primary care physician in one week after discharge. Continue with your nebulizers as intrusted by your primary care physician. Avoid triggers. If you're allergic to dust mites, pollens or molds, they can make your copd symptoms get worse. Cold air, exercise, fumes from chemicals or perfume, tobacco or wood smoke, and weather changes can also make asthma symptoms worse. So can common colds and sinus infections. Vaccinate with influenza vaccine yearly. Follow up with primary care physician one week after discharge. Continue with blood pressure medication. Maintain a healthy diet that consist of low sugar, low fat, low sodium diet. Exercise frequently if possible.  Follow up with primary care physician in one week after discharge. Continue with cholesterol medications. Maintain a healthy diet that consist of low sugar, low fat, low sodium diet. Exercise frequently if possible.  Follow up with primary care physician in one week after discharge. You complaint of dizziness and ringing of ear. You are started on meclizine. Neurologist evaluated you. As per attending, you had upper respiratory infection  and labyrinthitis recently which might be contributing to your symptoms.

## 2019-02-19 NOTE — DISCHARGE NOTE ADULT - PATIENT PORTAL LINK FT
You can access the [facility] Patient Portal, offered by [facility], by registering with the following website: [email or web address]

## 2019-02-19 NOTE — DISCHARGE NOTE ADULT - SECONDARY DIAGNOSIS.
Lung cancer CAD (coronary artery disease) DM (diabetes mellitus) COPD (chronic obstructive pulmonary disease) HTN (hypertension) Hypercholesteremia Tinnitus

## 2019-02-19 NOTE — PROGRESS NOTE ADULT - SUBJECTIVE AND OBJECTIVE BOX
Subjective:  pt seen and examined, no complaints, ROS - .    no chest pain or sob on exam     acetaminophen   Tablet .. 650 milliGRAM(s) Oral every 6 hours PRN  apixaban 5 milliGRAM(s) Oral every 12 hours  aspirin enteric coated 81 milliGRAM(s) Oral daily  atorvastatin 40 milliGRAM(s) Oral at bedtime  folic acid 1 milliGRAM(s) Oral daily  insulin lispro (HumaLOG) corrective regimen sliding scale   SubCutaneous Before meals and at bedtime  latanoprost 0.005% Ophthalmic Solution 1 Drop(s) Both EYES at bedtime  lisinopril 5 milliGRAM(s) Oral daily  meclizine 12.5 milliGRAM(s) Oral three times a day  metoprolol tartrate 25 milliGRAM(s) Oral two times a day  multivitamin 1 Tablet(s) Oral daily  pantoprazole    Tablet 40 milliGRAM(s) Oral before breakfast  sodium chloride 0.65% Nasal 1 Spray(s) Both Nostrils daily PRN                            11.3   5.95  )-----------( 176      ( 18 Feb 2019 07:19 )             36.3       Hemoglobin: 11.3 g/dL (02-18 @ 07:19)  Hemoglobin: 12.6 g/dL (02-17 @ 08:09)  Hemoglobin: 12.0 g/dL (02-16 @ 10:00)      02-18    139  |  105  |  22<H>  ----------------------------<  107<H>  4.5   |  29  |  1.06    Ca    9.4      18 Feb 2019 07:19  Phos  3.9     02-18  Mg     1.9     02-18    TPro  6.2  /  Alb  x   /  TBili  x   /  DBili  x   /  AST  x   /  ALT  x   /  AlkPhos  x   02-19    Creatinine Trend: 1.06<--, 1.07<--, 1.03<--    COAGS:     CARDIAC MARKERS ( 17 Feb 2019 00:55 )  0.373 ng/mL / x     / x     / x     / x      CARDIAC MARKERS ( 16 Feb 2019 17:39 )  0.515 ng/mL / x     / 195 U/L / x     / 4.2 ng/mL  CARDIAC MARKERS ( 16 Feb 2019 10:00 )  <0.015 ng/mL / x     / x     / x     / x            T(C): 36.7 (02-19-19 @ 05:01), Max: 36.7 (02-18-19 @ 14:58)  HR: 67 (02-19-19 @ 05:01) (58 - 71)  BP: 129/48 (02-19-19 @ 05:01) (122/55 - 137/65)  RR: 18 (02-19-19 @ 05:01) (17 - 18)  SpO2: 98% (02-19-19 @ 05:01) (95% - 98%)  Wt(kg): --    I&O's Summary        	  Lymphatic: No lymphadenopathy , no edema  Cardiovascular: Normal S1 S2,RRR,  No JVD, No murmurs , Peripheral pulses palpable 2+ bilaterally  Respiratory: Lungs clear to auscultation, normal effort 	  Gastrointestinal:  Soft, Non-tender, + BS	  Skin: No rashes, No ecchymoses, No cyanosis, warm to touch        TELEMETRY: SR, PAT	    ECG:  NSR	  RADIOLOGY:  OTHER:     DIAGNOSTIC TESTING:  [ ] Echocardiogram: < from: Transthoracic Echocardiogram (03.02.16 @ 15:24) >  Conclusions:  1. Mitral annular calcification.  2. Mild left atrial enlargement.  3. Normal left ventricular internal dimensions and wall  thicknesses.  4. Endocardium not well visualized; grossly normal left  ventricular function. Mild diastolic dysfunction (Stage I).  Regional wall motion could not be accurately assessed.  5.Normal right ventricular size and function.    < end of copied text >    [ ]  Catheterization:  [ ] Stress Test:    	    ASSESSMENT/PLAN:  74 yo F from [facility] assisted living with history of PAF (not on ac; unknown reason), metastatic bronchoalveolar CA, ? history of CAD (pt. reports mild MI two years ago), HTN, DM, HLD, COPD who is being seen for chest pain.    A/C with eliquis   ASA, statin   BP stable on BB, ACE   CT angio pending -* R/O pe   cardiac markers noted  **would determine overall prognosis and goals of care given metastatic CA  D/W Dr Cunha

## 2019-02-19 NOTE — DISCHARGE NOTE ADULT - MEDICATION SUMMARY - MEDICATIONS TO TAKE
I will START or STAY ON the medications listed below when I get home from the hospital:    aspirin 81 mg oral delayed release tablet  -- 1 tab(s) by mouth once a day  -- Indication: For Prophylactic measure    lisinopril 5 mg oral tablet  -- 1 tab(s) by mouth once a day  -- Indication: For HTN (hypertension)    Eliquis 5 mg oral tablet  -- 1 tab(s) by mouth 2 times a day   -- Check with your doctor before becoming pregnant.  It is very important that you take or use this exactly as directed.  Do not skip doses or discontinue unless directed by your doctor.  Obtain medical advice before taking any non-prescription drugs as some may affect the action of this medication.    -- Indication: For Atrial fibrillation    metFORMIN 500 mg oral tablet  -- 1 tab(s) by mouth 2 times a day  -- Indication: For DM (diabetes mellitus)    meclizine 12.5 mg oral tablet  -- 1 tab(s) by mouth 3 times a day  -- Indication: For Dizziness    metoprolol tartrate 25 mg oral tablet  -- 1 tab(s) by mouth 2 times a day  -- Indication: For Atrial fibrillation    latanoprost 0.005% ophthalmic solution  -- 1 drop(s) to each affected eye once a day (at bedtime)  -- Indication: For Prophylactic measure    Multiple Vitamins oral tablet  -- 1 tab(s) by mouth once a day  -- Indication: For Prophylactic measure    Vitamin D2 50,000 intl units (1.25 mg) oral capsule  -- 1 cap(s) by mouth once a week  -- Indication: For Prophylactic measure    folic acid 1 mg oral tablet  -- 1 tab(s) by mouth once a day  -- Indication: For Prophylactic measure

## 2019-02-19 NOTE — DISCHARGE NOTE ADULT - HOSPITAL COURSE
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   In Ed , BP : 121/53 mm hg , Hr : 147 , Temp : 98 F   EMS noticed patient was Afib with rVR in Ed and was given amiodarone and was then rate controlled. Patient was again in RVR in Ed was given Cardizem and then was in sinus and rate controlled.   Cbc WNL  ; Cmp WNL  T1 negative   CXR negative     Admitted to telemetry for Afib with RVR.        Problem/Plan - 1:  ·  Problem: Rapid atrial fibrillation.  Plan: Comes in with Afib with RVR , PAF history in past , not on ac ; however EKG looks more like sinus arrythmia , no afib with rvr   Converted after amiodarone, cardizem *1   CHADSVASC 3  on full dose levonox   on lopressor for now   Monitor on tele   FU Echocardiogram   Consult cardio.     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   For DM (diabetes mellitus), she is On metformin 500 BID , and has 6.1 a1c.   For CAD (coronary artery disease), she is on asa, statin.   For HTN (hypertension), she is on lisinopril , lopressor.  Patient is stable for discharge per attending and is advised to follow up with PCP as outpatient  Please refer to patient's complete medical chart with documents for a full hospital course, for this is only a brief summary. 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 In Ed , BP : 121/53 mm hg , Hr : 147 , Temp : 98 F   EMS noticed patient was Afib with rVR in Ed and was given amiodarone and was then rate controlled. Patient was again in RVR in Ed was given Cardizem and then was in sinus and rate controlled. Cbc WNL  ; Cmp WNL, CXR negative. She was Admitted to telemetry for Afib with RVR. She came in with Afib with RVR , PAF history in past, not on ac ; however EKG looks more like sinus arrythmia , no afib with rvr, Converted after amiodarone, cardizem *1, CHADSVASC 3, started on eliquis and lopressor, Echocardiogram normal. Consulted cardio.   She compliant of dizziness and tinnitus and neuro was consulted. She had recent URTI and labyrinthitis as per attending.    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   For DM (diabetes mellitus), she is On metformin 500 BID , and has 6.1 a1c.   For CAD (coronary artery disease), she is on asa, statin.   For HTN (hypertension), she is on lisinopril , lopressor.  Patient is stable for discharge per attending and is advised to follow up with PCP as outpatient  Please refer to patient's complete medical chart with documents for a full hospital course, for this is only a brief summary.

## 2019-02-19 NOTE — DISCHARGE NOTE ADULT - CARE PLAN
Principal Discharge DX:	Rapid atrial fibrillation  Goal:	rate control and prevent complication  Assessment and plan of treatment:	You presented with chest pain. You were found to have atrial fibrillation with RVR meaning your heart rate was very high. Your cardiac enzymes were slightly high due to stress in your heart. Cardiologist was consulted. Please continue with your anticoagulation medication, eliquis and rate control medication, lopressor as instructed in the medication reconciliation and follow up with your primary care physician.  Secondary Diagnosis:	Lung cancer  Assessment and plan of treatment:	Please continue taking your home meds. Follow up with your primary care physician in one week after discharge.  Secondary Diagnosis:	CAD (coronary artery disease)  Assessment and plan of treatment:	Continue with your blood pressure medication and statin as instructed. Maintain a healthy diet and exercise frequently if possible. Lose weight. Follow up with your primary care physician in one week after discharge.  Secondary Diagnosis:	DM (diabetes mellitus)  Assessment and plan of treatment:	Continue with your blood sugar medication. HbAIC was 6.1.  You must maintain a healthy diet that consist of low sugar, low fat, low sodium diet. Exercise frequently if possible. Consider repeating your Hemoglobin A1c within 3 months after discharge to monitor your average blood glucose control. Follow up with primary care physician in one week after discharge.  Secondary Diagnosis:	COPD (chronic obstructive pulmonary disease)  Assessment and plan of treatment:	Continue with your nebulizers as intrusted by your primary care physician. Avoid triggers. If you're allergic to dust mites, pollens or molds, they can make your copd symptoms get worse. Cold air, exercise, fumes from chemicals or perfume, tobacco or wood smoke, and weather changes can also make asthma symptoms worse. So can common colds and sinus infections. Vaccinate with influenza vaccine yearly. Follow up with primary care physician one week after discharge.  Secondary Diagnosis:	HTN (hypertension)  Assessment and plan of treatment:	Continue with blood pressure medication. Maintain a healthy diet that consist of low sugar, low fat, low sodium diet. Exercise frequently if possible.  Follow up with primary care physician in one week after discharge.  Secondary Diagnosis:	Hypercholesteremia  Assessment and plan of treatment:	Continue with cholesterol medications. Maintain a healthy diet that consist of low sugar, low fat, low sodium diet. Exercise frequently if possible.  Follow up with primary care physician in one week after discharge. Principal Discharge DX:	Rapid atrial fibrillation  Goal:	rate control and prevent complication  Assessment and plan of treatment:	You presented with chest pain. You were found to have atrial fibrillation with RVR meaning your heart rate was very high. Your cardiac enzymes were slightly high due to stress in your heart. Cardiologist was consulted. Please continue with your anticoagulation medication, eliquis and rate control medication, lopressor as instructed in the medication reconciliation and follow up with your primary care physician.  Secondary Diagnosis:	Lung cancer  Assessment and plan of treatment:	Please continue taking your home meds. Follow up with your primary care physician in one week after discharge.  Secondary Diagnosis:	CAD (coronary artery disease)  Assessment and plan of treatment:	Continue with your blood pressure medication and statin as instructed. Maintain a healthy diet and exercise frequently if possible. Lose weight. Follow up with your primary care physician in one week after discharge.  Secondary Diagnosis:	DM (diabetes mellitus)  Assessment and plan of treatment:	Continue with your blood sugar medication. HbAIC was 6.1.  You must maintain a healthy diet that consist of low sugar, low fat, low sodium diet. Exercise frequently if possible. Consider repeating your Hemoglobin A1c within 3 months after discharge to monitor your average blood glucose control. Follow up with primary care physician in one week after discharge.  Secondary Diagnosis:	COPD (chronic obstructive pulmonary disease)  Assessment and plan of treatment:	Continue with your nebulizers as intrusted by your primary care physician. Avoid triggers. If you're allergic to dust mites, pollens or molds, they can make your copd symptoms get worse. Cold air, exercise, fumes from chemicals or perfume, tobacco or wood smoke, and weather changes can also make asthma symptoms worse. So can common colds and sinus infections. Vaccinate with influenza vaccine yearly. Follow up with primary care physician one week after discharge.  Secondary Diagnosis:	HTN (hypertension)  Assessment and plan of treatment:	Continue with blood pressure medication. Maintain a healthy diet that consist of low sugar, low fat, low sodium diet. Exercise frequently if possible.  Follow up with primary care physician in one week after discharge.  Secondary Diagnosis:	Tinnitus  Assessment and plan of treatment:	You complaint of dizziness and ringing of ear. You are started on meclizine. Neurologist evaluated you. As per attending, you had upper respiratory infection  and labyrinthitis recently which might be contributing to your symptoms.

## 2019-02-20 LAB — ANA TITR SER: NEGATIVE — SIGNIFICANT CHANGE UP

## 2019-03-31 ENCOUNTER — INPATIENT (INPATIENT)
Facility: HOSPITAL | Age: 74
LOS: 3 days | Discharge: HOME HEALTH | DRG: 871 | End: 2019-04-04
Attending: INTERNAL MEDICINE | Admitting: INTERNAL MEDICINE
Payer: MEDICAID

## 2019-03-31 VITALS
HEIGHT: 63 IN | WEIGHT: 149.91 LBS | DIASTOLIC BLOOD PRESSURE: 64 MMHG | SYSTOLIC BLOOD PRESSURE: 129 MMHG | OXYGEN SATURATION: 98 % | RESPIRATION RATE: 18 BRPM | TEMPERATURE: 102 F | HEART RATE: 106 BPM

## 2019-03-31 DIAGNOSIS — J18.9 PNEUMONIA, UNSPECIFIED ORGANISM: ICD-10-CM

## 2019-03-31 DIAGNOSIS — E11.9 TYPE 2 DIABETES MELLITUS WITHOUT COMPLICATIONS: ICD-10-CM

## 2019-03-31 DIAGNOSIS — J44.9 CHRONIC OBSTRUCTIVE PULMONARY DISEASE, UNSPECIFIED: ICD-10-CM

## 2019-03-31 DIAGNOSIS — Z29.9 ENCOUNTER FOR PROPHYLACTIC MEASURES, UNSPECIFIED: ICD-10-CM

## 2019-03-31 DIAGNOSIS — Z98.89 OTHER SPECIFIED POSTPROCEDURAL STATES: Chronic | ICD-10-CM

## 2019-03-31 DIAGNOSIS — I10 ESSENTIAL (PRIMARY) HYPERTENSION: ICD-10-CM

## 2019-03-31 DIAGNOSIS — C34.90 MALIGNANT NEOPLASM OF UNSPECIFIED PART OF UNSPECIFIED BRONCHUS OR LUNG: ICD-10-CM

## 2019-03-31 DIAGNOSIS — A41.9 SEPSIS, UNSPECIFIED ORGANISM: ICD-10-CM

## 2019-03-31 LAB
ALBUMIN SERPL ELPH-MCNC: 3.5 G/DL — SIGNIFICANT CHANGE UP (ref 3.5–5)
ALP SERPL-CCNC: 73 U/L — SIGNIFICANT CHANGE UP (ref 40–120)
ALT FLD-CCNC: 15 U/L DA — SIGNIFICANT CHANGE UP (ref 10–60)
ANION GAP SERPL CALC-SCNC: 8 MMOL/L — SIGNIFICANT CHANGE UP (ref 5–17)
AST SERPL-CCNC: 18 U/L — SIGNIFICANT CHANGE UP (ref 10–40)
BASE EXCESS BLDV CALC-SCNC: 5.6 MMOL/L — HIGH (ref -2–2)
BILIRUB SERPL-MCNC: 0.3 MG/DL — SIGNIFICANT CHANGE UP (ref 0.2–1.2)
BUN SERPL-MCNC: 10 MG/DL — SIGNIFICANT CHANGE UP (ref 7–18)
CALCIUM SERPL-MCNC: 9.3 MG/DL — SIGNIFICANT CHANGE UP (ref 8.4–10.5)
CHLORIDE SERPL-SCNC: 104 MMOL/L — SIGNIFICANT CHANGE UP (ref 96–108)
CO2 SERPL-SCNC: 26 MMOL/L — SIGNIFICANT CHANGE UP (ref 22–31)
CREAT SERPL-MCNC: 1.23 MG/DL — SIGNIFICANT CHANGE UP (ref 0.5–1.3)
D DIMER BLD IA.RAPID-MCNC: <150 NG/ML DDU — SIGNIFICANT CHANGE UP
GLUCOSE SERPL-MCNC: 156 MG/DL — HIGH (ref 70–99)
HCO3 BLDV-SCNC: 32 MMOL/L — HIGH (ref 21–29)
HCT VFR BLD CALC: 36.4 % — SIGNIFICANT CHANGE UP (ref 34.5–45)
HGB BLD-MCNC: 11.2 G/DL — LOW (ref 11.5–15.5)
HOROWITZ INDEX BLDV+IHG-RTO: 21 — SIGNIFICANT CHANGE UP
LACTATE SERPL-SCNC: 2.2 MMOL/L — HIGH (ref 0.7–2)
LACTATE SERPL-SCNC: 2.5 MMOL/L — HIGH (ref 0.7–2)
MCHC RBC-ENTMCNC: 28.8 PG — SIGNIFICANT CHANGE UP (ref 27–34)
MCHC RBC-ENTMCNC: 30.8 GM/DL — LOW (ref 32–36)
MCV RBC AUTO: 93.6 FL — SIGNIFICANT CHANGE UP (ref 80–100)
NRBC # BLD: 0 /100 WBCS — SIGNIFICANT CHANGE UP (ref 0–0)
PCO2 BLDV: 59 MMHG — HIGH (ref 35–50)
PH BLDV: 7.36 — SIGNIFICANT CHANGE UP (ref 7.35–7.45)
PLATELET # BLD AUTO: 182 K/UL — SIGNIFICANT CHANGE UP (ref 150–400)
PO2 BLDV: 19 MMHG — LOW (ref 25–45)
POTASSIUM SERPL-MCNC: 5 MMOL/L — SIGNIFICANT CHANGE UP (ref 3.5–5.3)
POTASSIUM SERPL-SCNC: 5 MMOL/L — SIGNIFICANT CHANGE UP (ref 3.5–5.3)
PROT SERPL-MCNC: 7.5 G/DL — SIGNIFICANT CHANGE UP (ref 6–8.3)
RBC # BLD: 3.89 M/UL — SIGNIFICANT CHANGE UP (ref 3.8–5.2)
RBC # FLD: 13.5 % — SIGNIFICANT CHANGE UP (ref 10.3–14.5)
SAO2 % BLDV: 27 % — LOW (ref 67–88)
SODIUM SERPL-SCNC: 138 MMOL/L — SIGNIFICANT CHANGE UP (ref 135–145)
TROPONIN I SERPL-MCNC: <0.015 NG/ML — SIGNIFICANT CHANGE UP (ref 0–0.04)
WBC # BLD: 15.21 K/UL — HIGH (ref 3.8–10.5)
WBC # FLD AUTO: 15.21 K/UL — HIGH (ref 3.8–10.5)

## 2019-03-31 PROCEDURE — 71046 X-RAY EXAM CHEST 2 VIEWS: CPT | Mod: 26

## 2019-03-31 PROCEDURE — 99285 EMERGENCY DEPT VISIT HI MDM: CPT

## 2019-03-31 RX ORDER — ACETAMINOPHEN 500 MG
650 TABLET ORAL EVERY 6 HOURS
Qty: 0 | Refills: 0 | Status: DISCONTINUED | OUTPATIENT
Start: 2019-03-31 | End: 2019-04-04

## 2019-03-31 RX ORDER — SODIUM CHLORIDE 9 MG/ML
1350 INJECTION INTRAMUSCULAR; INTRAVENOUS; SUBCUTANEOUS ONCE
Qty: 0 | Refills: 0 | Status: COMPLETED | OUTPATIENT
Start: 2019-03-31 | End: 2019-03-31

## 2019-03-31 RX ORDER — PIPERACILLIN AND TAZOBACTAM 4; .5 G/20ML; G/20ML
3.38 INJECTION, POWDER, LYOPHILIZED, FOR SOLUTION INTRAVENOUS EVERY 8 HOURS
Qty: 0 | Refills: 0 | Status: DISCONTINUED | OUTPATIENT
Start: 2019-03-31 | End: 2019-04-04

## 2019-03-31 RX ORDER — METOPROLOL TARTRATE 50 MG
25 TABLET ORAL
Qty: 0 | Refills: 0 | Status: DISCONTINUED | OUTPATIENT
Start: 2019-03-31 | End: 2019-03-31

## 2019-03-31 RX ORDER — APIXABAN 2.5 MG/1
5 TABLET, FILM COATED ORAL EVERY 12 HOURS
Qty: 0 | Refills: 0 | Status: DISCONTINUED | OUTPATIENT
Start: 2019-03-31 | End: 2019-04-04

## 2019-03-31 RX ORDER — VANCOMYCIN HCL 1 G
1000 VIAL (EA) INTRAVENOUS ONCE
Qty: 0 | Refills: 0 | Status: COMPLETED | OUTPATIENT
Start: 2019-03-31 | End: 2019-03-31

## 2019-03-31 RX ORDER — INSULIN LISPRO 100/ML
VIAL (ML) SUBCUTANEOUS
Qty: 0 | Refills: 0 | Status: DISCONTINUED | OUTPATIENT
Start: 2019-03-31 | End: 2019-04-04

## 2019-03-31 RX ORDER — ASPIRIN/CALCIUM CARB/MAGNESIUM 324 MG
81 TABLET ORAL DAILY
Qty: 0 | Refills: 0 | Status: DISCONTINUED | OUTPATIENT
Start: 2019-03-31 | End: 2019-04-04

## 2019-03-31 RX ORDER — PIPERACILLIN AND TAZOBACTAM 4; .5 G/20ML; G/20ML
3.38 INJECTION, POWDER, LYOPHILIZED, FOR SOLUTION INTRAVENOUS ONCE
Qty: 0 | Refills: 0 | Status: COMPLETED | OUTPATIENT
Start: 2019-03-31 | End: 2019-03-31

## 2019-03-31 RX ORDER — METOPROLOL TARTRATE 50 MG
12.5 TABLET ORAL
Qty: 0 | Refills: 0 | Status: DISCONTINUED | OUTPATIENT
Start: 2019-03-31 | End: 2019-04-04

## 2019-03-31 RX ORDER — ASPIRIN/CALCIUM CARB/MAGNESIUM 324 MG
81 TABLET ORAL ONCE
Qty: 0 | Refills: 0 | Status: COMPLETED | OUTPATIENT
Start: 2019-03-31 | End: 2019-03-31

## 2019-03-31 RX ORDER — IPRATROPIUM/ALBUTEROL SULFATE 18-103MCG
3 AEROSOL WITH ADAPTER (GRAM) INHALATION EVERY 6 HOURS
Qty: 0 | Refills: 0 | Status: DISCONTINUED | OUTPATIENT
Start: 2019-03-31 | End: 2019-04-01

## 2019-03-31 RX ORDER — AZITHROMYCIN 500 MG/1
500 TABLET, FILM COATED ORAL ONCE
Qty: 0 | Refills: 0 | Status: COMPLETED | OUTPATIENT
Start: 2019-03-31 | End: 2019-03-31

## 2019-03-31 RX ORDER — SODIUM CHLORIDE 9 MG/ML
1000 INJECTION INTRAMUSCULAR; INTRAVENOUS; SUBCUTANEOUS
Qty: 0 | Refills: 0 | Status: DISCONTINUED | OUTPATIENT
Start: 2019-03-31 | End: 2019-04-04

## 2019-03-31 RX ORDER — MORPHINE SULFATE 50 MG/1
2 CAPSULE, EXTENDED RELEASE ORAL
Qty: 0 | Refills: 0 | Status: DISCONTINUED | OUTPATIENT
Start: 2019-03-31 | End: 2019-03-31

## 2019-03-31 RX ORDER — LISINOPRIL 2.5 MG/1
5 TABLET ORAL DAILY
Qty: 0 | Refills: 0 | Status: DISCONTINUED | OUTPATIENT
Start: 2019-03-31 | End: 2019-04-04

## 2019-03-31 RX ADMIN — PIPERACILLIN AND TAZOBACTAM 3.38 GRAM(S): 4; .5 INJECTION, POWDER, LYOPHILIZED, FOR SOLUTION INTRAVENOUS at 20:32

## 2019-03-31 RX ADMIN — PIPERACILLIN AND TAZOBACTAM 200 GRAM(S): 4; .5 INJECTION, POWDER, LYOPHILIZED, FOR SOLUTION INTRAVENOUS at 20:03

## 2019-03-31 RX ADMIN — Medication 81 MILLIGRAM(S): at 18:57

## 2019-03-31 RX ADMIN — MORPHINE SULFATE 2 MILLIGRAM(S): 50 CAPSULE, EXTENDED RELEASE ORAL at 18:57

## 2019-03-31 RX ADMIN — AZITHROMYCIN 500 MILLIGRAM(S): 500 TABLET, FILM COATED ORAL at 20:10

## 2019-03-31 RX ADMIN — SODIUM CHLORIDE 5400 MILLILITER(S): 9 INJECTION INTRAMUSCULAR; INTRAVENOUS; SUBCUTANEOUS at 19:50

## 2019-03-31 RX ADMIN — Medication 250 MILLIGRAM(S): at 20:25

## 2019-03-31 RX ADMIN — AZITHROMYCIN 250 MILLIGRAM(S): 500 TABLET, FILM COATED ORAL at 19:51

## 2019-03-31 RX ADMIN — MORPHINE SULFATE 2 MILLIGRAM(S): 50 CAPSULE, EXTENDED RELEASE ORAL at 19:27

## 2019-03-31 NOTE — H&P ADULT - NSHPPHYSICALEXAM_GEN_ALL_CORE
ICU Vital Signs Last 24 Hrs  T(C): 38.8 (31 Mar 2019 18:11), Max: 38.8 (31 Mar 2019 18:11)  T(F): 101.9 (31 Mar 2019 18:11), Max: 101.9 (31 Mar 2019 18:11)  HR: 106 (31 Mar 2019 18:11) (106 - 106)  BP: 129/64 (31 Mar 2019 18:11) (129/64 - 129/64)  BP(mean): --  ABP: --  ABP(mean): --  RR: 18 (31 Mar 2019 18:11) (18 - 18)  SpO2: 98% (31 Mar 2019 18:11) (98% - 98%)    PHYSICAL EXAM:  GENERAL: in mild distress, obese  HEAD:  Atraumatic, Normocephalic  EYES:  conjunctiva and sclera clear  NECK: Supple, No JVD, Normal thyroid  CHEST/LUNG: Generalized Rhonchi b/l on chest,  Clear to percussion bilaterally; No rales, wheezing, or rubs  HEART: Regular rate and rhythm; No murmurs, rubs, or gallops  ABDOMEN: Soft, Nontender, Nondistended; Bowel sounds present  NERVOUS SYSTEM:  Alert & Oriented X3,    EXTREMITIES:  2+ Peripheral Pulses, No clubbing, cyanosis, or edema  SKIN: warm dry

## 2019-03-31 NOTE — H&P ADULT - ASSESSMENT
73 yr old F from [facility], ambulates with walker w/ PMHx of CAD, Paroxysmal Atrial Fibrillation (no Hx of AC), Bronchoalveolar CA (s/p wedge resection 2013 , on oral chemotherapy -> Tegresso) complicated w/ Metastatic Disease, DM (on Metformin), COPD (on 3L NC; no past intubations), HLD, GERD, HTn came with complain of  shortness of breath and fever x today.    Patient is admitted for sepsis secondary to PNA

## 2019-03-31 NOTE — H&P ADULT - NSICDXPASTMEDICALHX_GEN_ALL_CORE_FT
PAST MEDICAL HISTORY:  ACS (acute coronary syndrome)     Bronchoalveolar carcinoma     CAD (coronary artery disease)     Cataract     Constipation     COPD (chronic obstructive pulmonary disease)     DM (diabetes mellitus)     GERD (gastroesophageal reflux disease)     HTN (hypertension)     Hypercholesteremia     Lung cancer wedge resection of left lung 2013    OA (osteoarthritis)     Seborrheic dermatitis

## 2019-03-31 NOTE — ED ADULT NURSE NOTE - OBJECTIVE STATEMENT
pt came in w/ co b/l cp accompanied by sob & chills. pt denies headache, dizziness, cough, fever. pt has fever in triage. breathing is unlabored.

## 2019-03-31 NOTE — H&P ADULT - PROBLEM SELECTOR PLAN 7
RISK                                                          Points  [  ] Previous VTE                                                3  [  ] Thrombophilia                                             2  [  ] Lower limb paralysis                                   2        (unable to hold up >15 seconds)    [  ] Current Cancer                                             2         (within 6 months)  [ x ] Immobilization > 24 hrs                              1  [  ] ICU/CCU stay > 24 hours                             1  [ x ] Age > 60                                                         1    IMPROVE VTE Score: 2  Eliquis for VTE prophylaxis.

## 2019-03-31 NOTE — H&P ADULT - HISTORY OF PRESENT ILLNESS
73 yr old F from [facility], ambulates with walker w/ PMHx of CAD, Paroxysmal Atrial Fibrillation (no Hx of AC), Bronchoalveolar CA (s/p wedge resection 2013 , on oral chemotherapy -> Tegresso) complicated w/ Metastatic Disease, DM (on Metformin), COPD (on 3L NC; no past intubations), HLD, GERD, HTn came with complain of  shortness of breath and fever x today.    Patient states she developed sudden shortness of breath on exertion and o rest, upper chest pain B/l on sides and chills. She denies coughing, headache, diarrhea, vomiting, dysuria or any other complains. She got Flu vaccine this season.     In ED, patient's vital signs were remarkable for Temp of 101.9, HR: 106, wbc: 15.21, Lactate of 2.5, s/p code sepsis    Goals of care: Full code

## 2019-03-31 NOTE — ED PROVIDER NOTE - OBJECTIVE STATEMENT
74 y/o F pt with PMHx of ACS, bronchoalveolar carcinoma, CAD, COPD, DM, GERD, HTN, HLD, and lung CA presents to ED, from assisted living, c/o chest pain, shortness of breath and fever x today. Pt denies h/o: dialysis, kidney issues, or blood clots. Pt also denies leg swelling, recent travel, or any other complaints.

## 2019-03-31 NOTE — ED ADULT NURSE NOTE - NSIMPLEMENTINTERV_GEN_ALL_ED
Implemented All Universal Safety Interventions:  [place] to call system. Call bell, personal items and telephone within reach. Instruct patient to call for assistance. Room bathroom lighting operational. Non-slip footwear when patient is off stretcher. Physically safe environment: no spills, clutter or unnecessary equipment. Stretcher in lowest position, wheels locked, appropriate side rails in place.

## 2019-03-31 NOTE — ED ADULT TRIAGE NOTE - CHIEF COMPLAINT QUOTE
biba from [facility] with  chest pain radiating to l ar m with breathing difficulty

## 2019-03-31 NOTE — H&P ADULT - PROBLEM SELECTOR PLAN 1
p/w fever, chills, shortness of breath, chest pain with WBC: 15.21, Lactate 2.3 CXR shows questionable Left side infilltrate pending official read --> Pneumonia  Flu test negative  s/p vanco and zosyn  started zosyn, duoneb, IV hydration, oxygen suplementation, tyelnol  F/u Blood cultures

## 2019-03-31 NOTE — H&P ADULT - PROBLEM SELECTOR PLAN 2
p/w high grade fever, chills, tachycardia, elevated WBC with CXR finding of PNA likely sepsis secondary to PNA  started vanc and zosyn  F/u Blood cultures  tyelnon for fever

## 2019-04-01 LAB
ALBUMIN SERPL ELPH-MCNC: 3.2 G/DL — LOW (ref 3.5–5)
ALP SERPL-CCNC: 65 U/L — SIGNIFICANT CHANGE UP (ref 40–120)
ALT FLD-CCNC: 13 U/L DA — SIGNIFICANT CHANGE UP (ref 10–60)
ANION GAP SERPL CALC-SCNC: 5 MMOL/L — SIGNIFICANT CHANGE UP (ref 5–17)
AST SERPL-CCNC: 14 U/L — SIGNIFICANT CHANGE UP (ref 10–40)
BASOPHILS # BLD AUTO: 0.04 K/UL — SIGNIFICANT CHANGE UP (ref 0–0.2)
BASOPHILS NFR BLD AUTO: 0.2 % — SIGNIFICANT CHANGE UP (ref 0–2)
BILIRUB SERPL-MCNC: 0.5 MG/DL — SIGNIFICANT CHANGE UP (ref 0.2–1.2)
BUN SERPL-MCNC: 13 MG/DL — SIGNIFICANT CHANGE UP (ref 7–18)
CALCIUM SERPL-MCNC: 8.8 MG/DL — SIGNIFICANT CHANGE UP (ref 8.4–10.5)
CHLORIDE SERPL-SCNC: 104 MMOL/L — SIGNIFICANT CHANGE UP (ref 96–108)
CHOLEST SERPL-MCNC: 168 MG/DL — SIGNIFICANT CHANGE UP (ref 10–199)
CO2 SERPL-SCNC: 28 MMOL/L — SIGNIFICANT CHANGE UP (ref 22–31)
CREAT SERPL-MCNC: 1.28 MG/DL — SIGNIFICANT CHANGE UP (ref 0.5–1.3)
EOSINOPHIL # BLD AUTO: 0.01 K/UL — SIGNIFICANT CHANGE UP (ref 0–0.5)
EOSINOPHIL NFR BLD AUTO: 0.1 % — SIGNIFICANT CHANGE UP (ref 0–6)
FOLATE SERPL-MCNC: >20 NG/ML — SIGNIFICANT CHANGE UP
GLUCOSE BLDC GLUCOMTR-MCNC: 120 MG/DL — HIGH (ref 70–99)
GLUCOSE BLDC GLUCOMTR-MCNC: 127 MG/DL — HIGH (ref 70–99)
GLUCOSE BLDC GLUCOMTR-MCNC: 156 MG/DL — HIGH (ref 70–99)
GLUCOSE SERPL-MCNC: 147 MG/DL — HIGH (ref 70–99)
HBA1C BLD-MCNC: 6.3 % — HIGH (ref 4–5.6)
HCT VFR BLD CALC: 35.1 % — SIGNIFICANT CHANGE UP (ref 34.5–45)
HDLC SERPL-MCNC: 72 MG/DL — SIGNIFICANT CHANGE UP
HGB BLD-MCNC: 10.8 G/DL — LOW (ref 11.5–15.5)
IMM GRANULOCYTES NFR BLD AUTO: 0.7 % — SIGNIFICANT CHANGE UP (ref 0–1.5)
LACTATE SERPL-SCNC: 1.5 MMOL/L — SIGNIFICANT CHANGE UP (ref 0.7–2)
LIPID PNL WITH DIRECT LDL SERPL: 85 MG/DL — SIGNIFICANT CHANGE UP
LYMPHOCYTES # BLD AUTO: 1.35 K/UL — SIGNIFICANT CHANGE UP (ref 1–3.3)
LYMPHOCYTES # BLD AUTO: 6.8 % — LOW (ref 13–44)
MAGNESIUM SERPL-MCNC: 1.7 MG/DL — SIGNIFICANT CHANGE UP (ref 1.6–2.6)
MCHC RBC-ENTMCNC: 28.8 PG — SIGNIFICANT CHANGE UP (ref 27–34)
MCHC RBC-ENTMCNC: 30.8 GM/DL — LOW (ref 32–36)
MCV RBC AUTO: 93.6 FL — SIGNIFICANT CHANGE UP (ref 80–100)
MONOCYTES # BLD AUTO: 1.31 K/UL — HIGH (ref 0–0.9)
MONOCYTES NFR BLD AUTO: 6.6 % — SIGNIFICANT CHANGE UP (ref 2–14)
NEUTROPHILS # BLD AUTO: 16.94 K/UL — HIGH (ref 1.8–7.4)
NEUTROPHILS NFR BLD AUTO: 85.6 % — HIGH (ref 43–77)
NRBC # BLD: 0 /100 WBCS — SIGNIFICANT CHANGE UP (ref 0–0)
PHOSPHATE SERPL-MCNC: 3.2 MG/DL — SIGNIFICANT CHANGE UP (ref 2.5–4.5)
PLATELET # BLD AUTO: 167 K/UL — SIGNIFICANT CHANGE UP (ref 150–400)
POTASSIUM SERPL-MCNC: 4.4 MMOL/L — SIGNIFICANT CHANGE UP (ref 3.5–5.3)
POTASSIUM SERPL-SCNC: 4.4 MMOL/L — SIGNIFICANT CHANGE UP (ref 3.5–5.3)
PROCALCITONIN SERPL-MCNC: 0.78 NG/ML — HIGH (ref 0.02–0.1)
PROT SERPL-MCNC: 6.8 G/DL — SIGNIFICANT CHANGE UP (ref 6–8.3)
RBC # BLD: 3.75 M/UL — LOW (ref 3.8–5.2)
RBC # FLD: 13.5 % — SIGNIFICANT CHANGE UP (ref 10.3–14.5)
SODIUM SERPL-SCNC: 137 MMOL/L — SIGNIFICANT CHANGE UP (ref 135–145)
TOTAL CHOLESTEROL/HDL RATIO MEASUREMENT: 2.3 RATIO — LOW (ref 3.3–7.1)
TRIGL SERPL-MCNC: 56 MG/DL — SIGNIFICANT CHANGE UP (ref 10–149)
TSH SERPL-MCNC: 0.32 UU/ML — LOW (ref 0.34–4.82)
VIT B12 SERPL-MCNC: 385 PG/ML — SIGNIFICANT CHANGE UP (ref 232–1245)
WBC # BLD: 19.78 K/UL — HIGH (ref 3.8–10.5)
WBC # FLD AUTO: 19.78 K/UL — HIGH (ref 3.8–10.5)

## 2019-04-01 RX ORDER — IPRATROPIUM/ALBUTEROL SULFATE 18-103MCG
3 AEROSOL WITH ADAPTER (GRAM) INHALATION EVERY 6 HOURS
Qty: 0 | Refills: 0 | Status: DISCONTINUED | OUTPATIENT
Start: 2019-04-01 | End: 2019-04-04

## 2019-04-01 RX ADMIN — LISINOPRIL 5 MILLIGRAM(S): 2.5 TABLET ORAL at 06:02

## 2019-04-01 RX ADMIN — Medication 1: at 17:48

## 2019-04-01 RX ADMIN — Medication 12.5 MILLIGRAM(S): at 17:48

## 2019-04-01 RX ADMIN — APIXABAN 5 MILLIGRAM(S): 2.5 TABLET, FILM COATED ORAL at 17:48

## 2019-04-01 RX ADMIN — Medication 81 MILLIGRAM(S): at 14:47

## 2019-04-01 RX ADMIN — Medication 12.5 MILLIGRAM(S): at 06:01

## 2019-04-01 RX ADMIN — PIPERACILLIN AND TAZOBACTAM 25 GRAM(S): 4; .5 INJECTION, POWDER, LYOPHILIZED, FOR SOLUTION INTRAVENOUS at 14:47

## 2019-04-01 RX ADMIN — SODIUM CHLORIDE 75 MILLILITER(S): 9 INJECTION INTRAMUSCULAR; INTRAVENOUS; SUBCUTANEOUS at 01:11

## 2019-04-01 RX ADMIN — PIPERACILLIN AND TAZOBACTAM 25 GRAM(S): 4; .5 INJECTION, POWDER, LYOPHILIZED, FOR SOLUTION INTRAVENOUS at 23:44

## 2019-04-01 RX ADMIN — PIPERACILLIN AND TAZOBACTAM 25 GRAM(S): 4; .5 INJECTION, POWDER, LYOPHILIZED, FOR SOLUTION INTRAVENOUS at 05:57

## 2019-04-01 RX ADMIN — APIXABAN 5 MILLIGRAM(S): 2.5 TABLET, FILM COATED ORAL at 06:54

## 2019-04-01 RX ADMIN — Medication 3 MILLILITER(S): at 09:47

## 2019-04-01 NOTE — PROGRESS NOTE ADULT - SUBJECTIVE AND OBJECTIVE BOX
NP Note discussed with  Primary Attending    Patient is a 73y old  Female who presents with a chief complaint of Shortness of breath (31 Mar 2019 22:52)      INTERVAL HPI/OVERNIGHT EVENTS: no new complaints    MEDICATIONS  (STANDING):  ALBUTerol/ipratropium for Nebulization 3 milliLiter(s) Nebulizer every 6 hours  apixaban 5 milliGRAM(s) Oral every 12 hours  aspirin enteric coated 81 milliGRAM(s) Oral daily  insulin lispro (HumaLOG) corrective regimen sliding scale   SubCutaneous three times a day before meals  lisinopril 5 milliGRAM(s) Oral daily  metoprolol tartrate 12.5 milliGRAM(s) Oral two times a day  piperacillin/tazobactam IVPB. 3.375 Gram(s) IV Intermittent every 8 hours  sodium chloride 0.9%. 1000 milliLiter(s) (75 mL/Hr) IV Continuous <Continuous>    MEDICATIONS  (PRN):  acetaminophen   Tablet .. 650 milliGRAM(s) Oral every 6 hours PRN Temp greater or equal to 38C (100.4F), Mild Pain (1 - 3), Moderate Pain (4 - 6)  guaiFENesin    Syrup 200 milliGRAM(s) Oral every 6 hours PRN Cough      __________________________________________________  REVIEW OF SYSTEMS:    CONSTITUTIONAL: No fever,   EYES: no acute visual disturbances  NECK: No pain or stiffness  RESPIRATORY: No cough; No shortness of breath  CARDIOVASCULAR: No chest pain, no palpitations  GASTROINTESTINAL: No pain. No nausea or vomiting; No diarrhea   NEUROLOGICAL: No headache or numbness, no tremors  MUSCULOSKELETAL: No joint pain, no muscle pain  GENITOURINARY: no dysuria, no frequency, no hesitancy  PSYCHIATRY: no depression , no anxiety  ALL OTHER  ROS negative        Vital Signs Last 24 Hrs  T(C): 36.8 (01 Apr 2019 07:35), Max: 38.8 (31 Mar 2019 18:11)  T(F): 98.2 (01 Apr 2019 07:35), Max: 101.9 (31 Mar 2019 18:11)  HR: 74 (01 Apr 2019 07:35) (74 - 116)  BP: 111/53 (01 Apr 2019 07:35) (111/53 - 132/55)  BP(mean): --  RR: 18 (01 Apr 2019 07:35) (17 - 18)  SpO2: 99% (01 Apr 2019 07:35) (98% - 99%)    ________________________________________________  PHYSICAL EXAM:  GENERAL: NAD  HEENT: Normocephalic;  conjunctivae and sclerae clear; moist mucous membranes;   NECK : supple  CHEST/LUNG: +Rhonchi   HEART: S1 S2  regular; no murmurs, gallops or rubs  ABDOMEN: Obese, Soft, Nontender, Nondistended; Bowel sounds present  EXTREMITIES: no cyanosis; no edema; no calf tenderness  SKIN: warm and dry; no rash  NERVOUS SYSTEM:  Awake and alert; Oriented  to place, person and time ; no new deficits    _________________________________________________  LABS:                        10.8   19.78 )-----------( 167      ( 01 Apr 2019 06:13 )             35.1     04-01    137  |  104  |  13  ----------------------------<  147<H>  4.4   |  28  |  1.28    Ca    8.8      01 Apr 2019 06:13  Phos  3.2     04-01  Mg     1.7     04-01    TPro  6.8  /  Alb  3.2<L>  /  TBili  0.5  /  DBili  x   /  AST  14  /  ALT  13  /  AlkPhos  65  04-01        CAPILLARY BLOOD GLUCOSE    POCT Blood Glucose.: 127 mg/dL (01 Apr 2019 08:02)    RADIOLOGY & ADDITIONAL TESTS:    < from: Xray Chest 2 Views PA/Lat (03.31.19 @ 19:52) >  IMPRESSION:  Nonspecific opacities bilateral lung bases slightly progressed on the   right. Follow-up recommended.    No pleural effusion.    Heart size enlarged.    < end of copied text >    Imaging  Reviewed:  YES    Consultant(s) Notes Reviewed:   YES      Plan of care was discussed with patient and /or primary care giver; all questions and concerns were addressed

## 2019-04-01 NOTE — PROGRESS NOTE ADULT - PROBLEM SELECTOR PLAN 1
Admitted with sepsis secondary to PNA   CXR as above   afebrile, WBCs increasing 15.21 --> 19.78  Lactate 2.5 --> 1.5   Continue zosyn, Genie, IVF, and 02     Blood cultures testing Admitted with sepsis secondary to PNA   CXR as above   afebrile, WBCs increasing 15.21 --> 19.78  Lactate 2.5 --> 1.5   Continue zosyn, Genie, IVF, and 02   Blood cultures testing

## 2019-04-01 NOTE — PROGRESS NOTE ADULT - ATTENDING COMMENTS
PATIENT IS SEEN AND EXAMINED  - POST OBSTRUCTIVE GRAM NEGATIVE PNEUMONIA ON IV ZOSYN. F/UP BLOOD CXS  - CANCER OF LUNG ON ORAL CHEMOTHERAPY  - GI AND DVT PROPHYLAXIS  - DR. BENTON

## 2019-04-01 NOTE — PROGRESS NOTE ADULT - ASSESSMENT
73 yr old F from [facility], ambulates with walker w/ PMHx of CAD, Paroxysmal Atrial Fibrillation (no Hx of AC), Bronchoalveolar CA (s/p wedge resection 2013 , on oral chemotherapy -> Tegresso) complicated w/ Metastatic Disease, DM (on Metformin), COPD (on 3L NC; no past intubations), HLD, GERD, HTn came with complain of  shortness of breath and fever x today.    Patient is admitted for sepsis secondary to PNA    Problem/Plan - 3:  ·  Problem: COPD (chronic obstructive pulmonary disease).  Plan: c/w oxygen supplementation 3L, duoneb  NOt wheezing currently so will hold off steroids.     Problem/Plan - 4:  ·  Problem: DM (diabetes mellitus).  Plan: takes metformin   c/w accuchecks, hss  F/u HBA1C.     Problem/Plan - 5:  ·  Problem: HTN (hypertension).  Plan: takes metoprolol and lisinopril  BP stable continue with medications.     Problem/Plan - 6:  Problem: Bronchoalveolar carcinoma. Plan: Takes Tagrisso ( Tyrosine kinase inhibitors ) chemotherapy at home.    Problem/Plan - 7:  ·  Problem: Prophylactic measure.  Plan: RISK                                                          Points  [  ] Previous VTE                                                3  [  ] Thrombophilia                                             2  [  ] Lower limb paralysis                                   2        (unable to hold up >15 seconds)    [  ] Current Cancer                                             2         (within 6 months)  [ x ] Immobilization > 24 hrs                              1  [  ] ICU/CCU stay > 24 hours                             1  [ x ] Age > 60                                                         1    IMPROVE VTE Score: 2  Eliquis for VTE prophylaxis. 73 yr old F from [facility], ambulates with walker w/ PMHx of CAD, Paroxysmal Atrial Fibrillation (no Hx of AC), Bronchoalveolar CA (s/p wedge resection 2013 , on oral chemotherapy -> Tegresso) complicated w/ Metastatic Disease, DM (on Metformin), COPD (on 3L NC; no past intubations), HLD, GERD, HTn came with complain of  shortness of breath and fever x today.    Patient is admitted for sepsis secondary to PNA

## 2019-04-01 NOTE — ED ADULT NURSE REASSESSMENT NOTE - NS ED NURSE REASSESS COMMENT FT1
Patient resting in bed, admitted to Medicine, awaiting floor bed. No acute distress noted, denies chest pain, no shortness of breath indicated. Safety maintained.

## 2019-04-02 LAB
ANION GAP SERPL CALC-SCNC: 5 MMOL/L — SIGNIFICANT CHANGE UP (ref 5–17)
BUN SERPL-MCNC: 11 MG/DL — SIGNIFICANT CHANGE UP (ref 7–18)
CALCIUM SERPL-MCNC: 9.1 MG/DL — SIGNIFICANT CHANGE UP (ref 8.4–10.5)
CHLORIDE SERPL-SCNC: 107 MMOL/L — SIGNIFICANT CHANGE UP (ref 96–108)
CO2 SERPL-SCNC: 29 MMOL/L — SIGNIFICANT CHANGE UP (ref 22–31)
CREAT SERPL-MCNC: 0.94 MG/DL — SIGNIFICANT CHANGE UP (ref 0.5–1.3)
GLUCOSE BLDC GLUCOMTR-MCNC: 105 MG/DL — HIGH (ref 70–99)
GLUCOSE BLDC GLUCOMTR-MCNC: 116 MG/DL — HIGH (ref 70–99)
GLUCOSE BLDC GLUCOMTR-MCNC: 119 MG/DL — HIGH (ref 70–99)
GLUCOSE BLDC GLUCOMTR-MCNC: 124 MG/DL — HIGH (ref 70–99)
GLUCOSE SERPL-MCNC: 123 MG/DL — HIGH (ref 70–99)
HCT VFR BLD CALC: 34.5 % — SIGNIFICANT CHANGE UP (ref 34.5–45)
HGB BLD-MCNC: 10.6 G/DL — LOW (ref 11.5–15.5)
MCHC RBC-ENTMCNC: 28.5 PG — SIGNIFICANT CHANGE UP (ref 27–34)
MCHC RBC-ENTMCNC: 30.7 GM/DL — LOW (ref 32–36)
MCV RBC AUTO: 92.7 FL — SIGNIFICANT CHANGE UP (ref 80–100)
NRBC # BLD: 0 /100 WBCS — SIGNIFICANT CHANGE UP (ref 0–0)
PLATELET # BLD AUTO: 161 K/UL — SIGNIFICANT CHANGE UP (ref 150–400)
POTASSIUM SERPL-MCNC: 4.2 MMOL/L — SIGNIFICANT CHANGE UP (ref 3.5–5.3)
POTASSIUM SERPL-SCNC: 4.2 MMOL/L — SIGNIFICANT CHANGE UP (ref 3.5–5.3)
RBC # BLD: 3.72 M/UL — LOW (ref 3.8–5.2)
RBC # FLD: 13.8 % — SIGNIFICANT CHANGE UP (ref 10.3–14.5)
SODIUM SERPL-SCNC: 141 MMOL/L — SIGNIFICANT CHANGE UP (ref 135–145)
WBC # BLD: 12.18 K/UL — HIGH (ref 3.8–10.5)
WBC # FLD AUTO: 12.18 K/UL — HIGH (ref 3.8–10.5)

## 2019-04-02 RX ORDER — ACETAMINOPHEN 500 MG
650 TABLET ORAL EVERY 6 HOURS
Qty: 0 | Refills: 0 | Status: DISCONTINUED | OUTPATIENT
Start: 2019-04-02 | End: 2019-04-04

## 2019-04-02 RX ADMIN — Medication 81 MILLIGRAM(S): at 13:02

## 2019-04-02 RX ADMIN — Medication 650 MILLIGRAM(S): at 01:10

## 2019-04-02 RX ADMIN — APIXABAN 5 MILLIGRAM(S): 2.5 TABLET, FILM COATED ORAL at 17:11

## 2019-04-02 RX ADMIN — Medication 12.5 MILLIGRAM(S): at 17:13

## 2019-04-02 RX ADMIN — PIPERACILLIN AND TAZOBACTAM 25 GRAM(S): 4; .5 INJECTION, POWDER, LYOPHILIZED, FOR SOLUTION INTRAVENOUS at 22:25

## 2019-04-02 RX ADMIN — APIXABAN 5 MILLIGRAM(S): 2.5 TABLET, FILM COATED ORAL at 05:58

## 2019-04-02 RX ADMIN — Medication 650 MILLIGRAM(S): at 00:05

## 2019-04-02 RX ADMIN — Medication 12.5 MILLIGRAM(S): at 05:58

## 2019-04-02 RX ADMIN — Medication 650 MILLIGRAM(S): at 17:12

## 2019-04-02 RX ADMIN — LISINOPRIL 5 MILLIGRAM(S): 2.5 TABLET ORAL at 05:58

## 2019-04-02 RX ADMIN — PIPERACILLIN AND TAZOBACTAM 25 GRAM(S): 4; .5 INJECTION, POWDER, LYOPHILIZED, FOR SOLUTION INTRAVENOUS at 05:57

## 2019-04-02 RX ADMIN — PIPERACILLIN AND TAZOBACTAM 25 GRAM(S): 4; .5 INJECTION, POWDER, LYOPHILIZED, FOR SOLUTION INTRAVENOUS at 13:02

## 2019-04-02 RX ADMIN — Medication 650 MILLIGRAM(S): at 06:01

## 2019-04-02 RX ADMIN — Medication 650 MILLIGRAM(S): at 17:42

## 2019-04-02 NOTE — CONSULT NOTE ADULT - GASTROINTESTINAL DETAILS
no distention/no masses palpable/no organomegaly/no guarding/bowel sounds normal/nontender/no rigidity/soft

## 2019-04-02 NOTE — PROGRESS NOTE ADULT - PROBLEM SELECTOR PLAN 1
PNA secondary to Post bronchial obstruction due to h/o Ca  - Will c/w Zosyn   WBC: trending down 15--> 19--> 12  Blood culture; negative

## 2019-04-02 NOTE — PHYSICAL THERAPY INITIAL EVALUATION ADULT - ADDITIONAL COMMENTS
Pt is from [facility] (assisted living) and does most of her own ADL's except for cooking. Pt has elevator access also.

## 2019-04-02 NOTE — PHYSICAL THERAPY INITIAL EVALUATION ADULT - DIAGNOSIS, PT EVAL
Decreased pulmonary function and endurance, increased fatigue secondary to pneumonia that limits her from participating in functional activities

## 2019-04-02 NOTE — CONSULT NOTE ADULT - ASSESSMENT
Right sided pneumonia  Fever - resolved  Leukocytosis - decreasing     plan - cont zosyn 3.375gms iv q8hrs

## 2019-04-02 NOTE — PROGRESS NOTE ADULT - ASSESSMENT
73 yr old F from [facility], ambulates with walker w/ PMHx of CAD, Paroxysmal Atrial Fibrillation (no Hx of AC), Bronchoalveolar CA (s/p wedge resection 2013 , on oral chemotherapy -> Tegresso) complicated w/ Metastatic Disease, DM (on Metformin), COPD (on 3L NC; no past intubations), HLD, GERD, HTn came with complain of  shortness of breath and fever x today.    Patient is admitted for sepsis secondary to PNA

## 2019-04-02 NOTE — CONSULT NOTE ADULT - SUBJECTIVE AND OBJECTIVE BOX
HPI:  73 yr old F from [facility], ambulates with walker w/ PMHx of CAD, Paroxysmal Atrial Fibrillation (no Hx of AC), Bronchoalveolar CA (s/p wedge resection 2013 , on oral chemotherapy -> Tegresso) complicated w/ Metastatic Disease, DM (on Metformin), COPD (on 3L NC; no past intubations), HLD, GERD, HTn came with complain of  shortness of breath and fever x today.    Patient states she developed sudden shortness of breath on exertion and o rest, upper chest pain B/l on sides and chills. She denies coughing, headache, diarrhea, vomiting, dysuria or any other complains. She got Flu vaccine this season.     In ED, patient's vital signs were remarkable for Temp of 101.9, HR: 106, wbc: 15.21, Lactate of 2.5, s/p code sepsis    Goals of care: Full code (31 Mar 2019 22:52)      PAST MEDICAL & SURGICAL HISTORY:  Lung cancer: wedge resection of left lung 2013  Seborrheic dermatitis  OA (osteoarthritis)  Hypercholesteremia  HTN (hypertension)  GERD (gastroesophageal reflux disease)  Constipation  DM (diabetes mellitus)  Cataract  CAD (coronary artery disease)  Bronchoalveolar carcinoma  ACS (acute coronary syndrome)  COPD (chronic obstructive pulmonary disease)  S/P ovarian cystectomy      No Known Allergies      Meds:  acetaminophen   Tablet .. 650 milliGRAM(s) Oral every 6 hours PRN  acetaminophen   Tablet .. 650 milliGRAM(s) Oral every 6 hours PRN  ALBUTerol/ipratropium for Nebulization 3 milliLiter(s) Nebulizer every 6 hours PRN  apixaban 5 milliGRAM(s) Oral every 12 hours  aspirin enteric coated 81 milliGRAM(s) Oral daily  guaiFENesin    Syrup 200 milliGRAM(s) Oral every 6 hours PRN  insulin lispro (HumaLOG) corrective regimen sliding scale   SubCutaneous three times a day before meals  lisinopril 5 milliGRAM(s) Oral daily  metoprolol tartrate 12.5 milliGRAM(s) Oral two times a day  piperacillin/tazobactam IVPB. 3.375 Gram(s) IV Intermittent every 8 hours  sodium chloride 0.9%. 1000 milliLiter(s) IV Continuous <Continuous>      SOCIAL HISTORY:  Smoker:  YES / NO        PACK YEARS:                         WHEN QUIT?  ETOH use:  YES / NO               FREQUENCY / QUANTITY:  Ilicit Drug use:  YES / NO  Occupation:  Assisted device use (Cane / Walker):  Live with:    FAMILY HISTORY:  Family history of prostate cancer      VITALS:  Vital Signs Last 24 Hrs  T(C): 36.7 (02 Apr 2019 14:15), Max: 36.7 (01 Apr 2019 20:31)  T(F): 98.1 (02 Apr 2019 14:15), Max: 98.1 (01 Apr 2019 20:31)  HR: 88 (02 Apr 2019 17:18) (82 - 90)  BP: 151/65 (02 Apr 2019 17:18) (128/47 - 156/74)  BP(mean): --  RR: 18 (02 Apr 2019 17:18) (14 - 18)  SpO2: 100% (02 Apr 2019 17:18) (98% - 100%)    LABS/DIAGNOSTIC TESTS:                          10.6   12.18 )-----------( 161      ( 02 Apr 2019 06:27 )             34.5     WBC Count: 12.18 K/uL (04-02 @ 06:27)  WBC Count: 19.78 K/uL (04-01 @ 06:13)  WBC Count: 15.21 K/uL (03-31 @ 18:46)      04-02    141  |  107  |  11  ----------------------------<  123<H>  4.2   |  29  |  0.94    Ca    9.1      02 Apr 2019 06:27  Phos  3.2     04-01  Mg     1.7     04-01    TPro  6.8  /  Alb  3.2<L>  /  TBili  0.5  /  DBili  x   /  AST  14  /  ALT  13  /  AlkPhos  65  04-01          LIVER FUNCTIONS - ( 01 Apr 2019 06:13 )  Alb: 3.2 g/dL / Pro: 6.8 g/dL / ALK PHOS: 65 U/L / ALT: 13 U/L DA / AST: 14 U/L / GGT: x                 LACTATE:    ABG -     CULTURES:   .Blood  03-31 @ 22:01   No growth to date.  --  --      .Blood  03-31 @ 21:58   No growth to date.  --  --          RADIOLOGY:< from: Xray Chest 2 Views PA/Lat (03.31.19 @ 19:52) >  EXAM:  XR CHEST PA LAT 2V                            PROCEDURE DATE:  03/31/2019          INTERPRETATION:  CLINICAL STATEMENT: Chest pain.    TECHNIQUE: PA and lateral views of the chest.    COMPARISON: 2/16/2019    FINDINGS/  IMPRESSION:  Nonspecific opacities bilateral lung bases slightly progressed on the   right. Follow-up recommended.    No pleural effusion.    Heart size enlarged.      < end of copied text >        ROS  [  ] UNABLE TO ELICIT HPI:  73 yr old F from [facility], ambulates with walker w/ PMHx of CAD, Paroxysmal Atrial Fibrillation (no Hx of AC), Bronchoalveolar CA (s/p wedge resection 2013 , on oral chemotherapy -> Tegresso) complicated w/ Metastatic Disease, DM (on Metformin), COPD (on 3L NC; no past intubations), HLD, GERD, HTn came with complain of  shortness of breath and fever x today.  Patient states she developed sudden shortness of breath on exertion and o rest, upper chest pain B/l on sides and chills. She denies coughing, headache, diarrhea, vomiting, dysuria or any other complains. She got Flu vaccine this season.   In ED, patient's vital signs were remarkable for Temp of 101.9, HR: 106, wbc: 15.21, Lactate of 2.5, s/p code sepsis    History as above, she is feeling better today but c/o weakness and has a dry cough , she had a fever on admission but none now, she c/o pleuritic chest pain when she coughs though. she has no other symptoms. Her CXR shows her to have  right sided infiltrates which have progressed, likely post obstructive pneumonia.        PAST MEDICAL & SURGICAL HISTORY:  Lung cancer: wedge resection of left lung 2013  Seborrheic dermatitis  OA (osteoarthritis)  Hypercholesteremia  HTN (hypertension)  GERD (gastroesophageal reflux disease)  Constipation  DM (diabetes mellitus)  Cataract  CAD (coronary artery disease)  Bronchoalveolar carcinoma  ACS (acute coronary syndrome)  COPD (chronic obstructive pulmonary disease)  S/P ovarian cystectomy      No Known Allergies      Meds:  acetaminophen   Tablet .. 650 milliGRAM(s) Oral every 6 hours PRN  acetaminophen   Tablet .. 650 milliGRAM(s) Oral every 6 hours PRN  ALBUTerol/ipratropium for Nebulization 3 milliLiter(s) Nebulizer every 6 hours PRN  apixaban 5 milliGRAM(s) Oral every 12 hours  aspirin enteric coated 81 milliGRAM(s) Oral daily  guaiFENesin    Syrup 200 milliGRAM(s) Oral every 6 hours PRN  insulin lispro (HumaLOG) corrective regimen sliding scale   SubCutaneous three times a day before meals  lisinopril 5 milliGRAM(s) Oral daily  metoprolol tartrate 12.5 milliGRAM(s) Oral two times a day  piperacillin/tazobactam IVPB. 3.375 Gram(s) IV Intermittent every 8 hours  sodium chloride 0.9%. 1000 milliLiter(s) IV Continuous <Continuous>      SOCIAL HISTORY:  Smoker: no  ETOH use:  no    FAMILY HISTORY:  Family history of prostate cancer      VITALS:  Vital Signs Last 24 Hrs  T(C): 36.7 (02 Apr 2019 14:15), Max: 36.7 (01 Apr 2019 20:31)  T(F): 98.1 (02 Apr 2019 14:15), Max: 98.1 (01 Apr 2019 20:31)  HR: 88 (02 Apr 2019 17:18) (82 - 90)  BP: 151/65 (02 Apr 2019 17:18) (128/47 - 156/74)  BP(mean): --  RR: 18 (02 Apr 2019 17:18) (14 - 18)  SpO2: 100% (02 Apr 2019 17:18) (98% - 100%)    LABS/DIAGNOSTIC TESTS:                          10.6   12.18 )-----------( 161      ( 02 Apr 2019 06:27 )             34.5     WBC Count: 12.18 K/uL (04-02 @ 06:27)  WBC Count: 19.78 K/uL (04-01 @ 06:13)  WBC Count: 15.21 K/uL (03-31 @ 18:46)      04-02    141  |  107  |  11  ----------------------------<  123<H>  4.2   |  29  |  0.94    Ca    9.1      02 Apr 2019 06:27  Phos  3.2     04-01  Mg     1.7     04-01    TPro  6.8  /  Alb  3.2<L>  /  TBili  0.5  /  DBili  x   /  AST  14  /  ALT  13  /  AlkPhos  65  04-01          LIVER FUNCTIONS - ( 01 Apr 2019 06:13 )  Alb: 3.2 g/dL / Pro: 6.8 g/dL / ALK PHOS: 65 U/L / ALT: 13 U/L DA / AST: 14 U/L / GGT: x                 LACTATE:    ABG -     CULTURES:   .Blood  03-31 @ 22:01   No growth to date.  --  --      .Blood  03-31 @ 21:58   No growth to date.  --  --          RADIOLOGY:< from: Xray Chest 2 Views PA/Lat (03.31.19 @ 19:52) >  EXAM:  XR CHEST PA LAT 2V                            PROCEDURE DATE:  03/31/2019          INTERPRETATION:  CLINICAL STATEMENT: Chest pain.    TECHNIQUE: PA and lateral views of the chest.    COMPARISON: 2/16/2019    FINDINGS/  IMPRESSION:  Nonspecific opacities bilateral lung bases slightly progressed on the   right. Follow-up recommended.    No pleural effusion.    Heart size enlarged.      < end of copied text >        ROS  [  ] UNABLE TO ELICIT

## 2019-04-02 NOTE — PROGRESS NOTE ADULT - SUBJECTIVE AND OBJECTIVE BOX
PGY 1 Note discussed with supervising resident and primary attending    Patient is a 73y old  Female who presents with a chief complaint of Shortness of breath (01 Apr 2019 10:32)      INTERVAL HPI/OVERNIGHT EVENTS: Patient seen and examined at the bedside.   No new complains   Patient feeling much better  Blood culture: negative   WBC count trending down     MEDICATIONS  (STANDING):  apixaban 5 milliGRAM(s) Oral every 12 hours  aspirin enteric coated 81 milliGRAM(s) Oral daily  insulin lispro (HumaLOG) corrective regimen sliding scale   SubCutaneous three times a day before meals  lisinopril 5 milliGRAM(s) Oral daily  metoprolol tartrate 12.5 milliGRAM(s) Oral two times a day  piperacillin/tazobactam IVPB. 3.375 Gram(s) IV Intermittent every 8 hours  sodium chloride 0.9%. 1000 milliLiter(s) (75 mL/Hr) IV Continuous <Continuous>    MEDICATIONS  (PRN):  acetaminophen   Tablet .. 650 milliGRAM(s) Oral every 6 hours PRN Temp greater or equal to 38C (100.4F), Mild Pain (1 - 3), Moderate Pain (4 - 6)  ALBUTerol/ipratropium for Nebulization 3 milliLiter(s) Nebulizer every 6 hours PRN Shortness of Breath and/or Wheezing  guaiFENesin    Syrup 200 milliGRAM(s) Oral every 6 hours PRN Cough      __________________________________________________  REVIEW OF SYSTEMS:    CONSTITUTIONAL: No fever,   EYES: no acute visual disturbances  NECK: No pain or stiffness  RESPIRATORY: No cough; No shortness of breath  CARDIOVASCULAR: No chest pain, no palpitations  GASTROINTESTINAL: No pain. No nausea or vomiting; No diarrhea   NEUROLOGICAL: No headache or numbness, no tremors  MUSCULOSKELETAL: No joint pain, no muscle pain  GENITOURINARY: no dysuria, no frequency, no hesitancy  PSYCHIATRY: no depression , no anxiety  ALL OTHER  ROS negative        Vital Signs Last 24 Hrs  T(C): 36.5 (02 Apr 2019 05:34), Max: 37.2 (01 Apr 2019 16:26)  T(F): 97.7 (02 Apr 2019 05:34), Max: 98.9 (01 Apr 2019 16:26)  HR: 87 (02 Apr 2019 05:34) (80 - 87)  BP: 128/47 (02 Apr 2019 05:34) (120/61 - 153/60)  BP(mean): --  RR: 18 (02 Apr 2019 05:34) (14 - 18)  SpO2: 99% (02 Apr 2019 05:34) (98% - 100%)    ________________________________________________  PHYSICAL EXAM:  GENERAL: female in bed in no acute distress   HEENT: Normocephalic;  conjunctivae and sclerae clear; moist mucous membranes;   NECK : supple  CHEST/LUNG: Clear to auscultation bilaterally with good air entry   HEART: S1 S2  regular; no murmurs, gallops or rubs  ABDOMEN: Soft, Nontender, Nondistended; Bowel sounds present  EXTREMITIES: no cyanosis; no edema; no calf tenderness  SKIN: warm and dry; no rash  NERVOUS SYSTEM:  Awake and alert; Oriented  to place, person and time ; no new deficits    _________________________________________________  LABS:                        10.6   12.18 )-----------( 161      ( 02 Apr 2019 06:27 )             34.5     04-02    141  |  107  |  11  ----------------------------<  123<H>  4.2   |  29  |  0.94    Ca    9.1      02 Apr 2019 06:27  Phos  3.2     04-01  Mg     1.7     04-01    TPro  6.8  /  Alb  3.2<L>  /  TBili  0.5  /  DBili  x   /  AST  14  /  ALT  13  /  AlkPhos  65  04-01        CAPILLARY BLOOD GLUCOSE      POCT Blood Glucose.: 116 mg/dL (02 Apr 2019 07:59)  POCT Blood Glucose.: 156 mg/dL (01 Apr 2019 17:00)  POCT Blood Glucose.: 120 mg/dL (01 Apr 2019 11:50)        RADIOLOGY & ADDITIONAL TESTS:    Imaging Personally Reviewed:  YES    Consultant(s) Notes Reviewed:   YES    Care Discussed with Consultants :     Plan of care was discussed with patient and /or primary care giver; all questions and concerns were addressed and care was aligned with patient's wishes.

## 2019-04-03 ENCOUNTER — TRANSCRIPTION ENCOUNTER (OUTPATIENT)
Age: 74
End: 2019-04-03

## 2019-04-03 LAB
ANION GAP SERPL CALC-SCNC: 5 MMOL/L — SIGNIFICANT CHANGE UP (ref 5–17)
BUN SERPL-MCNC: 9 MG/DL — SIGNIFICANT CHANGE UP (ref 7–18)
CALCIUM SERPL-MCNC: 9.1 MG/DL — SIGNIFICANT CHANGE UP (ref 8.4–10.5)
CHLORIDE SERPL-SCNC: 105 MMOL/L — SIGNIFICANT CHANGE UP (ref 96–108)
CO2 SERPL-SCNC: 29 MMOL/L — SIGNIFICANT CHANGE UP (ref 22–31)
CREAT SERPL-MCNC: 0.97 MG/DL — SIGNIFICANT CHANGE UP (ref 0.5–1.3)
GLUCOSE BLDC GLUCOMTR-MCNC: 114 MG/DL — HIGH (ref 70–99)
GLUCOSE BLDC GLUCOMTR-MCNC: 116 MG/DL — HIGH (ref 70–99)
GLUCOSE BLDC GLUCOMTR-MCNC: 132 MG/DL — HIGH (ref 70–99)
GLUCOSE BLDC GLUCOMTR-MCNC: 177 MG/DL — HIGH (ref 70–99)
GLUCOSE SERPL-MCNC: 115 MG/DL — HIGH (ref 70–99)
HCT VFR BLD CALC: 34.5 % — SIGNIFICANT CHANGE UP (ref 34.5–45)
HGB BLD-MCNC: 10.6 G/DL — LOW (ref 11.5–15.5)
MCHC RBC-ENTMCNC: 28.2 PG — SIGNIFICANT CHANGE UP (ref 27–34)
MCHC RBC-ENTMCNC: 30.7 GM/DL — LOW (ref 32–36)
MCV RBC AUTO: 91.8 FL — SIGNIFICANT CHANGE UP (ref 80–100)
NRBC # BLD: 0 /100 WBCS — SIGNIFICANT CHANGE UP (ref 0–0)
PLATELET # BLD AUTO: 171 K/UL — SIGNIFICANT CHANGE UP (ref 150–400)
POTASSIUM SERPL-MCNC: 3.9 MMOL/L — SIGNIFICANT CHANGE UP (ref 3.5–5.3)
POTASSIUM SERPL-SCNC: 3.9 MMOL/L — SIGNIFICANT CHANGE UP (ref 3.5–5.3)
RBC # BLD: 3.76 M/UL — LOW (ref 3.8–5.2)
RBC # FLD: 13.8 % — SIGNIFICANT CHANGE UP (ref 10.3–14.5)
SODIUM SERPL-SCNC: 139 MMOL/L — SIGNIFICANT CHANGE UP (ref 135–145)
WBC # BLD: 10.04 K/UL — SIGNIFICANT CHANGE UP (ref 3.8–10.5)
WBC # FLD AUTO: 10.04 K/UL — SIGNIFICANT CHANGE UP (ref 3.8–10.5)

## 2019-04-03 RX ORDER — LISINOPRIL 2.5 MG/1
1 TABLET ORAL
Qty: 0 | Refills: 0 | DISCHARGE
Start: 2019-04-03

## 2019-04-03 RX ORDER — ACETAMINOPHEN 500 MG
2 TABLET ORAL
Qty: 0 | Refills: 0 | DISCHARGE
Start: 2019-04-03

## 2019-04-03 RX ADMIN — Medication 12.5 MILLIGRAM(S): at 06:12

## 2019-04-03 RX ADMIN — Medication 650 MILLIGRAM(S): at 13:08

## 2019-04-03 RX ADMIN — Medication 12.5 MILLIGRAM(S): at 17:31

## 2019-04-03 RX ADMIN — Medication 650 MILLIGRAM(S): at 05:11

## 2019-04-03 RX ADMIN — LISINOPRIL 5 MILLIGRAM(S): 2.5 TABLET ORAL at 06:13

## 2019-04-03 RX ADMIN — PIPERACILLIN AND TAZOBACTAM 25 GRAM(S): 4; .5 INJECTION, POWDER, LYOPHILIZED, FOR SOLUTION INTRAVENOUS at 21:53

## 2019-04-03 RX ADMIN — APIXABAN 5 MILLIGRAM(S): 2.5 TABLET, FILM COATED ORAL at 17:31

## 2019-04-03 RX ADMIN — PIPERACILLIN AND TAZOBACTAM 25 GRAM(S): 4; .5 INJECTION, POWDER, LYOPHILIZED, FOR SOLUTION INTRAVENOUS at 06:13

## 2019-04-03 RX ADMIN — Medication 650 MILLIGRAM(S): at 23:21

## 2019-04-03 RX ADMIN — PIPERACILLIN AND TAZOBACTAM 25 GRAM(S): 4; .5 INJECTION, POWDER, LYOPHILIZED, FOR SOLUTION INTRAVENOUS at 13:07

## 2019-04-03 RX ADMIN — Medication 650 MILLIGRAM(S): at 06:15

## 2019-04-03 RX ADMIN — APIXABAN 5 MILLIGRAM(S): 2.5 TABLET, FILM COATED ORAL at 06:13

## 2019-04-03 RX ADMIN — Medication 81 MILLIGRAM(S): at 12:20

## 2019-04-03 RX ADMIN — Medication 650 MILLIGRAM(S): at 13:41

## 2019-04-03 NOTE — PROGRESS NOTE ADULT - ASSESSMENT
73 yr old F from [facility], ambulates with walker w/ PMHx of CAD, Paroxysmal Atrial Fibrillation (no Hx of AC), Bronchoalveolar CA (s/p wedge resection 2013 , on oral chemotherapy -> Tegresso) complicated w/ Metastatic Disease, DM (on Metformin), COPD (on 3L NC; no past intubations), HLD, GERD, HTn came with complain of  shortness of breath and fever x today.    Patient is admitted for sepsis secondary to PNA

## 2019-04-03 NOTE — PROGRESS NOTE ADULT - PROBLEM SELECTOR PLAN 6
Takes Tagrisso ( Tyrosine kinase inhibitors ) chemotherapy at home
Takes Tagrisso ( Tyrosine kinase inhibitors ) chemotherapy at home
DVT ppx - Eliquis

## 2019-04-03 NOTE — PROGRESS NOTE ADULT - SUBJECTIVE AND OBJECTIVE BOX
PGY 1 Note discussed with supervising resident and primary attending    Patient is a 73y old  Female who presents with a chief complaint of Shortness of breath (03 Apr 2019 07:55)      INTERVAL HPI/OVERNIGHT EVENTS:   Patient seen and examined at the bedside.   Medically stable for discharge. but refuses to go today   DC tomorrow     MEDICATIONS  (STANDING):  apixaban 5 milliGRAM(s) Oral every 12 hours  aspirin enteric coated 81 milliGRAM(s) Oral daily  insulin lispro (HumaLOG) corrective regimen sliding scale   SubCutaneous three times a day before meals  lisinopril 5 milliGRAM(s) Oral daily  metoprolol tartrate 12.5 milliGRAM(s) Oral two times a day  piperacillin/tazobactam IVPB. 3.375 Gram(s) IV Intermittent every 8 hours  sodium chloride 0.9%. 1000 milliLiter(s) (75 mL/Hr) IV Continuous <Continuous>    MEDICATIONS  (PRN):  acetaminophen   Tablet .. 650 milliGRAM(s) Oral every 6 hours PRN Temp greater or equal to 38C (100.4F), Mild Pain (1 - 3), Moderate Pain (4 - 6)  acetaminophen   Tablet .. 650 milliGRAM(s) Oral every 6 hours PRN Mild Pain (1 - 3), Moderate Pain (4 - 6)  ALBUTerol/ipratropium for Nebulization 3 milliLiter(s) Nebulizer every 6 hours PRN Shortness of Breath and/or Wheezing  guaiFENesin    Syrup 200 milliGRAM(s) Oral every 6 hours PRN Cough      __________________________________________________  REVIEW OF SYSTEMS:    CONSTITUTIONAL: No fever,   EYES: no acute visual disturbances  NECK: No pain or stiffness  RESPIRATORY: No cough; No shortness of breath  CARDIOVASCULAR: No chest pain, no palpitations  GASTROINTESTINAL: No pain. No nausea or vomiting; No diarrhea   NEUROLOGICAL: No headache or numbness, no tremors  MUSCULOSKELETAL: No joint pain, no muscle pain  GENITOURINARY: no dysuria, no frequency, no hesitancy  PSYCHIATRY: no depression , no anxiety  ALL OTHER  ROS negative        Vital Signs Last 24 Hrs  T(C): 36.9 (03 Apr 2019 13:24), Max: 36.9 (03 Apr 2019 05:32)  T(F): 98.4 (03 Apr 2019 13:24), Max: 98.5 (03 Apr 2019 05:32)  HR: 88 (03 Apr 2019 13:24) (72 - 91)  BP: 143/53 (03 Apr 2019 13:24) (129/52 - 156/74)  BP(mean): --  RR: 18 (03 Apr 2019 13:24) (18 - 18)  SpO2: 98% (03 Apr 2019 13:24) (90% - 100%)    ________________________________________________  PHYSICAL EXAM:  GENERAL: NAD  HEENT: Normocephalic;  conjunctivae and sclerae clear; moist mucous membranes;   NECK : supple  CHEST/LUNG: Clear to auscultation bilaterally with good air entry   HEART: S1 S2  regular; no murmurs, gallops or rubs  ABDOMEN: Soft, Nontender, Nondistended; Bowel sounds present  EXTREMITIES: no cyanosis; no edema; no calf tenderness  SKIN: warm and dry; no rash  NERVOUS SYSTEM:  Awake and alert; Oriented  to place, person and time ; no new deficits    _________________________________________________  LABS:                        10.6   10.04 )-----------( 171      ( 03 Apr 2019 07:04 )             34.5     04-03    139  |  105  |  9   ----------------------------<  115<H>  3.9   |  29  |  0.97    Ca    9.1      03 Apr 2019 07:04          CAPILLARY BLOOD GLUCOSE      POCT Blood Glucose.: 132 mg/dL (03 Apr 2019 11:55)  POCT Blood Glucose.: 114 mg/dL (03 Apr 2019 08:09)  POCT Blood Glucose.: 119 mg/dL (02 Apr 2019 21:32)  POCT Blood Glucose.: 124 mg/dL (02 Apr 2019 16:54)        RADIOLOGY & ADDITIONAL TESTS:    Imaging Personally Reviewed:  YES    Consultant(s) Notes Reviewed:   YES    Care Discussed with Consultants :     Plan of care was discussed with patient and /or primary care giver; all questions and concerns were addressed and care was aligned with patient's wishes.

## 2019-04-03 NOTE — DISCHARGE NOTE PROVIDER - HOSPITAL COURSE
73 yr old F from [facility], ambulates with walker w/ PMHx of CAD, Paroxysmal Atrial Fibrillation (no Hx of AC), Bronchoalveolar CA (s/p wedge resection 2013 , on oral chemotherapy -> Tegresso) complicated w/ Metastatic Disease, DM (on Metformin), COPD (on 3L NC; no past intubations), HLD, GERD, HTn came with complain of  shortness of breath and fever x today.        Patient is admitted for sepsis secondary to PNA: post obstruction PNA due to CAncer. Pneumonia, secondary to Post bronchial obstruction due to h/o Ca, c/w Zosyn, WBC: trending down 15--> 19--> 12, Blood culture; negative.         COPD (chronic obstructive pulmonary disease): c/w oxygen supplementation 3L, duoneb, not wheezing currently so will held off steroids.         DM (diabetes mellitus): takes metformin, c/w accuchecks, hss, F/u HBA1C; 6.3.         HTN (hypertension): takes metoprolol and lisinopril, BP stable continue with medications.         Bronchoalveolar carcinoma: Takes Tagrisso ( Tyrosine kinase inhibitors ) chemotherapy at home.        For Afib: metoprolol and Eliquis was continued         Patient is medically stable for discharge. Case is discussed with the  attending

## 2019-04-03 NOTE — PROGRESS NOTE ADULT - PROBLEM SELECTOR PLAN 5
takes metoprolol and lisinopril  BP stable continue with medications
takes metoprolol and lisinopril  BP stable continue with medications
On metformin at home   HbA1C from February 2019 was 6.1, current HbA1C testing  Blood sugars well controlled 90-130s   Continue accuchecks and sliding scale

## 2019-04-03 NOTE — DISCHARGE NOTE PROVIDER - NSDCCPCAREPLAN_GEN_ALL_CORE_FT
PRINCIPAL DISCHARGE DIAGNOSIS  Diagnosis: Sepsis due to pneumonia  Assessment and Plan of Treatment: You were admitted with sepsis secondary to pneumonia, which is most likely caused by the obstruction of the bronchiole secondary to cancer. You received IV zosyn while inpatient. Your WBC count trended down. You are recommended to take antibiotics orally as advised and follow up with PCP      SECONDARY DISCHARGE DIAGNOSES  Diagnosis: Bronchoalveolar carcinoma  Assessment and Plan of Treatment: You are recommended to take chemo medication as advised and follow up with PCP and your oncologist.    Diagnosis: Atrial fibrillation  Assessment and Plan of Treatment: You have afib. You are recommended to take metoprolol and Eliquis as advised and follow up with PCP    Diagnosis: HTN (hypertension)  Assessment and Plan of Treatment: You are recommended to take medications as advised, take DASH Diet, exercise and follow up with PCP    Diagnosis: DM (diabetes mellitus)  Assessment and Plan of Treatment: You are recommended to take low carb diet, takemedications as advised, exercise and follow up with PCP. Your A1c: 6.3.    Diagnosis: COPD (chronic obstructive pulmonary disease)  Assessment and Plan of Treatment: You are recommended to take medication as advised and follow up with PCP. You COPD was stable on this admission

## 2019-04-03 NOTE — PROGRESS NOTE ADULT - PROBLEM SELECTOR PLAN 2
as mentioned above
as mentioned above
No wheezing on exam, does not appear to be in exacerbation   changed duonebs to PRN   On 3L at facility, continue 02 supplementation 3L

## 2019-04-03 NOTE — PROGRESS NOTE ADULT - PROBLEM SELECTOR PLAN 3
c/w oxygen supplementation 3L, duoneb  NOt wheezing currently so will hold off steroids
c/w oxygen supplementation 3L, duoneb  NOt wheezing currently so will hold off steroids
On Tagrisso at home

## 2019-04-03 NOTE — PROGRESS NOTE ADULT - PROBLEM SELECTOR PLAN 4
takes metformin   c/w gayle guthrie  F/u HBA1C; 6.3
takes metformin   c/w gayle guthrie  F/u HBA1C; 6.3
SBPs well controlled 110-120s   Continue lisinopril and metoprolol

## 2019-04-03 NOTE — PROGRESS NOTE ADULT - PROBLEM SELECTOR PLAN 1
PNA secondary to Post bronchial obstruction due to h/o Ca  - Will c/w Zosyn   WBC: trending down 15--> 19--> 12--> 10  Blood culture; negative  Will dc tomorrow

## 2019-04-04 ENCOUNTER — TRANSCRIPTION ENCOUNTER (OUTPATIENT)
Age: 74
End: 2019-04-04

## 2019-04-04 VITALS
SYSTOLIC BLOOD PRESSURE: 120 MMHG | WEIGHT: 149.91 LBS | TEMPERATURE: 98 F | RESPIRATION RATE: 18 BRPM | OXYGEN SATURATION: 99 % | DIASTOLIC BLOOD PRESSURE: 34 MMHG | HEART RATE: 96 BPM

## 2019-04-04 LAB
GLUCOSE BLDC GLUCOMTR-MCNC: 119 MG/DL — HIGH (ref 70–99)
GLUCOSE BLDC GLUCOMTR-MCNC: 169 MG/DL — HIGH (ref 70–99)

## 2019-04-04 PROCEDURE — 83605 ASSAY OF LACTIC ACID: CPT

## 2019-04-04 PROCEDURE — 82962 GLUCOSE BLOOD TEST: CPT

## 2019-04-04 PROCEDURE — 80061 LIPID PANEL: CPT

## 2019-04-04 PROCEDURE — 97161 PT EVAL LOW COMPLEX 20 MIN: CPT

## 2019-04-04 PROCEDURE — 83735 ASSAY OF MAGNESIUM: CPT

## 2019-04-04 PROCEDURE — 85379 FIBRIN DEGRADATION QUANT: CPT

## 2019-04-04 PROCEDURE — 84145 PROCALCITONIN (PCT): CPT

## 2019-04-04 PROCEDURE — 80048 BASIC METABOLIC PNL TOTAL CA: CPT

## 2019-04-04 PROCEDURE — 94640 AIRWAY INHALATION TREATMENT: CPT

## 2019-04-04 PROCEDURE — 36415 COLL VENOUS BLD VENIPUNCTURE: CPT

## 2019-04-04 PROCEDURE — 99285 EMERGENCY DEPT VISIT HI MDM: CPT | Mod: 25

## 2019-04-04 PROCEDURE — 80053 COMPREHEN METABOLIC PANEL: CPT

## 2019-04-04 PROCEDURE — 96374 THER/PROPH/DIAG INJ IV PUSH: CPT

## 2019-04-04 PROCEDURE — 87631 RESP VIRUS 3-5 TARGETS: CPT

## 2019-04-04 PROCEDURE — 82607 VITAMIN B-12: CPT

## 2019-04-04 PROCEDURE — 93005 ELECTROCARDIOGRAM TRACING: CPT

## 2019-04-04 PROCEDURE — 71046 X-RAY EXAM CHEST 2 VIEWS: CPT

## 2019-04-04 PROCEDURE — 84100 ASSAY OF PHOSPHORUS: CPT

## 2019-04-04 PROCEDURE — 84443 ASSAY THYROID STIM HORMONE: CPT

## 2019-04-04 PROCEDURE — 84484 ASSAY OF TROPONIN QUANT: CPT

## 2019-04-04 PROCEDURE — 82803 BLOOD GASES ANY COMBINATION: CPT

## 2019-04-04 PROCEDURE — 85027 COMPLETE CBC AUTOMATED: CPT

## 2019-04-04 PROCEDURE — 87040 BLOOD CULTURE FOR BACTERIA: CPT

## 2019-04-04 PROCEDURE — 82746 ASSAY OF FOLIC ACID SERUM: CPT

## 2019-04-04 PROCEDURE — 83036 HEMOGLOBIN GLYCOSYLATED A1C: CPT

## 2019-04-04 RX ADMIN — Medication 81 MILLIGRAM(S): at 11:52

## 2019-04-04 RX ADMIN — APIXABAN 5 MILLIGRAM(S): 2.5 TABLET, FILM COATED ORAL at 05:38

## 2019-04-04 RX ADMIN — Medication 650 MILLIGRAM(S): at 00:10

## 2019-04-04 RX ADMIN — Medication 12.5 MILLIGRAM(S): at 05:38

## 2019-04-04 RX ADMIN — Medication 650 MILLIGRAM(S): at 06:30

## 2019-04-04 RX ADMIN — Medication 1: at 11:52

## 2019-04-04 RX ADMIN — LISINOPRIL 5 MILLIGRAM(S): 2.5 TABLET ORAL at 05:38

## 2019-04-04 RX ADMIN — PIPERACILLIN AND TAZOBACTAM 25 GRAM(S): 4; .5 INJECTION, POWDER, LYOPHILIZED, FOR SOLUTION INTRAVENOUS at 05:38

## 2019-04-04 RX ADMIN — Medication 650 MILLIGRAM(S): at 05:37

## 2019-04-04 NOTE — DISCHARGE NOTE NURSING/CASE MANAGEMENT/SOCIAL WORK - NSDCDPATPORTLINK_GEN_ALL_CORE
You can access the [facility] Patient Portal, offered by [facility], by registering with the following website: [email or web address]

## 2019-08-25 ENCOUNTER — EMERGENCY (EMERGENCY)
Facility: HOSPITAL | Age: 74
LOS: 1 days | Discharge: HOME | End: 2019-08-25
Attending: STUDENT IN AN ORGANIZED HEALTH CARE EDUCATION/TRAINING PROGRAM
Payer: MEDICAID

## 2019-08-25 VITALS
WEIGHT: 160.06 LBS | RESPIRATION RATE: 19 BRPM | SYSTOLIC BLOOD PRESSURE: 165 MMHG | TEMPERATURE: 98 F | HEART RATE: 76 BPM | DIASTOLIC BLOOD PRESSURE: 78 MMHG | OXYGEN SATURATION: 98 %

## 2019-08-25 VITALS
SYSTOLIC BLOOD PRESSURE: 138 MMHG | DIASTOLIC BLOOD PRESSURE: 66 MMHG | HEART RATE: 68 BPM | OXYGEN SATURATION: 95 % | RESPIRATION RATE: 18 BRPM | TEMPERATURE: 98 F

## 2019-08-25 DIAGNOSIS — Z98.89 OTHER SPECIFIED POSTPROCEDURAL STATES: Chronic | ICD-10-CM

## 2019-08-25 LAB
ALBUMIN SERPL ELPH-MCNC: 2.9 G/DL — LOW (ref 3.5–5)
ALP SERPL-CCNC: 56 U/L — SIGNIFICANT CHANGE UP (ref 40–120)
ALT FLD-CCNC: 16 U/L DA — SIGNIFICANT CHANGE UP (ref 10–60)
ANION GAP SERPL CALC-SCNC: 6 MMOL/L — SIGNIFICANT CHANGE UP (ref 5–17)
AST SERPL-CCNC: 16 U/L — SIGNIFICANT CHANGE UP (ref 10–40)
BILIRUB SERPL-MCNC: 0.2 MG/DL — SIGNIFICANT CHANGE UP (ref 0.2–1.2)
BUN SERPL-MCNC: 12 MG/DL — SIGNIFICANT CHANGE UP (ref 7–18)
CALCIUM SERPL-MCNC: 9.5 MG/DL — SIGNIFICANT CHANGE UP (ref 8.4–10.5)
CHLORIDE SERPL-SCNC: 101 MMOL/L — SIGNIFICANT CHANGE UP (ref 96–108)
CO2 SERPL-SCNC: 30 MMOL/L — SIGNIFICANT CHANGE UP (ref 22–31)
CREAT SERPL-MCNC: 1.09 MG/DL — SIGNIFICANT CHANGE UP (ref 0.5–1.3)
GLUCOSE SERPL-MCNC: 110 MG/DL — HIGH (ref 70–99)
HCT VFR BLD CALC: 35.9 % — SIGNIFICANT CHANGE UP (ref 34.5–45)
HGB BLD-MCNC: 10.9 G/DL — LOW (ref 11.5–15.5)
MCHC RBC-ENTMCNC: 28.1 PG — SIGNIFICANT CHANGE UP (ref 27–34)
MCHC RBC-ENTMCNC: 30.4 GM/DL — LOW (ref 32–36)
MCV RBC AUTO: 92.5 FL — SIGNIFICANT CHANGE UP (ref 80–100)
NRBC # BLD: 0 /100 WBCS — SIGNIFICANT CHANGE UP (ref 0–0)
PLATELET # BLD AUTO: 256 K/UL — SIGNIFICANT CHANGE UP (ref 150–400)
POTASSIUM SERPL-MCNC: 4.4 MMOL/L — SIGNIFICANT CHANGE UP (ref 3.5–5.3)
POTASSIUM SERPL-SCNC: 4.4 MMOL/L — SIGNIFICANT CHANGE UP (ref 3.5–5.3)
PROT SERPL-MCNC: 6.8 G/DL — SIGNIFICANT CHANGE UP (ref 6–8.3)
RBC # BLD: 3.88 M/UL — SIGNIFICANT CHANGE UP (ref 3.8–5.2)
RBC # FLD: 14 % — SIGNIFICANT CHANGE UP (ref 10.3–14.5)
SODIUM SERPL-SCNC: 137 MMOL/L — SIGNIFICANT CHANGE UP (ref 135–145)
TROPONIN I SERPL-MCNC: <0.015 NG/ML — SIGNIFICANT CHANGE UP (ref 0–0.04)
WBC # BLD: 8.14 K/UL — SIGNIFICANT CHANGE UP (ref 3.8–10.5)
WBC # FLD AUTO: 8.14 K/UL — SIGNIFICANT CHANGE UP (ref 3.8–10.5)

## 2019-08-25 PROCEDURE — 84484 ASSAY OF TROPONIN QUANT: CPT

## 2019-08-25 PROCEDURE — 99284 EMERGENCY DEPT VISIT MOD MDM: CPT | Mod: 25

## 2019-08-25 PROCEDURE — 83690 ASSAY OF LIPASE: CPT

## 2019-08-25 PROCEDURE — 96374 THER/PROPH/DIAG INJ IV PUSH: CPT | Mod: 25

## 2019-08-25 PROCEDURE — 36415 COLL VENOUS BLD VENIPUNCTURE: CPT

## 2019-08-25 PROCEDURE — 71045 X-RAY EXAM CHEST 1 VIEW: CPT | Mod: 26

## 2019-08-25 PROCEDURE — 99284 EMERGENCY DEPT VISIT MOD MDM: CPT

## 2019-08-25 PROCEDURE — 93010 ELECTROCARDIOGRAM REPORT: CPT

## 2019-08-25 PROCEDURE — 85027 COMPLETE CBC AUTOMATED: CPT

## 2019-08-25 PROCEDURE — 96375 TX/PRO/DX INJ NEW DRUG ADDON: CPT

## 2019-08-25 PROCEDURE — 80053 COMPREHEN METABOLIC PANEL: CPT

## 2019-08-25 PROCEDURE — 93005 ELECTROCARDIOGRAM TRACING: CPT

## 2019-08-25 PROCEDURE — 71045 X-RAY EXAM CHEST 1 VIEW: CPT

## 2019-08-25 RX ORDER — ONDANSETRON 8 MG/1
4 TABLET, FILM COATED ORAL ONCE
Refills: 0 | Status: COMPLETED | OUTPATIENT
Start: 2019-08-25 | End: 2019-08-25

## 2019-08-25 RX ORDER — KETOROLAC TROMETHAMINE 30 MG/ML
15 SYRINGE (ML) INJECTION ONCE
Refills: 0 | Status: DISCONTINUED | OUTPATIENT
Start: 2019-08-25 | End: 2019-08-25

## 2019-08-25 RX ORDER — AMOXICILLIN 250 MG/5ML
2 SUSPENSION, RECONSTITUTED, ORAL (ML) ORAL
Qty: 30 | Refills: 0
Start: 2019-08-25 | End: 2019-08-29

## 2019-08-25 RX ORDER — AZITHROMYCIN 500 MG/1
500 TABLET, FILM COATED ORAL ONCE
Refills: 0 | Status: COMPLETED | OUTPATIENT
Start: 2019-08-25 | End: 2019-08-25

## 2019-08-25 RX ORDER — CEFTRIAXONE 500 MG/1
1000 INJECTION, POWDER, FOR SOLUTION INTRAMUSCULAR; INTRAVENOUS ONCE
Refills: 0 | Status: COMPLETED | OUTPATIENT
Start: 2019-08-25 | End: 2019-08-25

## 2019-08-25 RX ORDER — AZITHROMYCIN 500 MG/1
1 TABLET, FILM COATED ORAL
Qty: 6 | Refills: 0
Start: 2019-08-25 | End: 2019-08-29

## 2019-08-25 RX ADMIN — AZITHROMYCIN 250 MILLIGRAM(S): 500 TABLET, FILM COATED ORAL at 13:09

## 2019-08-25 RX ADMIN — ONDANSETRON 4 MILLIGRAM(S): 8 TABLET, FILM COATED ORAL at 11:51

## 2019-08-25 RX ADMIN — Medication 15 MILLIGRAM(S): at 11:40

## 2019-08-25 RX ADMIN — CEFTRIAXONE 100 MILLIGRAM(S): 500 INJECTION, POWDER, FOR SOLUTION INTRAMUSCULAR; INTRAVENOUS at 12:12

## 2019-08-25 RX ADMIN — Medication 15 MILLIGRAM(S): at 12:10

## 2019-08-25 NOTE — ED ADULT NURSE NOTE - OBJECTIVE STATEMENT
Patient TAY from [facility] with c/o  generalized crampy pain with sharp pain to back on pain scale 8/10 ttok tylenol this morning at around 9:30 pain was relieved then came back

## 2019-08-25 NOTE — ED PROVIDER NOTE - CARDIAC, MLM
Normal rate, regular rhythm.  Heart sounds S1, S2.  No murmurs, rubs or gallops. Has point tenderness to left anterior chest wall. Normal rate, regular rhythm.  Heart sounds S1, S2.  Has point tenderness to left anterior chest wall.

## 2019-08-25 NOTE — ED PROVIDER NOTE - MUSCULOSKELETAL, MLM
Spine appears normal, range of motion is not limited, no muscle or joint tenderness moving all extremities comfortably in stretcher

## 2019-08-25 NOTE — ED PROVIDER NOTE - PROGRESS NOTE DETAILS
Pt seen and re-evaluated at bedside. No new complaints. Diagnostic tests reviewed and results discussed. In v/o improved symptoms will d/c home for OP f/u with RX for PNA. Teach back successful, understands return precautions.

## 2019-08-25 NOTE — ED PROVIDER NOTE - CLINICAL SUMMARY MEDICAL DECISION MAKING FREE TEXT BOX
74yo F presents to ED complaining of chest pain. Will do labs and imaging. H&PE is suggestive of chest discomfort.  - labs  - imaging  - meds  - will c/t monitor

## 2019-08-25 NOTE — ED PROVIDER NOTE - OBJECTIVE STATEMENT
73y female with pmhx of COPD and lung cancer for 8y presents to ED complaining of chest pain and leg stiffness. Pt states when she woke up in the morning, she could hardly walk because she felt extra stiff today.  Pt adds she usually uses a walker to walk. Pt reports she was diagnosed w PNA 2w ago by her [facility]'s doctor. Pt adds she was given abx that she is currently taking but does not remember the day of the abx she is currently on. Pt reports she feels     better after abx but still has l chest pain. Pt adds took 2 tylenols at 9:30a. Pt adds she feels nauseous and is coughing. Pt denies fall, loc, or any other complaints. NKDA. 73y female with pmhx of COPD and lung cancer for 8y presents to ED complaining of chest discomfort, nausea, and overall body stiffness. Reports recently dx with PNA by her assisted living provider. States no blood work or chest ray done. States walks with a walker and today feels more stiff.     States was given abx for PNA, does not recall the name. Did not take the abx today but did take 2 tablets of tylenols prior to ED arrival.    Tolerating PO solids and liquids.    Pt denies fever, falling, trauma, H/A, dizziness, LOC, changes in vision, current CP, palpitations, SOB, cough, abd pain, N/V, D/C, dysuria, hematuria, vaginal bleeding/discharge, incontinence, changes in bowel and bladder habit, weakness or numbness in the extremities, or recent travel.

## 2019-08-25 NOTE — ED ADULT NURSE NOTE - ED STAT RN HANDOFF DETAILS
Patient discharged back to assisted living as per MD order, IV access removed no redness, swelling or bleeding noted at site. All discharge instructions and F/U visits provided to patient. Prescriptions sent to pharmacy. Patient verbalizes understanding leaving via wheelchair stable in no acute distress .

## 2019-08-25 NOTE — ED ADULT NURSE NOTE - NSIMPLEMENTINTERV_GEN_ALL_ED
Implemented All Fall with Harm Risk Interventions:  [place] to call system. Call bell, personal items and telephone within reach. Instruct patient to call for assistance. Room bathroom lighting operational. Non-slip footwear when patient is off stretcher. Physically safe environment: no spills, clutter or unnecessary equipment. Stretcher in lowest position, wheels locked, appropriate side rails in place. Provide visual cue, wrist band, yellow gown, etc. Monitor gait and stability. Monitor for mental status changes and reorient to person, place, and time. Review medications for side effects contributing to fall risk. Reinforce activity limits and safety measures with patient and family. Provide visual clues: red socks.

## 2020-01-28 ENCOUNTER — INPATIENT (INPATIENT)
Facility: HOSPITAL | Age: 75
LOS: 2 days | Discharge: NURSING, REHAB OR LONG-TERM CARE | DRG: 180 | End: 2020-01-31
Attending: INTERNAL MEDICINE | Admitting: INTERNAL MEDICINE
Payer: MEDICAID

## 2020-01-28 VITALS
RESPIRATION RATE: 20 BRPM | TEMPERATURE: 98 F | DIASTOLIC BLOOD PRESSURE: 64 MMHG | OXYGEN SATURATION: 98 % | WEIGHT: 167.99 LBS | HEIGHT: 63 IN | SYSTOLIC BLOOD PRESSURE: 162 MMHG | HEART RATE: 99 BPM

## 2020-01-28 DIAGNOSIS — Z98.89 OTHER SPECIFIED POSTPROCEDURAL STATES: Chronic | ICD-10-CM

## 2020-01-28 DIAGNOSIS — J18.9 PNEUMONIA, UNSPECIFIED ORGANISM: ICD-10-CM

## 2020-01-28 LAB
ALBUMIN SERPL ELPH-MCNC: 2.8 G/DL — LOW (ref 3.5–5)
ALP SERPL-CCNC: 67 U/L — SIGNIFICANT CHANGE UP (ref 40–120)
ALT FLD-CCNC: 17 U/L DA — SIGNIFICANT CHANGE UP (ref 10–60)
ANION GAP SERPL CALC-SCNC: 4 MMOL/L — LOW (ref 5–17)
APPEARANCE UR: CLEAR — SIGNIFICANT CHANGE UP
APTT BLD: 44.3 SEC — HIGH (ref 27.5–36.3)
AST SERPL-CCNC: 25 U/L — SIGNIFICANT CHANGE UP (ref 10–40)
BACTERIA # UR AUTO: ABNORMAL /HPF
BASOPHILS # BLD AUTO: 0.04 K/UL — SIGNIFICANT CHANGE UP (ref 0–0.2)
BASOPHILS NFR BLD AUTO: 0.5 % — SIGNIFICANT CHANGE UP (ref 0–2)
BILIRUB SERPL-MCNC: 0.3 MG/DL — SIGNIFICANT CHANGE UP (ref 0.2–1.2)
BILIRUB UR-MCNC: NEGATIVE — SIGNIFICANT CHANGE UP
BUN SERPL-MCNC: 14 MG/DL — SIGNIFICANT CHANGE UP (ref 7–18)
CALCIUM SERPL-MCNC: 9.4 MG/DL — SIGNIFICANT CHANGE UP (ref 8.4–10.5)
CHLORIDE SERPL-SCNC: 101 MMOL/L — SIGNIFICANT CHANGE UP (ref 96–108)
CO2 SERPL-SCNC: 31 MMOL/L — SIGNIFICANT CHANGE UP (ref 22–31)
COLOR SPEC: YELLOW — SIGNIFICANT CHANGE UP
CREAT SERPL-MCNC: 0.94 MG/DL — SIGNIFICANT CHANGE UP (ref 0.5–1.3)
DIFF PNL FLD: NEGATIVE — SIGNIFICANT CHANGE UP
EOSINOPHIL # BLD AUTO: 0.15 K/UL — SIGNIFICANT CHANGE UP (ref 0–0.5)
EOSINOPHIL NFR BLD AUTO: 1.9 % — SIGNIFICANT CHANGE UP (ref 0–6)
EPI CELLS # UR: SIGNIFICANT CHANGE UP /HPF
GLUCOSE SERPL-MCNC: 60 MG/DL — LOW (ref 70–99)
GLUCOSE UR QL: NEGATIVE — SIGNIFICANT CHANGE UP
HCT VFR BLD CALC: 33.5 % — LOW (ref 34.5–45)
HGB BLD-MCNC: 10.4 G/DL — LOW (ref 11.5–15.5)
HYALINE CASTS # UR AUTO: ABNORMAL /LPF
IMM GRANULOCYTES NFR BLD AUTO: 0.5 % — SIGNIFICANT CHANGE UP (ref 0–1.5)
INR BLD: 1.49 RATIO — HIGH (ref 0.88–1.16)
KETONES UR-MCNC: NEGATIVE — SIGNIFICANT CHANGE UP
LACTATE SERPL-SCNC: 0.6 MMOL/L — LOW (ref 0.7–2)
LEUKOCYTE ESTERASE UR-ACNC: ABNORMAL
LYMPHOCYTES # BLD AUTO: 1.36 K/UL — SIGNIFICANT CHANGE UP (ref 1–3.3)
LYMPHOCYTES # BLD AUTO: 17.4 % — SIGNIFICANT CHANGE UP (ref 13–44)
MCHC RBC-ENTMCNC: 28.3 PG — SIGNIFICANT CHANGE UP (ref 27–34)
MCHC RBC-ENTMCNC: 31 GM/DL — LOW (ref 32–36)
MCV RBC AUTO: 91 FL — SIGNIFICANT CHANGE UP (ref 80–100)
MONOCYTES # BLD AUTO: 0.84 K/UL — SIGNIFICANT CHANGE UP (ref 0–0.9)
MONOCYTES NFR BLD AUTO: 10.7 % — SIGNIFICANT CHANGE UP (ref 2–14)
NEUTROPHILS # BLD AUTO: 5.4 K/UL — SIGNIFICANT CHANGE UP (ref 1.8–7.4)
NEUTROPHILS NFR BLD AUTO: 69 % — SIGNIFICANT CHANGE UP (ref 43–77)
NITRITE UR-MCNC: NEGATIVE — SIGNIFICANT CHANGE UP
NRBC # BLD: 0 /100 WBCS — SIGNIFICANT CHANGE UP (ref 0–0)
PH UR: 7 — SIGNIFICANT CHANGE UP (ref 5–8)
PLATELET # BLD AUTO: 233 K/UL — SIGNIFICANT CHANGE UP (ref 150–400)
POTASSIUM SERPL-MCNC: 4.4 MMOL/L — SIGNIFICANT CHANGE UP (ref 3.5–5.3)
POTASSIUM SERPL-SCNC: 4.4 MMOL/L — SIGNIFICANT CHANGE UP (ref 3.5–5.3)
PROT SERPL-MCNC: 6.9 G/DL — SIGNIFICANT CHANGE UP (ref 6–8.3)
PROT UR-MCNC: 30 MG/DL
PROTHROM AB SERPL-ACNC: 16.7 SEC — HIGH (ref 10–12.9)
RBC # BLD: 3.68 M/UL — LOW (ref 3.8–5.2)
RBC # FLD: 12.9 % — SIGNIFICANT CHANGE UP (ref 10.3–14.5)
RBC CASTS # UR COMP ASSIST: SIGNIFICANT CHANGE UP /HPF (ref 0–2)
SODIUM SERPL-SCNC: 136 MMOL/L — SIGNIFICANT CHANGE UP (ref 135–145)
SP GR SPEC: 1 — LOW (ref 1.01–1.02)
UROBILINOGEN FLD QL: NEGATIVE — SIGNIFICANT CHANGE UP
WBC # BLD: 7.83 K/UL — SIGNIFICANT CHANGE UP (ref 3.8–10.5)
WBC # FLD AUTO: 7.83 K/UL — SIGNIFICANT CHANGE UP (ref 3.8–10.5)
WBC UR QL: ABNORMAL /HPF (ref 0–5)

## 2020-01-28 PROCEDURE — 99285 EMERGENCY DEPT VISIT HI MDM: CPT

## 2020-01-28 PROCEDURE — 99222 1ST HOSP IP/OBS MODERATE 55: CPT | Mod: GC

## 2020-01-28 PROCEDURE — 71045 X-RAY EXAM CHEST 1 VIEW: CPT | Mod: 26

## 2020-01-28 RX ORDER — IPRATROPIUM/ALBUTEROL SULFATE 18-103MCG
3 AEROSOL WITH ADAPTER (GRAM) INHALATION EVERY 6 HOURS
Refills: 0 | Status: DISCONTINUED | OUTPATIENT
Start: 2020-01-28 | End: 2020-01-31

## 2020-01-28 RX ORDER — CEFTRIAXONE 500 MG/1
1000 INJECTION, POWDER, FOR SOLUTION INTRAMUSCULAR; INTRAVENOUS ONCE
Refills: 0 | Status: COMPLETED | OUTPATIENT
Start: 2020-01-28 | End: 2020-01-28

## 2020-01-28 RX ORDER — AZITHROMYCIN 500 MG/1
500 TABLET, FILM COATED ORAL ONCE
Refills: 0 | Status: COMPLETED | OUTPATIENT
Start: 2020-01-28 | End: 2020-01-28

## 2020-01-28 RX ADMIN — CEFTRIAXONE 100 MILLIGRAM(S): 500 INJECTION, POWDER, FOR SOLUTION INTRAMUSCULAR; INTRAVENOUS at 17:47

## 2020-01-28 RX ADMIN — CEFTRIAXONE 1000 MILLIGRAM(S): 500 INJECTION, POWDER, FOR SOLUTION INTRAMUSCULAR; INTRAVENOUS at 19:00

## 2020-01-28 RX ADMIN — AZITHROMYCIN 500 MILLIGRAM(S): 500 TABLET, FILM COATED ORAL at 19:00

## 2020-01-28 RX ADMIN — AZITHROMYCIN 255 MILLIGRAM(S): 500 TABLET, FILM COATED ORAL at 17:47

## 2020-01-28 NOTE — H&P ADULT - NSHPPHYSICALEXAM_GEN_ALL_CORE
Vital Signs Last 24 Hrs  T(C): 36.4 (28 Jan 2020 21:49), Max: 36.5 (28 Jan 2020 16:59)  T(F): 97.6 (28 Jan 2020 21:49), Max: 97.7 (28 Jan 2020 16:59)  HR: 86 (28 Jan 2020 21:49) (86 - 99)  BP: 130/65 (28 Jan 2020 21:49) (130/65 - 162/64)  BP(mean): --  RR: 16 (28 Jan 2020 21:49) (16 - 20)  SpO2: 100% (28 Jan 2020 21:49) (98% - 100%) Vital Signs Last 24 Hrs  T(C): 36.4 (28 Jan 2020 21:49), Max: 36.5 (28 Jan 2020 16:59)  T(F): 97.6 (28 Jan 2020 21:49), Max: 97.7 (28 Jan 2020 16:59)  HR: 86 (28 Jan 2020 21:49) (86 - 99)  BP: 130/65 (28 Jan 2020 21:49) (130/65 - 162/64)  BP(mean): --  RR: 16 (28 Jan 2020 21:49) (16 - 20)  SpO2: 100% (28 Jan 2020 21:49) (98% - 100%)  patient is lying comfortably  Eyes: PERRL, no conjunctival injection  HENT:  Neck supple without meningismus   CV: RRR, Warm, well-perfused extremities  RESP: B/L WHEEZE IN UPPER LOBES AND RONCHI in RLL  GI: soft, non-tender, non-distended  MSK: No gross deformities appreciated  Skin: Warm, dry. No rashes  Neuro: Alert, CNs II-XII grossly intact. Sensation and motor function of extremities grossly intact.  Psych: Appropriate mood and affect.

## 2020-01-28 NOTE — H&P ADULT - ATTENDING COMMENTS
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 She has had  multiple admissions for pneumonia in the past.   She presents with a week of SOB, cough and dizziness. She was diagnosed with pneumonia in the outpatient and was placed on oral antibiotics. She did not get better and was sent to the ED. Denies any fevers    Vital Signs Last 24 Hrs  T(C): 36.4 (2020 21:49), Max: 36.5 (2020 16:59)  T(F): 97.6 (2020 21:49), Max: 97.7 (2020 16:59)  HR: 86 (2020 21:49) (86 - 99)  BP: 130/65 (2020 21:49) (130/65 - 162/64)  RR: 16 (2020 21:49) (16 - 20)  SpO2: 100% (2020 21:49) (98% - 100%)    Middle aged woman, NAD, AAO X 3  Appreciable air entry B/L; expiratory wheezes in th upper lobes; crackle in the RML and RLL.  Soft NT ND BS +  No pedal edema; some non specific TTP in the LEs  NO focal deficits    Labs                        10.4   7.83  )-----------( 233      ( 2020 17:44 )             33.5         136  |  101  |  14  --------------------<  60<L>  4.4   |  31  |  0.94    Ca    9.4      2020 17:44  TPro  6.9  /  Alb  2.8<L>  /  TBili  0.3  /  DBili  x   /  AST  25  /  ALT  17  /  AlkPhos  67      PT/INR - ( 2020 17:44 )   PT: 16.7 sec;   INR: 1.49 ratio     PTT - ( 2020 17:44 )  PTT:44.3 sec    Urinalysis Basic - ( 2020 19:50 )    Color: Yellow / Appearance: Clear / S.005 / pH: x  Gluc: x / Ketone: Negative  / Bili: Negative / Urobili: Negative   Blood: x / Protein: 30 mg/dL / Nitrite: Negative   Leuk Esterase: Moderate / RBC: 0-2 /HPF / WBC 26-50 /HPF   Sq Epi: x / Non Sq Epi: Few /HPF / Bacteria: Few /HPF    CXR: Interval progression of left basilar and right midlung opacities    Impression  Chronic respiratory failure  Multifocal PNA - failed outpatient management  COPD with mild exacerbation  Metastatic bronchoalveolar cancer on Tagrisso  Asymptomatic bacteruria    A/P  Admit to Medicine  Continue broad spectrum antibiotics  Supplemental O2 at home level requirement  Bronchodilators RTC  Pain control

## 2020-01-28 NOTE — H&P ADULT - PROBLEM SELECTOR PLAN 2
Patient has history of COPD on Atrovent.  Duoneb nebs here.  will hold off on steroids since she does not sounds too bad.  wheezing is appreciated in only b/l upper lobes.

## 2020-01-28 NOTE — H&P ADULT - ASSESSMENT
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 She has had  multiple admissions for pneumonia in the past. She was diagnosed with pneumonia in the outpatient and was placed on oral antibiotics (ON CEFTIN 500 MD X TWICE DAILY FOR 7 DAYS). Patient states that her cough has improved is non-productive but she is still short of breath persistently She did not get better and was sent to the ED. Denies any fevers , although mentions severe chills and rigors. Denies any recent travel or sick contact.   Patient denies any chest pain, palpitation, nausea, vomiting, abdominal pain, change in urinary or bowel habits.  Patient is being admitted for PNA after failing outpatient treatment. CXR showed interval progression of left basilar and right midlung opacities. Will start on IV ceftriaxone and IV azithromycin.

## 2020-01-28 NOTE — H&P ADULT - HISTORY OF PRESENT ILLNESS
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 She has had  multiple admissions for pneumonia in the past. She was diagnosed with pneumonia in the outpatient and was placed on oral antibiotics (ON CEFTIN 500 MD X TWICE DAILY FOR 7 DAYS). Patient states that her cough has improved is non-productive but she is still short of breath persistently She did not get better and was sent to the ED. Denies any fevers , although mentions severe chills and rigors. Denies any recent travel or sick contact.   Patient denies any chest pain, palpitation, nausea, vomiting, abdominal pain, change in urinary or bowel habits.  Patient is being admitted for PNA after failing outpatient treatment. CXR showed interval progression of left basilar and right midlung opacities. Will start on IV ceftriaxone and IV azithromycin.

## 2020-01-28 NOTE — ED ADULT NURSE NOTE - OBJECTIVE STATEMENT
pt sent from [facility] for worsening PNA & UTI. pt currently being treated for both conditions, but as per NH pt isn't improving. Upon assessment, pt breathing unlabored on 2L NC, pt endorses having sob, fevers & chills x2 days. Pt denies cp, HA, urinary distress, abdominal pain, n/v/d. NAD.

## 2020-01-28 NOTE — ED PROVIDER NOTE - PHYSICAL EXAMINATION
Eyes: PERRL, no conjunctival injection  HENT:  Neck supple without meningismus   CV: RRR, Warm, well-perfused extremities  RESP: CTA B/L, no tachypnea   GI: soft, non-tender, non-distended  MSK: No gross deformities appreciated  Skin: Warm, dry. No rashes  Neuro: Alert, CNs II-XII grossly intact. Sensation and motor function of extremities grossly intact.  Psych: Appropriate mood and affect.

## 2020-01-28 NOTE — H&P ADULT - PROBLEM SELECTOR PLAN 1
Patient is being admitted for PNA after failing outpatient treatment.   CXR showed interval progression of left basilar and right midlung opacities.   Will start on IV ceftriaxone and IV azithromycin. Patient presented with cough, SOB and chills.  Patient is being admitted for PNA after failing outpatient treatment.  wheezing in b/l upper lung fields.  ronchi in RLL AND RML  CXR showed interval progression of left basilar and right midlung opacities.  blood cultures sent   Will start on IV ceftriaxone and IV azithromycin.

## 2020-01-28 NOTE — ED PROVIDER NOTE - OBJECTIVE STATEMENT
73 yo F with PMH of ACS, CAD, lung CA, CAD, COPD 73 yo F with PMH of ACS, CAD, lung CA, CAD, COPD on 2L at home presents to ED for worsening pneumonia and UTI. Patient was treated with outpatient antibiotics but has not improved. She is still having fever and chills.   No nausea, vomiting, CP, abdominal pain.

## 2020-01-28 NOTE — ED PROVIDER NOTE - NS ED ROS FT
Review of Systems   Constitutional:  No Weight Change, + Fever, + Chills, No weakness    ENT/Mouth:  No Nasal Congestion, No Hoarseness, No sore throat, No Rhinorrhea, No Swallowing Difficulty  Eyes:  No Eye Pain, No Swelling, No Redness, No Discharge, No Vision Changes  Cardiovascular:  No Chest Pain, No palpitations, No Dyspnea on Exertion, No Orthopnea, No  Respiratory:  + SOB, + Cough, No Wheezing  Gastrointestinal:  No Nausea, No Vomiting, No Diarrhea, No Constipation, No abdominal pain, No melena or bright red blood per rectum  Genitourinary:  No Dysuria, No Urinary Frequency, No Hematuria, No Urinary Incontinence,  Musculoskeletal:  No Arthralgias, No Myalgias, No Joint Swelling, No Joint Stiffness,  Skin:  No rashes

## 2020-01-28 NOTE — H&P ADULT - PROBLEM SELECTOR PLAN 9
IMPROVE VTE Individual Risk Assessment  RISK                                                          Points  [] Previous VTE                                           3  [] Thrombophilia                                        2  [] Lower limb paralysis                              2   [] Current Cancer                                       2   [] Immobilization > 24 hrs                        1  [] ICU/CCU stay > 24 hours                       1  [] Age > 60                                                   1  IMPROVE VTE Score = 4  pt on eliquis

## 2020-01-29 ENCOUNTER — TRANSCRIPTION ENCOUNTER (OUTPATIENT)
Age: 75
End: 2020-01-29

## 2020-01-29 DIAGNOSIS — I10 ESSENTIAL (PRIMARY) HYPERTENSION: ICD-10-CM

## 2020-01-29 DIAGNOSIS — I48.91 UNSPECIFIED ATRIAL FIBRILLATION: ICD-10-CM

## 2020-01-29 DIAGNOSIS — Z29.9 ENCOUNTER FOR PROPHYLACTIC MEASURES, UNSPECIFIED: ICD-10-CM

## 2020-01-29 DIAGNOSIS — C34.90 MALIGNANT NEOPLASM OF UNSPECIFIED PART OF UNSPECIFIED BRONCHUS OR LUNG: ICD-10-CM

## 2020-01-29 DIAGNOSIS — M19.90 UNSPECIFIED OSTEOARTHRITIS, UNSPECIFIED SITE: ICD-10-CM

## 2020-01-29 DIAGNOSIS — J18.9 PNEUMONIA, UNSPECIFIED ORGANISM: ICD-10-CM

## 2020-01-29 DIAGNOSIS — J44.9 CHRONIC OBSTRUCTIVE PULMONARY DISEASE, UNSPECIFIED: ICD-10-CM

## 2020-01-29 DIAGNOSIS — I25.10 ATHEROSCLEROTIC HEART DISEASE OF NATIVE CORONARY ARTERY WITHOUT ANGINA PECTORIS: ICD-10-CM

## 2020-01-29 DIAGNOSIS — E78.00 PURE HYPERCHOLESTEROLEMIA, UNSPECIFIED: ICD-10-CM

## 2020-01-29 LAB
24R-OH-CALCIDIOL SERPL-MCNC: 54.9 NG/ML — SIGNIFICANT CHANGE UP (ref 30–80)
ALBUMIN SERPL ELPH-MCNC: 2.6 G/DL — LOW (ref 3.5–5)
ALP SERPL-CCNC: 65 U/L — SIGNIFICANT CHANGE UP (ref 40–120)
ALT FLD-CCNC: 15 U/L DA — SIGNIFICANT CHANGE UP (ref 10–60)
ANION GAP SERPL CALC-SCNC: 5 MMOL/L — SIGNIFICANT CHANGE UP (ref 5–17)
AST SERPL-CCNC: 16 U/L — SIGNIFICANT CHANGE UP (ref 10–40)
BASOPHILS # BLD AUTO: 0.03 K/UL — SIGNIFICANT CHANGE UP (ref 0–0.2)
BASOPHILS NFR BLD AUTO: 0.4 % — SIGNIFICANT CHANGE UP (ref 0–2)
BILIRUB DIRECT SERPL-MCNC: 0.1 MG/DL — SIGNIFICANT CHANGE UP (ref 0–0.2)
BILIRUB INDIRECT FLD-MCNC: 0.1 MG/DL — LOW (ref 0.2–1)
BILIRUB SERPL-MCNC: 0.2 MG/DL — SIGNIFICANT CHANGE UP (ref 0.2–1.2)
BUN SERPL-MCNC: 11 MG/DL — SIGNIFICANT CHANGE UP (ref 7–18)
CALCIUM SERPL-MCNC: 9.7 MG/DL — SIGNIFICANT CHANGE UP (ref 8.4–10.5)
CHLORIDE SERPL-SCNC: 102 MMOL/L — SIGNIFICANT CHANGE UP (ref 96–108)
CHOLEST SERPL-MCNC: 171 MG/DL — SIGNIFICANT CHANGE UP (ref 10–199)
CO2 SERPL-SCNC: 33 MMOL/L — HIGH (ref 22–31)
CREAT SERPL-MCNC: 0.86 MG/DL — SIGNIFICANT CHANGE UP (ref 0.5–1.3)
CULTURE RESULTS: SIGNIFICANT CHANGE UP
EOSINOPHIL # BLD AUTO: 0.21 K/UL — SIGNIFICANT CHANGE UP (ref 0–0.5)
EOSINOPHIL NFR BLD AUTO: 2.8 % — SIGNIFICANT CHANGE UP (ref 0–6)
FLU A RESULT: SIGNIFICANT CHANGE UP
FLU A RESULT: SIGNIFICANT CHANGE UP
FLUAV AG NPH QL: SIGNIFICANT CHANGE UP
FLUBV AG NPH QL: SIGNIFICANT CHANGE UP
GLUCOSE BLDC GLUCOMTR-MCNC: 106 MG/DL — HIGH (ref 70–99)
GLUCOSE BLDC GLUCOMTR-MCNC: 175 MG/DL — HIGH (ref 70–99)
GLUCOSE BLDC GLUCOMTR-MCNC: 240 MG/DL — HIGH (ref 70–99)
GLUCOSE BLDC GLUCOMTR-MCNC: 274 MG/DL — HIGH (ref 70–99)
GLUCOSE SERPL-MCNC: 89 MG/DL — SIGNIFICANT CHANGE UP (ref 70–99)
HBA1C BLD-MCNC: 5.8 % — HIGH (ref 4–5.6)
HCT VFR BLD CALC: 34.7 % — SIGNIFICANT CHANGE UP (ref 34.5–45)
HDLC SERPL-MCNC: 60 MG/DL — SIGNIFICANT CHANGE UP
HGB BLD-MCNC: 10.5 G/DL — LOW (ref 11.5–15.5)
IMM GRANULOCYTES NFR BLD AUTO: 0.5 % — SIGNIFICANT CHANGE UP (ref 0–1.5)
LIPID PNL WITH DIRECT LDL SERPL: 83 MG/DL — SIGNIFICANT CHANGE UP
LYMPHOCYTES # BLD AUTO: 1.25 K/UL — SIGNIFICANT CHANGE UP (ref 1–3.3)
LYMPHOCYTES # BLD AUTO: 16.4 % — SIGNIFICANT CHANGE UP (ref 13–44)
MAGNESIUM SERPL-MCNC: 1.6 MG/DL — SIGNIFICANT CHANGE UP (ref 1.6–2.6)
MCHC RBC-ENTMCNC: 27.7 PG — SIGNIFICANT CHANGE UP (ref 27–34)
MCHC RBC-ENTMCNC: 30.3 GM/DL — LOW (ref 32–36)
MCV RBC AUTO: 91.6 FL — SIGNIFICANT CHANGE UP (ref 80–100)
MONOCYTES # BLD AUTO: 0.79 K/UL — SIGNIFICANT CHANGE UP (ref 0–0.9)
MONOCYTES NFR BLD AUTO: 10.4 % — SIGNIFICANT CHANGE UP (ref 2–14)
NEUTROPHILS # BLD AUTO: 5.29 K/UL — SIGNIFICANT CHANGE UP (ref 1.8–7.4)
NEUTROPHILS NFR BLD AUTO: 69.5 % — SIGNIFICANT CHANGE UP (ref 43–77)
NRBC # BLD: 0 /100 WBCS — SIGNIFICANT CHANGE UP (ref 0–0)
PHOSPHATE SERPL-MCNC: 3.1 MG/DL — SIGNIFICANT CHANGE UP (ref 2.5–4.5)
PLATELET # BLD AUTO: 244 K/UL — SIGNIFICANT CHANGE UP (ref 150–400)
POTASSIUM SERPL-MCNC: 4.2 MMOL/L — SIGNIFICANT CHANGE UP (ref 3.5–5.3)
POTASSIUM SERPL-SCNC: 4.2 MMOL/L — SIGNIFICANT CHANGE UP (ref 3.5–5.3)
PROT SERPL-MCNC: 6.7 G/DL — SIGNIFICANT CHANGE UP (ref 6–8.3)
RAPID RVP RESULT: SIGNIFICANT CHANGE UP
RBC # BLD: 3.79 M/UL — LOW (ref 3.8–5.2)
RBC # FLD: 13.2 % — SIGNIFICANT CHANGE UP (ref 10.3–14.5)
RSV RESULT: SIGNIFICANT CHANGE UP
RSV RNA RESP QL NAA+PROBE: SIGNIFICANT CHANGE UP
SODIUM SERPL-SCNC: 140 MMOL/L — SIGNIFICANT CHANGE UP (ref 135–145)
SPECIMEN SOURCE: SIGNIFICANT CHANGE UP
TOTAL CHOLESTEROL/HDL RATIO MEASUREMENT: 2.8 RATIO — LOW (ref 3.3–7.1)
TRIGL SERPL-MCNC: 138 MG/DL — SIGNIFICANT CHANGE UP (ref 10–149)
TSH SERPL-MCNC: 0.52 UU/ML — SIGNIFICANT CHANGE UP (ref 0.34–4.82)
VIT B12 SERPL-MCNC: 873 PG/ML — SIGNIFICANT CHANGE UP (ref 232–1245)
WBC # BLD: 7.61 K/UL — SIGNIFICANT CHANGE UP (ref 3.8–10.5)
WBC # FLD AUTO: 7.61 K/UL — SIGNIFICANT CHANGE UP (ref 3.8–10.5)

## 2020-01-29 PROCEDURE — 99232 SBSQ HOSP IP/OBS MODERATE 35: CPT | Mod: GC

## 2020-01-29 RX ORDER — CEFTRIAXONE 500 MG/1
1000 INJECTION, POWDER, FOR SOLUTION INTRAMUSCULAR; INTRAVENOUS EVERY 24 HOURS
Refills: 0 | Status: DISCONTINUED | OUTPATIENT
Start: 2020-01-29 | End: 2020-01-31

## 2020-01-29 RX ORDER — METOPROLOL TARTRATE 50 MG
25 TABLET ORAL
Refills: 0 | Status: DISCONTINUED | OUTPATIENT
Start: 2020-01-29 | End: 2020-01-31

## 2020-01-29 RX ORDER — ACETAMINOPHEN 500 MG
650 TABLET ORAL EVERY 6 HOURS
Refills: 0 | Status: DISCONTINUED | OUTPATIENT
Start: 2020-01-29 | End: 2020-01-31

## 2020-01-29 RX ORDER — FOLIC ACID 0.8 MG
1 TABLET ORAL DAILY
Refills: 0 | Status: DISCONTINUED | OUTPATIENT
Start: 2020-01-29 | End: 2020-01-31

## 2020-01-29 RX ORDER — ASPIRIN/CALCIUM CARB/MAGNESIUM 324 MG
81 TABLET ORAL DAILY
Refills: 0 | Status: DISCONTINUED | OUTPATIENT
Start: 2020-01-29 | End: 2020-01-31

## 2020-01-29 RX ORDER — LISINOPRIL 2.5 MG/1
5 TABLET ORAL DAILY
Refills: 0 | Status: DISCONTINUED | OUTPATIENT
Start: 2020-01-29 | End: 2020-01-29

## 2020-01-29 RX ORDER — PANTOPRAZOLE SODIUM 20 MG/1
40 TABLET, DELAYED RELEASE ORAL
Refills: 0 | Status: DISCONTINUED | OUTPATIENT
Start: 2020-01-29 | End: 2020-01-31

## 2020-01-29 RX ORDER — METOPROLOL TARTRATE 50 MG
25 TABLET ORAL
Refills: 0 | Status: DISCONTINUED | OUTPATIENT
Start: 2020-01-29 | End: 2020-01-29

## 2020-01-29 RX ORDER — INFLUENZA VIRUS VACCINE 15; 15; 15; 15 UG/.5ML; UG/.5ML; UG/.5ML; UG/.5ML
0.5 SUSPENSION INTRAMUSCULAR ONCE
Refills: 0 | Status: DISCONTINUED | OUTPATIENT
Start: 2020-01-29 | End: 2020-01-31

## 2020-01-29 RX ORDER — LISINOPRIL 2.5 MG/1
5 TABLET ORAL DAILY
Refills: 0 | Status: DISCONTINUED | OUTPATIENT
Start: 2020-01-29 | End: 2020-01-31

## 2020-01-29 RX ORDER — INSULIN LISPRO 100/ML
VIAL (ML) SUBCUTANEOUS
Refills: 0 | Status: DISCONTINUED | OUTPATIENT
Start: 2020-01-29 | End: 2020-01-31

## 2020-01-29 RX ORDER — AZITHROMYCIN 500 MG/1
500 TABLET, FILM COATED ORAL EVERY 24 HOURS
Refills: 0 | Status: DISCONTINUED | OUTPATIENT
Start: 2020-01-29 | End: 2020-01-29

## 2020-01-29 RX ORDER — APIXABAN 2.5 MG/1
5 TABLET, FILM COATED ORAL EVERY 12 HOURS
Refills: 0 | Status: DISCONTINUED | OUTPATIENT
Start: 2020-01-29 | End: 2020-01-31

## 2020-01-29 RX ORDER — AZITHROMYCIN 500 MG/1
500 TABLET, FILM COATED ORAL DAILY
Refills: 0 | Status: DISCONTINUED | OUTPATIENT
Start: 2020-01-29 | End: 2020-01-31

## 2020-01-29 RX ADMIN — CEFTRIAXONE 100 MILLIGRAM(S): 500 INJECTION, POWDER, FOR SOLUTION INTRAMUSCULAR; INTRAVENOUS at 22:02

## 2020-01-29 RX ADMIN — APIXABAN 5 MILLIGRAM(S): 2.5 TABLET, FILM COATED ORAL at 09:21

## 2020-01-29 RX ADMIN — AZITHROMYCIN 255 MILLIGRAM(S): 500 TABLET, FILM COATED ORAL at 18:14

## 2020-01-29 RX ADMIN — Medication 3 MILLILITER(S): at 21:08

## 2020-01-29 RX ADMIN — Medication 25 MILLIGRAM(S): at 17:04

## 2020-01-29 RX ADMIN — Medication 3 MILLILITER(S): at 14:56

## 2020-01-29 RX ADMIN — Medication 3 MILLILITER(S): at 08:20

## 2020-01-29 RX ADMIN — AZITHROMYCIN 500 MILLIGRAM(S): 500 TABLET, FILM COATED ORAL at 22:03

## 2020-01-29 RX ADMIN — Medication 2: at 17:04

## 2020-01-29 RX ADMIN — Medication 1: at 22:03

## 2020-01-29 RX ADMIN — Medication 81 MILLIGRAM(S): at 12:23

## 2020-01-29 RX ADMIN — Medication 1 MILLIGRAM(S): at 12:24

## 2020-01-29 RX ADMIN — LISINOPRIL 5 MILLIGRAM(S): 2.5 TABLET ORAL at 12:23

## 2020-01-29 RX ADMIN — PANTOPRAZOLE SODIUM 40 MILLIGRAM(S): 20 TABLET, DELAYED RELEASE ORAL at 06:39

## 2020-01-29 RX ADMIN — Medication 3: at 12:11

## 2020-01-29 RX ADMIN — APIXABAN 5 MILLIGRAM(S): 2.5 TABLET, FILM COATED ORAL at 17:05

## 2020-01-29 NOTE — DISCHARGE NOTE PROVIDER - NSDCMRMEDTOKEN_GEN_ALL_CORE_FT
acetaminophen 325 mg oral tablet: 2 tab(s) orally every 6 hours, As needed, Temp greater or equal to 38C (100.4F), Mild Pain (1 - 3), Moderate Pain (4 - 6)  aspirin 81 mg oral delayed release tablet: 1 tab(s) orally once a day  Atrovent HFA 17 mcg/inh inhalation aerosol: 2 puff(s) inhaled 4 times a day  Eliquis 5 mg oral tablet: 1 tab(s) orally 2 times a day   folic acid 1 mg oral tablet: 1 tab(s) orally once a day  Glucotrol XL 5 mg oral tablet, extended release: 1 tab(s) orally once a day  latanoprost 0.005% ophthalmic solution: 1 drop(s) to each affected eye once a day (at bedtime)  lisinopril 5 mg oral tablet: 1 tab(s) orally once a day  metFORMIN 500 mg oral tablet: 1 tab(s) orally 2 times a day  metoprolol tartrate 25 mg oral tablet: 1 tab(s) orally 2 times a day  Multiple Vitamins oral tablet: 1 tab(s) orally once a day  pantoprazole 40 mg oral delayed release tablet: 1 tab(s) orally once a day  Tagrisso 80 mg oral tablet: 1 tab(s) orally once a day  Vitamin D2 50,000 intl units (1.25 mg) oral capsule: 1 cap(s) orally once a week acetaminophen 325 mg oral tablet: 2 tab(s) orally every 6 hours, As needed, Temp greater or equal to 38C (100.4F), Mild Pain (1 - 3), Moderate Pain (4 - 6)  aspirin 81 mg oral delayed release tablet: 1 tab(s) orally once a day  Atrovent HFA 17 mcg/inh inhalation aerosol: 2 puff(s) inhaled 4 times a day  azithromycin 500 mg oral tablet: 1 tab(s) orally once a day   cefuroxime 500 mg oral tablet: 1 tab(s) orally 2 times a day   Eliquis 5 mg oral tablet: 1 tab(s) orally 2 times a day   folic acid 1 mg oral tablet: 1 tab(s) orally once a day  Glucotrol XL 5 mg oral tablet, extended release: 1 tab(s) orally once a day  latanoprost 0.005% ophthalmic solution: 1 drop(s) to each affected eye once a day (at bedtime)  lisinopril 5 mg oral tablet: 1 tab(s) orally once a day  metFORMIN 500 mg oral tablet: 1 tab(s) orally 2 times a day  metoprolol tartrate 25 mg oral tablet: 1 tab(s) orally 2 times a day  Multiple Vitamins oral tablet: 1 tab(s) orally once a day  pantoprazole 40 mg oral delayed release tablet: 1 tab(s) orally once a day  predniSONE 20 mg oral tablet: 2 tab(s) orally once a day  Tagrisso 80 mg oral tablet: 1 tab(s) orally once a day  Vitamin D2 50,000 intl units (1.25 mg) oral capsule: 1 cap(s) orally once a week

## 2020-01-29 NOTE — PROGRESS NOTE ADULT - ASSESSMENT
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 She has had  multiple admissions for pneumonia in the past. She was diagnosed with pneumonia in the outpatient and was placed on oral antibiotics (ON CEFTIN 500 MD X TWICE DAILY FOR 7 DAYS). Patient states that her cough has improved is non-productive but she is still short of breath persistently She did not get better and was sent to the ED. Denies any fevers , although mentions severe chills and rigors. Denies any recent travel or sick contact.   Patient denies any chest pain, palpitation, nausea, vomiting, abdominal pain, change in urinary or bowel habits.  Patient is being admitted for PNA after failing outpatient treatment. CXR showed interval progression of left basilar and right midlung opacities. Will start on IV ceftriaxone and IV azithromycin.

## 2020-01-29 NOTE — DISCHARGE NOTE PROVIDER - PROVIDER TOKENS
FREE:[LAST:[Barbara],FIRST:[Delroy],PHONE:[phone]],FAX:[phone]    -],ADDRESS:[[facility]],FOLLOWUP:[2 weeks],ESTABLISHEDPATIENT:[T]],PROVIDER:[TOKEN:[[id number]],FOLLOWUP:[2 weeks]]

## 2020-01-29 NOTE — CONSULT NOTE ADULT - ASSESSMENT
PET CT SCAN SKULL BASE - MID THIGH INITIAL	2017-12-28 17:58:50	12/20/17- Left message with  to schedule exam. NS	Clinical information:72-year-old female with lung cancer on [place] referred for follow-up evaluation. Status post left lung surgery (7 years ago) and chemotherapy/radiation therapy (last: 5 years ago).Description:Approximately 50 minutes after injection of 12.8 mCi of F-18-FDG intravenously in the left hand, PET/CT scan was acquired from skull base to mid thigh.  Serum glucose measured 113 mg/dl before tracer administration.  Quantitative uptake is reported as standard uptake value (SUV).  Patient weight: 73.6 Kg.Comparison:PET/CT from 4/1/2013.NECK: Physiologic distribution of the radiopharmaceutical. Unenhanced CT images reveal no gross pathologically enlarged by size criteria cervical lymphadenopathy.CHEST: Mild uptake localizes to groundglass opacities predominantly in the lower lung fields associated with mild bronchiectasis and subpleural reticulation. Mild uptake localizes to a suspected trace to small left pleural effusion which is not significantly changed.Unenhanced CT images reveal no gross pathologically enlarged by size criteria axillary, mediastinal, or retrocrural lymphadenopathy. Subcentimeter mediastinal lymph nodes are not significantly changed. Evaluation of elaine are limited without intravenous contrast. Bovine aortic arch. Aortic calcification. Dense mitral annulus calcification. Main pulmonary trunk is mildly enlarged. Correlate clinically for pulmonary arterial hypertension. Heart is top normal to mildly enlarged. No pericardial or right-sided pleural effusion. Trace to small left pleural effusion as above. Evaluation of the lungs is limited by respiratory motion. Central airways are clear. Status post left lower lobectomy. Subpleural reticulation associated with groundglass opacities predominantly in the lower lung zones. Mild bronchiectatic changes in the middle and left lower lobes. Findings suggest chronic interstitial lung disease. Subcentimeter nodules in both upper lobes are too small to metabolically characterize. No metabolically active pulmonary nodule or mass.ABDOMEN/PELVIS: Low-grade increased uptake, near hepatic background, localizes to stable appearance of the adrenals bilaterally which is nonspecific.The solid abdominal viscera are unremarkable within limitations of non contrast technique. Scarring in the upper pole of the right kidney. No hydronephrosis. Punctate nonobstructing calculus in the interpolar region left kidney. Urinary bladder is fluid-filled. No bladder calculus. Uterus is atrophic and/or surgically absent. Correlate with surgical history.Oral contrast has reached the transverse colon. Scattered colonic diverticula without evidence for acute diverticulitis. Appendix is unremarkable. Stomach is decompressed with nonspecific wall thickening and is limited in evaluation. No pathologically enlarged by size criteria retroperitoneal, pelvic sidewall, or inguinal lymphadenopathy. Abdominal aortic calcification extending into iliac vessels. No abdominal aortic aneurysm. Postsurgical changes in the lower anterior abdominal wall.MUSCULOSKELETAL: No abnormal metabolic activity in the visualized osseous structures. Spondylosis and facet arthrosis in the lower lumbar spine. Hypoplastic left cervical rib is redemonstrated. Stable lucency in C4 vertebral body.IMPRESSION:IMPRESSION:1. Postsurgical changes from left lower lobectomy. Stable trace to small left pleural effusion which is mildly avid. Groundglass opacities associated with subpleural reticulation and mild bronchiectasis, with a basilar predominance are suspicious for chronic interstitial disease and are mildly avid. Findings suggest pulmonary arterial hypertension. Stable subcentimeter mediastinal lymph nodes. Subcentimeter nodules in both upper lobes are too small to metabolically characterize. Correlate clinically and follow-up evaluation with high-resolution chest CT is suggested.2. 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 ILD may be from drug induced pneumonitis from tagris[place] which is a diagnosis of exclusion. Progression of findings on imaging could also represent progression of her adenoca which is typically lepidic in nature and can mimic bronchopneumonia.  On chronic O2 with no increase in requirement  COPD is doubtful as she is a lifelong nonsmoker  Symptomatically she feels better  Recommending obtaining old records from [place] as she follows there regularly including pulmonary and oncology notes and last imaging which she states was 1-2 months prior which may obviate the need for any CT imaging here.  Can complete course of abx and complete prednisone 40mg x 7 days and repeat imaging. PET CT SCAN SKULL BASE - MID THIGH INITIAL	2017-12-28 17:58:50	12/20/17- Left message with  to schedule exam. NS	Clinical information:72-year-old female with lung cancer on [place] referred for follow-up evaluation. Status post left lung surgery (7 years ago) and chemotherapy/radiation therapy (last: 5 years ago).Description:Approximately 50 minutes after injection of 12.8 mCi of F-18-FDG intravenously in the left hand, PET/CT scan was acquired from skull base to mid thigh.  Serum glucose measured 113 mg/dl before tracer administration.  Quantitative uptake is reported as standard uptake value (SUV).  Patient weight: 73.6 Kg.Comparison:PET/CT from 4/1/2013.NECK: Physiologic distribution of the radiopharmaceutical. Unenhanced CT images reveal no gross pathologically enlarged by size criteria cervical lymphadenopathy.CHEST: Mild uptake localizes to groundglass opacities predominantly in the lower lung fields associated with mild bronchiectasis and subpleural reticulation. Mild uptake localizes to a suspected trace to small left pleural effusion which is not significantly changed.Unenhanced CT images reveal no gross pathologically enlarged by size criteria axillary, mediastinal, or retrocrural lymphadenopathy. Subcentimeter mediastinal lymph nodes are not significantly changed. Evaluation of elaine are limited without intravenous contrast. Bovine aortic arch. Aortic calcification. Dense mitral annulus calcification. Main pulmonary trunk is mildly enlarged. Correlate clinically for pulmonary arterial hypertension. Heart is top normal to mildly enlarged. No pericardial or right-sided pleural effusion. Trace to small left pleural effusion as above. Evaluation of the lungs is limited by respiratory motion. Central airways are clear. Status post left lower lobectomy. Subpleural reticulation associated with groundglass opacities predominantly in the lower lung zones. Mild bronchiectatic changes in the middle and left lower lobes. Findings suggest chronic interstitial lung disease. Subcentimeter nodules in both upper lobes are too small to metabolically characterize. No metabolically active pulmonary nodule or mass.ABDOMEN/PELVIS: Low-grade increased uptake, near hepatic background, localizes to stable appearance of the adrenals bilaterally which is nonspecific.The solid abdominal viscera are unremarkable within limitations of non contrast technique. Scarring in the upper pole of the right kidney. No hydronephrosis. Punctate nonobstructing calculus in the interpolar region left kidney. Urinary bladder is fluid-filled. No bladder calculus. Uterus is atrophic and/or surgically absent. Correlate with surgical history.Oral contrast has reached the transverse colon. Scattered colonic diverticula without evidence for acute diverticulitis. Appendix is unremarkable. Stomach is decompressed with nonspecific wall thickening and is limited in evaluation. No pathologically enlarged by size criteria retroperitoneal, pelvic sidewall, or inguinal lymphadenopathy. Abdominal aortic calcification extending into iliac vessels. No abdominal aortic aneurysm. Postsurgical changes in the lower anterior abdominal wall.MUSCULOSKELETAL: No abnormal metabolic activity in the visualized osseous structures. Spondylosis and facet arthrosis in the lower lumbar spine. Hypoplastic left cervical rib is redemonstrated. Stable lucency in C4 vertebral body.IMPRESSION:IMPRESSION:1. Postsurgical changes from left lower lobectomy. Stable trace to small left pleural effusion which is mildly avid. Groundglass opacities associated with subpleural reticulation and mild bronchiectasis, with a basilar predominance are suspicious for chronic interstitial disease and are mildly avid. Findings suggest pulmonary arterial hypertension. Stable subcentimeter mediastinal lymph nodes. Subcentimeter nodules in both upper lobes are too small to metabolically characterize. Correlate clinically and follow-up evaluation with high-resolution chest CT is suggested.2. Additional findings as described in the body of the report.12.830		    1) Subjective chills, fever- possible UTI  2) Abnormal CXR  3) Stage 4 Adenoca lung on chronic [place]  4) Chronic respiratory failure on O2 for the last 3 years  5) ?COPD    Clinically stable  Does not appear toxic  No leukocytosis or fever on presentation or admission  Imaging reviewed along with old [facility] report, progressive CXR findings suspect more progression of her ILD then pneumonia given old records. ILD may be from drug induced pneumonitis from [place] which is a diagnosis of exclusion. Progression of findings on imaging could also represent progression of her adenoca which is typically lepidic in nature and can mimic bronchopneumonia.  On chronic O2 with no increase in requirement  COPD is doubtful as she is a lifelong nonsmoker  Symptomatically she feels better  Recommending obtaining old records from [place] as she follows there regularly including pulmonary and oncology notes and last imaging (CT chest/PET scan) which she states was 1-2 months prior which may obviate the need for any CT imaging here.  Can complete course of abx and complete prednisone 40mg x 7 days and repeat imaging.

## 2020-01-29 NOTE — PROGRESS NOTE ADULT - PROBLEM SELECTOR PLAN 4
patient is on eliquis and metoprolol.  01/29/2020: c/w Eliquis 5 mg and Metoprolol 25 BID; rate is controlled

## 2020-01-29 NOTE — PROGRESS NOTE ADULT - PROBLEM SELECTOR PLAN 2
Patient has history of COPD on Atrovent.  Duoneb nebs here.  Hold off on steroids since she does not sounds too bad.  01/29/2020: c/w supplemental oxygen; Genie PRN; holding steroids as it is not a COPD exacerbation

## 2020-01-29 NOTE — DISCHARGE NOTE PROVIDER - HOSPITAL COURSE
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 She has had  multiple admissions for pneumonia in the past. She was diagnosed with pneumonia in the outpatient and was placed on oral antibiotics (ON CEFTIN 500 MD X TWICE DAILY FOR 7 DAYS). Patient states that her cough has improved is non-productive but she is still short of breath persistently She did not get better and was sent to the ED. Denies any fevers , although mentions severe chills and rigors. Denies any recent travel or sick contact.     Patient denies any chest pain, palpitation, nausea, vomiting, abdominal pain, change in urinary or bowel habits.    Patient is being admitted for PNA after failing outpatient treatment. CXR showed interval progression of left basilar and right midlung opacities. Will start on IV ceftriaxone and IV azithromycin.        Pneumonia         Patient presented with cough, SOB, and chills. Patient continued to remain afebrile. Patient's chest XR showed interval progression of left and right opacities. Patient was started on IV Ceftriaxone and Azithromycin for community acquired pneumonia. Patient continued to saturate well on supplemental oxygen nasal canula. BCx and UCx results returned back as XXXX while results for the Influenza, RSV, and other viruses were also negative.         COPD        Patient was noted to have mild upper lobe wheezing bilaterally. However due to the mildness of the wheezing the patient was not suspected to be in COPD exacerbation and was not started on steroids. Patient maintained adequate oxygenation with her supplemental oxygen via nasal canula.         Atrial Fibrillation        Patient continued to remain on her Eliquis 5 mg BID and Metoprolol 25 mg BID without adverse events noted during the admission.         Hypertension         Patient was noted to have a mild increase in her BP during the admission and had her Lisinopril 5 mg resumed with better BP control. 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 She has had  multiple admissions for pneumonia in the past. She was diagnosed with pneumonia in the outpatient and was placed on oral antibiotics (ON CEFTIN 500 MD X TWICE DAILY FOR 7 DAYS). Patient states that her cough has improved is non-productive but she is still short of breath persistently She did not get better and was sent to the ED. Denies any fevers , although mentions severe chills and rigors. Denies any recent travel or sick contact.     Patient denies any chest pain, palpitation, nausea, vomiting, abdominal pain, change in urinary or bowel habits.    Patient is being admitted for PNA after failing outpatient treatment. CXR showed interval progression of left basilar and right midlung opacities. Will start on IV ceftriaxone and IV azithromycin.        Pneumonia         Patient presented with cough, SOB, and chills. Patient continued to remain afebrile. Patient's chest XR showed interval progression of left and right opacities. Patient was started on IV Ceftriaxone and Azithromycin for community acquired pneumonia. Patient continued to saturate well on supplemental oxygen nasal canula. BCx and UCx results returned back as negative and results for the Influenza, RSV, and other viruses were also negative. Azithromycin and Ceftriaxone were transitioned to PO medications and the patient was discharged hemodynamically stable and afebrile.         COPD        Patient was noted to have mild upper lobe wheezing bilaterally. However due to the mildness of the wheezing the patient was not suspected to be in COPD exacerbation and was not started on steroids. Patient maintained adequate oxygenation with her supplemental oxygen via nasal canula and later had her oxygenation status assesses off of nasal canula with results showing XXXXX. Patient was started on a course of 40 mg of PO Prednisone to be completed upon discharge for an additional 3 days.         Atrial Fibrillation        Patient continued to remain on her Eliquis 5 mg BID and Metoprolol 25 mg BID without adverse events noted during the admission.         Hypertension         Patient was noted to have a mild increase in her BP during the admission and had her Lisinopril 5 mg resumed with better BP control. 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 She has had  multiple admissions for pneumonia in the past. She was diagnosed with pneumonia in the outpatient and was placed on oral antibiotics (ON CEFTIN 500 MD X TWICE DAILY FOR 7 DAYS). Patient states that her cough has improved is non-productive but she is still short of breath persistently She did not get better and was sent to the ED. Denies any fevers , although mentions severe chills and rigors. Denies any recent travel or sick contact.     Patient denies any chest pain, palpitation, nausea, vomiting, abdominal pain, change in urinary or bowel habits.    Patient is being admitted for PNA after failing outpatient treatment. CXR showed interval progression of left basilar and right midlung opacities. Will start on IV ceftriaxone and IV azithromycin.        Pneumonia         Patient presented with cough, SOB, and chills. Patient continued to remain afebrile. Patient's chest XR showed interval progression of left and right opacities. Patient was started on IV Ceftriaxone and Azithromycin for community acquired pneumonia. Patient continued to saturate well on supplemental oxygen nasal canula. BCx and UCx results returned back as negative and results for the Influenza, RSV, and other viruses were also negative. Azithromycin and Ceftriaxone were transitioned to PO medications and the patient was discharged hemodynamically stable and afebrile. Patient to be discharged to take an additional 3 days of Azithromycin 300 mg and Ceftin 500 mg BID for an additional 3 days for completion of her antibiotic course.         Stage 4 Metastatic Lung Cancer         Patient was noted to have mild upper lobe wheezing bilaterally. Chest XHowever due to the mildness of the wheezing the patient was not suspected to be in COPD exacerbation and was not started on steroids. Patient maintained adequate oxygenation with her supplemental oxygen via nasal canula and later had her oxygenation status assesses off of nasal canula with results showing XXXXX. Patient was started on a course of 40 mg of PO Prednisone to be completed upon discharge for an additional 3 days.         Atrial Fibrillation        Patient continued to remain on her Eliquis 5 mg BID and Metoprolol 25 mg BID without adverse events noted during the admission.         Hypertension         Patient was noted to have a mild increase in her BP during the admission and had her Lisinopril 5 mg resumed with better BP control. 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 She has had  multiple admissions for pneumonia in the past. She was diagnosed with pneumonia in the outpatient and was placed on oral antibiotics (ON CEFTIN 500 MD X TWICE DAILY FOR 7 DAYS). Patient states that her cough has improved is non-productive but she is still short of breath persistently She did not get better and was sent to the ED. Denies any fevers , although mentions severe chills and rigors. Denies any recent travel or sick contact.     Patient denies any chest pain, palpitation, nausea, vomiting, abdominal pain, change in urinary or bowel habits.    Patient is being admitted for PNA after failing outpatient treatment. CXR showed interval progression of left basilar and right midlung opacities. Will start on IV ceftriaxone and IV azithromycin.        Pneumonia         Patient presented with cough, SOB, and chills. Patient continued to remain afebrile. Patient's chest XR showed interval progression of left and right opacities. Patient was started on IV Ceftriaxone and Azithromycin for community acquired pneumonia. Patient continued to saturate well on supplemental oxygen nasal canula. BCx and UCx results returned back as negative and results for the Influenza, RSV, and other viruses were also negative. Azithromycin and Ceftriaxone were transitioned to PO medications and the patient was discharged hemodynamically stable and afebrile. Patient to be discharged to complete a total of 7 days of Azithromycin and Ceftin for completion of antibiotic regimen.         Stage 4 Metastatic Lung Cancer         Patient was noted to have mild upper lobe wheezing bilaterally. Chest XR demonstrated to the patient to have Interstitial Lung Disease (ILD) or progression of her malignancy due to chronic usage of Tagrisso. Patient's Oncologist was sought out to try and obtain a better picture of the patient's overall prognosis but attempts were unsuccessful (see full chart record). Patient was placed on Prednisone to be completed for 7 days total and was informed to follow up with her Oncologist, Dr. Jimenez about continuation of using her Tagrisso. Patient maintained adequate oxygenation with her supplemental oxygen via nasal canula and later had her oxygenation status assesses off of nasal canula with results showing 84%. Patient was started on a course of 40 mg of PO Prednisone to be completed for 7 days total and to resume continuance of her home oxygen.          Chronic Hypoxic Respiratory Failure        Patient maintained adequate oxygenation with her supplemental oxygen via nasal canula and later had her oxygenation status assesses off of nasal canula with results showing 84%. This was presumed to be secondary to ILD or metastatic malignancy. Patient was started on a course of 40 mg of PO Prednisone to be completed for 7 days total and to resume continuance of her home oxygen.          Atrial Fibrillation        Patient continued to remain on her Eliquis 5 mg BID and Metoprolol 25 mg BID without adverse events noted during the admission.         Hypertension         Patient was noted to have a mild increase in her BP during the admission and had her Lisinopril 5 mg resumed with better BP control. Patient to continue her anti-hypertensive regimen at discharge.         Goals of Care        Patient was evaluated by Dr. Vo and the Palliative Health Care team and was found to have her sister, Nelsy Baldwin ([phone]) listed as her HCP. Patient said that she wanted time to think about her Goals of Care Status in light of her malignancy and will re-evaluate that with her sister. 74 year old woman-resident of 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 She has had  multiple admissions for pneumonia in the past. She was diagnosed with pneumonia in the outpatient and was placed on oral antibiotics (ON CEFTIN 500 MD X TWICE DAILY FOR 7 DAYS). Patient stated that her cough has improved was non-productive but she was still short of breath persistently She did not get better and was sent to the ED. Denied any fevers , although mentions severe chills and rigors. Denies any recent travel or sick contact.     Patient denied any chest pain, palpitation, nausea, vomiting, abdominal pain, change in urinary or bowel habits.    Patient was admitted for PNA after failing outpatient treatment. CXR showed interval progression of left basilar and right midlung opacities.        Pneumonia        Patient presented with cough, SOB, and chills. Patient continued to remain afebrile. Patient's chest XR showed interval progression of left and right opacities. Patient was started on IV Ceftriaxone and Azithromycin for community acquired pneumonia. Patient continued to saturate well on supplemental oxygen nasal canula. BCx and UCx results returned back as negative and results for the Influenza, RSV, and other viruses were also negative. Azithromycin and Ceftriaxone were transitioned to PO medications and the patient was discharged hemodynamically stable and afebrile. Patient to be discharged to complete a total of 7 days of Azithromycin and Ceftin for completion of antibiotic regimen.         Stage 4 Metastatic Adenocarcinoma of Lung         Patient was noted to have mild upper lobe wheezing bilaterally. Chest XR demonstrated to the patient to have Interstitial Lung Disease (ILD) or progression of her malignancy due to chronic usage of Tagrisso. Patient's Oncologist was sought out to try and obtain a better picture of the patient's overall prognosis but attempts were unsuccessful (see full chart record). Patient was placed on Prednisone to be completed for 7 days total and was informed to follow up with her Oncologist, Dr. Jimenez about continuation of using her Tagrisso. Patient maintained adequate oxygenation with her supplemental oxygen via nasal canula and later had her oxygenation status assesses off of nasal canula with results showing 84%. Patient was started on a course of 40 mg of PO Prednisone to be completed for 7 days total and to resume continuance of her home oxygen.          Chronic Hypoxic Respiratory Failure        Patient maintained adequate oxygenation with her supplemental oxygen via nasal canula and later had her oxygenation status assesses off of nasal canula with results showing 84%. This was presumed to be secondary to ILD or metastatic malignancy. Patient was started on a course of 40 mg of PO Prednisone to be completed for 7 days total and to resume continuance of her home oxygen.          Atrial Fibrillation        Patient continued to remain on her Eliquis 5 mg BID and Metoprolol 25 mg BID without adverse events noted during the admission.         Hypertension         Patient was noted to have a mild increase in her BP during the admission and had her Lisinopril 5 mg resumed with better BP control. Patient to continue her anti-hypertensive regimen at discharge.         Goals of Care        Patient was evaluated by Dr. Vo and the Palliative Health Care team and was found to have her sister, Nelsy Baldwin ([phone]) listed as her HCP. Patient said that she wanted time to think about her Goals of Care Status in light of her malignancy and will re-evaluate that with her sister. Currently patient is Full code

## 2020-01-29 NOTE — DISCHARGE NOTE PROVIDER - CARE PROVIDER_API CALL
Delroy Jimenez [facility]  Phone: [phone]  Fax: (   )    -  Established Patient  Follow Up Time: 2 weeks    Homer Rose (MD)  [place] [address]  Phone: [phone]  Fax: [phone]  Follow Up Time: 2 weeks

## 2020-01-29 NOTE — PROGRESS NOTE ADULT - PROBLEM SELECTOR PLAN 1
Patient presented with cough, SOB and chills.  Patient is being admitted for PNA after failing outpatient treatment.  CXR showed interval progression of left basilar and right midlung opacities.  Will start on IV ceftriaxone and IV azithromycin.  01/29/2020: Saturating well on 2L nasal canula; Remains afebrile; Mild upper lobe wheezing bilaterally; f/u BCx and UCx [ ]; -ve for Flu A/B and RSV; f/u Rapid Viral Panel [ ]

## 2020-01-29 NOTE — DISCHARGE NOTE PROVIDER - NSDCCPCAREPLAN_GEN_ALL_CORE_FT
PRINCIPAL DISCHARGE DIAGNOSIS  Diagnosis: Pneumonia  Assessment and Plan of Treatment: Patient presented with cough, SOB, and chills. Patient continued to remain afebrile. Patient's chest XR showed interval progression of left and right opacities. Patient was started on IV Ceftriaxone and Azithromycin for community acquired pneumonia. Patient continued to saturate well on supplemental oxygen nasal canula. BCx and UCx results returned back as XXXX while results for the Influenza, RSV, and other viruses were also negative.      SECONDARY DISCHARGE DIAGNOSES  Diagnosis: COPD (chronic obstructive pulmonary disease)  Assessment and Plan of Treatment: Patient was noted to have mild upper lobe wheezing bilaterally. However due to the mildness of the wheezing the patient was not suspected to be in COPD exacerbation and was not started on steroids. Patient maintained adequate oxygenation with her supplemental oxygen via nasal canula.    Diagnosis: Atrial fibrillation  Assessment and Plan of Treatment: Patient continued to remain on her Eliquis 5 mg BID and Metoprolol 25 mg BID without adverse events noted during the admission.    Diagnosis: HTN (hypertension)  Assessment and Plan of Treatment: Patient was noted to have a mild increase in her BP during the admission and had her Lisinopril 5 mg resumed with better BP control. PRINCIPAL DISCHARGE DIAGNOSIS  Diagnosis: Pneumonia  Assessment and Plan of Treatment: Patient presented with cough, SOB, and chills. Patient continued to remain afebrile. Patient's chest XR showed interval progression of left and right opacities. Patient was started on IV Ceftriaxone and Azithromycin for community acquired pneumonia. Patient continued to saturate well on supplemental oxygen nasal canula. BCx and UCx results returned back as negative and results for the Influenza, RSV, and other viruses were also negative. Patient was discharged to take Ceftin and Azithromycin upon discharge for X days.      SECONDARY DISCHARGE DIAGNOSES  Diagnosis: COPD (chronic obstructive pulmonary disease)  Assessment and Plan of Treatment: Patient was noted to have mild upper lobe wheezing bilaterally. However due to the mildness of the wheezing the patient was not suspected to be in COPD exacerbation and was not started on steroids initially. Patient was evaluated for utilization of home oxygen and results showed XXXX. Patient was staretd on 40 mg PO prednisone to be completed for 5 days total.    Diagnosis: Atrial fibrillation  Assessment and Plan of Treatment: Patient continued to remain on her Eliquis 5 mg BID and Metoprolol 25 mg BID without adverse events noted during the admission.    Diagnosis: HTN (hypertension)  Assessment and Plan of Treatment: Patient was noted to have a mild increase in her BP during the admission and had her Lisinopril 5 mg resumed with better BP control and instructed to continue her anti-hypertensive regimen. PRINCIPAL DISCHARGE DIAGNOSIS  Diagnosis: Pneumonia  Assessment and Plan of Treatment: You presented with cough, shortness of breath, and chills and remained afebrile. Your chest XR showed interval progression of left and right opacities in your lungs and you were started on IV Ceftriaxone and Azithromycin for community acquired pneumonia. You continued to saturate well on supplemental oxygen through nasal canula. Blood culture and urine culture results returned back as negative and results for the Influenza, RSV, and other viruses were also negative. Azithromycin and Ceftriaxone were transitioned to oral medications and you were discharged hemodynamically stable and afebrile. Please continue to take 5 days total of Azithromycin 500 mg until 02/02/2020 and 7 days total of Ceftin 500 mg BID until 02/04/2020. Please follow up with your primary care provider in the next 1-2 weeks for additional healthcare maintenance.      SECONDARY DISCHARGE DIAGNOSES  Diagnosis: Goals of care, counseling/discussion  Assessment and Plan of Treatment: You were evaluated by Dr. Vo and the Palliative Health Care team and you sister, Nelsy Baldwin ([phone]), was listed as your proxy. You said that you wanted time to think about Goals of Care Status in light of your malignancy and will re-evaluate that with your sister.    Diagnosis: Bronchoalveolar carcinoma  Assessment and Plan of Treatment: You were noted to have mild upper lobe wheezing bilaterally. Chest XR demonstrated you may have Interstitial Lung Disease (ILD) or progression of her malignancy due to chronic usage of Tagrisso. We tried contacting your Oncologist, Dr. Jimenez, but attempts were unsuccessful. You were placed on 40 mg Prednisone to be completed for 7 days total until 02/04/2020 and are to follow up with your Oncologist, Dr. Jimenez about continuation of using your Tagrisso. You maintained adequate oxygenation with your supplemental oxygen via nasal canula and later had your oxygenation status assessed off of nasal canula with results showing 84%. Please continue to use your home oxygen and follow up with Dr. Jimenez in 1-2 weeks.    Diagnosis: Chronic respiratory failure with hypoxia  Assessment and Plan of Treatment: You maintained adequate oxygenation with your supplemental oxygen via nasal canula and later had your oxygenation status assessed off of nasal canula with results showing 84%. This was presumed to be secondary to interstitial lung disease or metastatic malignancy. Please follow up with this result in the next 1-2 weeks with your Primary Care Provider or Oncologist for additional health care maintenance.    Diagnosis: Atrial fibrillation  Assessment and Plan of Treatment: You continued to remain on Eliquis 5 mg BID and Metoprolol 25 mg twice a day without adverse events noted during the admission. Please continue this regimen at home.    Diagnosis: HTN (hypertension)  Assessment and Plan of Treatment: You were noted to have a mild increase in your BP during the admission and your Lisinopril 5 mg resumed with better BP control and should continue your anti-hypertensive regimen. Please follow-up with your Primary Care Provider for additional health care maintenance.

## 2020-01-29 NOTE — PROGRESS NOTE ADULT - PROBLEM SELECTOR PLAN 5
patient takes lisinopril at home  monitor blood pressure  01/29/2020: BP elevated to SBP of 150+; resumed the Lisinopril and Metoprolol

## 2020-01-29 NOTE — PROGRESS NOTE ADULT - SUBJECTIVE AND OBJECTIVE BOX
PGY1 Note discussed with supervising resident and primary attending.    Patient is a 74y old  Female who presents with a chief complaint of fever and chills (2020 22:01)    INTERVAL HPI/OVERNIGHT EVENTS:  - No acute events overnight  - Patient states that she is feeling much better and denies dysuria   - The cough she was having has subsided and her chills have ceased  - Remains afebrile  - Mild elevation in BP (resumed anti-hypertensives)  - Tolerating a normal diet  - Saturating well on nasal canula    MEDICATIONS  (STANDING):  albuterol/ipratropium for Nebulization 3 milliLiter(s) Nebulizer every 6 hours  apixaban 5 milliGRAM(s) Oral every 12 hours  aspirin enteric coated 81 milliGRAM(s) Oral daily  azithromycin  IVPB 500 milliGRAM(s) IV Intermittent every 24 hours  cefTRIAXone   IVPB 1000 milliGRAM(s) IV Intermittent every 24 hours  folic acid 1 milliGRAM(s) Oral daily  influenza   Vaccine 0.5 milliLiter(s) IntraMuscular once  insulin lispro (HumaLOG) corrective regimen sliding scale   SubCutaneous Before meals and at bedtime  lisinopril 5 milliGRAM(s) Oral daily  metoprolol tartrate 25 milliGRAM(s) Oral two times a day  pantoprazole    Tablet 40 milliGRAM(s) Oral before breakfast    MEDICATIONS  (PRN):  acetaminophen   Tablet .. 650 milliGRAM(s) Oral every 6 hours PRN Temp greater or equal to 38C (100.4F), Mild Pain (1 - 3), Moderate Pain (4 - 6)    Allergies    No Known Allergies    Intolerances    REVIEW OF SYSTEMS:  CONSTITUTIONAL: No fever, weight loss, or fatigue  RESPIRATORY: No cough, wheezing, chills or hemoptysis; No shortness of breath  CARDIOVASCULAR: No chest pain, palpitations, dizziness, or leg swelling  GASTROINTESTINAL: No abdominal or epigastric pain. No nausea, vomiting, or hematemesis; No diarrhea or constipation. No melena or hematochezia.  NEUROLOGICAL: No headaches, memory loss, loss of strength, numbness, or tremors  SKIN: No itching, burning, rashes, or lesions     Vital Signs Last 24 Hrs  T(C): 36.7 (2020 08:15), Max: 36.8 (2020 23:40)  T(F): 98 (2020 08:15), Max: 98.2 (2020 23:40)  HR: 89 (2020 08:15) (84 - 99)  BP: 151/64 (2020 08:15) (122/44 - 162/64)  BP(mean): --  RR: 20 (2020 08:15) (16 - 20)  SpO2: 98% (2020 08:15) (98% - 100%)    PHYSICAL EXAM:  GENERAL: NAD, well-groomed, well-developed; overweight/obese  HEAD:  Atraumatic, Normocephalic  EYES: EOMI, PERRLA, conjunctiva and sclera clear  NECK: Supple, No JVD, Normal thyroid  CHEST/LUNG: Breathing on 2L nasal canula; mild wheezing in the upper lobes  HEART: irregular rate and rhythm   ABDOMEN: Soft, Nontender, Nondistended; Bowel sounds present  NERVOUS SYSTEM:  Alert & Oriented X3, Good concentration; Motor Strength 5/5 B/L   EXTREMITIES:  2+ Peripheral Pulses, No clubbing, cyanosis, or edema    LABS:                        10.5   7.61  )-----------( 244      ( 2020 06:18 )             34.7         140  |  102  |  11  ----------------------------<  89  4.2   |  33<H>  |  0.86    Ca    9.7      2020 06:17  Phos  3.1       Mg     1.6         TPro  6.7  /  Alb  2.6<L>  /  TBili  0.2  /  DBili  0.1  /  AST  16  /  ALT  15  /  AlkPhos  65  -29    PT/INR - ( 2020 17:44 )   PT: 16.7 sec;   INR: 1.49 ratio      PTT - ( 2020 17:44 )  PTT:44.3 sec  Urinalysis Basic - ( 2020 19:50 )    Color: Yellow / Appearance: Clear / S.005 / pH: x  Gluc: x / Ketone: Negative  / Bili: Negative / Urobili: Negative   Blood: x / Protein: 30 mg/dL / Nitrite: Negative   Leuk Esterase: Moderate / RBC: 0-2 /HPF / WBC 26-50 /HPF   Sq Epi: x / Non Sq Epi: Few /HPF / Bacteria: Few /HPF    CAPILLARY BLOOD GLUCOSE    POCT Blood Glucose.: 106 mg/dL (2020 08:48)    RADIOLOGY & ADDITIONAL TESTS:    IMPRESSION:     Interval progression of left basilar and right midlung opacities. Consider correlation with CT imaging.    ARIELLE JUNE M.D., ATTENDING RADIOLOGIST  This document has been electronically signed. 2020  5:58PM    Imaging Personally Reviewed:  [ ] YES  [ ] NO    Consultant(s) Notes Reviewed:  [ ] YES  [ ] NO

## 2020-01-30 LAB
ANION GAP SERPL CALC-SCNC: 5 MMOL/L — SIGNIFICANT CHANGE UP (ref 5–17)
BASOPHILS # BLD AUTO: 0.05 K/UL — SIGNIFICANT CHANGE UP (ref 0–0.2)
BASOPHILS NFR BLD AUTO: 0.6 % — SIGNIFICANT CHANGE UP (ref 0–2)
BUN SERPL-MCNC: 14 MG/DL — SIGNIFICANT CHANGE UP (ref 7–18)
CALCIUM SERPL-MCNC: 9.9 MG/DL — SIGNIFICANT CHANGE UP (ref 8.4–10.5)
CHLORIDE SERPL-SCNC: 102 MMOL/L — SIGNIFICANT CHANGE UP (ref 96–108)
CO2 SERPL-SCNC: 33 MMOL/L — HIGH (ref 22–31)
CREAT SERPL-MCNC: 0.94 MG/DL — SIGNIFICANT CHANGE UP (ref 0.5–1.3)
EOSINOPHIL # BLD AUTO: 0.21 K/UL — SIGNIFICANT CHANGE UP (ref 0–0.5)
EOSINOPHIL NFR BLD AUTO: 2.7 % — SIGNIFICANT CHANGE UP (ref 0–6)
GLUCOSE BLDC GLUCOMTR-MCNC: 125 MG/DL — HIGH (ref 70–99)
GLUCOSE BLDC GLUCOMTR-MCNC: 331 MG/DL — HIGH (ref 70–99)
GLUCOSE BLDC GLUCOMTR-MCNC: 347 MG/DL — HIGH (ref 70–99)
GLUCOSE BLDC GLUCOMTR-MCNC: 383 MG/DL — HIGH (ref 70–99)
GLUCOSE SERPL-MCNC: 111 MG/DL — HIGH (ref 70–99)
HCT VFR BLD CALC: 33.6 % — LOW (ref 34.5–45)
HGB BLD-MCNC: 10.3 G/DL — LOW (ref 11.5–15.5)
IMM GRANULOCYTES NFR BLD AUTO: 0.6 % — SIGNIFICANT CHANGE UP (ref 0–1.5)
LYMPHOCYTES # BLD AUTO: 1.34 K/UL — SIGNIFICANT CHANGE UP (ref 1–3.3)
LYMPHOCYTES # BLD AUTO: 17.2 % — SIGNIFICANT CHANGE UP (ref 13–44)
MAGNESIUM SERPL-MCNC: 1.7 MG/DL — SIGNIFICANT CHANGE UP (ref 1.6–2.6)
MCHC RBC-ENTMCNC: 28.4 PG — SIGNIFICANT CHANGE UP (ref 27–34)
MCHC RBC-ENTMCNC: 30.7 GM/DL — LOW (ref 32–36)
MCV RBC AUTO: 92.6 FL — SIGNIFICANT CHANGE UP (ref 80–100)
MONOCYTES # BLD AUTO: 0.72 K/UL — SIGNIFICANT CHANGE UP (ref 0–0.9)
MONOCYTES NFR BLD AUTO: 9.2 % — SIGNIFICANT CHANGE UP (ref 2–14)
NEUTROPHILS # BLD AUTO: 5.42 K/UL — SIGNIFICANT CHANGE UP (ref 1.8–7.4)
NEUTROPHILS NFR BLD AUTO: 69.7 % — SIGNIFICANT CHANGE UP (ref 43–77)
NRBC # BLD: 0 /100 WBCS — SIGNIFICANT CHANGE UP (ref 0–0)
PHOSPHATE SERPL-MCNC: 4.2 MG/DL — SIGNIFICANT CHANGE UP (ref 2.5–4.5)
PLATELET # BLD AUTO: 237 K/UL — SIGNIFICANT CHANGE UP (ref 150–400)
POTASSIUM SERPL-MCNC: 4.3 MMOL/L — SIGNIFICANT CHANGE UP (ref 3.5–5.3)
POTASSIUM SERPL-SCNC: 4.3 MMOL/L — SIGNIFICANT CHANGE UP (ref 3.5–5.3)
RBC # BLD: 3.63 M/UL — LOW (ref 3.8–5.2)
RBC # FLD: 13.2 % — SIGNIFICANT CHANGE UP (ref 10.3–14.5)
SODIUM SERPL-SCNC: 140 MMOL/L — SIGNIFICANT CHANGE UP (ref 135–145)
WBC # BLD: 7.79 K/UL — SIGNIFICANT CHANGE UP (ref 3.8–10.5)
WBC # FLD AUTO: 7.79 K/UL — SIGNIFICANT CHANGE UP (ref 3.8–10.5)

## 2020-01-30 PROCEDURE — 99232 SBSQ HOSP IP/OBS MODERATE 35: CPT | Mod: GC

## 2020-01-30 PROCEDURE — 99223 1ST HOSP IP/OBS HIGH 75: CPT

## 2020-01-30 RX ADMIN — AZITHROMYCIN 500 MILLIGRAM(S): 500 TABLET, FILM COATED ORAL at 11:54

## 2020-01-30 RX ADMIN — Medication 3 MILLILITER(S): at 08:30

## 2020-01-30 RX ADMIN — Medication 40 MILLIGRAM(S): at 08:45

## 2020-01-30 RX ADMIN — Medication 25 MILLIGRAM(S): at 06:59

## 2020-01-30 RX ADMIN — Medication 25 MILLIGRAM(S): at 17:02

## 2020-01-30 RX ADMIN — APIXABAN 5 MILLIGRAM(S): 2.5 TABLET, FILM COATED ORAL at 17:01

## 2020-01-30 RX ADMIN — Medication 81 MILLIGRAM(S): at 11:54

## 2020-01-30 RX ADMIN — CEFTRIAXONE 100 MILLIGRAM(S): 500 INJECTION, POWDER, FOR SOLUTION INTRAMUSCULAR; INTRAVENOUS at 22:58

## 2020-01-30 RX ADMIN — Medication 1 MILLIGRAM(S): at 11:54

## 2020-01-30 RX ADMIN — PANTOPRAZOLE SODIUM 40 MILLIGRAM(S): 20 TABLET, DELAYED RELEASE ORAL at 06:58

## 2020-01-30 RX ADMIN — Medication 5: at 11:54

## 2020-01-30 RX ADMIN — APIXABAN 5 MILLIGRAM(S): 2.5 TABLET, FILM COATED ORAL at 06:59

## 2020-01-30 RX ADMIN — Medication 4: at 21:59

## 2020-01-30 RX ADMIN — Medication 4: at 17:02

## 2020-01-30 RX ADMIN — Medication 3 MILLILITER(S): at 15:41

## 2020-01-30 RX ADMIN — LISINOPRIL 5 MILLIGRAM(S): 2.5 TABLET ORAL at 06:59

## 2020-01-30 NOTE — PROGRESS NOTE ADULT - SUBJECTIVE AND OBJECTIVE BOX
PGY1 Note discussed with supervising resident and primary attending.    Patient is a 74y old  Female who presents with a chief complaint of fever and chills (2020 15:31)    INTERVAL HPI/OVERNIGHT EVENTS:  - No acute events overnight  - Patient had IV site removed overnight due to pain  - Patient tolerating breakfast without nasal canula   - Patient states that she is feeling much better  - Hemodynamically stable and afebrile  - Normal bowel movements and urinary habits  - Initiated on 40 mg Prednisone for 5 days and PO Azithromycin     MEDICATIONS  (STANDING):  albuterol/ipratropium for Nebulization 3 milliLiter(s) Nebulizer every 6 hours  apixaban 5 milliGRAM(s) Oral every 12 hours  aspirin enteric coated 81 milliGRAM(s) Oral daily  azithromycin   Tablet 500 milliGRAM(s) Oral daily  cefTRIAXone   IVPB 1000 milliGRAM(s) IV Intermittent every 24 hours  folic acid 1 milliGRAM(s) Oral daily  influenza   Vaccine 0.5 milliLiter(s) IntraMuscular once  insulin lispro (HumaLOG) corrective regimen sliding scale   SubCutaneous Before meals and at bedtime  lisinopril 5 milliGRAM(s) Oral daily  metoprolol tartrate 25 milliGRAM(s) Oral two times a day  pantoprazole    Tablet 40 milliGRAM(s) Oral before breakfast  predniSONE   Tablet 40 milliGRAM(s) Oral daily    MEDICATIONS  (PRN):  acetaminophen   Tablet .. 650 milliGRAM(s) Oral every 6 hours PRN Temp greater or equal to 38C (100.4F), Mild Pain (1 - 3), Moderate Pain (4 - 6)    Allergies    No Known Allergies    Intolerances    REVIEW OF SYSTEMS:  CONSTITUTIONAL: No fever, weight loss, or fatigue  RESPIRATORY: No cough, wheezing, chills or hemoptysis; No shortness of breath  CARDIOVASCULAR: No chest pain, palpitations, dizziness, or leg swelling  GASTROINTESTINAL: No abdominal or epigastric pain. No nausea, vomiting, or hematemesis; No diarrhea or constipation. No melena or hematochezia.  NEUROLOGICAL: No headaches, memory loss, loss of strength, numbness, or tremors  SKIN: No itching, burning, rashes, or lesions     Vital Signs Last 24 Hrs  T(C): 36.5 (2020 07:49), Max: 36.7 (2020 16:02)  T(F): 97.7 (2020 07:49), Max: 98.1 (2020 16:02)  HR: 71 (2020 07:49) (71 - 103)  BP: 132/51 (2020 07:49) (119/43 - 139/42)  BP(mean): --  RR: 18 (2020 07:49) (18 - 18)  SpO2: 94% (2020 07:49) (93% - 100%)    PHYSICAL EXAM:  GENERAL: NAD, well-groomed, well-developed  HEAD:  Atraumatic, Normocephalic  EYES: EOMI, PERRLA, conjunctiva and sclera clear; Utilizes glasses   NECK: Supple, No JVD, Normal thyroid  CHEST/LUNG: Tolerating off of nasal canula; mild wheezing in the upper lobes; R>L ronchi   HEART: irregular rate and rhythm   ABDOMEN: Soft, Nontender, Nondistended; Bowel sounds present  NERVOUS SYSTEM:  Alert & Oriented X3, Good concentration; Motor Strength 5/5 B/L   EXTREMITIES:  2+ Peripheral Pulses, No clubbing, cyanosis, or edema    LABS:                        10.3   7.79  )-----------( 237      ( 2020 06:44 )             33.6     01-30    140  |  102  |  14  ----------------------------<  111<H>  4.3   |  33<H>  |  0.94    Ca    9.9      2020 06:44  Phos  4.2     01-30  Mg     1.7     01-30    TPro  6.7  /  Alb  2.6<L>  /  TBili  0.2  /  DBili  0.1  /  AST  16  /  ALT  15  /  AlkPhos  65  01-29    PT/INR - ( 2020 17:44 )   PT: 16.7 sec;   INR: 1.49 ratio      PTT - ( 2020 17:44 )  PTT:44.3 sec  Urinalysis Basic - ( 2020 19:50 )    Color: Yellow / Appearance: Clear / S.005 / pH: x  Gluc: x / Ketone: Negative  / Bili: Negative / Urobili: Negative   Blood: x / Protein: 30 mg/dL / Nitrite: Negative   Leuk Esterase: Moderate / RBC: 0-2 /HPF / WBC 26-50 /HPF   Sq Epi: x / Non Sq Epi: Few /HPF / Bacteria: Few /HPF    CAPILLARY BLOOD GLUCOSE    POCT Blood Glucose.: 125 mg/dL (2020 08:30)  POCT Blood Glucose.: 175 mg/dL (2020 21:19)  POCT Blood Glucose.: 240 mg/dL (2020 16:57)  POCT Blood Glucose.: 274 mg/dL (2020 11:55)    RADIOLOGY & ADDITIONAL TESTS:    Imaging Personally Reviewed:  [ ] YES  [ ] NO    Consultant(s) Notes Reviewed:  [ ] YES  [ ] NO

## 2020-01-30 NOTE — PROGRESS NOTE ADULT - PROBLEM SELECTOR PLAN 2
Patient has history of COPD on Atrovent.  Duoneb nebs here.  Hold off on steroids since she does not sounds too bad.  01/29/2020: c/w supplemental oxygen; Genie PRN; holding steroids as it is not a COPD exacerbation  01/30/2020: Assess oxygenation status off of nasal canula while ambulating on the walker; starting Prednisone 40 mg for 5 days for mild exacerbation s/p wedge resection on Tagrisso.  may have developed ILD as a result of the Tagrisso and the recc per Pulm is to stop taking the Tagrisso  01/30/2020: continues to take Tagrisso

## 2020-01-30 NOTE — PROGRESS NOTE ADULT - PROBLEM SELECTOR PLAN 1
Patient presented with cough, SOB and chills.  Patient is being admitted for PNA after failing outpatient treatment.  CXR showed interval progression of left basilar and right midlung opacities.  Will start on IV ceftriaxone and IV azithromycin.  01/29/2020: Saturating well on 2L nasal canula; Remains afebrile; Mild upper lobe wheezing bilaterally; f/u BCx and UCx [ ]; -ve for Flu A/B and RSV; f/u Rapid Viral Panel [ ]  01/30/2020: Tolerating well off of nasal canula; ronchi R>L; BCx -ve; UCx-ve; RVP -ve; remains afebrile

## 2020-01-30 NOTE — PROGRESS NOTE ADULT - ATTENDING COMMENTS
Patient seen/evaluated at bedside 1/29/2020. I agree with the resident progress note/outlined plan of care. My independent findings and conclusions are documented.    Cough and sob improved. Pt reports she has 02 at home 2.0 l--> will need to verify  Physical exam significant for   AAOx3 speaking in complete sentences  scattered rhonchi R lung bases > L lung fields, decreased airflow and rare expiratory wheeze  s1S2 RRR  soft, NT, ND, + bS  no LE edema/cyanosis/clubbing    1. Pneumonia  2. COPD mild exacerbation    continue w/ scheduled bronchodilators. Add brief course of prednisone 40mg x 5 days. Monitor FS/glycemic control. C/w sliding scale coverage for now but may require low dose lantus while inhouse  check room air resting and ambulatory (w/ her rolling walker tomorrow 1/30)  pulmonary consultant to see, Dr. Baldwin notified  pt c/o pain at site of IV antibiotic infusion w/ azithromycin. As bioavailability the same po or IV would change to oral formulation  rest of plan as above
Patient seen/evaluated at bedside 1/30/2020. I agree with the resident progress note/outlined plan of care. My independent findings and conclusions are documented.    Feels better today w/ decreased cough, sob.   Ambulatory sats 84%     1. Community acquired pneumonia  2. Interstitial Lung Disease with flare  3.  Stage 4 Adenocarcinoma  4. chronic hypoxic respiratory failure    Of note, pt has no confirmed diagnosis of COPD. Prior history indicates Stage 4 adenocarcinoma and interstitial lung diease  she continues to improve on ceftriaxone and azithromycin, steroids and 02 supplementation  appreciate pulmonary assessment/ recommendations  palliative care consult, goals of care discussion  dvt ppx  expected discharge 1/31/2020

## 2020-01-30 NOTE — PROGRESS NOTE ADULT - PROBLEM SELECTOR PLAN 5
patient takes lisinopril at home  monitor blood pressure  01/29/2020: BP elevated to SBP of 150+; resumed the Lisinopril and Metoprolol  01/30/2020: BP under much better control patient has history of OA.  on acetaminophin.  will conitnue

## 2020-01-30 NOTE — PROGRESS NOTE ADULT - PROBLEM SELECTOR PLAN 3
s/p wedge resection on Tagrisso.  will continue  01/30/2020: continues to take Tagrisso patient is on eliquis and metoprolol.  01/29/2020: c/w Eliquis 5 mg and Metoprolol 25 BID; rate is controlled

## 2020-01-30 NOTE — PROGRESS NOTE ADULT - PROBLEM SELECTOR PLAN 6
patient has history of OA.  on acetaminophin.  will conitnue continue statin.  f/u lipid panel  01/29/2020: Lipid panel is normal

## 2020-01-30 NOTE — PROGRESS NOTE ADULT - PROBLEM SELECTOR PLAN 4
patient is on eliquis and metoprolol.  01/29/2020: c/w Eliquis 5 mg and Metoprolol 25 BID; rate is controlled patient takes lisinopril at home  monitor blood pressure  01/29/2020: BP elevated to SBP of 150+; resumed the Lisinopril and Metoprolol  01/30/2020: BP under much better control

## 2020-01-30 NOTE — PROGRESS NOTE ADULT - ASSESSMENT
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 She has had  multiple admissions for pneumonia in the past. She was diagnosed with pneumonia in the outpatient and was placed on oral antibiotics (ON CEFTIN 500 MD X TWICE DAILY FOR 7 DAYS). Patient states that her cough has improved is non-productive but she is still short of breath persistently She did not get better and was sent to the ED. Denies any fevers , although mentions severe chills and rigors. Denies any recent travel or sick contact.   Patient denies any chest pain, palpitation, nausea, vomiting, abdominal pain, change in urinary or bowel habits.  Patient is being admitted for PNA after failing outpatient treatment. CXR showed interval progression of left basilar and right midlung opacities. Will start on IV ceftriaxone and IV azithromycin.

## 2020-01-30 NOTE — PROGRESS NOTE ADULT - PROBLEM SELECTOR PLAN 7
continue statin.  f/u lipid panel  01/29/2020: Lipid panel is normal continue aspirin, statin and beta blocker

## 2020-01-30 NOTE — CHART NOTE - NSCHARTNOTEFT_GEN_A_CORE
Writer was informed by health team that patient ambulated without supplemental oxygen while using her rolling walker and was found to desaturate to 84%. Patient was immediately placed back on nasal canula with improved saturation to 95% on 2L nasal canula. Patient will go back home with supplemental oxygen.

## 2020-01-30 NOTE — CHART NOTE - NSCHARTNOTEFT_GEN_A_CORE
See consult note from yesterday for full details    PET CT SCAN SKULL BASE - MID THIGH INITIAL	2017-12-28 17:58:50	12/20/17- Left message with  to schedule exam. NS	Clinical information:72-year-old female with lung cancer on Tagrisso referred for follow-up evaluation. Status post left lung surgery (7 years ago) and chemotherapy/radiation therapy (last: 5 years ago).Description:Approximately 50 minutes after injection of 12.8 mCi of F-[id number] intravenously in the left hand, PET/CT scan was acquired from skull base to mid thigh.  Serum glucose measured 113 mg/dl before tracer administration.  Quantitative uptake is reported as standard uptake value (SUV).  Patient weight: 73.6 Kg.Comparison:PET/CT from 4/1/2013.NECK: Physiologic distribution of the radiopharmaceutical. Unenhanced CT images reveal no gross pathologically enlarged by size criteria cervical lymphadenopathy.CHEST: Mild uptake localizes to groundglass opacities predominantly in the lower lung fields associated with mild bronchiectasis and subpleural reticulation. Mild uptake localizes to a suspected trace to small left pleural effusion which is not significantly changed.Unenhanced CT images reveal no gross pathologically enlarged by size criteria axillary, mediastinal, or retrocrural lymphadenopathy. Subcentimeter mediastinal lymph nodes are not significantly changed. Evaluation of elaine are limited without intravenous contrast. Bovine aortic arch. Aortic calcification. Dense mitral annulus calcification. Main pulmonary trunk is mildly enlarged. Correlate clinically for pulmonary arterial hypertension. Heart is top normal to mildly enlarged. No pericardial or right-sided pleural effusion. Trace to small left pleural effusion as above. Evaluation of the lungs is limited by respiratory motion. Central airways are clear. Status post left lower lobectomy. Subpleural reticulation associated with groundglass opacities predominantly in the lower lung zones. Mild bronchiectatic changes in the middle and left lower lobes. Findings suggest chronic interstitial lung disease. Subcentimeter nodules in both upper lobes are too small to metabolically characterize. No metabolically active pulmonary nodule or mass.ABDOMEN/PELVIS: Low-grade increased uptake, near hepatic background, localizes to stable appearance of the adrenals bilaterally which is nonspecific.The solid abdominal viscera are unremarkable within limitations of non contrast technique. Scarring in the upper pole of the right kidney. No hydronephrosis. Punctate nonobstructing calculus in the interpolar region left kidney. Urinary bladder is fluid-filled. No bladder calculus. Uterus is atrophic and/or surgically absent. Correlate with surgical history.Oral contrast has reached the transverse colon. Scattered colonic diverticula without evidence for acute diverticulitis. Appendix is unremarkable. Stomach is decompressed with nonspecific wall thickening and is limited in evaluation. No pathologically enlarged by size criteria retroperitoneal, pelvic sidewall, or inguinal lymphadenopathy. Abdominal aortic calcification extending into iliac vessels. No abdominal aortic aneurysm. Postsurgical changes in the lower anterior abdominal wall.MUSCULOSKELETAL: No abnormal metabolic activity in the visualized osseous structures. Spondylosis and facet arthrosis in the lower lumbar spine. Hypoplastic left cervical rib is redemonstrated. Stable lucency in C4 vertebral body.IMPRESSION:IMPRESSION:1. Postsurgical changes from left lower lobectomy. Stable trace to small left pleural effusion which is mildly avid. Groundglass opacities associated with subpleural reticulation and mild bronchiectasis, with a basilar predominance are suspicious for chronic interstitial disease and are mildly avid. Findings suggest pulmonary arterial hypertension. Stable subcentimeter mediastinal lymph nodes. Subcentimeter nodules in both upper lobes are too small to metabolically characterize. Correlate clinically and follow-up evaluation with high-resolution chest CT is suggested.2. Additional findings as described in the body of the report.[id number]	    1) Subjective chills, fever- possible UTI  2) Abnormal CXR  3) Stage 4 Adenoca lung on chronic Tagrisso  4) Chronic respiratory failure on O2 for the last 3 years  5) ?COPD    Clinically stable  Does not appear toxic  No leukocytosis or fever on presentation or admission  Imaging reviewed along with old [facility] report, progressive CXR findings suspect more progression of her ILD then pneumonia given old records. ILD may be from drug induced pneumonitis from tagrisso which is a diagnosis of exclusion. Progression of findings on imaging could also represent progression of her adenoca which is typically lepidic in nature and can mimic bronchopneumonia.  On chronic O2 with no increase in requirement  COPD is doubtful as she is a lifelong nonsmoker  Symptomatically she feels better  Recommending obtaining old records from [facility]  as she follows there regularly including pulmonary and oncology notes and last imaging (CT chest/PET scan) which she states was 1-2 months prior which may obviate the need for any CT imaging here.  Can complete course of abx and complete prednisone 40mg x 7 days and repeat imaging. Tagrisso may need to be held but would leave decision to [facility] as they know her clinical condition better.

## 2020-01-30 NOTE — PROGRESS NOTE ADULT - PROBLEM SELECTOR PLAN 8
continue aspirin, statin and beta blocker IMPROVE VTE Individual Risk Assessment  RISK                                                          Points  [] Previous VTE                                           3  [] Thrombophilia                                        2  [] Lower limb paralysis                              2   [] Current Cancer                                       2   [] Immobilization > 24 hrs                        1  [] ICU/CCU stay > 24 hours                       1  [] Age > 60                                                   1  IMPROVE VTE Score = 4  pt on eliquis

## 2020-01-30 NOTE — CHART NOTE - NSCHARTNOTEFT_GEN_A_CORE
Writer spoke to the patient and found out the patient's Oncologist was:     Dr. Delroy Cameron [facility]   [phone]    Writer attempted to speak with the patient's Oncologist about the patient's cancer status as she has Stage 4 adenocarcinoma and was unable to reach her. Writer will attempt to reach out to the Oncologist again for prognosis of the patient.

## 2020-01-31 ENCOUNTER — TRANSCRIPTION ENCOUNTER (OUTPATIENT)
Age: 75
End: 2020-01-31

## 2020-01-31 VITALS
DIASTOLIC BLOOD PRESSURE: 57 MMHG | HEART RATE: 77 BPM | OXYGEN SATURATION: 98 % | RESPIRATION RATE: 16 BRPM | TEMPERATURE: 98 F | SYSTOLIC BLOOD PRESSURE: 128 MMHG

## 2020-01-31 DIAGNOSIS — Z51.5 ENCOUNTER FOR PALLIATIVE CARE: ICD-10-CM

## 2020-01-31 DIAGNOSIS — R53.81 OTHER MALAISE: ICD-10-CM

## 2020-01-31 DIAGNOSIS — J96.11 CHRONIC RESPIRATORY FAILURE WITH HYPOXIA: ICD-10-CM

## 2020-01-31 DIAGNOSIS — C34.90 MALIGNANT NEOPLASM OF UNSPECIFIED PART OF UNSPECIFIED BRONCHUS OR LUNG: ICD-10-CM

## 2020-01-31 DIAGNOSIS — E44.0 MODERATE PROTEIN-CALORIE MALNUTRITION: ICD-10-CM

## 2020-01-31 LAB
ANION GAP SERPL CALC-SCNC: 3 MMOL/L — LOW (ref 5–17)
BASOPHILS # BLD AUTO: 0.03 K/UL — SIGNIFICANT CHANGE UP (ref 0–0.2)
BASOPHILS NFR BLD AUTO: 0.3 % — SIGNIFICANT CHANGE UP (ref 0–2)
BUN SERPL-MCNC: 20 MG/DL — HIGH (ref 7–18)
CALCIUM SERPL-MCNC: 9.6 MG/DL — SIGNIFICANT CHANGE UP (ref 8.4–10.5)
CHLORIDE SERPL-SCNC: 103 MMOL/L — SIGNIFICANT CHANGE UP (ref 96–108)
CO2 SERPL-SCNC: 31 MMOL/L — SIGNIFICANT CHANGE UP (ref 22–31)
CREAT SERPL-MCNC: 1.13 MG/DL — SIGNIFICANT CHANGE UP (ref 0.5–1.3)
EOSINOPHIL # BLD AUTO: 0.06 K/UL — SIGNIFICANT CHANGE UP (ref 0–0.5)
EOSINOPHIL NFR BLD AUTO: 0.6 % — SIGNIFICANT CHANGE UP (ref 0–6)
GLUCOSE BLDC GLUCOMTR-MCNC: 128 MG/DL — HIGH (ref 70–99)
GLUCOSE BLDC GLUCOMTR-MCNC: 144 MG/DL — HIGH (ref 70–99)
GLUCOSE SERPL-MCNC: 234 MG/DL — HIGH (ref 70–99)
HCT VFR BLD CALC: 33.6 % — LOW (ref 34.5–45)
HGB BLD-MCNC: 10.2 G/DL — LOW (ref 11.5–15.5)
IMM GRANULOCYTES NFR BLD AUTO: 1.1 % — SIGNIFICANT CHANGE UP (ref 0–1.5)
LYMPHOCYTES # BLD AUTO: 1.28 K/UL — SIGNIFICANT CHANGE UP (ref 1–3.3)
LYMPHOCYTES # BLD AUTO: 11.8 % — LOW (ref 13–44)
MAGNESIUM SERPL-MCNC: 2.1 MG/DL — SIGNIFICANT CHANGE UP (ref 1.6–2.6)
MCHC RBC-ENTMCNC: 27.6 PG — SIGNIFICANT CHANGE UP (ref 27–34)
MCHC RBC-ENTMCNC: 30.4 GM/DL — LOW (ref 32–36)
MCV RBC AUTO: 90.8 FL — SIGNIFICANT CHANGE UP (ref 80–100)
MONOCYTES # BLD AUTO: 0.87 K/UL — SIGNIFICANT CHANGE UP (ref 0–0.9)
MONOCYTES NFR BLD AUTO: 8 % — SIGNIFICANT CHANGE UP (ref 2–14)
NEUTROPHILS # BLD AUTO: 8.53 K/UL — HIGH (ref 1.8–7.4)
NEUTROPHILS NFR BLD AUTO: 78.2 % — HIGH (ref 43–77)
NRBC # BLD: 0 /100 WBCS — SIGNIFICANT CHANGE UP (ref 0–0)
PHOSPHATE SERPL-MCNC: 2.8 MG/DL — SIGNIFICANT CHANGE UP (ref 2.5–4.5)
PLATELET # BLD AUTO: 269 K/UL — SIGNIFICANT CHANGE UP (ref 150–400)
POTASSIUM SERPL-MCNC: 4.5 MMOL/L — SIGNIFICANT CHANGE UP (ref 3.5–5.3)
POTASSIUM SERPL-SCNC: 4.5 MMOL/L — SIGNIFICANT CHANGE UP (ref 3.5–5.3)
RBC # BLD: 3.7 M/UL — LOW (ref 3.8–5.2)
RBC # FLD: 13.4 % — SIGNIFICANT CHANGE UP (ref 10.3–14.5)
SODIUM SERPL-SCNC: 137 MMOL/L — SIGNIFICANT CHANGE UP (ref 135–145)
WBC # BLD: 10.89 K/UL — HIGH (ref 3.8–10.5)
WBC # FLD AUTO: 10.89 K/UL — HIGH (ref 3.8–10.5)

## 2020-01-31 PROCEDURE — 84443 ASSAY THYROID STIM HORMONE: CPT

## 2020-01-31 PROCEDURE — 96374 THER/PROPH/DIAG INJ IV PUSH: CPT

## 2020-01-31 PROCEDURE — 93005 ELECTROCARDIOGRAM TRACING: CPT

## 2020-01-31 PROCEDURE — 97161 PT EVAL LOW COMPLEX 20 MIN: CPT

## 2020-01-31 PROCEDURE — 85027 COMPLETE CBC AUTOMATED: CPT

## 2020-01-31 PROCEDURE — 82962 GLUCOSE BLOOD TEST: CPT

## 2020-01-31 PROCEDURE — 71045 X-RAY EXAM CHEST 1 VIEW: CPT

## 2020-01-31 PROCEDURE — 87631 RESP VIRUS 3-5 TARGETS: CPT

## 2020-01-31 PROCEDURE — 99285 EMERGENCY DEPT VISIT HI MDM: CPT | Mod: 25

## 2020-01-31 PROCEDURE — 85610 PROTHROMBIN TIME: CPT

## 2020-01-31 PROCEDURE — 36415 COLL VENOUS BLD VENIPUNCTURE: CPT

## 2020-01-31 PROCEDURE — 87798 DETECT AGENT NOS DNA AMP: CPT

## 2020-01-31 PROCEDURE — 80061 LIPID PANEL: CPT

## 2020-01-31 PROCEDURE — 87633 RESP VIRUS 12-25 TARGETS: CPT

## 2020-01-31 PROCEDURE — 87581 M.PNEUMON DNA AMP PROBE: CPT

## 2020-01-31 PROCEDURE — 83036 HEMOGLOBIN GLYCOSYLATED A1C: CPT

## 2020-01-31 PROCEDURE — 83605 ASSAY OF LACTIC ACID: CPT

## 2020-01-31 PROCEDURE — 83735 ASSAY OF MAGNESIUM: CPT

## 2020-01-31 PROCEDURE — 87486 CHLMYD PNEUM DNA AMP PROBE: CPT

## 2020-01-31 PROCEDURE — 99497 ADVNCD CARE PLAN 30 MIN: CPT | Mod: 25

## 2020-01-31 PROCEDURE — 94640 AIRWAY INHALATION TREATMENT: CPT

## 2020-01-31 PROCEDURE — 80048 BASIC METABOLIC PNL TOTAL CA: CPT

## 2020-01-31 PROCEDURE — 82306 VITAMIN D 25 HYDROXY: CPT

## 2020-01-31 PROCEDURE — 99223 1ST HOSP IP/OBS HIGH 75: CPT

## 2020-01-31 PROCEDURE — 85730 THROMBOPLASTIN TIME PARTIAL: CPT

## 2020-01-31 PROCEDURE — 81001 URINALYSIS AUTO W/SCOPE: CPT

## 2020-01-31 PROCEDURE — 80053 COMPREHEN METABOLIC PANEL: CPT

## 2020-01-31 PROCEDURE — 96375 TX/PRO/DX INJ NEW DRUG ADDON: CPT

## 2020-01-31 PROCEDURE — 87040 BLOOD CULTURE FOR BACTERIA: CPT

## 2020-01-31 PROCEDURE — 82607 VITAMIN B-12: CPT

## 2020-01-31 PROCEDURE — 87086 URINE CULTURE/COLONY COUNT: CPT

## 2020-01-31 PROCEDURE — 99232 SBSQ HOSP IP/OBS MODERATE 35: CPT

## 2020-01-31 PROCEDURE — 80076 HEPATIC FUNCTION PANEL: CPT

## 2020-01-31 PROCEDURE — 84100 ASSAY OF PHOSPHORUS: CPT

## 2020-01-31 RX ORDER — CEFUROXIME AXETIL 250 MG
1 TABLET ORAL
Qty: 8 | Refills: 0
Start: 2020-01-31 | End: 2020-02-03

## 2020-01-31 RX ORDER — AZITHROMYCIN 500 MG/1
1 TABLET, FILM COATED ORAL
Qty: 4 | Refills: 0
Start: 2020-01-31 | End: 2020-02-03

## 2020-01-31 RX ORDER — AZITHROMYCIN 500 MG/1
1 TABLET, FILM COATED ORAL
Qty: 2 | Refills: 0
Start: 2020-01-31 | End: 2020-02-01

## 2020-01-31 RX ADMIN — Medication 25 MILLIGRAM(S): at 05:29

## 2020-01-31 RX ADMIN — PANTOPRAZOLE SODIUM 40 MILLIGRAM(S): 20 TABLET, DELAYED RELEASE ORAL at 05:28

## 2020-01-31 RX ADMIN — APIXABAN 5 MILLIGRAM(S): 2.5 TABLET, FILM COATED ORAL at 12:33

## 2020-01-31 RX ADMIN — Medication 81 MILLIGRAM(S): at 12:33

## 2020-01-31 RX ADMIN — LISINOPRIL 5 MILLIGRAM(S): 2.5 TABLET ORAL at 05:29

## 2020-01-31 RX ADMIN — AZITHROMYCIN 500 MILLIGRAM(S): 500 TABLET, FILM COATED ORAL at 12:33

## 2020-01-31 RX ADMIN — Medication 40 MILLIGRAM(S): at 12:33

## 2020-01-31 RX ADMIN — Medication 1 MILLIGRAM(S): at 12:33

## 2020-01-31 NOTE — PROGRESS NOTE ADULT - SUBJECTIVE AND OBJECTIVE BOX
Interval Events: Pt irritable this am as she did not sleep well overnight. States breathing is stable    OBJECTIVE:  Vital Signs Last 24 Hrs  T(C): 36.5 (31 Jan 2020 08:32), Max: 36.5 (31 Jan 2020 08:32)  T(F): 97.7 (31 Jan 2020 08:32), Max: 97.7 (31 Jan 2020 08:32)  HR: 77 (31 Jan 2020 08:32) (67 - 100)  BP: 128/57 (31 Jan 2020 08:32) (128/57 - 156/57)  RR: 16 (31 Jan 2020 08:32) (15 - 16)  SpO2: 98% (31 Jan 2020 08:32) (94% - 98%)    CAPILLARY BLOOD GLUCOSE    POCT Blood Glucose.: 128 mg/dL (31 Jan 2020 08:49)    PHYSICAL EXAM:  General: Awake, alert, oriented X 3.   HEENT: Atraumatic, PERRL, Anicteric.   Lymph Nodes: No palpable lymphadenopathy  Respiratory: Basilar crackles  Cardiovascular: S1 S2 normal. No murmurs, rubs or gallops.   Abdomen: Soft, non-tender, non-distended. No organomegaly.  Extremities: Warm to touch. Peripheral pulse palpable. No pedal edema.   Skin: No rashes or skin lesions  Neurological: No focal deficits.  Psychiatry: Appropriate mood and affect.    HOSPITAL MEDICATIONS:  MEDICATIONS  (STANDING):  albuterol/ipratropium for Nebulization 3 milliLiter(s) Nebulizer every 6 hours  apixaban 5 milliGRAM(s) Oral every 12 hours  aspirin enteric coated 81 milliGRAM(s) Oral daily  azithromycin   Tablet 500 milliGRAM(s) Oral daily  cefTRIAXone   IVPB 1000 milliGRAM(s) IV Intermittent every 24 hours  folic acid 1 milliGRAM(s) Oral daily  influenza   Vaccine 0.5 milliLiter(s) IntraMuscular once  insulin lispro (HumaLOG) corrective regimen sliding scale   SubCutaneous Before meals and at bedtime  lisinopril 5 milliGRAM(s) Oral daily  metoprolol tartrate 25 milliGRAM(s) Oral two times a day  pantoprazole    Tablet 40 milliGRAM(s) Oral before breakfast  predniSONE   Tablet 40 milliGRAM(s) Oral daily    MEDICATIONS  (PRN):  acetaminophen   Tablet .. 650 milliGRAM(s) Oral every 6 hours PRN Temp greater or equal to 38C (100.4F), Mild Pain (1 - 3), Moderate Pain (4 - 6)    LABS:                        10.2   10.89 )-----------( 269      ( 31 Jan 2020 06:19 )             33.6     01-31    137  |  103  |  20<H>  ----------------------------<  234<H>  4.5   |  31  |  1.13    Ca    9.6      31 Jan 2020 06:19  Phos  2.8     01-31  Mg     2.1     01-31    MICROBIOLOGY:     RADIOLOGY:  [X] Reviewed and interpreted by me

## 2020-01-31 NOTE — CONSULT NOTE ADULT - PROBLEM SELECTOR RECOMMENDATION 5
Met with patient regarding current condition, overall goals of care. She expressed that she has been tolerating her cancer treatment fairly well, will f/u with her oncologist. She has no children, has siblings and nieces/nephews in [place]. Her sister Nelsy Cisneros is her HCP, reports that facility has the form which was done years ago, she does not wish to fill out another one. Regarding treatment preferences, discussed risks/benefits of CPR/intubation in the context of her comorbidities, she expressed that she has been thinking about it however remains undecided at this time. She is happy to be returning to [facility] today. Support provided.    total advance care planning discussion time: 25m in

## 2020-01-31 NOTE — DISCHARGE NOTE NURSING/CASE MANAGEMENT/SOCIAL WORK - PATIENT PORTAL LINK FT
You can access the FollowMyHealth Patient Portal offered by [facility] by registering at the following website: [email or web address]. By joining [facility] FollowMyHealth portal, you will also be able to view your health information using other applications (apps) compatible with our system.

## 2020-01-31 NOTE — CONSULT NOTE ADULT - SUBJECTIVE AND OBJECTIVE BOX
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 She has had  multiple admissions for pneumonia in the past. She was diagnosed with pneumonia in the outpatient and was placed on oral antibiotics (ON CEFTIN 500 MD X TWICE DAILY FOR 7 DAYS). Patient states that her cough has improved is non-productive but she is still short of breath persistently She did not get better and was sent to the ED. Denies any fevers , although mentions severe chills and rigors. Denies any recent travel or sick contact.   Patient denies any chest pain, palpitation, nausea, vomiting, abdominal pain, change in urinary or bowel habits.  Patient is being admitted for PNA after failing outpatient treatment. CXR showed interval progression of left basilar and right midlung opacities. Will start on IV ceftriaxone and IV azithromycin. (28 Jan 2020 22:01)    Interval history: patient feels better, denies SOB.       PAST MEDICAL & SURGICAL HISTORY:  Lung cancer: wedge resection of left lung 2013  Seborrheic dermatitis  OA (osteoarthritis)  Hypercholesteremia  HTN (hypertension)  GERD (gastroesophageal reflux disease)  Constipation  DM (diabetes mellitus)  Cataract  CAD (coronary artery disease)  Bronchoalveolar carcinoma  ACS (acute coronary syndrome)  COPD (chronic obstructive pulmonary disease)  S/P ovarian cystectomy      SOCIAL HISTORY:  has siblings, no children  Admitted from:  assisted living        Surrogate/HCP/Guardian:    Nelsy Cisneros sister       Phone#: [phone]    FAMILY HISTORY:  Family history of prostate cancer    Baseline ADLs (prior to admission): alert, ambulatory w RW    Allergies    No Known Allergies    Intolerances      Present Symptoms:   Dyspnea: improved  Nausea/Vomiting: denies  Anxiety:denies  Depressed denies  Fatigue: dnies  Loss of appetite: denies  Pain:     denies         Review of Systems:  All others negative      MEDICATIONS  (STANDING):  albuterol/ipratropium for Nebulization 3 milliLiter(s) Nebulizer every 6 hours  apixaban 5 milliGRAM(s) Oral every 12 hours  aspirin enteric coated 81 milliGRAM(s) Oral daily  azithromycin   Tablet 500 milliGRAM(s) Oral daily  cefTRIAXone   IVPB 1000 milliGRAM(s) IV Intermittent every 24 hours  folic acid 1 milliGRAM(s) Oral daily  influenza   Vaccine 0.5 milliLiter(s) IntraMuscular once  insulin lispro (HumaLOG) corrective regimen sliding scale   SubCutaneous Before meals and at bedtime  lisinopril 5 milliGRAM(s) Oral daily  metoprolol tartrate 25 milliGRAM(s) Oral two times a day  pantoprazole    Tablet 40 milliGRAM(s) Oral before breakfast  predniSONE   Tablet 40 milliGRAM(s) Oral daily    MEDICATIONS  (PRN):  acetaminophen   Tablet .. 650 milliGRAM(s) Oral every 6 hours PRN Temp greater or equal to 38C (100.4F), Mild Pain (1 - 3), Moderate Pain (4 - 6)      PHYSICAL EXAM:    Vital Signs Last 24 Hrs  T(C): 36.5 (31 Jan 2020 08:32), Max: 36.5 (31 Jan 2020 08:32)  T(F): 97.7 (31 Jan 2020 08:32), Max: 97.7 (31 Jan 2020 08:32)  HR: 77 (31 Jan 2020 08:32) (77 - 100)  BP: 128/57 (31 Jan 2020 08:32) (128/57 - 156/57)  BP(mean): --  RR: 16 (31 Jan 2020 08:32) (15 - 16)  SpO2: 98% (31 Jan 2020 08:32) (94% - 98%)     Karnofsky Performance Score/Palliative Performance Status Version2: 70    %     PHYSICAL EXAM-  GENERAL: alert, WNWD, NAD  HEENT: Atraumatic, oropharynx clear, neck supple  CHEST/LUNG: basilar crackles  HEART: Regular rate and rhythm    ABDOMEN: Soft, Nontender, Nondistended   MUSCULOSKELETAL:  No  edema   NERVOUS SYSTEM:  Alert & Oriented X3,  follows commands  SKIN: No rashes or lesions noted  Oral intake: fair       LABS:                        10.2   10.89 )-----------( 269      ( 31 Jan 2020 06:19 )             33.6     01-31    137  |  103  |  20<H>  ----------------------------<  234<H>  4.5   |  31  |  1.13    Ca    9.6      31 Jan 2020 06:19  Phos  2.8     01-31  Mg     2.1     01-31          RADIOLOGY & ADDITIONAL STUDIES:    ADVANCE DIRECTIVES: HCP
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 She has had  multiple admissions for pneumonia in the past. She was diagnosed with pneumonia in the outpatient and was placed on oral antibiotics (ON CEFTIN 500 MD X TWICE DAILY FOR 7 DAYS). Patient states that her cough has improved is non-productive but she is still short of breath persistently She did not get better and was sent to the ED. Denies any fevers , although mentions severe chills and rigors. Denies any recent travel or sick contact.   Patient denies any chest pain, palpitation, nausea, vomiting, abdominal pain, change in urinary or bowel habits.  Patient is being admitted for PNA after failing outpatient treatment. CXR showed interval progression of left basilar and right midlung opacities. Will start on IV ceftriaxone and IV azithromycin. (2020 22:01)        MEDICATIONS  (STANDING):  albuterol/ipratropium for Nebulization 3 milliLiter(s) Nebulizer every 6 hours  apixaban 5 milliGRAM(s) Oral every 12 hours  aspirin enteric coated 81 milliGRAM(s) Oral daily  azithromycin   Tablet 500 milliGRAM(s) Oral daily  cefTRIAXone   IVPB 1000 milliGRAM(s) IV Intermittent every 24 hours  folic acid 1 milliGRAM(s) Oral daily  influenza   Vaccine 0.5 milliLiter(s) IntraMuscular once  insulin lispro (HumaLOG) corrective regimen sliding scale   SubCutaneous Before meals and at bedtime  lisinopril 5 milliGRAM(s) Oral daily  metoprolol tartrate 25 milliGRAM(s) Oral two times a day  pantoprazole    Tablet 40 milliGRAM(s) Oral before breakfast  predniSONE   Tablet 40 milliGRAM(s) Oral daily    MEDICATIONS  (PRN):  acetaminophen   Tablet .. 650 milliGRAM(s) Oral every 6 hours PRN Temp greater or equal to 38C (100.4F), Mild Pain (1 - 3), Moderate Pain (4 - 6)      Allergies    No Known Allergies    Intolerances      PAST MEDICAL & SURGICAL HISTORY:  Lung cancer: wedge resection of left lung   Seborrheic dermatitis  OA (osteoarthritis)  Hypercholesteremia  HTN (hypertension)  GERD (gastroesophageal reflux disease)  Constipation  DM (diabetes mellitus)  Cataract  CAD (coronary artery disease)  Bronchoalveolar carcinoma  ACS (acute coronary syndrome)  COPD (chronic obstructive pulmonary disease)  S/P ovarian cystectomy    FAMILY HISTORY:  Family history of prostate cancer    SOCIAL HISTORY  Smoking History: Nonsmoker  Alcohol: Denies  Drugs: Denies  Occupation: Former cosmotologist    T(C): 36.5 (20 @ 07:49), Max: 36.7 (20 @ 16:02)  HR: 71 (20 @ 07:49) (71 - 103)  BP: 132/51 (20 @ 07:49) (119/43 - 139/42)  RR: 18 (20 @ 07:49) (18 - 18)  SpO2: 94% (20 @ 07:49) (93% - 100%)    LABS:                        10.3   7.79  )-----------( 237      ( 2020 06:44 )             33.6         140  |  102  |  14  ----------------------------<  111<H>  4.3   |  33<H>  |  0.94    Ca    9.9      2020 06:44  Phos  4.2       Mg     1.7         TPro  6.7  /  Alb  2.6<L>  /  TBili  0.2  /  DBili  0.1  /  AST  16  /  ALT  15  /  AlkPhos  65  29    PT/INR - ( 2020 17:44 )   PT: 16.7 sec;   INR: 1.49 ratio       PTT - ( 2020 17:44 )  PTT:44.3 sec  Urinalysis Basic - ( 2020 19:50 )    Color: Yellow / Appearance: Clear / S.005 / pH: x  Gluc: x / Ketone: Negative  / Bili: Negative / Urobili: Negative   Blood: x / Protein: 30 mg/dL / Nitrite: Negative   Leuk Esterase: Moderate / RBC: 0-2 /HPF / WBC 26-50 /HPF   Sq Epi: x / Non Sq Epi: Few /HPF / Bacteria: Few /HPF    Microbiology  Culture Results:   No growth to date. ( @ 01:40)  Culture Results:   No growth to date. ( @ 01:40)  Culture Results:   <10,000 CFU/mL Normal Urogenital Candace ( @ 01:39)    RADIOLOGY & ADDITIONAL STUDIES: (My [place])  CXR- Prominent interstitial opacities right midlung field and Left lower lung field

## 2020-01-31 NOTE — CONSULT NOTE ADULT - PROBLEM SELECTOR RECOMMENDATION 3
ambulatory w RW, PT eval noted, ambulated 100 ft. To return to [facility]. Assist w ADls prn.

## 2020-01-31 NOTE — CONSULT NOTE ADULT - PROBLEM SELECTOR RECOMMENDATION 9
Chronic resp failure on 3L home o2. ILD vs malignancy. Pulm following. On antibiotic, steroid course

## 2020-01-31 NOTE — PROGRESS NOTE ADULT - ASSESSMENT
1) Subjective chills, fever- possible UTI/Versus super infection  2) Abnormal CXR  3) Stage 4 [place] lung on chronic Tagrisso  4) Chronic respiratory failure on O2 for the last 3 years  5) ?COPD    Clinically stable  She has been on chronic oxygen for 3 years  Suspect progression of her ILD vs malignancy, she had a recent advanced imaging at [place]  Until results are obtained, she can be tx with 7 days of abx/Prednisone 40mg daily x 7 days  She can follow up with her pulmonary team and oncologist for follow up to determine contiuation or Tagrisso  Afebrile, WBC stable.   Glucose elevated from steroids, monitor closely

## 2020-01-31 NOTE — CONSULT NOTE ADULT - PROBLEM SELECTOR RECOMMENDATION 2
Metastatic bronchoalveolar CA, on tagrisso, foll by onc Dr Jimenez at [place], unable to reach. Patient wishes to continue DMT at this time, f/u onc as outpt. ECOG 2

## 2020-02-13 ENCOUNTER — INPATIENT (INPATIENT)
Facility: HOSPITAL | Age: 75
LOS: 5 days | Discharge: NURSING, REHAB OR LONG-TERM CARE | DRG: 871 | End: 2020-02-19
Attending: INTERNAL MEDICINE | Admitting: INTERNAL MEDICINE
Payer: MEDICAID

## 2020-02-13 VITALS
WEIGHT: 160.06 LBS | HEART RATE: 84 BPM | HEIGHT: 63 IN | OXYGEN SATURATION: 97 % | RESPIRATION RATE: 18 BRPM | TEMPERATURE: 98 F | SYSTOLIC BLOOD PRESSURE: 123 MMHG | DIASTOLIC BLOOD PRESSURE: 67 MMHG

## 2020-02-13 DIAGNOSIS — Z98.89 OTHER SPECIFIED POSTPROCEDURAL STATES: Chronic | ICD-10-CM

## 2020-02-13 DIAGNOSIS — J10.1 INFLUENZA DUE TO OTHER IDENTIFIED INFLUENZA VIRUS WITH OTHER RESPIRATORY MANIFESTATIONS: ICD-10-CM

## 2020-02-13 LAB
ALBUMIN SERPL ELPH-MCNC: 3.3 G/DL — LOW (ref 3.5–5)
ALP SERPL-CCNC: 61 U/L — SIGNIFICANT CHANGE UP (ref 40–120)
ALT FLD-CCNC: 15 U/L DA — SIGNIFICANT CHANGE UP (ref 10–60)
ANION GAP SERPL CALC-SCNC: 7 MMOL/L — SIGNIFICANT CHANGE UP (ref 5–17)
APTT BLD: 51.2 SEC — HIGH (ref 27.5–36.3)
AST SERPL-CCNC: 24 U/L — SIGNIFICANT CHANGE UP (ref 10–40)
BASOPHILS # BLD AUTO: 0.03 K/UL — SIGNIFICANT CHANGE UP (ref 0–0.2)
BASOPHILS NFR BLD AUTO: 0.3 % — SIGNIFICANT CHANGE UP (ref 0–2)
BILIRUB SERPL-MCNC: 0.2 MG/DL — SIGNIFICANT CHANGE UP (ref 0.2–1.2)
BUN SERPL-MCNC: 19 MG/DL — HIGH (ref 7–18)
CALCIUM SERPL-MCNC: 9.2 MG/DL — SIGNIFICANT CHANGE UP (ref 8.4–10.5)
CHLORIDE SERPL-SCNC: 96 MMOL/L — SIGNIFICANT CHANGE UP (ref 96–108)
CO2 SERPL-SCNC: 25 MMOL/L — SIGNIFICANT CHANGE UP (ref 22–31)
CREAT SERPL-MCNC: 1.56 MG/DL — HIGH (ref 0.5–1.3)
EOSINOPHIL # BLD AUTO: 0.25 K/UL — SIGNIFICANT CHANGE UP (ref 0–0.5)
EOSINOPHIL NFR BLD AUTO: 2.5 % — SIGNIFICANT CHANGE UP (ref 0–6)
FLU A RESULT: DETECTED
FLU A RESULT: DETECTED
FLUAV AG NPH QL: DETECTED
FLUBV AG NPH QL: SIGNIFICANT CHANGE UP
GLUCOSE BLDC GLUCOMTR-MCNC: 89 MG/DL — SIGNIFICANT CHANGE UP (ref 70–99)
GLUCOSE SERPL-MCNC: 47 MG/DL — LOW (ref 70–99)
HCT VFR BLD CALC: 35.4 % — SIGNIFICANT CHANGE UP (ref 34.5–45)
HGB BLD-MCNC: 10.9 G/DL — LOW (ref 11.5–15.5)
IMM GRANULOCYTES NFR BLD AUTO: 0.2 % — SIGNIFICANT CHANGE UP (ref 0–1.5)
INR BLD: 1.59 RATIO — HIGH (ref 0.88–1.16)
LIDOCAIN IGE QN: 118 U/L — SIGNIFICANT CHANGE UP (ref 73–393)
LYMPHOCYTES # BLD AUTO: 0.98 K/UL — LOW (ref 1–3.3)
LYMPHOCYTES # BLD AUTO: 9.9 % — LOW (ref 13–44)
MAGNESIUM SERPL-MCNC: 1.9 MG/DL — SIGNIFICANT CHANGE UP (ref 1.6–2.6)
MCHC RBC-ENTMCNC: 28.4 PG — SIGNIFICANT CHANGE UP (ref 27–34)
MCHC RBC-ENTMCNC: 30.8 GM/DL — LOW (ref 32–36)
MCV RBC AUTO: 92.2 FL — SIGNIFICANT CHANGE UP (ref 80–100)
MONOCYTES # BLD AUTO: 0.74 K/UL — SIGNIFICANT CHANGE UP (ref 0–0.9)
MONOCYTES NFR BLD AUTO: 7.5 % — SIGNIFICANT CHANGE UP (ref 2–14)
NEUTROPHILS # BLD AUTO: 7.91 K/UL — HIGH (ref 1.8–7.4)
NEUTROPHILS NFR BLD AUTO: 79.6 % — HIGH (ref 43–77)
NRBC # BLD: 0 /100 WBCS — SIGNIFICANT CHANGE UP (ref 0–0)
OSMOLALITY SERPL: 278 MOSMOL/KG — LOW (ref 280–301)
PLATELET # BLD AUTO: 180 K/UL — SIGNIFICANT CHANGE UP (ref 150–400)
POTASSIUM SERPL-MCNC: 4.7 MMOL/L — SIGNIFICANT CHANGE UP (ref 3.5–5.3)
POTASSIUM SERPL-SCNC: 4.7 MMOL/L — SIGNIFICANT CHANGE UP (ref 3.5–5.3)
PROT SERPL-MCNC: 7.4 G/DL — SIGNIFICANT CHANGE UP (ref 6–8.3)
PROTHROM AB SERPL-ACNC: 17.9 SEC — HIGH (ref 10–12.9)
RBC # BLD: 3.84 M/UL — SIGNIFICANT CHANGE UP (ref 3.8–5.2)
RBC # FLD: 14.3 % — SIGNIFICANT CHANGE UP (ref 10.3–14.5)
RSV RESULT: SIGNIFICANT CHANGE UP
RSV RNA RESP QL NAA+PROBE: SIGNIFICANT CHANGE UP
SODIUM SERPL-SCNC: 128 MMOL/L — LOW (ref 135–145)
TROPONIN I SERPL-MCNC: <0.015 NG/ML — SIGNIFICANT CHANGE UP (ref 0–0.04)
WBC # BLD: 9.93 K/UL — SIGNIFICANT CHANGE UP (ref 3.8–10.5)
WBC # FLD AUTO: 9.93 K/UL — SIGNIFICANT CHANGE UP (ref 3.8–10.5)

## 2020-02-13 PROCEDURE — 71045 X-RAY EXAM CHEST 1 VIEW: CPT | Mod: 26

## 2020-02-13 PROCEDURE — 99285 EMERGENCY DEPT VISIT HI MDM: CPT

## 2020-02-13 RX ORDER — ONDANSETRON 8 MG/1
4 TABLET, FILM COATED ORAL ONCE
Refills: 0 | Status: COMPLETED | OUTPATIENT
Start: 2020-02-13 | End: 2020-02-13

## 2020-02-13 RX ORDER — DEXTROSE 50 % IN WATER 50 %
50 SYRINGE (ML) INTRAVENOUS ONCE
Refills: 0 | Status: COMPLETED | OUTPATIENT
Start: 2020-02-13 | End: 2020-02-13

## 2020-02-13 RX ORDER — SODIUM CHLORIDE 9 MG/ML
1000 INJECTION INTRAMUSCULAR; INTRAVENOUS; SUBCUTANEOUS ONCE
Refills: 0 | Status: COMPLETED | OUTPATIENT
Start: 2020-02-13 | End: 2020-02-13

## 2020-02-13 RX ORDER — ACETAMINOPHEN 500 MG
650 TABLET ORAL EVERY 6 HOURS
Refills: 0 | Status: DISCONTINUED | OUTPATIENT
Start: 2020-02-13 | End: 2020-02-17

## 2020-02-13 RX ORDER — IPRATROPIUM/ALBUTEROL SULFATE 18-103MCG
3 AEROSOL WITH ADAPTER (GRAM) INHALATION EVERY 6 HOURS
Refills: 0 | Status: DISCONTINUED | OUTPATIENT
Start: 2020-02-13 | End: 2020-02-16

## 2020-02-13 RX ADMIN — Medication 650 MILLIGRAM(S): at 22:55

## 2020-02-13 RX ADMIN — Medication 50 MILLILITER(S): at 21:06

## 2020-02-13 RX ADMIN — ONDANSETRON 4 MILLIGRAM(S): 8 TABLET, FILM COATED ORAL at 19:47

## 2020-02-13 RX ADMIN — SODIUM CHLORIDE 1000 MILLILITER(S): 9 INJECTION INTRAMUSCULAR; INTRAVENOUS; SUBCUTANEOUS at 19:47

## 2020-02-13 RX ADMIN — Medication 75 MILLIGRAM(S): at 21:07

## 2020-02-13 NOTE — H&P ADULT - PROBLEM SELECTOR PLAN 3
- p/w BUN/CR:19/1.56,   - likely due to severe dehydration/ prerenal azotemia (decreased oral intake)  - will hold Lisinopril (home meds)   - c/w IVF  - Monitor renal function   - f/u BMP , Cr urine , Pr/Cr ratio , Urine lytes , Sodium urine   - f/u  FeNa  - f/u UA

## 2020-02-13 NOTE — ED ADULT NURSE NOTE - NSIMPLEMENTINTERV_GEN_ALL_ED
Implemented All Fall Risk Interventions:  [place] to call system. Call bell, personal items and telephone within reach. Instruct patient to call for assistance. Room bathroom lighting operational. Non-slip footwear when patient is off stretcher. Physically safe environment: no spills, clutter or unnecessary equipment. Stretcher in lowest position, wheels locked, appropriate side rails in place. Provide visual cue, wrist band, yellow gown, etc. Monitor gait and stability. Monitor for mental status changes and reorient to person, place, and time. Review medications for side effects contributing to fall risk. Reinforce activity limits and safety measures with patient and family.

## 2020-02-13 NOTE — H&P ADULT - PROBLEM SELECTOR PLAN 8
patient takes lisinopril at home  will hold in setting of infection.  monitor blood pressure patient takes lisinopril at home  will hold in setting of infection and SPRING  monitor blood pressure

## 2020-02-13 NOTE — H&P ADULT - NSHPPHYSICALEXAM_GEN_ALL_CORE
Vital Signs Last 24 Hrs  T(C): 39.1 (14 Feb 2020 00:03), Max: 39.4 (13 Feb 2020 21:47)  T(F): 102.4 (14 Feb 2020 00:03), Max: 102.9 (13 Feb 2020 21:47)  HR: 106 (14 Feb 2020 00:03) (60 - 106)  BP: 97/69 (14 Feb 2020 00:03) (97/69 - 151/54)  BP(mean): --  RR: 18 (14 Feb 2020 00:03) (18 - 18)  SpO2: 96% (14 Feb 2020 00:03) (96% - 100%)      PHYSICAL EXAM:  GENERAL: NAD  HEENT: Normocephalic;  conjunctivae and sclerae clear; moist mucous membranes;   NECK : supple  CHEST/LUNG: coarse breath sounds B/L  HEART: S1 S2  regular; no murmurs, gallops or rubs  ABDOMEN: Soft, Nontender, Nondistended; Bowel sounds present  EXTREMITIES: no cyanosis; no edema; no calf tenderness  SKIN: warm and dry; no rash  NERVOUS SYSTEM:  Awake and alert; Oriented  to place, person and time ; no new deficits

## 2020-02-13 NOTE — ED PROVIDER NOTE - CONSTITUTIONAL, MLM
normal... Well appearing, awake, alert, oriented to person, place, time/situation and in no acute distress.

## 2020-02-13 NOTE — H&P ADULT - PROBLEM SELECTOR PLAN 5
Patient has history of COPD on Atrovent.  Duoneb nebs here.  will hold off on steroids since she does not sounds too bad.  wheezing is appreciated in only b/l upper lobes. Patient has history of COPD on Atrovent.  c/w Duoneb nebs

## 2020-02-13 NOTE — H&P ADULT - HISTORY OF PRESENT ILLNESS
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 Patient reports some chills, subjective fever, nausea, generalized abdominal pain, and a productive cough. Patient's abdominal pain is poorly characterized, although patient notes that the pain is intermittent and non-radiating.   She was admitted here on Jan 2020 for pneumonia and completed antibiotic course. 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 Patient reports some chills, subjective fever, nausea, generalized abdominal pain, and a productive cough. Patient's abdominal pain is poorly characterized, although patient notes that the pain is intermittent and non-radiating. Denies any urinary or bowel complaint, vomiting, chest pain.  She was admitted here on Jan 2020 for pneumonia and completed antibiotic course.

## 2020-02-13 NOTE — H&P ADULT - PROBLEM SELECTOR PLAN 6
s/p wedge resection on Tagrisso.  will continue s/p wedge resection on Tagrisso.  will hold due to infection

## 2020-02-13 NOTE — H&P ADULT - PROBLEM SELECTOR PLAN 1
-p/w shortness of breath and flu-like symptoms for the past two days  -ED course: 1L NS  -UA: pending  -CXR: unchanged infiltrate from prior  -Sepsis 2/2 flu with ?PNA  -c/w tamiflu  - will start rocephin and azithro since pt. is septic    - follow blood culture, UA -p/w shortness of breath and flu-like symptoms for the past two days  -ED course: 1L NS  -UA: pending  -CXR: unchanged infiltrate from prior  -FLU A detected  -Sepsis 2/2 flu with ?PNA  -c/w tamiflu  - will start rocephin and azithro since pt. is septic    - follow blood culture, UA

## 2020-02-13 NOTE — H&P ADULT - PROBLEM SELECTOR PLAN 2
- hyponatremia likely 2/2  poor po intake   - corrected Na to glucose : 128  - serum osmo : 278   - S/P 1lt. NS bolus in ED  - C/W NS  - f/u urine osmolarity Cr urine , Pr/Cr ratio , Urine lytes , Sodium urine , total protein   - f/u Renal and bladder US  - BMP q6  - Goal to correct serum sodium not more than 6-8 meq/L in the first 24hrs

## 2020-02-13 NOTE — ED ADULT NURSE NOTE - OBJECTIVE STATEMENT
pt bibems for c/o generalized abdominal pain, nausea, cough, sob, chills & chest congestion x4 days. Denies V/D, cp, leg swelling, HA, dizziness, urinary distress. Breathing unlabored on 2L NC. NAD

## 2020-02-13 NOTE — ED PROVIDER NOTE - OBJECTIVE STATEMENT
74 year old female with PMHx of COPD, diabetes mellitus, GERD, HTN, hypercholesterolemia, lung cancer, osteoarthritis, seborrheic dermatitis, ACS, bronchoalveolar carcinoma, and CAD and PSHx of an ovarian cystectomy presents to the ED with complaints of flu-like symptoms for the past two days. Patient reports some chills, subjective fever, nausea, generalized abdominal pain, and a productive cough. Patient's abdominal pain is poorly characterized, although patient notes that the pain is intermittent and non-radiating. Patient states that she notices phlegm with her cough, but is unsure as to the color. Patient otherwise denies any vomiting and all other acute complaints. NKDA.

## 2020-02-13 NOTE — H&P ADULT - ASSESSMENT
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 Admitted for flu with hyponatremia and SPRING.

## 2020-02-14 DIAGNOSIS — A41.9 SEPSIS, UNSPECIFIED ORGANISM: ICD-10-CM

## 2020-02-14 DIAGNOSIS — N17.9 ACUTE KIDNEY FAILURE, UNSPECIFIED: ICD-10-CM

## 2020-02-14 DIAGNOSIS — E16.2 HYPOGLYCEMIA, UNSPECIFIED: ICD-10-CM

## 2020-02-14 DIAGNOSIS — J10.1 INFLUENZA DUE TO OTHER IDENTIFIED INFLUENZA VIRUS WITH OTHER RESPIRATORY MANIFESTATIONS: ICD-10-CM

## 2020-02-14 DIAGNOSIS — E87.1 HYPO-OSMOLALITY AND HYPONATREMIA: ICD-10-CM

## 2020-02-14 LAB
ALBUMIN SERPL ELPH-MCNC: 2.7 G/DL — LOW (ref 3.5–5)
ALBUMIN SERPL ELPH-MCNC: 2.8 G/DL — LOW (ref 3.5–5)
ALP SERPL-CCNC: 53 U/L — SIGNIFICANT CHANGE UP (ref 40–120)
ALP SERPL-CCNC: 56 U/L — SIGNIFICANT CHANGE UP (ref 40–120)
ALT FLD-CCNC: 13 U/L DA — SIGNIFICANT CHANGE UP (ref 10–60)
ALT FLD-CCNC: 15 U/L DA — SIGNIFICANT CHANGE UP (ref 10–60)
ANION GAP SERPL CALC-SCNC: 4 MMOL/L — LOW (ref 5–17)
ANION GAP SERPL CALC-SCNC: 5 MMOL/L — SIGNIFICANT CHANGE UP (ref 5–17)
ANION GAP SERPL CALC-SCNC: 6 MMOL/L — SIGNIFICANT CHANGE UP (ref 5–17)
AST SERPL-CCNC: 18 U/L — SIGNIFICANT CHANGE UP (ref 10–40)
AST SERPL-CCNC: 22 U/L — SIGNIFICANT CHANGE UP (ref 10–40)
BASOPHILS # BLD AUTO: 0.02 K/UL — SIGNIFICANT CHANGE UP (ref 0–0.2)
BASOPHILS NFR BLD AUTO: 0.2 % — SIGNIFICANT CHANGE UP (ref 0–2)
BILIRUB SERPL-MCNC: 0.3 MG/DL — SIGNIFICANT CHANGE UP (ref 0.2–1.2)
BILIRUB SERPL-MCNC: 0.3 MG/DL — SIGNIFICANT CHANGE UP (ref 0.2–1.2)
BUN SERPL-MCNC: 16 MG/DL — SIGNIFICANT CHANGE UP (ref 7–18)
BUN SERPL-MCNC: 18 MG/DL — SIGNIFICANT CHANGE UP (ref 7–18)
BUN SERPL-MCNC: 20 MG/DL — HIGH (ref 7–18)
CALCIUM SERPL-MCNC: 8.4 MG/DL — SIGNIFICANT CHANGE UP (ref 8.4–10.5)
CALCIUM SERPL-MCNC: 8.5 MG/DL — SIGNIFICANT CHANGE UP (ref 8.4–10.5)
CALCIUM SERPL-MCNC: 8.8 MG/DL — SIGNIFICANT CHANGE UP (ref 8.4–10.5)
CHLORIDE SERPL-SCNC: 97 MMOL/L — SIGNIFICANT CHANGE UP (ref 96–108)
CHLORIDE SERPL-SCNC: 98 MMOL/L — SIGNIFICANT CHANGE UP (ref 96–108)
CHLORIDE SERPL-SCNC: 99 MMOL/L — SIGNIFICANT CHANGE UP (ref 96–108)
CHOLEST SERPL-MCNC: 185 MG/DL — SIGNIFICANT CHANGE UP (ref 10–199)
CO2 SERPL-SCNC: 27 MMOL/L — SIGNIFICANT CHANGE UP (ref 22–31)
CO2 SERPL-SCNC: 27 MMOL/L — SIGNIFICANT CHANGE UP (ref 22–31)
CO2 SERPL-SCNC: 31 MMOL/L — SIGNIFICANT CHANGE UP (ref 22–31)
CREAT SERPL-MCNC: 1.38 MG/DL — HIGH (ref 0.5–1.3)
CREAT SERPL-MCNC: 1.57 MG/DL — HIGH (ref 0.5–1.3)
CREAT SERPL-MCNC: 1.6 MG/DL — HIGH (ref 0.5–1.3)
EOSINOPHIL # BLD AUTO: 0.17 K/UL — SIGNIFICANT CHANGE UP (ref 0–0.5)
EOSINOPHIL NFR BLD AUTO: 1.7 % — SIGNIFICANT CHANGE UP (ref 0–6)
GLUCOSE BLDC GLUCOMTR-MCNC: 102 MG/DL — HIGH (ref 70–99)
GLUCOSE BLDC GLUCOMTR-MCNC: 163 MG/DL — HIGH (ref 70–99)
GLUCOSE SERPL-MCNC: 203 MG/DL — HIGH (ref 70–99)
GLUCOSE SERPL-MCNC: 86 MG/DL — SIGNIFICANT CHANGE UP (ref 70–99)
GLUCOSE SERPL-MCNC: 88 MG/DL — SIGNIFICANT CHANGE UP (ref 70–99)
HBA1C BLD-MCNC: 6.2 % — HIGH (ref 4–5.6)
HCT VFR BLD CALC: 34.8 % — SIGNIFICANT CHANGE UP (ref 34.5–45)
HDLC SERPL-MCNC: 84 MG/DL — SIGNIFICANT CHANGE UP
HGB BLD-MCNC: 10.4 G/DL — LOW (ref 11.5–15.5)
IMM GRANULOCYTES NFR BLD AUTO: 0.3 % — SIGNIFICANT CHANGE UP (ref 0–1.5)
LACTATE SERPL-SCNC: 0.7 MMOL/L — SIGNIFICANT CHANGE UP (ref 0.7–2)
LIPID PNL WITH DIRECT LDL SERPL: 83 MG/DL — SIGNIFICANT CHANGE UP
LYMPHOCYTES # BLD AUTO: 1.27 K/UL — SIGNIFICANT CHANGE UP (ref 1–3.3)
LYMPHOCYTES # BLD AUTO: 12.5 % — LOW (ref 13–44)
MAGNESIUM SERPL-MCNC: 1.6 MG/DL — SIGNIFICANT CHANGE UP (ref 1.6–2.6)
MAGNESIUM SERPL-MCNC: 1.9 MG/DL — SIGNIFICANT CHANGE UP (ref 1.6–2.6)
MCHC RBC-ENTMCNC: 27.7 PG — SIGNIFICANT CHANGE UP (ref 27–34)
MCHC RBC-ENTMCNC: 29.9 GM/DL — LOW (ref 32–36)
MCV RBC AUTO: 92.6 FL — SIGNIFICANT CHANGE UP (ref 80–100)
MONOCYTES # BLD AUTO: 1.08 K/UL — HIGH (ref 0–0.9)
MONOCYTES NFR BLD AUTO: 10.6 % — SIGNIFICANT CHANGE UP (ref 2–14)
NEUTROPHILS # BLD AUTO: 7.62 K/UL — HIGH (ref 1.8–7.4)
NEUTROPHILS NFR BLD AUTO: 74.7 % — SIGNIFICANT CHANGE UP (ref 43–77)
NRBC # BLD: 0 /100 WBCS — SIGNIFICANT CHANGE UP (ref 0–0)
PHOSPHATE SERPL-MCNC: 3.2 MG/DL — SIGNIFICANT CHANGE UP (ref 2.5–4.5)
PHOSPHATE SERPL-MCNC: 4.6 MG/DL — HIGH (ref 2.5–4.5)
PLATELET # BLD AUTO: 186 K/UL — SIGNIFICANT CHANGE UP (ref 150–400)
POTASSIUM SERPL-MCNC: 4.3 MMOL/L — SIGNIFICANT CHANGE UP (ref 3.5–5.3)
POTASSIUM SERPL-MCNC: 4.3 MMOL/L — SIGNIFICANT CHANGE UP (ref 3.5–5.3)
POTASSIUM SERPL-MCNC: 4.6 MMOL/L — SIGNIFICANT CHANGE UP (ref 3.5–5.3)
POTASSIUM SERPL-SCNC: 4.3 MMOL/L — SIGNIFICANT CHANGE UP (ref 3.5–5.3)
POTASSIUM SERPL-SCNC: 4.3 MMOL/L — SIGNIFICANT CHANGE UP (ref 3.5–5.3)
POTASSIUM SERPL-SCNC: 4.6 MMOL/L — SIGNIFICANT CHANGE UP (ref 3.5–5.3)
PROT SERPL-MCNC: 6.4 G/DL — SIGNIFICANT CHANGE UP (ref 6–8.3)
PROT SERPL-MCNC: 6.7 G/DL — SIGNIFICANT CHANGE UP (ref 6–8.3)
RBC # BLD: 3.76 M/UL — LOW (ref 3.8–5.2)
RBC # FLD: 14.5 % — SIGNIFICANT CHANGE UP (ref 10.3–14.5)
SODIUM SERPL-SCNC: 130 MMOL/L — LOW (ref 135–145)
SODIUM SERPL-SCNC: 131 MMOL/L — LOW (ref 135–145)
SODIUM SERPL-SCNC: 133 MMOL/L — LOW (ref 135–145)
TOTAL CHOLESTEROL/HDL RATIO MEASUREMENT: 2.2 RATIO — LOW (ref 3.3–7.1)
TRIGL SERPL-MCNC: 91 MG/DL — SIGNIFICANT CHANGE UP (ref 10–149)
TSH SERPL-MCNC: 0.2 UU/ML — LOW (ref 0.34–4.82)
VIT B12 SERPL-MCNC: 806 PG/ML — SIGNIFICANT CHANGE UP (ref 232–1245)
WBC # BLD: 10.19 K/UL — SIGNIFICANT CHANGE UP (ref 3.8–10.5)
WBC # FLD AUTO: 10.19 K/UL — SIGNIFICANT CHANGE UP (ref 3.8–10.5)

## 2020-02-14 RX ORDER — FOLIC ACID 0.8 MG
1 TABLET ORAL DAILY
Refills: 0 | Status: DISCONTINUED | OUTPATIENT
Start: 2020-02-14 | End: 2020-02-19

## 2020-02-14 RX ORDER — SODIUM CHLORIDE 9 MG/ML
1000 INJECTION INTRAMUSCULAR; INTRAVENOUS; SUBCUTANEOUS
Refills: 0 | Status: DISCONTINUED | OUTPATIENT
Start: 2020-02-14 | End: 2020-02-19

## 2020-02-14 RX ORDER — DEXTROSE 50 % IN WATER 50 %
25 SYRINGE (ML) INTRAVENOUS ONCE
Refills: 0 | Status: DISCONTINUED | OUTPATIENT
Start: 2020-02-14 | End: 2020-02-16

## 2020-02-14 RX ORDER — SODIUM CHLORIDE 9 MG/ML
1000 INJECTION, SOLUTION INTRAVENOUS
Refills: 0 | Status: DISCONTINUED | OUTPATIENT
Start: 2020-02-14 | End: 2020-02-14

## 2020-02-14 RX ORDER — ASPIRIN/CALCIUM CARB/MAGNESIUM 324 MG
81 TABLET ORAL DAILY
Refills: 0 | Status: DISCONTINUED | OUTPATIENT
Start: 2020-02-14 | End: 2020-02-19

## 2020-02-14 RX ORDER — PANTOPRAZOLE SODIUM 20 MG/1
40 TABLET, DELAYED RELEASE ORAL
Refills: 0 | Status: DISCONTINUED | OUTPATIENT
Start: 2020-02-14 | End: 2020-02-19

## 2020-02-14 RX ORDER — CEFTRIAXONE 500 MG/1
1000 INJECTION, POWDER, FOR SOLUTION INTRAMUSCULAR; INTRAVENOUS EVERY 24 HOURS
Refills: 0 | Status: DISCONTINUED | OUTPATIENT
Start: 2020-02-14 | End: 2020-02-17

## 2020-02-14 RX ORDER — SODIUM CHLORIDE 9 MG/ML
1000 INJECTION INTRAMUSCULAR; INTRAVENOUS; SUBCUTANEOUS
Refills: 0 | Status: DISCONTINUED | OUTPATIENT
Start: 2020-02-14 | End: 2020-02-14

## 2020-02-14 RX ORDER — AZITHROMYCIN 500 MG/1
500 TABLET, FILM COATED ORAL EVERY 24 HOURS
Refills: 0 | Status: DISCONTINUED | OUTPATIENT
Start: 2020-02-14 | End: 2020-02-17

## 2020-02-14 RX ORDER — SODIUM CHLORIDE 9 MG/ML
1000 INJECTION, SOLUTION INTRAVENOUS
Refills: 0 | Status: DISCONTINUED | OUTPATIENT
Start: 2020-02-14 | End: 2020-02-16

## 2020-02-14 RX ORDER — GLUCAGON INJECTION, SOLUTION 0.5 MG/.1ML
1 INJECTION, SOLUTION SUBCUTANEOUS ONCE
Refills: 0 | Status: DISCONTINUED | OUTPATIENT
Start: 2020-02-14 | End: 2020-02-16

## 2020-02-14 RX ORDER — APIXABAN 2.5 MG/1
5 TABLET, FILM COATED ORAL EVERY 12 HOURS
Refills: 0 | Status: DISCONTINUED | OUTPATIENT
Start: 2020-02-14 | End: 2020-02-19

## 2020-02-14 RX ORDER — ACETAMINOPHEN 500 MG
1000 TABLET ORAL ONCE
Refills: 0 | Status: COMPLETED | OUTPATIENT
Start: 2020-02-14 | End: 2020-02-14

## 2020-02-14 RX ORDER — DEXTROSE 50 % IN WATER 50 %
12.5 SYRINGE (ML) INTRAVENOUS ONCE
Refills: 0 | Status: DISCONTINUED | OUTPATIENT
Start: 2020-02-14 | End: 2020-02-16

## 2020-02-14 RX ORDER — DEXTROSE 50 % IN WATER 50 %
15 SYRINGE (ML) INTRAVENOUS ONCE
Refills: 0 | Status: DISCONTINUED | OUTPATIENT
Start: 2020-02-14 | End: 2020-02-16

## 2020-02-14 RX ADMIN — Medication 3 MILLILITER(S): at 09:35

## 2020-02-14 RX ADMIN — Medication 1000 MILLIGRAM(S): at 01:57

## 2020-02-14 RX ADMIN — Medication 81 MILLIGRAM(S): at 12:13

## 2020-02-14 RX ADMIN — Medication 3 MILLILITER(S): at 04:24

## 2020-02-14 RX ADMIN — APIXABAN 5 MILLIGRAM(S): 2.5 TABLET, FILM COATED ORAL at 06:08

## 2020-02-14 RX ADMIN — CEFTRIAXONE 100 MILLIGRAM(S): 500 INJECTION, POWDER, FOR SOLUTION INTRAMUSCULAR; INTRAVENOUS at 00:46

## 2020-02-14 RX ADMIN — Medication 75 MILLIGRAM(S): at 06:08

## 2020-02-14 RX ADMIN — AZITHROMYCIN 255 MILLIGRAM(S): 500 TABLET, FILM COATED ORAL at 00:46

## 2020-02-14 RX ADMIN — APIXABAN 5 MILLIGRAM(S): 2.5 TABLET, FILM COATED ORAL at 18:38

## 2020-02-14 RX ADMIN — CEFTRIAXONE 100 MILLIGRAM(S): 500 INJECTION, POWDER, FOR SOLUTION INTRAMUSCULAR; INTRAVENOUS at 23:44

## 2020-02-14 RX ADMIN — Medication 75 MILLIGRAM(S): at 18:03

## 2020-02-14 RX ADMIN — Medication 1 MILLIGRAM(S): at 12:13

## 2020-02-14 RX ADMIN — PANTOPRAZOLE SODIUM 40 MILLIGRAM(S): 20 TABLET, DELAYED RELEASE ORAL at 06:07

## 2020-02-14 RX ADMIN — Medication 400 MILLIGRAM(S): at 00:46

## 2020-02-14 RX ADMIN — SODIUM CHLORIDE 75 MILLILITER(S): 9 INJECTION INTRAMUSCULAR; INTRAVENOUS; SUBCUTANEOUS at 23:43

## 2020-02-14 RX ADMIN — SODIUM CHLORIDE 75 MILLILITER(S): 9 INJECTION, SOLUTION INTRAVENOUS at 15:13

## 2020-02-14 RX ADMIN — Medication 650 MILLIGRAM(S): at 01:57

## 2020-02-14 RX ADMIN — Medication 650 MILLIGRAM(S): at 22:52

## 2020-02-14 NOTE — PROGRESS NOTE ADULT - SUBJECTIVE AND OBJECTIVE BOX
Chart reviewed.  Patient seen and examined.    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, COPD with chronic respiratory failure on home O2 presented to ED from [facility] with c/o a 2 day hx of shortness of breath and flu-like symptoms In ED Tmax 102.4 (oral), , WBC normal, Found to be FLU A positive     Subjective/ROS: feels "weak"; denies CP/palpitation/SOB/HA/dizziness/abd pain/n/v/d/f/c    MEDICATIONS  (STANDING):  albuterol/ipratropium for Nebulization 3 milliLiter(s) Nebulizer every 6 hours  apixaban 5 milliGRAM(s) Oral every 12 hours  aspirin enteric coated 81 milliGRAM(s) Oral daily  azithromycin  IVPB 500 milliGRAM(s) IV Intermittent every 24 hours  cefTRIAXone   IVPB 1000 milliGRAM(s) IV Intermittent every 24 hours  folic acid 1 milliGRAM(s) Oral daily  oseltamivir 75 milliGRAM(s) Oral two times a day  pantoprazole    Tablet 40 milliGRAM(s) Oral before breakfast  sodium chloride 0.9%. 1000 milliLiter(s) (75 mL/Hr) IV Continuous <Continuous>    MEDICATIONS  (PRN):  acetaminophen   Tablet .. 650 milliGRAM(s) Oral every 6 hours PRN Temp greater or equal to 38C (100.4F)    VITALS:  Vital Signs Last 24 Hrs  T(C): 36.4 (14 Feb 2020 13:05), Max: 39.4 (13 Feb 2020 21:47)  T(F): 97.5 (14 Feb 2020 13:05), Max: 102.9 (13 Feb 2020 21:47)  HR: 100 (14 Feb 2020 13:05) (55 - 106)  BP: 125/68 (14 Feb 2020 13:05) (97/69 - 151/54)  BP(mean): --  RR: 19 (14 Feb 2020 13:05) (17 - 19)  SpO2: 97% (14 Feb 2020 13:05) (96% - 100%)    PHYSICAL EXAM:    GENERAL: frail elderly, NAD    HEENT:  pupils equal and reactive, EOMI, no oropharyngeal lesions, erythema, exudates, oral thrush    NECK:   supple, no carotid bruits, no palpable lymph nodes, no thyromegaly    CV:  +S1, +S2, regular, no murmurs or rubs    RESP: decrease breath sound bibasilar, no wheezing, no rales bilaterally    BREAST:  not examined    GI:  abdomen soft, non-tender, non-distended, normal BS, no bruits, no abdominal masses, no palpable masses    RECTAL:  not examined    :  not examined    MSK:   normal muscle tone, no atrophy, no rigidity, no contractions    EXT:   no clubbing, no cyanosis, no edema, no calf pain, swelling or erythema    VASCULAR:  pulses equal and symmetric in the upper and lower extremities    NEURO:  AAOX3, no focal neurological deficits, follows all commands, able to move extremities spontaneously    SKIN:  no ulcers, lesions or rashes      LABS:                        10.4   10.19 )-----------( 186      ( 14 Feb 2020 06:59 )             34.8     02-14    130<L>  |  97  |  18  ----------------------------<  86  4.3   |  27  |  1.57<H>    Ca    8.8      14 Feb 2020 06:59  Phos  4.6     02-14  Mg     1.9     02-14    TPro  6.7  /  Alb  2.8<L>  /  TBili  0.3  /  DBili  x   /  AST  22  /  ALT  15  /  AlkPhos  56  02-14    CARDIAC MARKERS ( 13 Feb 2020 18:28 )  <0.015 ng/mL / x     / x     / x     / x        LIVER FUNCTIONS - ( 14 Feb 2020 06:59 )  Alb: 2.8 g/dL / Pro: 6.7 g/dL / ALK PHOS: 56 U/L / ALT: 15 U/L DA / AST: 22 U/L / GGT: x           PT/INR - ( 13 Feb 2020 18:28 )   PT: 17.9 sec;   INR: 1.59 ratio       PTT - ( 13 Feb 2020 18:28 )  PTT:51.2 sec    Lactate, Blood: 0.7 mmol/L (02-14 @ 00:58)    PERTINENT DIAGNOSTICS  < from: Xray Chest 1 View- PORTABLE-Urgent (02.13.20 @ 20:40) >  INTERPRETATION:  AP semierect chest on February 13, 2020 at 8:15 PM. Patient has productive cough.    Heart may be enlarged.    On January 20 of thisyear there were is significant irregular infiltrates in both lower lung fields. The infiltration was at the left base and off the right hilum.    These infiltrates are again noted on the present study. Apparent differences may be secondary to limitedinspiration on the present exam.    IMPRESSION: Unchanged bilateral infiltrates as above.    < end of copied text >

## 2020-02-14 NOTE — PROGRESS NOTE ADULT - PROBLEM SELECTOR PLAN 8
BP uptrending in the setting of home dose BB/ACE-i on hold  resume metoprolol gradually 12.5mg bid (home dose is 25mg bid)  resume ace-i when/if renal function allows  Vitals per routine

## 2020-02-14 NOTE — PROGRESS NOTE ADULT - PROBLEM SELECTOR PLAN 7
chronic afib - rate controlled  [id number] Score = 5 (age-2, HTN-1, vasc (Cad) -1, female-1)  con't eliquis / BB

## 2020-02-14 NOTE — PROGRESS NOTE ADULT - PROBLEM SELECTOR PLAN 4
blood sugar 47 on arrival to ED likely d/t poor po intake  s/p D50 in ED  monitor fingersticks bid  pt has DM well controlled  A1c 6.2 %  will ck fingerstick AC& HS blood sugar 47 on arrival to ED likely d/t poor po intake  s/p D50 in ED  monitor fingersticks bid   DM well controlled  A1c 6.2 %  will ck fingerstick AC& HS  TSH borderline low - likely sick thyroid  f/u thyroid panel in am

## 2020-02-14 NOTE — PATIENT PROFILE ADULT - NSPROEDAREADYLEARN_GEN_A_NUR
Health Maintenance Summary     Topic Due On Due Status Completed On    Colorectal Cancer Screening - Colonoscopy Apr 19, 2022 Not Due Apr 19, 2017    Immunization - Pneumococcal  Completed Aug 28, 2017    Diabetes Eye Exam Dec 6, 2018 Not Due Dec 6, 2017    Glycohemoglobin A1C  (Diabetes Sugar)  Aug 19, 2018 Not Due Feb 19, 2018    GFR  (Kidney Function Test)  Feb 19, 2019 Not Due Feb 19, 2018    Diabetes Foot Exam  Feb 19, 2019 Not Due Feb 19, 2018    Medicare Wellness Visit Feb 19, 2019 Not Due Feb 19, 2018    IMMUNIZATION - DTaP/Tdap/Td Feb 20, 2018 Overdue Feb 19, 2018    Immunization-Influenza  Completed Dec 7, 2017    Depression Screening Feb 13, 2018 Overdue Feb 13, 2017    Hepatitis C Screening  Completed Dec 14, 2017          Patient is up to date, no discussion needed .             fatigue

## 2020-02-14 NOTE — PROGRESS NOTE ADULT - PROBLEM SELECTOR PLAN 2
with SPRING likely 2/2  poor po intake   SNa 130; Sglu 86   f/u urine osmolarity Cr urine , Pr/Cr ratio , Urine lytes , Sodium urine , total protein   f/u Renal and bladder US (ordered on admission)  f/u PM BMP today

## 2020-02-15 LAB
ANION GAP SERPL CALC-SCNC: 6 MMOL/L — SIGNIFICANT CHANGE UP (ref 5–17)
APPEARANCE UR: CLEAR — SIGNIFICANT CHANGE UP
BACTERIA # UR AUTO: ABNORMAL /HPF
BILIRUB UR-MCNC: NEGATIVE — SIGNIFICANT CHANGE UP
BUN SERPL-MCNC: 21 MG/DL — HIGH (ref 7–18)
CALCIUM SERPL-MCNC: 8.7 MG/DL — SIGNIFICANT CHANGE UP (ref 8.4–10.5)
CHLORIDE SERPL-SCNC: 98 MMOL/L — SIGNIFICANT CHANGE UP (ref 96–108)
CO2 SERPL-SCNC: 28 MMOL/L — SIGNIFICANT CHANGE UP (ref 22–31)
COLOR SPEC: YELLOW — SIGNIFICANT CHANGE UP
COMMENT - URINE: SIGNIFICANT CHANGE UP
COMMENT - URINE: SIGNIFICANT CHANGE UP
CREAT SERPL-MCNC: 1.28 MG/DL — SIGNIFICANT CHANGE UP (ref 0.5–1.3)
DIFF PNL FLD: ABNORMAL
EPI CELLS # UR: ABNORMAL /HPF
GLUCOSE BLDC GLUCOMTR-MCNC: 108 MG/DL — HIGH (ref 70–99)
GLUCOSE BLDC GLUCOMTR-MCNC: 117 MG/DL — HIGH (ref 70–99)
GLUCOSE BLDC GLUCOMTR-MCNC: 117 MG/DL — HIGH (ref 70–99)
GLUCOSE BLDC GLUCOMTR-MCNC: 162 MG/DL — HIGH (ref 70–99)
GLUCOSE SERPL-MCNC: 116 MG/DL — HIGH (ref 70–99)
GLUCOSE UR QL: NEGATIVE — SIGNIFICANT CHANGE UP
HCT VFR BLD CALC: 30 % — LOW (ref 34.5–45)
HGB BLD-MCNC: 9.2 G/DL — LOW (ref 11.5–15.5)
KETONES UR-MCNC: NEGATIVE — SIGNIFICANT CHANGE UP
LEUKOCYTE ESTERASE UR-ACNC: ABNORMAL
MCHC RBC-ENTMCNC: 28 PG — SIGNIFICANT CHANGE UP (ref 27–34)
MCHC RBC-ENTMCNC: 30.7 GM/DL — LOW (ref 32–36)
MCV RBC AUTO: 91.5 FL — SIGNIFICANT CHANGE UP (ref 80–100)
NITRITE UR-MCNC: NEGATIVE — SIGNIFICANT CHANGE UP
NRBC # BLD: 0 /100 WBCS — SIGNIFICANT CHANGE UP (ref 0–0)
OSMOLALITY UR: 282 MOS/KG — SIGNIFICANT CHANGE UP (ref 50–1200)
PH UR: 5 — SIGNIFICANT CHANGE UP (ref 5–8)
PLATELET # BLD AUTO: 162 K/UL — SIGNIFICANT CHANGE UP (ref 150–400)
POTASSIUM SERPL-MCNC: 4.7 MMOL/L — SIGNIFICANT CHANGE UP (ref 3.5–5.3)
POTASSIUM SERPL-SCNC: 4.7 MMOL/L — SIGNIFICANT CHANGE UP (ref 3.5–5.3)
PROT UR-MCNC: 100
RBC # BLD: 3.28 M/UL — LOW (ref 3.8–5.2)
RBC # FLD: 14.5 % — SIGNIFICANT CHANGE UP (ref 10.3–14.5)
RBC CASTS # UR COMP ASSIST: ABNORMAL /HPF (ref 0–2)
SODIUM SERPL-SCNC: 132 MMOL/L — LOW (ref 135–145)
SODIUM UR-SCNC: 38 MMOL/L — SIGNIFICANT CHANGE UP
SP GR SPEC: 1.02 — SIGNIFICANT CHANGE UP (ref 1.01–1.02)
T3 SERPL-MCNC: 73 NG/DL — LOW (ref 80–200)
T4 AB SER-ACNC: 8.1 UG/DL — SIGNIFICANT CHANGE UP (ref 4.6–12)
TSH SERPL-MCNC: 0.3 UU/ML — LOW (ref 0.34–4.82)
UROBILINOGEN FLD QL: NEGATIVE — SIGNIFICANT CHANGE UP
WBC # BLD: 8.35 K/UL — SIGNIFICANT CHANGE UP (ref 3.8–10.5)
WBC # FLD AUTO: 8.35 K/UL — SIGNIFICANT CHANGE UP (ref 3.8–10.5)
WBC UR QL: ABNORMAL /HPF (ref 0–5)

## 2020-02-15 RX ADMIN — Medication 75 MILLIGRAM(S): at 06:50

## 2020-02-15 RX ADMIN — Medication 3 MILLILITER(S): at 10:10

## 2020-02-15 RX ADMIN — Medication 81 MILLIGRAM(S): at 11:10

## 2020-02-15 RX ADMIN — APIXABAN 5 MILLIGRAM(S): 2.5 TABLET, FILM COATED ORAL at 17:31

## 2020-02-15 RX ADMIN — Medication 3 MILLILITER(S): at 15:25

## 2020-02-15 RX ADMIN — Medication 650 MILLIGRAM(S): at 00:38

## 2020-02-15 RX ADMIN — Medication 1 MILLIGRAM(S): at 11:10

## 2020-02-15 RX ADMIN — APIXABAN 5 MILLIGRAM(S): 2.5 TABLET, FILM COATED ORAL at 06:50

## 2020-02-15 RX ADMIN — PANTOPRAZOLE SODIUM 40 MILLIGRAM(S): 20 TABLET, DELAYED RELEASE ORAL at 06:49

## 2020-02-15 RX ADMIN — Medication 75 MILLIGRAM(S): at 17:31

## 2020-02-15 RX ADMIN — AZITHROMYCIN 255 MILLIGRAM(S): 500 TABLET, FILM COATED ORAL at 00:20

## 2020-02-15 NOTE — PROGRESS NOTE ADULT - SUBJECTIVE AND OBJECTIVE BOX
Patient is a 74y old  Female who presents with a chief complaint of   PATIENT IS SEEN AND EXAMINED IN MEDICAL FLOOR.    ALLERGIES:  No Known Allergies        VITALS:    Vital Signs Last 24 Hrs  T(C): 37.7 (15 Feb 2020 21:26), Max: 37.7 (15 Feb 2020 21:26)  T(F): 99.9 (15 Feb 2020 21:26), Max: 99.9 (15 Feb 2020 21:26)  HR: 105 (15 Feb 2020 21:26) (84 - 108)  BP: 136/59 (15 Feb 2020 21:26) (101/36 - 160/65)  BP(mean): --  RR: 18 (15 Feb 2020 21:26) (17 - 18)  SpO2: 95% (15 Feb 2020 21:26) (94% - 98%)    LABS:    CBC Full  -  ( 15 Feb 2020 07:25 )  WBC Count : 8.35 K/uL  RBC Count : 3.28 M/uL  Hemoglobin : 9.2 g/dL  Hematocrit : 30.0 %  Platelet Count - Automated : 162 K/uL  Mean Cell Volume : 91.5 fl  Mean Cell Hemoglobin : 28.0 pg  Mean Cell Hemoglobin Concentration : 30.7 gm/dL  Auto Neutrophil # : x  Auto Lymphocyte # : x  Auto Monocyte # : x  Auto Eosinophil # : x  Auto Basophil # : x  Auto Neutrophil % : x  Auto Lymphocyte % : x  Auto Monocyte % : x  Auto Eosinophil % : x  Auto Basophil % : x      02-15    132<L>  |  98  |  21<H>  ----------------------------<  116<H>  4.7   |  28  |  1.28    Ca    8.7      15 Feb 2020 07:25  Phos  4.6     02-14  Mg     1.9     02-14    TPro  6.7  /  Alb  2.8<L>  /  TBili  0.3  /  DBili  x   /  AST  22  /  ALT  15  /  AlkPhos  56  02-14    CAPILLARY BLOOD GLUCOSE      POCT Blood Glucose.: 117 mg/dL (15 Feb 2020 22:10)  POCT Blood Glucose.: 117 mg/dL (15 Feb 2020 17:12)  POCT Blood Glucose.: 162 mg/dL (15 Feb 2020 12:02)  POCT Blood Glucose.: 108 mg/dL (15 Feb 2020 07:40)        LIVER FUNCTIONS - ( 14 Feb 2020 06:59 )  Alb: 2.8 g/dL / Pro: 6.7 g/dL / ALK PHOS: 56 U/L / ALT: 15 U/L DA / AST: 22 U/L / GGT: x           Creatinine Trend: 1.28<--, 1.60<--, 1.57<--, 1.38<--, 1.56<--, 1.13<--  I&O's Summary          .Blood  02-14 @ 02:16   No growth to date.  --  --      .Blood  02-14 @ 02:09   No growth to date.  --  --      .Blood  01-29 @ 01:40   No growth at 5 days.  --  --      .Urine  01-29 @ 01:39   <10,000 CFU/mL Normal Urogenital Candace  --  --          MEDICATIONS:    MEDICATIONS  (STANDING):  albuterol/ipratropium for Nebulization 3 milliLiter(s) Nebulizer every 6 hours  apixaban 5 milliGRAM(s) Oral every 12 hours  aspirin enteric coated 81 milliGRAM(s) Oral daily  azithromycin  IVPB 500 milliGRAM(s) IV Intermittent every 24 hours  cefTRIAXone   IVPB 1000 milliGRAM(s) IV Intermittent every 24 hours  dextrose 5%. 1000 milliLiter(s) (50 mL/Hr) IV Continuous <Continuous>  dextrose 50% Injectable 12.5 Gram(s) IV Push once  dextrose 50% Injectable 25 Gram(s) IV Push once  dextrose 50% Injectable 25 Gram(s) IV Push once  folic acid 1 milliGRAM(s) Oral daily  oseltamivir 75 milliGRAM(s) Oral two times a day  pantoprazole    Tablet 40 milliGRAM(s) Oral before breakfast  sodium chloride 0.9%. 1000 milliLiter(s) (75 mL/Hr) IV Continuous <Continuous>      MEDICATIONS  (PRN):  acetaminophen   Tablet .. 650 milliGRAM(s) Oral every 6 hours PRN Temp greater or equal to 38C (100.4F)  dextrose 40% Gel 15 Gram(s) Oral once PRN Blood Glucose LESS THAN 70 milliGRAM(s)/deciliter  glucagon  Injectable 1 milliGRAM(s) IntraMuscular once PRN Glucose LESS THAN 70 milligrams/deciliter      REVIEW OF SYSTEMS:                           ALL ROS DONE [ X   ]    CONSTITUTIONAL:  LETHARGIC [   ], FEVER [   ], UNRESPONSIVE [   ]  CVS:  CP  [   ], SOB, [   ], PALPITATIONS [   ], DIZZYNESS [   ]  RS: COUGH [   ], SPUTUM [   ]  GI: ABDOMINAL PAIN [   ], NAUSEA [   ], VOMITINGS [   ], DIARRHEA [   ], CONSTIPATION [   ]  :  DYSURIA [   ], NOCTURIA [   ], INCREASED FREQUENCY [   ], DRIBLING [   ],  SKELETAL: PAINFUL JOINTS [   ], SWOLLEN JOINTS [   ], NECK ACHE [   ], LOW BACK ACHE [   ],  SKIN : ULCERS [   ], RASH [   ], ITCHING [   ]  CNS: HEAD ACHE [   ], DOUBLE VISION [   ], BLURRED VISION [   ], AMS / CONFUSION [   ], SEIZURES [   ], WEAKNESS [   ],TINGLING / NUMBNESS [   ]    PHYSICAL EXAMINATION:  GENERAL APPEARANCE: NO DISTRESS  HEENT:  NO PALLOR, NO  JVD,  NO   NODES, NECK SUPPLE  CVS: S1 +, S2 +,   RS: AEEB,  OCCASIONAL  RALES +,   B/L RONCHI, BRONCHIAL BS IN RIGHT LOWER LUNG FIELDS +  ABD: SOFT, NT, NO, BS +  EXT: NO PE  SKIN: WARM,   SKELETAL:  ROM ACCEPTABLE  CNS:  AAO X  2-3  ,NO   DEFICITS    RADIOLOGY :    < from: Xray Chest 1 View- PORTABLE-Urgent (02.13.20 @ 20:40) >  INTERPRETATION:  AP semierect chest on February 13, 2020 at 8:15 PM. Patient has productive cough.    Heart may be enlarged.    On January 20 of thisyear there were is significant irregular infiltrates in both lower lung fields. The infiltration was at the left base and off the right hilum.    These infiltrates are again noted on the present study. Apparent differences may be secondary to limitedinspiration on the present exam.    IMPRESSION: Unchanged bilateral infiltrates as above.    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 Patient reports some chills, subjective fever, nausea, generalized abdominal pain, and a productive cough. Patient's abdominal pain is poorly characterized, although patient notes that the pain is intermittent and non-radiating. Denies any urinary or bowel complaint, vomiting, chest pain.  She was admitted here on Jan 2020 for pneumonia and completed antibiotic course. (13 Feb 2020 21:41)    Problem/Plan - 1:  ·  Problem: Sepsis.  Plan: INFLUENZA A, URTI, ACUTE BRONCHITIS, B/L POST OBSTRUCTIVE PNEUMONIA, SUSPECT GRAM NEGATIVE PNEUMONIA, LUNG CANCER ON CHEMOTHERAPY     CXR: unchanged infiltrate from prior  con't tamiflu  rocephin and azithro in the setting of sepsis   follow blood culture.     Problem/Plan - 2:  ·  Problem: Hyponatremia.  Plan: with SPRING likely 2/2  poor po intake   SNa 130; Sglu 86   f/u urine osmolarity Cr urine , Pr/Cr ratio , Urine lytes , Sodium urine , total protein   f/u Renal and bladder US (ordered on admission)  f/u PM BMP today.     Problem/Plan - 3:  ·  Problem: SPRING (acute kidney injury).  Plan: SPRING likely pre-renal azotemia d/t dehydration 2/2 inadequate po intake,   con't to hold ACE-I  IVF  monitor BMP  f/u UA.     Problem/Plan - 4:  ·  Problem: Hypoglycemia.  Plan: blood sugar 47 on arrival to ED likely d/t poor po intake  s/p D50 in ED  monitor fingersticks bid   DM well controlled  A1c 6.2 %  will ck fingerstick AC& HS  TSH borderline low - likely sick thyroid  f/u thyroid panel in am.     Problem/Plan - 5:  ·  Problem: COPD (chronic obstructive pulmonary disease).  Plan: chronic COPD  c/w Duoneb nebs.     Problem/Plan - 6:  Problem: Bronchoalveolar carcinoma. Plan: s/p wedge resection on Tagrisso.  held on admission due to infection.    Problem/Plan - 7:  ·  Problem: Atrial fibrillation.  Plan: chronic afib - rate controlled  [id number] Score = 5 (age-2, HTN-1, vasc (Cad) -1, female-1)  con't eliquis / BB.     Problem/Plan - 8:  ·  Problem: HTN (hypertension).  Plan: BP uptrending in the setting of home dose BB/ACE-i on hold  resume metoprolol gradually 12.5mg bid (home dose is 25mg bid)  resume ace-i when/if renal function allows  Vitals per routine.     Problem/Plan - 9:  ·  Problem: CAD (coronary artery disease).  Plan: chest pain free  con't asa / BB.     Problem/Plan - 10:  Problem: Prophylactic measure. Plan; IMPROVE VTE Individual Risk Assessment  RISK                                                          Points  [] Previous VTE                                           3  [] Thrombophilia                                        2  [] Lower limb paralysis                              2   [] Current Cancer                                       2   [] Immobilization > 24 hrs                        1  [] ICU/CCU stay > 24 hours                       1  [] Age > 60                                                   1  IMPROVE VTE Score = 4  pt on eliquis.    DR. BENTON

## 2020-02-16 DIAGNOSIS — N39.0 URINARY TRACT INFECTION, SITE NOT SPECIFIED: ICD-10-CM

## 2020-02-16 DIAGNOSIS — J09.X1 INFLUENZA DUE TO IDENTIFIED NOVEL INFLUENZA A VIRUS WITH PNEUMONIA: ICD-10-CM

## 2020-02-16 LAB
ANION GAP SERPL CALC-SCNC: 5 MMOL/L — SIGNIFICANT CHANGE UP (ref 5–17)
BUN SERPL-MCNC: 15 MG/DL — SIGNIFICANT CHANGE UP (ref 7–18)
CALCIUM SERPL-MCNC: 8.6 MG/DL — SIGNIFICANT CHANGE UP (ref 8.4–10.5)
CHLORIDE SERPL-SCNC: 101 MMOL/L — SIGNIFICANT CHANGE UP (ref 96–108)
CHLORIDE UR-SCNC: 73 MMOL/L — SIGNIFICANT CHANGE UP
CO2 SERPL-SCNC: 27 MMOL/L — SIGNIFICANT CHANGE UP (ref 22–31)
CREAT ?TM UR-MCNC: 58 MG/DL — SIGNIFICANT CHANGE UP
CREAT SERPL-MCNC: 1.12 MG/DL — SIGNIFICANT CHANGE UP (ref 0.5–1.3)
GLUCOSE BLDC GLUCOMTR-MCNC: 109 MG/DL — HIGH (ref 70–99)
GLUCOSE BLDC GLUCOMTR-MCNC: 142 MG/DL — HIGH (ref 70–99)
GLUCOSE BLDC GLUCOMTR-MCNC: 149 MG/DL — HIGH (ref 70–99)
GLUCOSE BLDC GLUCOMTR-MCNC: 175 MG/DL — HIGH (ref 70–99)
GLUCOSE SERPL-MCNC: 112 MG/DL — HIGH (ref 70–99)
HCT VFR BLD CALC: 29.5 % — LOW (ref 34.5–45)
HGB BLD-MCNC: 9 G/DL — LOW (ref 11.5–15.5)
MCHC RBC-ENTMCNC: 27.9 PG — SIGNIFICANT CHANGE UP (ref 27–34)
MCHC RBC-ENTMCNC: 30.5 GM/DL — LOW (ref 32–36)
MCV RBC AUTO: 91.3 FL — SIGNIFICANT CHANGE UP (ref 80–100)
NRBC # BLD: 0 /100 WBCS — SIGNIFICANT CHANGE UP (ref 0–0)
OSMOLALITY UR: 306 MOS/KG — SIGNIFICANT CHANGE UP (ref 50–1200)
PLATELET # BLD AUTO: 162 K/UL — SIGNIFICANT CHANGE UP (ref 150–400)
POTASSIUM SERPL-MCNC: 5 MMOL/L — SIGNIFICANT CHANGE UP (ref 3.5–5.3)
POTASSIUM SERPL-SCNC: 5 MMOL/L — SIGNIFICANT CHANGE UP (ref 3.5–5.3)
PROT ?TM UR-MCNC: 67 MG/DL — HIGH (ref 0–12)
RBC # BLD: 3.23 M/UL — LOW (ref 3.8–5.2)
RBC # FLD: 14.6 % — HIGH (ref 10.3–14.5)
SODIUM SERPL-SCNC: 133 MMOL/L — LOW (ref 135–145)
SODIUM UR-SCNC: 61 MMOL/L — SIGNIFICANT CHANGE UP
WBC # BLD: 5.91 K/UL — SIGNIFICANT CHANGE UP (ref 3.8–10.5)
WBC # FLD AUTO: 5.91 K/UL — SIGNIFICANT CHANGE UP (ref 3.8–10.5)

## 2020-02-16 RX ORDER — ALBUTEROL 90 UG/1
2 AEROSOL, METERED ORAL EVERY 6 HOURS
Refills: 0 | Status: DISCONTINUED | OUTPATIENT
Start: 2020-02-16 | End: 2020-02-19

## 2020-02-16 RX ADMIN — APIXABAN 5 MILLIGRAM(S): 2.5 TABLET, FILM COATED ORAL at 17:11

## 2020-02-16 RX ADMIN — AZITHROMYCIN 255 MILLIGRAM(S): 500 TABLET, FILM COATED ORAL at 00:26

## 2020-02-16 RX ADMIN — Medication 1 MILLIGRAM(S): at 12:28

## 2020-02-16 RX ADMIN — CEFTRIAXONE 100 MILLIGRAM(S): 500 INJECTION, POWDER, FOR SOLUTION INTRAMUSCULAR; INTRAVENOUS at 00:30

## 2020-02-16 RX ADMIN — PANTOPRAZOLE SODIUM 40 MILLIGRAM(S): 20 TABLET, DELAYED RELEASE ORAL at 06:21

## 2020-02-16 RX ADMIN — APIXABAN 5 MILLIGRAM(S): 2.5 TABLET, FILM COATED ORAL at 06:21

## 2020-02-16 RX ADMIN — ALBUTEROL 2 PUFF(S): 90 AEROSOL, METERED ORAL at 15:21

## 2020-02-16 RX ADMIN — Medication 75 MILLIGRAM(S): at 06:21

## 2020-02-16 RX ADMIN — Medication 81 MILLIGRAM(S): at 12:28

## 2020-02-16 RX ADMIN — Medication 75 MILLIGRAM(S): at 17:11

## 2020-02-16 NOTE — PROGRESS NOTE ADULT - SUBJECTIVE AND OBJECTIVE BOX
Patient is a 74y old  Female who presents with a chief complaint of sob (15 Feb 2020 23:47)      INTERVAL HPI/OVERNIGHT EVENTS:    - pt in on NC 2L, feeling "so so"      REVIEW OF SYSTEMS:  CONSTITUTIONAL: No fever, weight loss, or fatigue  EYES: (+) eye itching, No eye pain, visual disturbances, or discharge  ENMT:  No difficulty hearing, tinnitus, vertigo; No sinus or throat pain  NECK: No pain or stiffness  BREASTS: No pain, masses, or nipple discharge  RESPIRATORY: No cough, wheezing, chills or hemoptysis; On NC 2L   CARDIOVASCULAR: No chest pain, palpitations, dizziness, or leg swelling  GASTROINTESTINAL: No abdominal or epigastric pain. No nausea, vomiting, or hematemesis; No diarrhea or constipation. No melena or hematochezia.  GENITOURINARY: No dysuria, frequency, hematuria, or incontinence  NEUROLOGICAL: No headaches, memory loss, loss of strength, numbness, or tremors  SKIN: No itching, burning, rashes, or lesions   LYMPH NODES: No enlarged glands  ENDOCRINE: No heat or cold intolerance; No hair loss  MUSCULOSKELETAL: No joint pain or swelling; No muscle, back, or extremity pain  HEME/LYMPH: No easy bruising, or bleeding gums  ALLERY AND IMMUNOLOGIC: No hives or eczema    FAMILY HISTORY:  Family history of prostate cancer    Vital Signs Last 24 Hrs  T(C): 36.7 (16 Feb 2020 05:45), Max: 37.7 (15 Feb 2020 21:26)  T(F): 98.1 (16 Feb 2020 05:45), Max: 99.9 (15 Feb 2020 21:26)  HR: 90 (16 Feb 2020 05:45) (84 - 105)  BP: 104/44 (16 Feb 2020 05:45) (101/36 - 136/59)  BP(mean): --  RR: 18 (16 Feb 2020 05:45) (18 - 18)  SpO2: 97% (16 Feb 2020 05:45) (95% - 98%)    02-15-20 @ 07:01  -  02-16-20 @ 07:00  --------------------------------------------------------  IN: 0 mL / OUT: 600 mL / NET: -600 mL        PHYSICAL EXAM:  GENERAL: NAD, well-groomed, well-developed  HEAD:  Atraumatic, Normocephalic  EYES: EOMI, PERRLA, conjunctiva and sclera clear  ENMT: No tonsillar erythema, exudates, or enlargement; Moist mucous membranes  NECK: Supple, No JVD, Normal thyroid  NERVOUS SYSTEM:  Alert & Oriented X 2-3; Motor Strength 5/5 B/L upper and lower extremities;  CHEST/LUNG:  No rales, (+) b/l rhonchi, no wheezing, or rubs  HEART: Regular rate and rhythm; No murmurs, rubs, or gallops  ABDOMEN: Soft, (+) diffuse tender on palpation, Nondistended; Bowel sounds present  EXTREMITIES:  2+ Peripheral Pulses, No clubbing, cyanosis, or edema  LYMPH: No lymphadenopathy noted  SKIN: No rashes or lesions    Consultant(s) Notes Reviewed:  [x ] YES  [ ] NO  Care Discussed with Consultants/Other Providers [ x] YES  [ ] NO    LABS:        RADIOLOGY & ADDITIONAL TESTS:  < from: Xray Chest 1 View- PORTABLE-Urgent (02.13.20 @ 20:40) >  Heart may be enlarged.    On January 20 of thisyear there were is significant irregular infiltrates in both lower lung fields. The infiltration was at the left base and off the right hilum.    These infiltrates are again noted on the present study. Apparent differences may be secondary to limitedinspiration on the present exam.    IMPRESSION: Unchanged bilateral infiltrates as above.      < end of copied text >    Imaging Personally Reviewed:  [ ] YES  [ ] NO  acetaminophen   Tablet .. 650 milliGRAM(s) Oral every 6 hours PRN  albuterol/ipratropium for Nebulization 3 milliLiter(s) Nebulizer every 6 hours  apixaban 5 milliGRAM(s) Oral every 12 hours  aspirin enteric coated 81 milliGRAM(s) Oral daily  azithromycin  IVPB 500 milliGRAM(s) IV Intermittent every 24 hours  cefTRIAXone   IVPB 1000 milliGRAM(s) IV Intermittent every 24 hours  folic acid 1 milliGRAM(s) Oral daily  oseltamivir 75 milliGRAM(s) Oral two times a day  pantoprazole    Tablet 40 milliGRAM(s) Oral before breakfast  sodium chloride 0.9%. 1000 milliLiter(s) IV Continuous <Continuous>      HEALTH ISSUES - PROBLEM Dx:  Sepsis: Sepsis  Hypoglycemia: Hypoglycemia  SPRING (acute kidney injury): SPRING (acute kidney injury)  Hyponatremia: Hyponatremia  Influenza A: Influenza A

## 2020-02-16 NOTE — PROGRESS NOTE ADULT - ASSESSMENT
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   Flu A positive, UA positive  Pt was admitted for Flu A, UTI and PNA.

## 2020-02-16 NOTE — PROGRESS NOTE ADULT - PROBLEM SELECTOR PLAN 7
chronic afib - rate controlled  [id number] Score = 5 (age-2, HTN-1, vasc (Cad) -1, female-1)  con't eliquis / BB BP uptrending in the setting of home dose BB/ACE-i on hold  c/w metoprolol gradually 12.5mg bid (home dose is 25mg bid)  will resume lisinopril 5mg when BP is going up  monitor BP

## 2020-02-16 NOTE — PROGRESS NOTE ADULT - PROBLEM SELECTOR PLAN 8
BP uptrending in the setting of home dose BB/ACE-i on hold  resume metoprolol gradually 12.5mg bid (home dose is 25mg bid)  resume ace-i when/if renal function allows  Vitals per routine chest pain free  con't asa / BB

## 2020-02-16 NOTE — PROGRESS NOTE ADULT - PROBLEM SELECTOR PLAN 5
chronic COPD  c/w Duoneb nebs chronic afib - rate controlled  [id number] Score = 5 (age-2, HTN-1, vasc (Cad) -1, female-1)  con't eliquis / BB

## 2020-02-16 NOTE — PROGRESS NOTE ADULT - PROBLEM SELECTOR PLAN 10
IMPROVE VTE Individual Risk Assessment  RISK                                                          Points  [] Previous VTE                                           3  [] Thrombophilia                                        2  [] Lower limb paralysis                              2   [] Current Cancer                                       2   [] Immobilization > 24 hrs                        1  [] ICU/CCU stay > 24 hours                       1  [] Age > 60                                                   1  IMPROVE VTE Score = 4  pt on eliquis
IMPROVE VTE Individual Risk Assessment  RISK                                                          Points  [] Previous VTE                                           3  [] Thrombophilia                                        2  [] Lower limb paralysis                              2   [] Current Cancer                                       2   [] Immobilization > 24 hrs                        1  [] ICU/CCU stay > 24 hours                       1  [] Age > 60                                                   1  IMPROVE VTE Score = 4  pt on eliquis

## 2020-02-16 NOTE — PROGRESS NOTE ADULT - PROBLEM SELECTOR PLAN 9
chest pain free  con't asa / BB IMPROVE VTE Individual Risk Assessment  RISK                                                          Points  [] Previous VTE                                           3  [] Thrombophilia                                        2  [] Lower limb paralysis                              2   [] Current Cancer                                       2   [] Immobilization > 24 hrs                        1  [] ICU/CCU stay > 24 hours                       1  [] Age > 60                                                   1  IMPROVE VTE Score = 4  pt on eliquis

## 2020-02-16 NOTE — PROGRESS NOTE ADULT - PROBLEM SELECTOR PLAN 2
with SPRING likely 2/2  poor po intake   SNa 130; Sglu 86   f/u urine osmolarity Cr urine , Pr/Cr ratio , Urine lytes , Sodium urine , total protein   f/u Renal and bladder US (ordered on admission)  f/u PM BMP today UA : +ve  Pt had SPRING on admission likely pre-renal azotemia d/t dehydration 2/2 inadequate po intake ---resolved  c/w rocephine  f/u US renal  f/u Urine cx  monitor BMP

## 2020-02-17 LAB
ANION GAP SERPL CALC-SCNC: 3 MMOL/L — LOW (ref 5–17)
ANISOCYTOSIS BLD QL: SLIGHT — SIGNIFICANT CHANGE UP
BASOPHILS # BLD AUTO: 0 K/UL — SIGNIFICANT CHANGE UP (ref 0–0.2)
BASOPHILS NFR BLD AUTO: 0 % — SIGNIFICANT CHANGE UP (ref 0–2)
BUN SERPL-MCNC: 10 MG/DL — SIGNIFICANT CHANGE UP (ref 7–18)
CALCIUM SERPL-MCNC: 9.3 MG/DL — SIGNIFICANT CHANGE UP (ref 8.4–10.5)
CHLORIDE SERPL-SCNC: 102 MMOL/L — SIGNIFICANT CHANGE UP (ref 96–108)
CO2 SERPL-SCNC: 32 MMOL/L — HIGH (ref 22–31)
CREAT SERPL-MCNC: 0.84 MG/DL — SIGNIFICANT CHANGE UP (ref 0.5–1.3)
DACRYOCYTES BLD QL SMEAR: SLIGHT — SIGNIFICANT CHANGE UP
EOSINOPHIL # BLD AUTO: 0.05 K/UL — SIGNIFICANT CHANGE UP (ref 0–0.5)
EOSINOPHIL NFR BLD AUTO: 1 % — SIGNIFICANT CHANGE UP (ref 0–6)
FERRITIN SERPL-MCNC: 305 NG/ML — HIGH (ref 15–150)
GLUCOSE BLDC GLUCOMTR-MCNC: 121 MG/DL — HIGH (ref 70–99)
GLUCOSE BLDC GLUCOMTR-MCNC: 123 MG/DL — HIGH (ref 70–99)
GLUCOSE BLDC GLUCOMTR-MCNC: 150 MG/DL — HIGH (ref 70–99)
GLUCOSE BLDC GLUCOMTR-MCNC: 152 MG/DL — HIGH (ref 70–99)
GLUCOSE SERPL-MCNC: 121 MG/DL — HIGH (ref 70–99)
HCT VFR BLD CALC: 30.7 % — LOW (ref 34.5–45)
HGB BLD-MCNC: 9.4 G/DL — LOW (ref 11.5–15.5)
IRON SATN MFR SERPL: 13 % — LOW (ref 15–50)
IRON SATN MFR SERPL: 25 UG/DL — LOW (ref 40–150)
LG PLATELETS BLD QL AUTO: SLIGHT — SIGNIFICANT CHANGE UP
LYMPHOCYTES # BLD AUTO: 1.33 K/UL — SIGNIFICANT CHANGE UP (ref 1–3.3)
LYMPHOCYTES # BLD AUTO: 28 % — SIGNIFICANT CHANGE UP (ref 13–44)
MAGNESIUM SERPL-MCNC: 2 MG/DL — SIGNIFICANT CHANGE UP (ref 1.6–2.6)
MANUAL SMEAR VERIFICATION: SIGNIFICANT CHANGE UP
MCHC RBC-ENTMCNC: 28 PG — SIGNIFICANT CHANGE UP (ref 27–34)
MCHC RBC-ENTMCNC: 30.6 GM/DL — LOW (ref 32–36)
MCV RBC AUTO: 91.4 FL — SIGNIFICANT CHANGE UP (ref 80–100)
MICROCYTES BLD QL: SLIGHT — SIGNIFICANT CHANGE UP
MONOCYTES # BLD AUTO: 0.29 K/UL — SIGNIFICANT CHANGE UP (ref 0–0.9)
MONOCYTES NFR BLD AUTO: 6 % — SIGNIFICANT CHANGE UP (ref 2–14)
NEUTROPHILS # BLD AUTO: 3.05 K/UL — SIGNIFICANT CHANGE UP (ref 1.8–7.4)
NEUTROPHILS NFR BLD AUTO: 62 % — SIGNIFICANT CHANGE UP (ref 43–77)
NEUTS BAND # BLD: 2 % — SIGNIFICANT CHANGE UP (ref 0–8)
NRBC # BLD: 0 /100 — SIGNIFICANT CHANGE UP (ref 0–0)
OVALOCYTES BLD QL SMEAR: SLIGHT — SIGNIFICANT CHANGE UP
PHOSPHATE SERPL-MCNC: 2.4 MG/DL — LOW (ref 2.5–4.5)
PLAT MORPH BLD: NORMAL — SIGNIFICANT CHANGE UP
PLATELET # BLD AUTO: 172 K/UL — SIGNIFICANT CHANGE UP (ref 150–400)
POIKILOCYTOSIS BLD QL AUTO: SLIGHT — SIGNIFICANT CHANGE UP
POTASSIUM SERPL-MCNC: 4.6 MMOL/L — SIGNIFICANT CHANGE UP (ref 3.5–5.3)
POTASSIUM SERPL-SCNC: 4.6 MMOL/L — SIGNIFICANT CHANGE UP (ref 3.5–5.3)
RBC # BLD: 3.36 M/UL — LOW (ref 3.8–5.2)
RBC # FLD: 14.6 % — HIGH (ref 10.3–14.5)
RBC BLD AUTO: ABNORMAL
SODIUM SERPL-SCNC: 137 MMOL/L — SIGNIFICANT CHANGE UP (ref 135–145)
T4 FREE SERPL-MCNC: 1 NG/DL — SIGNIFICANT CHANGE UP (ref 0.9–1.8)
TIBC SERPL-MCNC: 199 UG/DL — LOW (ref 250–450)
UIBC SERPL-MCNC: 174 UG/DL — SIGNIFICANT CHANGE UP (ref 110–370)
VARIANT LYMPHS # BLD: 1 % — SIGNIFICANT CHANGE UP (ref 0–6)
WBC # BLD: 4.76 K/UL — SIGNIFICANT CHANGE UP (ref 3.8–10.5)
WBC # FLD AUTO: 4.76 K/UL — SIGNIFICANT CHANGE UP (ref 3.8–10.5)

## 2020-02-17 RX ORDER — SODIUM,POTASSIUM PHOSPHATES 278-250MG
1 POWDER IN PACKET (EA) ORAL
Refills: 0 | Status: COMPLETED | OUTPATIENT
Start: 2020-02-17 | End: 2020-02-18

## 2020-02-17 RX ORDER — AZITHROMYCIN 500 MG/1
500 TABLET, FILM COATED ORAL DAILY
Refills: 0 | Status: COMPLETED | OUTPATIENT
Start: 2020-02-17 | End: 2020-02-19

## 2020-02-17 RX ORDER — METOPROLOL TARTRATE 50 MG
12.5 TABLET ORAL
Refills: 0 | Status: DISCONTINUED | OUTPATIENT
Start: 2020-02-17 | End: 2020-02-19

## 2020-02-17 RX ORDER — FERROUS SULFATE 325(65) MG
325 TABLET ORAL DAILY
Refills: 0 | Status: DISCONTINUED | OUTPATIENT
Start: 2020-02-17 | End: 2020-02-19

## 2020-02-17 RX ORDER — ACETAMINOPHEN 500 MG
650 TABLET ORAL EVERY 6 HOURS
Refills: 0 | Status: DISCONTINUED | OUTPATIENT
Start: 2020-02-17 | End: 2020-02-19

## 2020-02-17 RX ORDER — CEFUROXIME AXETIL 250 MG
500 TABLET ORAL EVERY 12 HOURS
Refills: 0 | Status: DISCONTINUED | OUTPATIENT
Start: 2020-02-17 | End: 2020-02-19

## 2020-02-17 RX ADMIN — ALBUTEROL 2 PUFF(S): 90 AEROSOL, METERED ORAL at 11:28

## 2020-02-17 RX ADMIN — Medication 81 MILLIGRAM(S): at 11:27

## 2020-02-17 RX ADMIN — Medication 650 MILLIGRAM(S): at 11:29

## 2020-02-17 RX ADMIN — Medication 650 MILLIGRAM(S): at 12:00

## 2020-02-17 RX ADMIN — Medication 12.5 MILLIGRAM(S): at 17:19

## 2020-02-17 RX ADMIN — Medication 1 MILLIGRAM(S): at 11:28

## 2020-02-17 RX ADMIN — Medication 325 MILLIGRAM(S): at 11:27

## 2020-02-17 RX ADMIN — APIXABAN 5 MILLIGRAM(S): 2.5 TABLET, FILM COATED ORAL at 06:06

## 2020-02-17 RX ADMIN — Medication 75 MILLIGRAM(S): at 06:06

## 2020-02-17 RX ADMIN — PANTOPRAZOLE SODIUM 40 MILLIGRAM(S): 20 TABLET, DELAYED RELEASE ORAL at 06:06

## 2020-02-17 RX ADMIN — APIXABAN 5 MILLIGRAM(S): 2.5 TABLET, FILM COATED ORAL at 17:19

## 2020-02-17 RX ADMIN — Medication 75 MILLIGRAM(S): at 17:20

## 2020-02-17 RX ADMIN — ALBUTEROL 2 PUFF(S): 90 AEROSOL, METERED ORAL at 17:09

## 2020-02-17 RX ADMIN — ALBUTEROL 2 PUFF(S): 90 AEROSOL, METERED ORAL at 22:42

## 2020-02-17 RX ADMIN — Medication 1 TABLET(S): at 11:30

## 2020-02-17 RX ADMIN — AZITHROMYCIN 500 MILLIGRAM(S): 500 TABLET, FILM COATED ORAL at 11:27

## 2020-02-17 RX ADMIN — Medication 1 TABLET(S): at 17:19

## 2020-02-17 NOTE — PROGRESS NOTE ADULT - ASSESSMENT
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   Flu A positive, UA positive  Pt was admitted for Flu A, UTI and PNA.

## 2020-02-17 NOTE — CHART NOTE - NSCHARTNOTEFT_GEN_A_CORE
paged by RN that pt is refusing IV access and has not gotten IV ceftriaxone today again. will dc IV abx and start pt on ceftin 500mg bid for pneumonia

## 2020-02-17 NOTE — CHART NOTE - NSCHARTNOTEFT_GEN_A_CORE
pt is refusing all IV abx and  Nurse informed was at risk of pulling the IV line out which is why is it was removed. Will switch to oral medications but pt is refusing them as well. will reschedule for am pt is refusing all IV abx and  Nurse informed was at risk of pulling the IV line out which is why is it was removed. pt was due for azithromycin Will switch azithromycin to oral medications but pt is refusing them as well right now. will reschedule for am. morning team to decide if pt can be switched to oral ceftin as well for

## 2020-02-17 NOTE — PROGRESS NOTE ADULT - PROBLEM SELECTOR PLAN 2
UA : +ve  Pt had SPRING on admission likely pre-renal azotemia d/t dehydration 2/2 inadequate po intake ---resolved  c/w rocephine  f/u US renal  f/u Urine cx  monitor BMP

## 2020-02-17 NOTE — PROGRESS NOTE ADULT - PROBLEM SELECTOR PLAN 7
BP uptrending in the setting of home dose BB/ACE-i on hold  c/w metoprolol gradually 12.5mg bid (home dose is 25mg bid)  will resume lisinopril 5mg when BP is going up  monitor BP BP uptrending in the setting of home dose BB/ACE-i on hold  pt not on any anti-hypertensives right now  start off with low dose lopressor when clinically feasible  will resume lisinopril 5mg when BP is going up  monitor BP

## 2020-02-17 NOTE — PROGRESS NOTE ADULT - SUBJECTIVE AND OBJECTIVE BOX
PGY 1 Note discussed with supervising resident and primary attending    Patient is a 74y old  Female who presents with a chief complaint of Flu A, PNA and UTI (16 Feb 2020 13:14)      INTERVAL HPI/OVERNIGHT EVENTS: Patient seen and examined at the bedside. Patient is sitting in bed and says she has no complaints. Patient refused IV antibiotics last night and was given oral. Pt c/o abdominal pain. Denies any constipation diarrhea. Remains Afebrile.      MEDICATIONS  (STANDING):  ALBUTerol    90 MICROgram(s) HFA Inhaler 2 Puff(s) Inhalation every 6 hours  apixaban 5 milliGRAM(s) Oral every 12 hours  aspirin enteric coated 81 milliGRAM(s) Oral daily  azithromycin   Tablet 500 milliGRAM(s) Oral daily  cefTRIAXone   IVPB 1000 milliGRAM(s) IV Intermittent every 24 hours  folic acid 1 milliGRAM(s) Oral daily  oseltamivir 75 milliGRAM(s) Oral two times a day  pantoprazole    Tablet 40 milliGRAM(s) Oral before breakfast  sodium chloride 0.9%. 1000 milliLiter(s) (75 mL/Hr) IV Continuous <Continuous>    MEDICATIONS  (PRN):  acetaminophen   Tablet .. 650 milliGRAM(s) Oral every 6 hours PRN Temp greater or equal to 38C (100.4F)      __________________________________________________  REVIEW OF SYSTEMS:    CONSTITUTIONAL: No fever,   EYES: no acute visual disturbances  NECK: No pain or stiffness  RESPIRATORY: No cough; No shortness of breath  CARDIOVASCULAR: No chest pain, no palpitations  GASTROINTESTINA: +diffuse abd pain. No nausea or vomiting; No diarrhea   NEUROLOGICAL: No headache or numbness, no tremors  MUSCULOSKELETAL: No joint pain, no muscle pain  GENITOURINARY: no dysuria, no frequency, no hesitancy  PSYCHIATRY: no depression , no anxiety  ALL OTHER  ROS negative        Vital Signs Last 24 Hrs  T(C): 36.6 (17 Feb 2020 06:00), Max: 36.6 (16 Feb 2020 21:25)  T(F): 97.8 (17 Feb 2020 06:00), Max: 97.8 (16 Feb 2020 21:25)  HR: 92 (17 Feb 2020 06:00) (80 - 92)  BP: 140/56 (17 Feb 2020 06:00) (120/47 - 140/56)  BP(mean): --  RR: 18 (17 Feb 2020 06:00) (16 - 18)  SpO2: 99% (17 Feb 2020 06:00) (97% - 99%)    ________________________________________________  PHYSICAL EXAM:  GENERAL: NAD, well-groomed, well-developed  HEAD:  Atraumatic, Normocephalic  EYES: EOMI, PERRLA, conjunctiva and sclera clear  ENMT: No tonsillar erythema, exudates, or enlargement; Moist mucous membranes  NECK: Supple, No JVD, Normal thyroid  NERVOUS SYSTEM:  Alert & Oriented X 2-3; Motor Strength 5/5 B/L upper and lower extremities;  CHEST/LUNG:  No rales, (+) b/l rhonchi, no wheezing, or rubs  HEART: Regular rate and rhythm; No murmurs, rubs, or gallops  ABDOMEN: Soft, (+) diffuse tender on palpation, Nondistended; Bowel sounds present  EXTREMITIES:  2+ Peripheral Pulses, No clubbing, cyanosis, or edema  LYMPH: No lymphadenopathy noted  SKIN: No rashes or lesions  _________________________________________________  LABS:                        9.4    4.76  )-----------( 172      ( 17 Feb 2020 06:54 )             30.7     02-17    137  |  102  |  10  ----------------------------<  121<H>  4.6   |  32<H>  |  0.84    Ca    9.3      17 Feb 2020 06:54  Phos  2.4     02-17  Mg     2.0     02-17          CAPILLARY BLOOD GLUCOSE      POCT Blood Glucose.: 121 mg/dL (17 Feb 2020 08:01)  POCT Blood Glucose.: 175 mg/dL (16 Feb 2020 21:23)  POCT Blood Glucose.: 142 mg/dL (16 Feb 2020 17:07)  POCT Blood Glucose.: 149 mg/dL (16 Feb 2020 12:07)        RADIOLOGY & ADDITIONAL TESTS:    Imaging Personally Reviewed:  YES/NO    Consultant(s) Notes Reviewed:   YES/ No    Care Discussed with Consultants :     Plan of care was discussed with patient and /or primary care giver; all questions and concerns were addressed and care was aligned with patient's wishes.

## 2020-02-17 NOTE — PROGRESS NOTE ADULT - PROBLEM SELECTOR PLAN 5
chronic afib - rate controlled  [id number] Score = 5 (age-2, HTN-1, vasc (Cad) -1, female-1)  con't eliquis / BB chronic afib - rate controlled  [id number] Score = 5 (age-2, HTN-1, vasc (Cad) -1, female-1)  con't eliquis

## 2020-02-18 LAB
-  AMPICILLIN: SIGNIFICANT CHANGE UP
-  CIPROFLOXACIN: SIGNIFICANT CHANGE UP
-  DAPTOMYCIN: SIGNIFICANT CHANGE UP
-  LEVOFLOXACIN: SIGNIFICANT CHANGE UP
-  LINEZOLID: SIGNIFICANT CHANGE UP
-  NITROFURANTOIN: SIGNIFICANT CHANGE UP
-  TETRACYCLINE: SIGNIFICANT CHANGE UP
-  VANCOMYCIN: SIGNIFICANT CHANGE UP
ALBUMIN SERPL ELPH-MCNC: 2.6 G/DL — LOW (ref 3.5–5)
ALP SERPL-CCNC: 57 U/L — SIGNIFICANT CHANGE UP (ref 40–120)
ALT FLD-CCNC: 39 U/L DA — SIGNIFICANT CHANGE UP (ref 10–60)
ANION GAP SERPL CALC-SCNC: 5 MMOL/L — SIGNIFICANT CHANGE UP (ref 5–17)
AST SERPL-CCNC: 44 U/L — HIGH (ref 10–40)
BILIRUB SERPL-MCNC: 0.2 MG/DL — SIGNIFICANT CHANGE UP (ref 0.2–1.2)
BUN SERPL-MCNC: 8 MG/DL — SIGNIFICANT CHANGE UP (ref 7–18)
CALCIUM SERPL-MCNC: 9.3 MG/DL — SIGNIFICANT CHANGE UP (ref 8.4–10.5)
CHLORIDE SERPL-SCNC: 100 MMOL/L — SIGNIFICANT CHANGE UP (ref 96–108)
CO2 SERPL-SCNC: 31 MMOL/L — SIGNIFICANT CHANGE UP (ref 22–31)
CREAT SERPL-MCNC: 0.79 MG/DL — SIGNIFICANT CHANGE UP (ref 0.5–1.3)
CULTURE RESULTS: SIGNIFICANT CHANGE UP
GLUCOSE BLDC GLUCOMTR-MCNC: 117 MG/DL — HIGH (ref 70–99)
GLUCOSE BLDC GLUCOMTR-MCNC: 98 MG/DL — SIGNIFICANT CHANGE UP (ref 70–99)
GLUCOSE SERPL-MCNC: 121 MG/DL — HIGH (ref 70–99)
HCT VFR BLD CALC: 31.6 % — LOW (ref 34.5–45)
HGB BLD-MCNC: 9.4 G/DL — LOW (ref 11.5–15.5)
MAGNESIUM SERPL-MCNC: 1.9 MG/DL — SIGNIFICANT CHANGE UP (ref 1.6–2.6)
MCHC RBC-ENTMCNC: 27 PG — SIGNIFICANT CHANGE UP (ref 27–34)
MCHC RBC-ENTMCNC: 29.7 GM/DL — LOW (ref 32–36)
MCV RBC AUTO: 90.8 FL — SIGNIFICANT CHANGE UP (ref 80–100)
METHOD TYPE: SIGNIFICANT CHANGE UP
NRBC # BLD: 0 /100 WBCS — SIGNIFICANT CHANGE UP (ref 0–0)
ORGANISM # SPEC MICROSCOPIC CNT: SIGNIFICANT CHANGE UP
PHOSPHATE SERPL-MCNC: 3.8 MG/DL — SIGNIFICANT CHANGE UP (ref 2.5–4.5)
PLATELET # BLD AUTO: 188 K/UL — SIGNIFICANT CHANGE UP (ref 150–400)
POTASSIUM SERPL-MCNC: 4.1 MMOL/L — SIGNIFICANT CHANGE UP (ref 3.5–5.3)
POTASSIUM SERPL-SCNC: 4.1 MMOL/L — SIGNIFICANT CHANGE UP (ref 3.5–5.3)
PROT SERPL-MCNC: 6.5 G/DL — SIGNIFICANT CHANGE UP (ref 6–8.3)
RBC # BLD: 3.48 M/UL — LOW (ref 3.8–5.2)
RBC # FLD: 14.6 % — HIGH (ref 10.3–14.5)
SODIUM SERPL-SCNC: 136 MMOL/L — SIGNIFICANT CHANGE UP (ref 135–145)
SPECIMEN SOURCE: SIGNIFICANT CHANGE UP
WBC # BLD: 3.84 K/UL — SIGNIFICANT CHANGE UP (ref 3.8–10.5)
WBC # FLD AUTO: 3.84 K/UL — SIGNIFICANT CHANGE UP (ref 3.8–10.5)

## 2020-02-18 PROCEDURE — 76775 US EXAM ABDO BACK WALL LIM: CPT | Mod: 26

## 2020-02-18 RX ORDER — LISINOPRIL 2.5 MG/1
5 TABLET ORAL DAILY
Refills: 0 | Status: DISCONTINUED | OUTPATIENT
Start: 2020-02-18 | End: 2020-02-19

## 2020-02-18 RX ADMIN — Medication 500 MILLIGRAM(S): at 05:13

## 2020-02-18 RX ADMIN — APIXABAN 5 MILLIGRAM(S): 2.5 TABLET, FILM COATED ORAL at 18:30

## 2020-02-18 RX ADMIN — Medication 1 TABLET(S): at 08:28

## 2020-02-18 RX ADMIN — Medication 12.5 MILLIGRAM(S): at 05:14

## 2020-02-18 RX ADMIN — Medication 650 MILLIGRAM(S): at 05:11

## 2020-02-18 RX ADMIN — ALBUTEROL 2 PUFF(S): 90 AEROSOL, METERED ORAL at 08:28

## 2020-02-18 RX ADMIN — APIXABAN 5 MILLIGRAM(S): 2.5 TABLET, FILM COATED ORAL at 05:13

## 2020-02-18 RX ADMIN — Medication 75 MILLIGRAM(S): at 05:13

## 2020-02-18 RX ADMIN — Medication 12.5 MILLIGRAM(S): at 18:31

## 2020-02-18 RX ADMIN — AZITHROMYCIN 500 MILLIGRAM(S): 500 TABLET, FILM COATED ORAL at 14:27

## 2020-02-18 RX ADMIN — PANTOPRAZOLE SODIUM 40 MILLIGRAM(S): 20 TABLET, DELAYED RELEASE ORAL at 08:28

## 2020-02-18 RX ADMIN — Medication 650 MILLIGRAM(S): at 06:00

## 2020-02-18 RX ADMIN — Medication 75 MILLIGRAM(S): at 18:31

## 2020-02-18 RX ADMIN — Medication 500 MILLIGRAM(S): at 18:30

## 2020-02-18 NOTE — PROGRESS NOTE ADULT - PROBLEM SELECTOR PROBLEM 6
Bronchoalveolar carcinoma

## 2020-02-18 NOTE — PROGRESS NOTE ADULT - PROBLEM SELECTOR PLAN 1
due to FLU  UA: pending  CXR: unchanged infiltrate from prior  con't tamiflu  rocephin and azithro in the setting of sepsis   follow blood culture
Pt was septic on admission, Flu A positive  CXR:On January 20 of this year there were is significant irregular infiltrates in both lower lung fields. The infiltration was at the left base and off the right hilum.  These infiltrates are again noted on the present study  c/w  tamiflu 75mg BID (2/13- 2/18)  azithro swiched to oral and rocephin changed to ceftin  blood culture: negative to date
Pt was septic on admission, Flu A positive  CXR:On January 20 of this year there were is significant irregular infiltrates in both lower lung fields. The infiltration was at the left base and off the right hilum.  These infiltrates are again noted on the present study  c/w  tamiflu 75mg BID (2/13- 2/18)  c/w rocephin and azithro for PNA  azithro swiched to oral since pt refusing IV, consider changing rocephin to oral ceftin  blood culture: negative to date
Pt was septic on admission, Flu A positive  CXR:On January 20 of this year there were is significant irregular infiltrates in both lower lung fields. The infiltration was at the left base and off the right hilum.  These infiltrates are again noted on the present study  c/w  tamiflu 75mg BID (2/13- 2/18)  c/w rocephin and azithro for PNA  blood culture: negative to date

## 2020-02-18 NOTE — PROGRESS NOTE ADULT - ASSESSMENT
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   Flu A positive, UA positive  Pt was admitted for Flu A, UTI and PNA.

## 2020-02-18 NOTE — PROGRESS NOTE ADULT - PROBLEM SELECTOR PLAN 3
SPRING likely pre-renal azotemia d/t dehydration 2/2 inadequate po intake,   con't to hold ACE-I  IVF  monitor BMP  f/u UA
Pt had SPRING likely 2/2  poor po intake   resolved    f/u BMP daily
Pt had SPRING likely 2/2  poor po intake   resolved today  f/u Renal and bladder US (ordered on admission)  f/u BMP daily
Pt had SPRING likely 2/2  poor po intake   SNa 130; Sglu 86 ---resolving, today 133  - urine osmolarity: 282, Urine Na 38  -f/u Cr urine , Pr/Cr ratio , Urine lytes , total protein   f/u Renal and bladder US (ordered on admission)  f/u PM BMP today

## 2020-02-18 NOTE — PROGRESS NOTE ADULT - PROBLEM SELECTOR PLAN 6
s/p wedge resection on Tagrisso.  held on admission due to infection

## 2020-02-18 NOTE — PROGRESS NOTE ADULT - PROBLEM SELECTOR PLAN 2
UA : +ve  c/w ceftin  US renal shows no hydropnephrosis  Urine cultures show Enteroococcus [id number]  f/u final result for sensitivity  monitor BMP

## 2020-02-18 NOTE — PROGRESS NOTE ADULT - PROBLEM SELECTOR PLAN 7
BB/ACEi were held on admission  pt on lopressor 12.5mg bid   will resume lisinopril 5mg when BP is going up  monitor BP

## 2020-02-18 NOTE — PROGRESS NOTE ADULT - SUBJECTIVE AND OBJECTIVE BOX
PGY 1 Note discussed with supervising resident and primary attending    Patient is a 74y old  Female who presents with a chief complaint of flu-like symptoms (17 Feb 2020 09:02)      INTERVAL HPI/OVERNIGHT EVENTS: Patient seen and examined at the bedside. When asked if she has burning in urine, pt replied 'sometimes.' Patient says she has some abdominal pain.    MEDICATIONS  (STANDING):  ALBUTerol    90 MICROgram(s) HFA Inhaler 2 Puff(s) Inhalation every 6 hours  apixaban 5 milliGRAM(s) Oral every 12 hours  aspirin enteric coated 81 milliGRAM(s) Oral daily  azithromycin   Tablet 500 milliGRAM(s) Oral daily  cefuroxime   Tablet 500 milliGRAM(s) Oral every 12 hours  ferrous    sulfate 325 milliGRAM(s) Oral daily  folic acid 1 milliGRAM(s) Oral daily  metoprolol tartrate 12.5 milliGRAM(s) Oral two times a day  oseltamivir 75 milliGRAM(s) Oral two times a day  pantoprazole    Tablet 40 milliGRAM(s) Oral before breakfast  sodium chloride 0.9%. 1000 milliLiter(s) (75 mL/Hr) IV Continuous <Continuous>    MEDICATIONS  (PRN):  acetaminophen   Tablet .. 650 milliGRAM(s) Oral every 6 hours PRN Temp greater or equal to 38C (100.4F), Moderate Pain (4 - 6)      __________________________________________________  REVIEW OF SYSTEMS:    CONSTITUTIONAL: No fever,   EYES: no acute visual disturbances  NECK: No pain or stiffness  RESPIRATORY: No cough; No shortness of breath  CARDIOVASCULAR: No chest pain, no palpitations  GASTROINTESTINAL: +abd pain. No nausea or vomiting; No diarrhea   NEUROLOGICAL: No headache or numbness, no tremors  MUSCULOSKELETAL: No joint pain, no muscle pain  GENITOURINARY: +dysuria, no frequency, no hesitancy  PSYCHIATRY: no depression , no anxiety  ALL OTHER  ROS negative        Vital Signs Last 24 Hrs  T(C): 36.8 (18 Feb 2020 14:28), Max: 36.8 (18 Feb 2020 14:28)  T(F): 98.3 (18 Feb 2020 14:28), Max: 98.3 (18 Feb 2020 14:28)  HR: 86 (18 Feb 2020 14:28) (80 - 87)  BP: 177/79 (18 Feb 2020 14:28) (147/64 - 177/79)  BP(mean): --  RR: 18 (18 Feb 2020 14:28) (17 - 18)  SpO2: 100% (18 Feb 2020 14:28) (97% - 100%)    ________________________________________________  PHYSICAL EXAM:  GENERAL: NAD, well-groomed, well-developed  HEAD:  Atraumatic, Normocephalic  EYES: EOMI, PERRLA, conjunctiva and sclera clear  ENMT: No tonsillar erythema, exudates, or enlargement; Moist mucous membranes  NECK: Supple, No JVD, Normal thyroid  NERVOUS SYSTEM:  Alert & Oriented X 2-3; Motor Strength 5/5 B/L upper and lower extremities;  CHEST/LUNG:  No rales, (+) b/l rhonchi, no wheezing, or rubs  HEART: Regular rate and rhythm; No murmurs, rubs, or gallops  ABDOMEN: Soft, abd tenderness in lower abdomen, Nondistended; Bowel sounds present  EXTREMITIES:  2+ Peripheral Pulses, No clubbing, cyanosis, or edema  LYMPH: No lymphadenopathy noted  SKIN: No rashes or lesions    _________________________________________________  LABS:                        9.4    3.84  )-----------( 188      ( 18 Feb 2020 07:25 )             31.6     02-18    136  |  100  |  8   ----------------------------<  121<H>  4.1   |  31  |  0.79    Ca    9.3      18 Feb 2020 07:25  Phos  3.8     02-18  Mg     1.9     02-18    TPro  6.5  /  Alb  2.6<L>  /  TBili  0.2  /  DBili  x   /  AST  44<H>  /  ALT  39  /  AlkPhos  57  02-18        CAPILLARY BLOOD GLUCOSE      POCT Blood Glucose.: 150 mg/dL (17 Feb 2020 21:40)  POCT Blood Glucose.: 123 mg/dL (17 Feb 2020 16:53)        RADIOLOGY & ADDITIONAL TESTS:    Imaging Personally Reviewed:  YES/NO    Consultant(s) Notes Reviewed:   YES/ No    Care Discussed with Consultants :     Plan of care was discussed with patient and /or primary care giver; all questions and concerns were addressed and care was aligned with patient's wishes.

## 2020-02-19 ENCOUNTER — TRANSCRIPTION ENCOUNTER (OUTPATIENT)
Age: 75
End: 2020-02-19

## 2020-02-19 VITALS
HEART RATE: 97 BPM | TEMPERATURE: 98 F | DIASTOLIC BLOOD PRESSURE: 70 MMHG | SYSTOLIC BLOOD PRESSURE: 133 MMHG | OXYGEN SATURATION: 95 % | RESPIRATION RATE: 18 BRPM

## 2020-02-19 LAB
ALBUMIN SERPL ELPH-MCNC: 2.9 G/DL — LOW (ref 3.5–5)
ALP SERPL-CCNC: 64 U/L — SIGNIFICANT CHANGE UP (ref 40–120)
ALT FLD-CCNC: 41 U/L DA — SIGNIFICANT CHANGE UP (ref 10–60)
ANION GAP SERPL CALC-SCNC: 6 MMOL/L — SIGNIFICANT CHANGE UP (ref 5–17)
AST SERPL-CCNC: 39 U/L — SIGNIFICANT CHANGE UP (ref 10–40)
BILIRUB SERPL-MCNC: 0.4 MG/DL — SIGNIFICANT CHANGE UP (ref 0.2–1.2)
BUN SERPL-MCNC: 7 MG/DL — SIGNIFICANT CHANGE UP (ref 7–18)
CALCIUM SERPL-MCNC: 10.3 MG/DL — SIGNIFICANT CHANGE UP (ref 8.4–10.5)
CHLORIDE SERPL-SCNC: 98 MMOL/L — SIGNIFICANT CHANGE UP (ref 96–108)
CO2 SERPL-SCNC: 32 MMOL/L — HIGH (ref 22–31)
CREAT SERPL-MCNC: 0.88 MG/DL — SIGNIFICANT CHANGE UP (ref 0.5–1.3)
CULTURE RESULTS: SIGNIFICANT CHANGE UP
CULTURE RESULTS: SIGNIFICANT CHANGE UP
GLUCOSE BLDC GLUCOMTR-MCNC: 123 MG/DL — HIGH (ref 70–99)
GLUCOSE BLDC GLUCOMTR-MCNC: 131 MG/DL — HIGH (ref 70–99)
GLUCOSE SERPL-MCNC: 126 MG/DL — HIGH (ref 70–99)
HCT VFR BLD CALC: 34.1 % — LOW (ref 34.5–45)
HGB BLD-MCNC: 10.5 G/DL — LOW (ref 11.5–15.5)
MAGNESIUM SERPL-MCNC: 1.9 MG/DL — SIGNIFICANT CHANGE UP (ref 1.6–2.6)
MCHC RBC-ENTMCNC: 27.7 PG — SIGNIFICANT CHANGE UP (ref 27–34)
MCHC RBC-ENTMCNC: 30.8 GM/DL — LOW (ref 32–36)
MCV RBC AUTO: 90 FL — SIGNIFICANT CHANGE UP (ref 80–100)
NRBC # BLD: 0 /100 WBCS — SIGNIFICANT CHANGE UP (ref 0–0)
PHOSPHATE SERPL-MCNC: 3.4 MG/DL — SIGNIFICANT CHANGE UP (ref 2.5–4.5)
PLATELET # BLD AUTO: 261 K/UL — SIGNIFICANT CHANGE UP (ref 150–400)
POTASSIUM SERPL-MCNC: 4.1 MMOL/L — SIGNIFICANT CHANGE UP (ref 3.5–5.3)
POTASSIUM SERPL-SCNC: 4.1 MMOL/L — SIGNIFICANT CHANGE UP (ref 3.5–5.3)
PROT SERPL-MCNC: 7.3 G/DL — SIGNIFICANT CHANGE UP (ref 6–8.3)
RBC # BLD: 3.79 M/UL — LOW (ref 3.8–5.2)
RBC # FLD: 14.1 % — SIGNIFICANT CHANGE UP (ref 10.3–14.5)
SODIUM SERPL-SCNC: 136 MMOL/L — SIGNIFICANT CHANGE UP (ref 135–145)
SPECIMEN SOURCE: SIGNIFICANT CHANGE UP
SPECIMEN SOURCE: SIGNIFICANT CHANGE UP
WBC # BLD: 4.29 K/UL — SIGNIFICANT CHANGE UP (ref 3.8–10.5)
WBC # FLD AUTO: 4.29 K/UL — SIGNIFICANT CHANGE UP (ref 3.8–10.5)

## 2020-02-19 PROCEDURE — 94640 AIRWAY INHALATION TREATMENT: CPT

## 2020-02-19 PROCEDURE — 85730 THROMBOPLASTIN TIME PARTIAL: CPT

## 2020-02-19 PROCEDURE — 87040 BLOOD CULTURE FOR BACTERIA: CPT

## 2020-02-19 PROCEDURE — 83690 ASSAY OF LIPASE: CPT

## 2020-02-19 PROCEDURE — 82436 ASSAY OF URINE CHLORIDE: CPT

## 2020-02-19 PROCEDURE — 99285 EMERGENCY DEPT VISIT HI MDM: CPT | Mod: 25

## 2020-02-19 PROCEDURE — 83605 ASSAY OF LACTIC ACID: CPT

## 2020-02-19 PROCEDURE — 87186 SC STD MICRODIL/AGAR DIL: CPT

## 2020-02-19 PROCEDURE — 83935 ASSAY OF URINE OSMOLALITY: CPT

## 2020-02-19 PROCEDURE — 82962 GLUCOSE BLOOD TEST: CPT

## 2020-02-19 PROCEDURE — 87631 RESP VIRUS 3-5 TARGETS: CPT

## 2020-02-19 PROCEDURE — 82570 ASSAY OF URINE CREATININE: CPT

## 2020-02-19 PROCEDURE — 84300 ASSAY OF URINE SODIUM: CPT

## 2020-02-19 PROCEDURE — 84156 ASSAY OF PROTEIN URINE: CPT

## 2020-02-19 PROCEDURE — 85610 PROTHROMBIN TIME: CPT

## 2020-02-19 PROCEDURE — 84100 ASSAY OF PHOSPHORUS: CPT

## 2020-02-19 PROCEDURE — 80053 COMPREHEN METABOLIC PANEL: CPT

## 2020-02-19 PROCEDURE — 80048 BASIC METABOLIC PNL TOTAL CA: CPT

## 2020-02-19 PROCEDURE — 76775 US EXAM ABDO BACK WALL LIM: CPT

## 2020-02-19 PROCEDURE — 83930 ASSAY OF BLOOD OSMOLALITY: CPT

## 2020-02-19 PROCEDURE — 84443 ASSAY THYROID STIM HORMONE: CPT

## 2020-02-19 PROCEDURE — 83550 IRON BINDING TEST: CPT

## 2020-02-19 PROCEDURE — 80061 LIPID PANEL: CPT

## 2020-02-19 PROCEDURE — 84436 ASSAY OF TOTAL THYROXINE: CPT

## 2020-02-19 PROCEDURE — 82728 ASSAY OF FERRITIN: CPT

## 2020-02-19 PROCEDURE — 82607 VITAMIN B-12: CPT

## 2020-02-19 PROCEDURE — 83735 ASSAY OF MAGNESIUM: CPT

## 2020-02-19 PROCEDURE — 84439 ASSAY OF FREE THYROXINE: CPT

## 2020-02-19 PROCEDURE — 71045 X-RAY EXAM CHEST 1 VIEW: CPT

## 2020-02-19 PROCEDURE — 87086 URINE CULTURE/COLONY COUNT: CPT

## 2020-02-19 PROCEDURE — 84480 ASSAY TRIIODOTHYRONINE (T3): CPT

## 2020-02-19 PROCEDURE — 36415 COLL VENOUS BLD VENIPUNCTURE: CPT

## 2020-02-19 PROCEDURE — 81001 URINALYSIS AUTO W/SCOPE: CPT

## 2020-02-19 PROCEDURE — 83540 ASSAY OF IRON: CPT

## 2020-02-19 PROCEDURE — 85027 COMPLETE CBC AUTOMATED: CPT

## 2020-02-19 PROCEDURE — 84484 ASSAY OF TROPONIN QUANT: CPT

## 2020-02-19 PROCEDURE — 83036 HEMOGLOBIN GLYCOSYLATED A1C: CPT

## 2020-02-19 RX ORDER — FERROUS SULFATE 325(65) MG
1 TABLET ORAL
Qty: 30 | Refills: 0
Start: 2020-02-19 | End: 2020-03-19

## 2020-02-19 RX ADMIN — Medication 325 MILLIGRAM(S): at 11:53

## 2020-02-19 RX ADMIN — Medication 81 MILLIGRAM(S): at 11:52

## 2020-02-19 RX ADMIN — Medication 500 MILLIGRAM(S): at 06:36

## 2020-02-19 RX ADMIN — AZITHROMYCIN 500 MILLIGRAM(S): 500 TABLET, FILM COATED ORAL at 11:52

## 2020-02-19 RX ADMIN — Medication 12.5 MILLIGRAM(S): at 06:36

## 2020-02-19 RX ADMIN — LISINOPRIL 5 MILLIGRAM(S): 2.5 TABLET ORAL at 07:11

## 2020-02-19 RX ADMIN — ALBUTEROL 2 PUFF(S): 90 AEROSOL, METERED ORAL at 11:53

## 2020-02-19 RX ADMIN — APIXABAN 5 MILLIGRAM(S): 2.5 TABLET, FILM COATED ORAL at 06:36

## 2020-02-19 RX ADMIN — Medication 1 MILLIGRAM(S): at 11:52

## 2020-02-19 RX ADMIN — PANTOPRAZOLE SODIUM 40 MILLIGRAM(S): 20 TABLET, DELAYED RELEASE ORAL at 06:36

## 2020-02-19 NOTE — DISCHARGE NOTE PROVIDER - HOSPITAL COURSE
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 Patient reports some chills, subjective fever, nausea, generalized abdominal pain, and a productive cough. Patient's abdominal pain is poorly characterized, although patient notes that the pain is intermittent and non-radiating. Denies any urinary or bowel complaint, vomiting, chest pain.    She was admitted here on Jan 2020 for pneumonia and completed antibiotic course. 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 Patient reports some chills, subjective fever, nausea, generalized abdominal pain, and a productive cough. Patient's abdominal pain is poorly characterized, although patient notes that the pain is intermittent and non-radiating. Denies any urinary or bowel complaint, vomiting, chest pain.    She was admitted here on Jan 2020 for pneumonia and completed antibiotic course.         Pt was admitted for Flu A, UTI and PNA.        Influenza A with pneumonia.     Pt was septic on admission, Flu A was positive.    CXR:On January 20 of this year there were is significant irregular infiltrates in both lower lung fields. The infiltration was at the left base and off the right hilum.    These infiltrates are again noted on the present study    Pt was continued on tamiflu 75mg BID (2/13- 2/18)    Patient was started on aiv rocephin andf azithro which were later swiched to oral azithro and rocephin changed to ceftin as pt was refusing IV line and IV antibiotic.    blood cultures were negative.          UTI (urinary tract infection).      UA was +ve. Patient complained of burning in urine sometimes.She was kept on iv rocephin and later ceftin. US renal showed no hydropnephrosis. Urine cultures showed [id number].        Hyponatremia    Pt had SPRING likely 2/2  poor po intake that resolved.        COPD (chronic obstructive pulmonary disease)    c/w Duoneb nebs.         [place] fibrillation.     For chronic afib patient was kept on eliquis and BB.         Bronchoalveolar carcinoma.    s/p wedge resection on Tagrisso. It was held on admission due to infection.        HTN (hypertension)    BB/ACEi were held on admission as pt was septic and hypotensive. Later she was started on lopressor 12.5mg bid and lisinopril 5mg.         CAD (coronary artery disease).    con't asa / BB.             Patient is stable for discharge per attending and is advised to follow up with PCP as outpatient    Please refer to patient's complete medical chart with documents for a full hospital course, for this is only a brief summary.

## 2020-02-19 NOTE — DISCHARGE NOTE PROVIDER - NSDCMRMEDTOKEN_GEN_ALL_CORE_FT
acetaminophen 325 mg oral tablet: 2 tab(s) orally every 6 hours, As needed, Temp greater or equal to 38C (100.4F), Mild Pain (1 - 3), Moderate Pain (4 - 6)  aspirin 81 mg oral delayed release tablet: 1 tab(s) orally once a day  Atrovent HFA 17 mcg/inh inhalation aerosol: 2 puff(s) inhaled 4 times a day  Eliquis 5 mg oral tablet: 1 tab(s) orally 2 times a day   folic acid 1 mg oral tablet: 1 tab(s) orally once a day  Glucotrol XL 5 mg oral tablet, extended release: 1 tab(s) orally once a day  lisinopril 5 mg oral tablet: 1 tab(s) orally once a day  metFORMIN 500 mg oral tablet: 1 tab(s) orally 2 times a day  metoprolol tartrate 25 mg oral tablet: 1 tab(s) orally 2 times a day  Multiple Vitamins oral tablet: 1 tab(s) orally once a day  pantoprazole 40 mg oral delayed release tablet: 1 tab(s) orally once a day  Tagrisso 80 mg oral tablet: 1 tab(s) orally once a day  Vitamin D2 50,000 intl units (1.25 mg) oral capsule: 1 cap(s) orally once a week acetaminophen 325 mg oral tablet: 2 tab(s) orally every 6 hours, As needed, Temp greater or equal to 38C (100.4F), Mild Pain (1 - 3), Moderate Pain (4 - 6)  amoxicillin-clavulanate 875 mg-125 mg oral tablet: 1 tab(s) orally 2 times a day   aspirin 81 mg oral delayed release tablet: 1 tab(s) orally once a day  Atrovent HFA 17 mcg/inh inhalation aerosol: 2 puff(s) inhaled 4 times a day  Eliquis 5 mg oral tablet: 1 tab(s) orally 2 times a day   ferrous sulfate 325 mg (65 mg elemental iron) oral tablet: 1 tab(s) orally once a day   folic acid 1 mg oral tablet: 1 tab(s) orally once a day  Glucotrol XL 5 mg oral tablet, extended release: 1 tab(s) orally once a day  lisinopril 5 mg oral tablet: 1 tab(s) orally once a day  metFORMIN 500 mg oral tablet: 1 tab(s) orally 2 times a day  metoprolol tartrate 25 mg oral tablet: 0.5 tab(s) orally 2 times a day  pantoprazole 40 mg oral delayed release tablet: 1 tab(s) orally once a day  Tagrisso 80 mg oral tablet: 1 tab(s) orally once a day

## 2020-02-19 NOTE — DISCHARGE NOTE NURSING/CASE MANAGEMENT/SOCIAL WORK - PATIENT PORTAL LINK FT
You can access the FollowMyHealth Patient Portal offered by [facility] by registering at the following website: [email or web address]. By joining [employer]’s FollowMyHealth portal, you will also be able to view your health information using other applications (apps) compatible with our system.

## 2020-02-19 NOTE — DISCHARGE NOTE PROVIDER - NSDCCPCAREPLAN_GEN_ALL_CORE_FT
PRINCIPAL DISCHARGE DIAGNOSIS  Diagnosis: Influenza A  Assessment and Plan of Treatment: You presented with flu like symptoms and found to have Influenza A. Your Chest Xray showed infiltrates at the left lung base and off the right hilum suspicious for pneumonia. You completed course of tamiflu 75mg (2/13- 2/18). You were also kept on antibiotics for pneumonia. You refused IV antibiotics so later they were switched to oral. Please follow with your pcp within a week after discharge and continue your antibiotics for one more day.      SECONDARY DISCHARGE DIAGNOSES  Diagnosis: UTI (urinary tract infection)  Assessment and Plan of Treatment: UTI (urinary tract infection)    Diagnosis: Chronic atrial fibrillation  Assessment and Plan of Treatment: Please continue with your anticoagulation medication eliquis as instructed in the medication reconciliation and your primary care physician.  Please continue with you rate control medication:lopressor as instructed in the medication reconciliation and your primary care physician.    Diagnosis: Hypertension  Assessment and Plan of Treatment: Continue with blood pressure medication. Maintain a healthy diet that consist of low sugar, low fat, low sodium diet. Exercise frequently if possible.  Follow up with primary care physician in one week after discharge.    Diagnosis: Hyponatremia  Assessment and Plan of Treatment: You had hyponatremia initially which resolved.n It was likey due to poor oral intake. Please continue to take a good healthy diet and drink 8 glasses of water a day and follow with your pcp. PRINCIPAL DISCHARGE DIAGNOSIS  Diagnosis: Influenza A  Assessment and Plan of Treatment: You presented with flu like symptoms and found to have Influenza A. Your Chest Xray showed infiltrates at the left lung base and off the right hilum suspicious for pneumonia. You completed course of tamiflu 75mg (2/13- 2/18). You were also kept on antibiotics for pneumonia. You refused IV antibiotics so later they were switched to oral. Please follow with your pcp within a week after discharge and continue augmentin 875 bid for 7 more days.      SECONDARY DISCHARGE DIAGNOSES  Diagnosis: UTI (urinary tract infection)  Assessment and Plan of Treatment: Urinalysis was positive. You complained of burning in urine sometimes. You were kept on iv rocephin and later ceftin. US renal showed no hydropnephrosis. Urine cultures showed [id number] [id number]. Please continue with augmentin for 7 days.    Diagnosis: Chronic atrial fibrillation  Assessment and Plan of Treatment: Please continue with your anticoagulation medication eliquis as instructed in the medication reconciliation and your primary care physician.  Please continue with you rate control medication:lopressor as instructed in the medication reconciliation and your primary care physician.    Diagnosis: Hypertension  Assessment and Plan of Treatment: Continue with blood pressure medication. Maintain a healthy diet that consist of low sugar, low fat, low sodium diet. Exercise frequently if possible.  Follow up with primary care physician in one week after discharge.    Diagnosis: Hyponatremia  Assessment and Plan of Treatment: You had hyponatremia initially which resolved.n It was likey due to poor oral intake. Please continue to take a good healthy diet and drink 8 glasses of water a day and follow with your pcp.

## 2020-02-22 ENCOUNTER — INPATIENT (INPATIENT)
Facility: HOSPITAL | Age: 75
LOS: 4 days | Discharge: NURSING, REHAB OR LONG-TERM CARE | DRG: 391 | End: 2020-02-27
Attending: INTERNAL MEDICINE | Admitting: INTERNAL MEDICINE
Payer: MEDICAID

## 2020-02-22 VITALS
HEART RATE: 76 BPM | OXYGEN SATURATION: 98 % | SYSTOLIC BLOOD PRESSURE: 150 MMHG | DIASTOLIC BLOOD PRESSURE: 70 MMHG | TEMPERATURE: 98 F | WEIGHT: 130.07 LBS | RESPIRATION RATE: 18 BRPM | HEIGHT: 63 IN

## 2020-02-22 DIAGNOSIS — K52.9 NONINFECTIVE GASTROENTERITIS AND COLITIS, UNSPECIFIED: ICD-10-CM

## 2020-02-22 DIAGNOSIS — I25.10 ATHEROSCLEROTIC HEART DISEASE OF NATIVE CORONARY ARTERY WITHOUT ANGINA PECTORIS: ICD-10-CM

## 2020-02-22 DIAGNOSIS — I48.91 UNSPECIFIED ATRIAL FIBRILLATION: ICD-10-CM

## 2020-02-22 DIAGNOSIS — C34.90 MALIGNANT NEOPLASM OF UNSPECIFIED PART OF UNSPECIFIED BRONCHUS OR LUNG: ICD-10-CM

## 2020-02-22 DIAGNOSIS — Z98.89 OTHER SPECIFIED POSTPROCEDURAL STATES: Chronic | ICD-10-CM

## 2020-02-22 DIAGNOSIS — Z29.9 ENCOUNTER FOR PROPHYLACTIC MEASURES, UNSPECIFIED: ICD-10-CM

## 2020-02-22 DIAGNOSIS — I10 ESSENTIAL (PRIMARY) HYPERTENSION: ICD-10-CM

## 2020-02-22 DIAGNOSIS — E87.1 HYPO-OSMOLALITY AND HYPONATREMIA: ICD-10-CM

## 2020-02-22 LAB
ALBUMIN SERPL ELPH-MCNC: 3.3 G/DL — LOW (ref 3.5–5)
ALP SERPL-CCNC: 62 U/L — SIGNIFICANT CHANGE UP (ref 40–120)
ALT FLD-CCNC: 29 U/L DA — SIGNIFICANT CHANGE UP (ref 10–60)
ANION GAP SERPL CALC-SCNC: 6 MMOL/L — SIGNIFICANT CHANGE UP (ref 5–17)
ANION GAP SERPL CALC-SCNC: 7 MMOL/L — SIGNIFICANT CHANGE UP (ref 5–17)
ANION GAP SERPL CALC-SCNC: 8 MMOL/L — SIGNIFICANT CHANGE UP (ref 5–17)
APTT BLD: 45.8 SEC — HIGH (ref 27.5–36.3)
AST SERPL-CCNC: 26 U/L — SIGNIFICANT CHANGE UP (ref 10–40)
BASOPHILS # BLD AUTO: 0.01 K/UL — SIGNIFICANT CHANGE UP (ref 0–0.2)
BASOPHILS NFR BLD AUTO: 0.1 % — SIGNIFICANT CHANGE UP (ref 0–2)
BILIRUB SERPL-MCNC: 0.4 MG/DL — SIGNIFICANT CHANGE UP (ref 0.2–1.2)
BUN SERPL-MCNC: 9 MG/DL — SIGNIFICANT CHANGE UP (ref 7–18)
CALCIUM SERPL-MCNC: 9.1 MG/DL — SIGNIFICANT CHANGE UP (ref 8.4–10.5)
CALCIUM SERPL-MCNC: 9.1 MG/DL — SIGNIFICANT CHANGE UP (ref 8.4–10.5)
CALCIUM SERPL-MCNC: 9.6 MG/DL — SIGNIFICANT CHANGE UP (ref 8.4–10.5)
CHLORIDE SERPL-SCNC: 86 MMOL/L — LOW (ref 96–108)
CHLORIDE SERPL-SCNC: 94 MMOL/L — LOW (ref 96–108)
CHLORIDE SERPL-SCNC: 95 MMOL/L — LOW (ref 96–108)
CHLORIDE UR-SCNC: 27 MMOL/L — SIGNIFICANT CHANGE UP
CO2 SERPL-SCNC: 27 MMOL/L — SIGNIFICANT CHANGE UP (ref 22–31)
CO2 SERPL-SCNC: 27 MMOL/L — SIGNIFICANT CHANGE UP (ref 22–31)
CO2 SERPL-SCNC: 28 MMOL/L — SIGNIFICANT CHANGE UP (ref 22–31)
CREAT ?TM UR-MCNC: 28 MG/DL — SIGNIFICANT CHANGE UP
CREAT SERPL-MCNC: 1.05 MG/DL — SIGNIFICANT CHANGE UP (ref 0.5–1.3)
CREAT SERPL-MCNC: 1.07 MG/DL — SIGNIFICANT CHANGE UP (ref 0.5–1.3)
CREAT SERPL-MCNC: 1.22 MG/DL — SIGNIFICANT CHANGE UP (ref 0.5–1.3)
EOSINOPHIL # BLD AUTO: 0.17 K/UL — SIGNIFICANT CHANGE UP (ref 0–0.5)
EOSINOPHIL NFR BLD AUTO: 2.2 % — SIGNIFICANT CHANGE UP (ref 0–6)
GLUCOSE BLDC GLUCOMTR-MCNC: 167 MG/DL — HIGH (ref 70–99)
GLUCOSE BLDC GLUCOMTR-MCNC: 45 MG/DL — CRITICAL LOW (ref 70–99)
GLUCOSE SERPL-MCNC: 161 MG/DL — HIGH (ref 70–99)
GLUCOSE SERPL-MCNC: 56 MG/DL — LOW (ref 70–99)
GLUCOSE SERPL-MCNC: 87 MG/DL — SIGNIFICANT CHANGE UP (ref 70–99)
HCT VFR BLD CALC: 31.1 % — LOW (ref 34.5–45)
HGB BLD-MCNC: 9.8 G/DL — LOW (ref 11.5–15.5)
IMM GRANULOCYTES NFR BLD AUTO: 0.8 % — SIGNIFICANT CHANGE UP (ref 0–1.5)
INR BLD: 1.87 RATIO — HIGH (ref 0.88–1.16)
LIDOCAIN IGE QN: 94 U/L — SIGNIFICANT CHANGE UP (ref 73–393)
LYMPHOCYTES # BLD AUTO: 1.33 K/UL — SIGNIFICANT CHANGE UP (ref 1–3.3)
LYMPHOCYTES # BLD AUTO: 17.3 % — SIGNIFICANT CHANGE UP (ref 13–44)
MCHC RBC-ENTMCNC: 27.7 PG — SIGNIFICANT CHANGE UP (ref 27–34)
MCHC RBC-ENTMCNC: 31.5 GM/DL — LOW (ref 32–36)
MCV RBC AUTO: 87.9 FL — SIGNIFICANT CHANGE UP (ref 80–100)
MONOCYTES # BLD AUTO: 0.68 K/UL — SIGNIFICANT CHANGE UP (ref 0–0.9)
MONOCYTES NFR BLD AUTO: 8.9 % — SIGNIFICANT CHANGE UP (ref 2–14)
NEUTROPHILS # BLD AUTO: 5.42 K/UL — SIGNIFICANT CHANGE UP (ref 1.8–7.4)
NEUTROPHILS NFR BLD AUTO: 70.7 % — SIGNIFICANT CHANGE UP (ref 43–77)
NRBC # BLD: 0 /100 WBCS — SIGNIFICANT CHANGE UP (ref 0–0)
OSMOLALITY SERPL: 267 MOSMOL/KG — LOW (ref 280–301)
OSMOLALITY UR: 154 MOS/KG — SIGNIFICANT CHANGE UP (ref 50–1200)
PLATELET # BLD AUTO: 352 K/UL — SIGNIFICANT CHANGE UP (ref 150–400)
POTASSIUM SERPL-MCNC: 3.6 MMOL/L — SIGNIFICANT CHANGE UP (ref 3.5–5.3)
POTASSIUM SERPL-MCNC: 4.2 MMOL/L — SIGNIFICANT CHANGE UP (ref 3.5–5.3)
POTASSIUM SERPL-MCNC: 4.6 MMOL/L — SIGNIFICANT CHANGE UP (ref 3.5–5.3)
POTASSIUM SERPL-SCNC: 3.6 MMOL/L — SIGNIFICANT CHANGE UP (ref 3.5–5.3)
POTASSIUM SERPL-SCNC: 4.2 MMOL/L — SIGNIFICANT CHANGE UP (ref 3.5–5.3)
POTASSIUM SERPL-SCNC: 4.6 MMOL/L — SIGNIFICANT CHANGE UP (ref 3.5–5.3)
PROT ?TM UR-MCNC: 22 MG/DL — HIGH (ref 0–12)
PROT SERPL-MCNC: 7.4 G/DL — SIGNIFICANT CHANGE UP (ref 6–8.3)
PROTHROM AB SERPL-ACNC: 21.2 SEC — HIGH (ref 10–12.9)
RBC # BLD: 3.54 M/UL — LOW (ref 3.8–5.2)
RBC # FLD: 13.8 % — SIGNIFICANT CHANGE UP (ref 10.3–14.5)
SODIUM SERPL-SCNC: 121 MMOL/L — LOW (ref 135–145)
SODIUM SERPL-SCNC: 128 MMOL/L — LOW (ref 135–145)
SODIUM SERPL-SCNC: 129 MMOL/L — LOW (ref 135–145)
SODIUM UR-SCNC: 35 MMOL/L — SIGNIFICANT CHANGE UP
WBC # BLD: 7.67 K/UL — SIGNIFICANT CHANGE UP (ref 3.8–10.5)
WBC # FLD AUTO: 7.67 K/UL — SIGNIFICANT CHANGE UP (ref 3.8–10.5)

## 2020-02-22 PROCEDURE — 74177 CT ABD & PELVIS W/CONTRAST: CPT | Mod: 26

## 2020-02-22 PROCEDURE — 99285 EMERGENCY DEPT VISIT HI MDM: CPT

## 2020-02-22 RX ORDER — KETOROLAC TROMETHAMINE 30 MG/ML
15 SYRINGE (ML) INJECTION ONCE
Refills: 0 | Status: DISCONTINUED | OUTPATIENT
Start: 2020-02-22 | End: 2020-02-22

## 2020-02-22 RX ORDER — INSULIN LISPRO 100/ML
VIAL (ML) SUBCUTANEOUS
Refills: 0 | Status: DISCONTINUED | OUTPATIENT
Start: 2020-02-22 | End: 2020-02-27

## 2020-02-22 RX ORDER — FOLIC ACID 0.8 MG
1 TABLET ORAL DAILY
Refills: 0 | Status: DISCONTINUED | OUTPATIENT
Start: 2020-02-22 | End: 2020-02-27

## 2020-02-22 RX ORDER — SODIUM CHLORIDE 9 MG/ML
1000 INJECTION, SOLUTION INTRAVENOUS
Refills: 0 | Status: DISCONTINUED | OUTPATIENT
Start: 2020-02-22 | End: 2020-02-23

## 2020-02-22 RX ORDER — APIXABAN 2.5 MG/1
5 TABLET, FILM COATED ORAL
Refills: 0 | Status: DISCONTINUED | OUTPATIENT
Start: 2020-02-23 | End: 2020-02-27

## 2020-02-22 RX ORDER — ONDANSETRON 8 MG/1
4 TABLET, FILM COATED ORAL ONCE
Refills: 0 | Status: COMPLETED | OUTPATIENT
Start: 2020-02-22 | End: 2020-02-22

## 2020-02-22 RX ORDER — ONDANSETRON 8 MG/1
4 TABLET, FILM COATED ORAL EVERY 6 HOURS
Refills: 0 | Status: DISCONTINUED | OUTPATIENT
Start: 2020-02-22 | End: 2020-02-27

## 2020-02-22 RX ORDER — IPRATROPIUM BROMIDE 0.2 MG/ML
1 SOLUTION, NON-ORAL INHALATION EVERY 6 HOURS
Refills: 0 | Status: DISCONTINUED | OUTPATIENT
Start: 2020-02-22 | End: 2020-02-27

## 2020-02-22 RX ORDER — FERROUS SULFATE 325(65) MG
325 TABLET ORAL DAILY
Refills: 0 | Status: DISCONTINUED | OUTPATIENT
Start: 2020-02-22 | End: 2020-02-23

## 2020-02-22 RX ORDER — SODIUM CHLORIDE 9 MG/ML
1000 INJECTION, SOLUTION INTRAVENOUS
Refills: 0 | Status: DISCONTINUED | OUTPATIENT
Start: 2020-02-22 | End: 2020-02-22

## 2020-02-22 RX ORDER — METOPROLOL TARTRATE 50 MG
12.5 TABLET ORAL
Refills: 0 | Status: DISCONTINUED | OUTPATIENT
Start: 2020-02-22 | End: 2020-02-27

## 2020-02-22 RX ORDER — ASPIRIN/CALCIUM CARB/MAGNESIUM 324 MG
81 TABLET ORAL DAILY
Refills: 0 | Status: DISCONTINUED | OUTPATIENT
Start: 2020-02-22 | End: 2020-02-27

## 2020-02-22 RX ORDER — SODIUM CHLORIDE 9 MG/ML
1000 INJECTION INTRAMUSCULAR; INTRAVENOUS; SUBCUTANEOUS ONCE
Refills: 0 | Status: COMPLETED | OUTPATIENT
Start: 2020-02-22 | End: 2020-02-22

## 2020-02-22 RX ORDER — DEXTROSE 50 % IN WATER 50 %
50 SYRINGE (ML) INTRAVENOUS ONCE
Refills: 0 | Status: COMPLETED | OUTPATIENT
Start: 2020-02-22 | End: 2020-02-22

## 2020-02-22 RX ORDER — MORPHINE SULFATE 50 MG/1
1 CAPSULE, EXTENDED RELEASE ORAL EVERY 6 HOURS
Refills: 0 | Status: DISCONTINUED | OUTPATIENT
Start: 2020-02-22 | End: 2020-02-22

## 2020-02-22 RX ORDER — ACETAMINOPHEN 500 MG
650 TABLET ORAL DAILY
Refills: 0 | Status: DISCONTINUED | OUTPATIENT
Start: 2020-02-22 | End: 2020-02-27

## 2020-02-22 RX ORDER — LISINOPRIL 2.5 MG/1
5 TABLET ORAL DAILY
Refills: 0 | Status: DISCONTINUED | OUTPATIENT
Start: 2020-02-22 | End: 2020-02-22

## 2020-02-22 RX ORDER — PANTOPRAZOLE SODIUM 20 MG/1
40 TABLET, DELAYED RELEASE ORAL
Refills: 0 | Status: DISCONTINUED | OUTPATIENT
Start: 2020-02-22 | End: 2020-02-27

## 2020-02-22 RX ORDER — VANCOMYCIN HCL 1 G
125 VIAL (EA) INTRAVENOUS ONCE
Refills: 0 | Status: COMPLETED | OUTPATIENT
Start: 2020-02-22 | End: 2020-02-22

## 2020-02-22 RX ADMIN — Medication 15 MILLIGRAM(S): at 13:43

## 2020-02-22 RX ADMIN — Medication 15 MILLIGRAM(S): at 14:13

## 2020-02-22 RX ADMIN — ONDANSETRON 4 MILLIGRAM(S): 8 TABLET, FILM COATED ORAL at 13:42

## 2020-02-22 RX ADMIN — Medication 125 MILLIGRAM(S): at 17:28

## 2020-02-22 RX ADMIN — Medication 50 MILLILITER(S): at 21:30

## 2020-02-22 RX ADMIN — SODIUM CHLORIDE 1000 MILLILITER(S): 9 INJECTION INTRAMUSCULAR; INTRAVENOUS; SUBCUTANEOUS at 14:42

## 2020-02-22 RX ADMIN — SODIUM CHLORIDE 75 MILLILITER(S): 9 INJECTION, SOLUTION INTRAVENOUS at 23:05

## 2020-02-22 RX ADMIN — Medication 1 PUFF(S): at 22:37

## 2020-02-22 RX ADMIN — ONDANSETRON 4 MILLIGRAM(S): 8 TABLET, FILM COATED ORAL at 22:36

## 2020-02-22 RX ADMIN — SODIUM CHLORIDE 1000 MILLILITER(S): 9 INJECTION INTRAMUSCULAR; INTRAVENOUS; SUBCUTANEOUS at 13:42

## 2020-02-22 NOTE — ED PROVIDER NOTE - OBJECTIVE STATEMENT
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 Associated with nausea. Approximately 6 episodes daily of NBNB diarrhea. Recently admitted for flu like symptoms and diagnosed with influenza A, UTI, and PNA. Received IV rocephin and azithro and is now on po augmentin. Reports compliance with abx. Denies fevers, chest pain, shortness of breath, dysuria, hematuria, diarrhea, constipation, or any other acute complaints. 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 Associated with nausea. Approximately 6 episodes daily of NBNB diarrhea. Recently admitted for flu like symptoms and diagnosed with influenza A, UTI, and PNA. Discharged two days ago from  on 2/19/20. also treated with abx in Jan for PNA. Received IV rocephin and azithro and is now on po augmentin. Reports compliance with abx. Denies fevers, chest pain, shortness of breath, dysuria, hematuria, diarrhea, constipation, or any other acute complaints. 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 Associated with nausea. Approximately 6 episodes daily of Nonbloody diarrhea. Recently admitted for flu like symptoms and diagnosed with influenza A, UTI, and PNA. Discharged two days ago from  on 2/19/20. also treated with abx in Jan for PNA. Received IV rocephin and azithro and is now on po augmentin. Reports compliance with abx. Denies fevers, chest pain, shortness of breath, dysuria, hematuria, diarrhea, constipation, or any other acute complaints.

## 2020-02-22 NOTE — H&P ADULT - HISTORY OF PRESENT ILLNESS
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 Associated with nausea. Approximately 6 episodes daily of NBNB diarrhea. Recently admitted for flu like symptoms and diagnosed with influenza A, UTI, and PNA. Discharged two days ago from  on 2/19/20. also treated with abx in Jan for PNA. Received IV rocephin and azithro and is now on po augmentin. Reports compliance with abx. Denies fevers, chest pain, shortness of breath, dysuria, hematuria, diarrhea, constipation, or any other acute complaints. 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 Approximately 6 episodes daily of NBNB diarrhea. Associated with nausea, weight loss of 30 pounds in 2 wks, loss of appetite and chills. It is a/w Generalized abdominal pain, 10/10, cramping, constant, relieved with pain medication. Recently admitted for flu like symptoms and diagnosed with influenza A, UTI, and PNA. Discharged two days ago from  on 2/19/20 also treated with abx in Jan for PNA. Received IV rocephin and azithro and is now on po augmentin. Reports compliance with abx. Denies fevers, chest pain, shortness of breath, dysuria, hematuria,  constipation, sick contacts, recent travel or any other acute complaints.  [facility] Full code

## 2020-02-22 NOTE — H&P ADULT - PROBLEM SELECTOR PLAN 1
abdominal pain, n/v, diarrhea, recent antibiotic use  VS are stable  In ED s/p ivf and vancomycin  CT Abdomen showed Colitis  Pain management with morphine and zofran prn  started cipro and Flagyl abdominal pain, n/v, diarrhea, recent antibiotic use like Augmentin  VS are stable  In ED s/p ivf and vancomycin 1dose  CT Abdomen showed focal colitis  Pain management with morphine and zofran prn  Not starting on antibiotics as she is afebrile, no leucocytosis.

## 2020-02-22 NOTE — ED PROVIDER NOTE - CLINICAL SUMMARY MEDICAL DECISION MAKING FREE TEXT BOX
73 yo F with abdominal pain and diarrhea. Recent d/c for PNA and influenza. On multiple rounds of abx over the last two months. Concern for c diff. CT remarkable for colitis. Discussed with PMD, Dr. berumen who recommends admission and tx for presumed C diff until samples obtained. po vanco given. Patient agreeable for admission.

## 2020-02-22 NOTE — ED PROVIDER NOTE - NS ED ROS FT
ROS  GENERAL: no fevers, no chills  EYES: no visual changes, no blurry vision    ENT: no epistaxis,  no throat pain  CHEST: no pain with breathing,  no hemoptysis   CARDIAC: no chest pain, no upper back pain   GI: + abdominal pain, + diarrhea, no hematemesis, no bright red blood per rectum   : no dysuria, no hematuria   MSK: no arm pain, no leg pain, no back pain   SKIN: no rash   NEURO: no headache, no neck pain   HEME: no easy bruising, no easy bleeding

## 2020-02-22 NOTE — H&P ADULT - PROBLEM SELECTOR PLAN 7
Problem: Prophylactic measure. Plan; IMPROVE VTE Individual Risk Assessment  RISK                                                          Points  [] Previous VTE                                           3  [] Thrombophilia                                        2  [] Lower limb paralysis                              2   [] Current Cancer                                       2   [] Immobilization > 24 hrs                        1  [] ICU/CCU stay > 24 hours                       1  [] Age > 60                                                   1  IMPROVE VTE Score = 4  pt on Eliquis.

## 2020-02-22 NOTE — H&P ADULT - ASSESSMENT
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 ED course s/p 1L ns and Vancomycin oral. CT scan showed Focal Colitis.  Admitted to medicine for Colitis.

## 2020-02-22 NOTE — H&P ADULT - NSHPPHYSICALEXAM_GEN_ALL_CORE
PHYSICAL EXAM:  GENERAL: NAD, obese  HEAD:  Atraumatic, Normocephalic  EYES:  conjunctiva and sclera clear  NECK: Supple, No JVD, Normal thyroid  CHEST/LUNG: Clear to auscultation. Clear to percussion bilaterally; No rales, rhonchi, wheezing, or rubs  HEART: Regular rate and rhythm; No murmurs, rubs, or gallops  ABDOMEN: Soft, Distended, Tenderness is present  NERVOUS SYSTEM:  Alert & Oriented X3,    EXTREMITIES:  2+ Peripheral Pulses, No clubbing, cyanosis, or edema  SKIN: warm dry

## 2020-02-22 NOTE — H&P ADULT - NSHPLABSRESULTS_GEN_ALL_CORE
Vital Signs Last 24 Hrs  T(C): 36.3 (22 Feb 2020 15:47), Max: 36.4 (22 Feb 2020 12:21)  T(F): 97.4 (22 Feb 2020 15:47), Max: 97.5 (22 Feb 2020 12:21)  HR: 89 (22 Feb 2020 15:47) (76 - 89)  BP: 162/60 (22 Feb 2020 15:47) (150/70 - 162/60)  BP(mean): --  RR: 18 (22 Feb 2020 15:47) (18 - 18)  SpO2: 100% (22 Feb 2020 15:47) (98% - 100%)    LABS:                        9.8    7.67  )-----------( 352      ( 22 Feb 2020 13:12 )             31.1     02-22    128<L>  |  95<L>  |  9   ----------------------------<  56<L>  4.2   |  27  |  1.07    Ca    9.1      22 Feb 2020 17:34    TPro  7.4  /  Alb  3.3<L>  /  TBili  0.4  /  DBili  x   /  AST  26  /  ALT  29  /  AlkPhos  62  02-22    PT/INR - ( 22 Feb 2020 13:12 )   PT: 21.2 sec;   INR: 1.87 ratio         PTT - ( 22 Feb 2020 13:12 )  PTT:45.8 sec    LIVER FUNCTIONS - ( 22 Feb 2020 13:12 )  Alb: 3.3 g/dL / Pro: 7.4 g/dL / ALK PHOS: 62 U/L / ALT: 29 U/L DA / AST: 26 U/L / GGT: x           Lipase, Serum: 94 U/L (02-22-20 @ 13:12)

## 2020-02-22 NOTE — ED PROVIDER NOTE - PHYSICAL EXAMINATION
GEN:   uncomfortable, in no apparent distress, AOx3  EYES:   PERRL, extra-occular movements intact  HEENT:   airway patent, moist mucosal membranes, uvula midline  CV:   regular rate and rhythm, normal S1/S2, no murmur  RESP:   clear to auscultation bilaterally, non-labored, speaking in full sentences  ABD:   soft, non tender, no guarding  :   no cva tenderness  MSK:   no musculoskeletal tenderness, 5/5 strength, moving all extremity  SKIN:   dry, intact, no rash  NEURO:   AOx3, no focal weakness or loss of sensation, gait normal, GCS 15 GEN:   uncomfortable, in no apparent distress, AOx3  EYES:   PERRL, extra-occular movements intact  HEENT:   airway patent, moist mucosal membranes, uvula midline  CV:   regular rate and rhythm, normal S1/S2, no murmur  RESP:   clear to auscultation bilaterally, non-labored, speaking in full sentences, on oxygen  ABD:   soft, non tender, no guarding  :   no cva tenderness  MSK:   no musculoskeletal tenderness, 5/5 strength, moving all extremity  SKIN:   dry, intact, no rash  NEURO:   AOx3, no focal weakness or loss of sensation, gait normal, GCS 15

## 2020-02-22 NOTE — H&P ADULT - PROBLEM SELECTOR PLAN 2
- hyponatremia likely 2/2  poor po intake vs Diarrhea and vomiting  -Calculated serum osmo : 251  - S/P 1lt. NS bolus in ED  - C/W NS  - f/u urine osmolarity Cr urine , Pr/Cr ratio , Urine lytes , Sodium urine , total protein

## 2020-02-22 NOTE — H&P ADULT - NSHPREVIEWOFSYSTEMS_GEN_ALL_CORE
REVIEW OF SYSTEMS:    CONSTITUTIONAL: No weakness, fevers or chills  EYES/ENT: No visual changes;  No vertigo or throat pain   NECK: No pain or stiffness  RESPIRATORY: No cough, wheezing, hemoptysis; No shortness of breath  CARDIOVASCULAR: No chest pain or palpitations  GASTROINTESTINAL: abdominal  pain. nausea, vomiting,  diarrhea is present   GENITOURINARY: No dysuria, frequency or hematuria  NEUROLOGICAL: No numbness or weakness  SKIN: No itching, burning, rashes, or lesions   All other review of systems is negative unless indicated above.

## 2020-02-22 NOTE — H&P ADULT - ATTENDING COMMENTS
PATIENT WAS SEEN AND EXAMINED   - DIARRHEA DUE TO AUGMENTIN, DC AUGMENTIN AND OBSERVE  - DEHYDRATED - CONTINUE IVF  -  DECONDITIONED LIMBS, OBTAIN PT / OT. MAY NEED DC PLAN TO [facility]  - GI AND DVT PROPHYLAXIS  - DR. BENTON

## 2020-02-23 LAB
ANION GAP SERPL CALC-SCNC: 5 MMOL/L — SIGNIFICANT CHANGE UP (ref 5–17)
ANION GAP SERPL CALC-SCNC: 6 MMOL/L — SIGNIFICANT CHANGE UP (ref 5–17)
BASOPHILS # BLD AUTO: 0.01 K/UL — SIGNIFICANT CHANGE UP (ref 0–0.2)
BASOPHILS NFR BLD AUTO: 0.2 % — SIGNIFICANT CHANGE UP (ref 0–2)
BUN SERPL-MCNC: 8 MG/DL — SIGNIFICANT CHANGE UP (ref 7–18)
BUN SERPL-MCNC: 9 MG/DL — SIGNIFICANT CHANGE UP (ref 7–18)
C DIFF BY PCR RESULT: SIGNIFICANT CHANGE UP
C DIFF TOX GENS STL QL NAA+PROBE: SIGNIFICANT CHANGE UP
CALCIUM SERPL-MCNC: 9.1 MG/DL — SIGNIFICANT CHANGE UP (ref 8.4–10.5)
CALCIUM SERPL-MCNC: 9.2 MG/DL — SIGNIFICANT CHANGE UP (ref 8.4–10.5)
CHLORIDE SERPL-SCNC: 99 MMOL/L — SIGNIFICANT CHANGE UP (ref 96–108)
CHLORIDE SERPL-SCNC: 99 MMOL/L — SIGNIFICANT CHANGE UP (ref 96–108)
CHOLEST SERPL-MCNC: 190 MG/DL — SIGNIFICANT CHANGE UP (ref 10–199)
CO2 SERPL-SCNC: 28 MMOL/L — SIGNIFICANT CHANGE UP (ref 22–31)
CO2 SERPL-SCNC: 30 MMOL/L — SIGNIFICANT CHANGE UP (ref 22–31)
CREAT SERPL-MCNC: 1.05 MG/DL — SIGNIFICANT CHANGE UP (ref 0.5–1.3)
CREAT SERPL-MCNC: 1.27 MG/DL — SIGNIFICANT CHANGE UP (ref 0.5–1.3)
EOSINOPHIL # BLD AUTO: 0.21 K/UL — SIGNIFICANT CHANGE UP (ref 0–0.5)
EOSINOPHIL NFR BLD AUTO: 3.7 % — SIGNIFICANT CHANGE UP (ref 0–6)
FOLATE SERPL-MCNC: >20 NG/ML — SIGNIFICANT CHANGE UP
GLUCOSE BLDC GLUCOMTR-MCNC: 101 MG/DL — HIGH (ref 70–99)
GLUCOSE BLDC GLUCOMTR-MCNC: 101 MG/DL — HIGH (ref 70–99)
GLUCOSE BLDC GLUCOMTR-MCNC: 118 MG/DL — HIGH (ref 70–99)
GLUCOSE BLDC GLUCOMTR-MCNC: 83 MG/DL — SIGNIFICANT CHANGE UP (ref 70–99)
GLUCOSE SERPL-MCNC: 105 MG/DL — HIGH (ref 70–99)
GLUCOSE SERPL-MCNC: 79 MG/DL — SIGNIFICANT CHANGE UP (ref 70–99)
HBA1C BLD-MCNC: 6.2 % — HIGH (ref 4–5.6)
HCT VFR BLD CALC: 29.5 % — LOW (ref 34.5–45)
HDLC SERPL-MCNC: 64 MG/DL — SIGNIFICANT CHANGE UP
HGB BLD-MCNC: 9.2 G/DL — LOW (ref 11.5–15.5)
IMM GRANULOCYTES NFR BLD AUTO: 0.9 % — SIGNIFICANT CHANGE UP (ref 0–1.5)
LIPID PNL WITH DIRECT LDL SERPL: 109 MG/DL — SIGNIFICANT CHANGE UP
LYMPHOCYTES # BLD AUTO: 1.14 K/UL — SIGNIFICANT CHANGE UP (ref 1–3.3)
LYMPHOCYTES # BLD AUTO: 20 % — SIGNIFICANT CHANGE UP (ref 13–44)
MAGNESIUM SERPL-MCNC: 2 MG/DL — SIGNIFICANT CHANGE UP (ref 1.6–2.6)
MCHC RBC-ENTMCNC: 27.6 PG — SIGNIFICANT CHANGE UP (ref 27–34)
MCHC RBC-ENTMCNC: 31.2 GM/DL — LOW (ref 32–36)
MCV RBC AUTO: 88.6 FL — SIGNIFICANT CHANGE UP (ref 80–100)
MONOCYTES # BLD AUTO: 0.86 K/UL — SIGNIFICANT CHANGE UP (ref 0–0.9)
MONOCYTES NFR BLD AUTO: 15.1 % — HIGH (ref 2–14)
NEUTROPHILS # BLD AUTO: 3.44 K/UL — SIGNIFICANT CHANGE UP (ref 1.8–7.4)
NEUTROPHILS NFR BLD AUTO: 60.1 % — SIGNIFICANT CHANGE UP (ref 43–77)
NRBC # BLD: 0 /100 WBCS — SIGNIFICANT CHANGE UP (ref 0–0)
PHOSPHATE SERPL-MCNC: 3.8 MG/DL — SIGNIFICANT CHANGE UP (ref 2.5–4.5)
PLATELET # BLD AUTO: 352 K/UL — SIGNIFICANT CHANGE UP (ref 150–400)
POTASSIUM SERPL-MCNC: 3.9 MMOL/L — SIGNIFICANT CHANGE UP (ref 3.5–5.3)
POTASSIUM SERPL-MCNC: 4.4 MMOL/L — SIGNIFICANT CHANGE UP (ref 3.5–5.3)
POTASSIUM SERPL-SCNC: 3.9 MMOL/L — SIGNIFICANT CHANGE UP (ref 3.5–5.3)
POTASSIUM SERPL-SCNC: 4.4 MMOL/L — SIGNIFICANT CHANGE UP (ref 3.5–5.3)
RBC # BLD: 3.33 M/UL — LOW (ref 3.8–5.2)
RBC # FLD: 14.2 % — SIGNIFICANT CHANGE UP (ref 10.3–14.5)
SODIUM SERPL-SCNC: 133 MMOL/L — LOW (ref 135–145)
SODIUM SERPL-SCNC: 134 MMOL/L — LOW (ref 135–145)
TOTAL CHOLESTEROL/HDL RATIO MEASUREMENT: 3 RATIO — LOW (ref 3.3–7.1)
TRIGL SERPL-MCNC: 86 MG/DL — SIGNIFICANT CHANGE UP (ref 10–149)
TSH SERPL-MCNC: 0.72 UU/ML — SIGNIFICANT CHANGE UP (ref 0.34–4.82)
URATE UR-MCNC: 16.9 MG/DL — SIGNIFICANT CHANGE UP
VIT B12 SERPL-MCNC: 1162 PG/ML — SIGNIFICANT CHANGE UP (ref 232–1245)
WBC # BLD: 5.71 K/UL — SIGNIFICANT CHANGE UP (ref 3.8–10.5)
WBC # FLD AUTO: 5.71 K/UL — SIGNIFICANT CHANGE UP (ref 3.8–10.5)

## 2020-02-23 RX ORDER — SODIUM CHLORIDE 9 MG/ML
1000 INJECTION, SOLUTION INTRAVENOUS
Refills: 0 | Status: DISCONTINUED | OUTPATIENT
Start: 2020-02-23 | End: 2020-02-24

## 2020-02-23 RX ORDER — NYSTATIN CREAM 100000 [USP'U]/G
1 CREAM TOPICAL EVERY 12 HOURS
Refills: 0 | Status: DISCONTINUED | OUTPATIENT
Start: 2020-02-23 | End: 2020-02-27

## 2020-02-23 RX ADMIN — Medication 1 PUFF(S): at 08:47

## 2020-02-23 RX ADMIN — APIXABAN 5 MILLIGRAM(S): 2.5 TABLET, FILM COATED ORAL at 05:30

## 2020-02-23 RX ADMIN — Medication 81 MILLIGRAM(S): at 12:32

## 2020-02-23 RX ADMIN — PANTOPRAZOLE SODIUM 40 MILLIGRAM(S): 20 TABLET, DELAYED RELEASE ORAL at 06:20

## 2020-02-23 RX ADMIN — Medication 12.5 MILLIGRAM(S): at 17:15

## 2020-02-23 RX ADMIN — APIXABAN 5 MILLIGRAM(S): 2.5 TABLET, FILM COATED ORAL at 17:15

## 2020-02-23 RX ADMIN — Medication 1 PUFF(S): at 17:15

## 2020-02-23 RX ADMIN — Medication 650 MILLIGRAM(S): at 11:48

## 2020-02-23 RX ADMIN — Medication 12.5 MILLIGRAM(S): at 05:30

## 2020-02-23 RX ADMIN — Medication 325 MILLIGRAM(S): at 12:32

## 2020-02-23 RX ADMIN — Medication 650 MILLIGRAM(S): at 12:20

## 2020-02-23 RX ADMIN — ONDANSETRON 4 MILLIGRAM(S): 8 TABLET, FILM COATED ORAL at 11:48

## 2020-02-23 RX ADMIN — ONDANSETRON 4 MILLIGRAM(S): 8 TABLET, FILM COATED ORAL at 05:29

## 2020-02-23 RX ADMIN — Medication 1 PUFF(S): at 22:05

## 2020-02-23 RX ADMIN — Medication 1 MILLIGRAM(S): at 12:32

## 2020-02-23 RX ADMIN — SODIUM CHLORIDE 75 MILLILITER(S): 9 INJECTION, SOLUTION INTRAVENOUS at 08:47

## 2020-02-23 NOTE — PROGRESS NOTE ADULT - SUBJECTIVE AND OBJECTIVE BOX
Patient is a 74y old  Female who presents with a chief complaint of Vomiting and Diarrhea. (22 Feb 2020 17:40)    PATIENT IS SEEN AND EXAMINED IN MEDICAL FLOOR.    ALLERGIES:  No Known Allergies        VITALS:    Vital Signs Last 24 Hrs  T(C): 36.6 (23 Feb 2020 08:03), Max: 36.7 (22 Feb 2020 19:00)  T(F): 97.8 (23 Feb 2020 08:03), Max: 98 (22 Feb 2020 19:00)  HR: 80 (23 Feb 2020 08:03) (70 - 89)  BP: 155/56 (23 Feb 2020 08:03) (126/41 - 162/60)  BP(mean): 96 (22 Feb 2020 19:00) (96 - 96)  RR: 16 (23 Feb 2020 08:03) (16 - 18)  SpO2: 100% (23 Feb 2020 08:03) (100% - 100%)    LABS:    CBC Full  -  ( 23 Feb 2020 06:50 )  WBC Count : 5.71 K/uL  RBC Count : 3.33 M/uL  Hemoglobin : 9.2 g/dL  Hematocrit : 29.5 %  Platelet Count - Automated : 352 K/uL  Mean Cell Volume : 88.6 fl  Mean Cell Hemoglobin : 27.6 pg  Mean Cell Hemoglobin Concentration : 31.2 gm/dL  Auto Neutrophil # : 3.44 K/uL  Auto Lymphocyte # : 1.14 K/uL  Auto Monocyte # : 0.86 K/uL  Auto Eosinophil # : 0.21 K/uL  Auto Basophil # : 0.01 K/uL  Auto Neutrophil % : 60.1 %  Auto Lymphocyte % : 20.0 %  Auto Monocyte % : 15.1 %  Auto Eosinophil % : 3.7 %  Auto Basophil % : 0.2 %    PT/INR - ( 22 Feb 2020 13:12 )   PT: 21.2 sec;   INR: 1.87 ratio         PTT - ( 22 Feb 2020 13:12 )  PTT:45.8 sec  02-23    133<L>  |  99  |  9   ----------------------------<  79  3.9   |  28  |  1.05    Ca    9.2      23 Feb 2020 06:50  Phos  3.8     02-23  Mg     2.0     02-23    TPro  7.4  /  Alb  3.3<L>  /  TBili  0.4  /  DBili  x   /  AST  26  /  ALT  29  /  AlkPhos  62  02-22    CAPILLARY BLOOD GLUCOSE      POCT Blood Glucose.: 101 mg/dL (23 Feb 2020 11:33)  POCT Blood Glucose.: 83 mg/dL (23 Feb 2020 08:31)  POCT Blood Glucose.: 167 mg/dL (22 Feb 2020 22:34)  POCT Blood Glucose.: 45 mg/dL (22 Feb 2020 21:02)    CARDIAC MARKERS ( 22 Feb 2020 13:12 )  <0.015 ng/mL / x     / x     / x     / x          LIVER FUNCTIONS - ( 22 Feb 2020 13:12 )  Alb: 3.3 g/dL / Pro: 7.4 g/dL / ALK PHOS: 62 U/L / ALT: 29 U/L DA / AST: 26 U/L / GGT: x           Creatinine Trend: 1.05<--, 1.22<--, 1.07<--, 1.05<--, 0.88<--, 0.79<--  I&O's Summary          .Urine  02-16 @ 22:58   50,000 - 99,000 CFU/mL Enterococcus faecalis (vancomycin resistant)  --  Gram Positive Cocci in Pairs and Chains      .Blood  02-14 @ 02:16   No growth at 5 days.  --  --      .Blood  02-14 @ 02:09   No growth at 5 days.  --  --      .Blood  01-29 @ 01:40   No growth at 5 days.  --  --      .Urine  01-29 @ 01:39   <10,000 CFU/mL Normal Urogenital Candace  --  --          MEDICATIONS:    MEDICATIONS  (STANDING):  acetaminophen   Tablet .. 650 milliGRAM(s) Oral daily  apixaban 5 milliGRAM(s) Oral two times a day  aspirin enteric coated 81 milliGRAM(s) Oral daily  dextrose 5%. 1000 milliLiter(s) (75 mL/Hr) IV Continuous <Continuous>  folic acid 1 milliGRAM(s) Oral daily  insulin lispro (HumaLOG) corrective regimen sliding scale   SubCutaneous Before meals and at bedtime  ipratropium 17 MICROgram(s) HFA Inhaler 1 Puff(s) Inhalation every 6 hours  metoprolol tartrate 12.5 milliGRAM(s) Oral two times a day  pantoprazole    Tablet 40 milliGRAM(s) Oral before breakfast      MEDICATIONS  (PRN):  aluminum hydroxide/magnesium hydroxide/simethicone Suspension 30 milliLiter(s) Oral every 8 hours PRN Dyspepsia  morphine  - Injectable 1 milliGRAM(s) IV Push every 6 hours PRN Severe Pain (7 - 10)  ondansetron Injectable 4 milliGRAM(s) IV Push every 6 hours PRN Nausea and/or Vomiting      REVIEW OF SYSTEMS:                           ALL ROS DONE [ X   ]    CONSTITUTIONAL:  LETHARGIC [   ], FEVER [   ], UNRESPONSIVE [   ]  CVS:  CP  [   ], SOB, [   ], PALPITATIONS [   ], DIZZYNESS [   ]  RS: COUGH [   ], SPUTUM [   ]  GI: ABDOMINAL PAIN [   ], NAUSEA [   ], VOMITINGS [   ], DIARRHEA [   ], CONSTIPATION [   ]  :  DYSURIA [   ], NOCTURIA [   ], INCREASED FREQUENCY [   ], DRIBLING [   ],  SKELETAL: PAINFUL JOINTS [   ], SWOLLEN JOINTS [   ], NECK ACHE [   ], LOW BACK ACHE [   ],  SKIN : ULCERS [   ], RASH [   ], ITCHING [   ]  CNS: HEAD ACHE [   ], DOUBLE VISION [   ], BLURRED VISION [   ], AMS / CONFUSION [   ], SEIZURES [   ], WEAKNESS [   ],TINGLING / NUMBNESS [   ]    PHYSICAL EXAMINATION:  GENERAL APPEARANCE: NO DISTRESS  HEENT:  NO PALLOR, NO  JVD,  NO   NODES, NECK SUPPLE  CVS: S1 +, S2 +,   RS: AEEB,  OCCASIONAL  RALES +,   MILD B/L  RONCHI +  ABD: SOFT, NT, NO, BS +  EXT: MILD PE +  SKIN: WARM,   SKELETAL:  ROM ACCEPTABLE  CNS:  AAO X  2-3 , NO DEFICITS    RADIOLOGY :    < from: CT Abdomen and Pelvis w/ IV Cont (02.22.20 @ 15:25) >  IMPRESSION: Mild colonic thickening as noted. Progression of basilar lung infiltrates versus neoplasm. Clinical correlation is suggested.    Thank you for  this referral.    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 Approximately 6 episodes daily of NBNB diarrhea. Associated with nausea, weight loss of 30 pounds in 2 wks, loss of appetite and chills. It is a/w Generalized abdominal pain, 10/10, cramping, constant, relieved with pain medication. Recently admitted for flu like symptoms and diagnosed with influenza A, UTI, and PNA. Discharged two days ago from  on 2/19/20 also treated with abx in Jan for PNA. Received IV rocephin and azithro and is now on po augmentin. Reports compliance with abx. Denies fevers, chest pain, shortness of breath, dysuria, hematuria,  constipation, sick contacts, recent travel or any other acute complaints.  [facility] Full code (22 Feb 2020 17:40)    - DIARRHEA - due to multiple factors. SUSPECT AUGMENTIN AND FESO4. LEAST SUSPICION FOR C. DIFF COLITIS. DISCONTINUED AUGMENTIN AND FESO4. WILL OBSERVE  - NAUSEA AND VOMITING, DUE TO GASTRITIS ON MAALOX, PPI  - DEHYDRATION DUE TO POOR ORAL INTAKE ON IV FLUIDS  - DECONDITIONED DUE TO MULTIPLE COMORBIDITIES - OBTAIN PT AND OT EVALUATION - DC PLAN TO ? [facility]  - RESOLVING INFLUENZA A, ACUTE BRONCHITIS AND POST OBSTRUCTIVE PNEUMONIA  - ADENOCARCINOMA OF LUNG ON ORAL CHEMOTHERAPY FROM [facility]  - GI AND DVT PROPHYLAXIS  - DR. BENTON

## 2020-02-23 NOTE — PHYSICAL THERAPY INITIAL EVALUATION ADULT - CRITERIA FOR SKILLED THERAPEUTIC INTERVENTIONS
anticipated discharge recommendation/predicted duration of therapy intervention/impairments found/rehab potential/therapy frequency

## 2020-02-24 ENCOUNTER — TRANSCRIPTION ENCOUNTER (OUTPATIENT)
Age: 75
End: 2020-02-24

## 2020-02-24 LAB
ANION GAP SERPL CALC-SCNC: 6 MMOL/L — SIGNIFICANT CHANGE UP (ref 5–17)
BUN SERPL-MCNC: 7 MG/DL — SIGNIFICANT CHANGE UP (ref 7–18)
CALCIUM SERPL-MCNC: 9 MG/DL — SIGNIFICANT CHANGE UP (ref 8.4–10.5)
CHLORIDE SERPL-SCNC: 102 MMOL/L — SIGNIFICANT CHANGE UP (ref 96–108)
CO2 SERPL-SCNC: 30 MMOL/L — SIGNIFICANT CHANGE UP (ref 22–31)
CREAT SERPL-MCNC: 1.25 MG/DL — SIGNIFICANT CHANGE UP (ref 0.5–1.3)
GLUCOSE BLDC GLUCOMTR-MCNC: 105 MG/DL — HIGH (ref 70–99)
GLUCOSE BLDC GLUCOMTR-MCNC: 131 MG/DL — HIGH (ref 70–99)
GLUCOSE BLDC GLUCOMTR-MCNC: 165 MG/DL — HIGH (ref 70–99)
GLUCOSE BLDC GLUCOMTR-MCNC: 169 MG/DL — HIGH (ref 70–99)
GLUCOSE SERPL-MCNC: 189 MG/DL — HIGH (ref 70–99)
HCT VFR BLD CALC: 29.9 % — LOW (ref 34.5–45)
HGB BLD-MCNC: 9.2 G/DL — LOW (ref 11.5–15.5)
MCHC RBC-ENTMCNC: 27.9 PG — SIGNIFICANT CHANGE UP (ref 27–34)
MCHC RBC-ENTMCNC: 30.8 GM/DL — LOW (ref 32–36)
MCV RBC AUTO: 90.6 FL — SIGNIFICANT CHANGE UP (ref 80–100)
NRBC # BLD: 0 /100 WBCS — SIGNIFICANT CHANGE UP (ref 0–0)
PLATELET # BLD AUTO: 316 K/UL — SIGNIFICANT CHANGE UP (ref 150–400)
POTASSIUM SERPL-MCNC: 4.3 MMOL/L — SIGNIFICANT CHANGE UP (ref 3.5–5.3)
POTASSIUM SERPL-SCNC: 4.3 MMOL/L — SIGNIFICANT CHANGE UP (ref 3.5–5.3)
RBC # BLD: 3.3 M/UL — LOW (ref 3.8–5.2)
RBC # FLD: 14.7 % — HIGH (ref 10.3–14.5)
SODIUM SERPL-SCNC: 138 MMOL/L — SIGNIFICANT CHANGE UP (ref 135–145)
WBC # BLD: 6.59 K/UL — SIGNIFICANT CHANGE UP (ref 3.8–10.5)
WBC # FLD AUTO: 6.59 K/UL — SIGNIFICANT CHANGE UP (ref 3.8–10.5)

## 2020-02-24 RX ORDER — ATORVASTATIN CALCIUM 80 MG/1
1 TABLET, FILM COATED ORAL
Qty: 30 | Refills: 0
Start: 2020-02-24 | End: 2020-03-24

## 2020-02-24 RX ORDER — ATORVASTATIN CALCIUM 80 MG/1
40 TABLET, FILM COATED ORAL AT BEDTIME
Refills: 0 | Status: DISCONTINUED | OUTPATIENT
Start: 2020-02-24 | End: 2020-02-27

## 2020-02-24 RX ADMIN — APIXABAN 5 MILLIGRAM(S): 2.5 TABLET, FILM COATED ORAL at 17:21

## 2020-02-24 RX ADMIN — Medication 12.5 MILLIGRAM(S): at 17:21

## 2020-02-24 RX ADMIN — PANTOPRAZOLE SODIUM 40 MILLIGRAM(S): 20 TABLET, DELAYED RELEASE ORAL at 05:37

## 2020-02-24 RX ADMIN — Medication 1 MILLIGRAM(S): at 11:54

## 2020-02-24 RX ADMIN — APIXABAN 5 MILLIGRAM(S): 2.5 TABLET, FILM COATED ORAL at 05:37

## 2020-02-24 RX ADMIN — Medication 81 MILLIGRAM(S): at 11:54

## 2020-02-24 RX ADMIN — Medication 1 PUFF(S): at 05:42

## 2020-02-24 RX ADMIN — NYSTATIN CREAM 1 APPLICATION(S): 100000 CREAM TOPICAL at 05:37

## 2020-02-24 RX ADMIN — Medication 12.5 MILLIGRAM(S): at 05:37

## 2020-02-24 RX ADMIN — Medication 1 PUFF(S): at 22:13

## 2020-02-24 RX ADMIN — ATORVASTATIN CALCIUM 40 MILLIGRAM(S): 80 TABLET, FILM COATED ORAL at 22:13

## 2020-02-24 RX ADMIN — NYSTATIN CREAM 1 APPLICATION(S): 100000 CREAM TOPICAL at 17:21

## 2020-02-24 RX ADMIN — Medication 1 PUFF(S): at 17:20

## 2020-02-24 RX ADMIN — Medication 1 PUFF(S): at 11:54

## 2020-02-24 RX ADMIN — Medication 0: at 22:13

## 2020-02-24 RX ADMIN — Medication 1: at 17:21

## 2020-02-24 RX ADMIN — Medication 1: at 11:54

## 2020-02-24 NOTE — PROGRESS NOTE ADULT - ASSESSMENT
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 ED course s/p 1L ns and Vancomycin oral. CT scan showed Focal Colitis.  Admitted to medicine for Colitis. 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 ED course s/p 1L ns and Vancomycin oral. CT scan showed Focal Colitis.  Admitted to medicine for Colitis.    dispo: pt is pending ernst placement- waiting for oral chemo tx - dc plan for wednesday

## 2020-02-24 NOTE — PROGRESS NOTE ADULT - PROBLEM SELECTOR PLAN 1
abdominal pain, n/v, diarrhea, recent antibiotic use like Augmentin  VS are stable  In ED s/p ivf and vancomycin 1dose  CT Abdomen showed focal colitis  Pain management  maalox PPI and zofran prn  Not starting on antibiotics as she is afebrile, no leucocytosis.

## 2020-02-24 NOTE — DISCHARGE NOTE PROVIDER - NSDCMRMEDTOKEN_GEN_ALL_CORE_FT
acetaminophen 325 mg oral tablet: 2 tab(s) orally every 6 hours, As needed, Temp greater or equal to 38C (100.4F), Mild Pain (1 - 3), Moderate Pain (4 - 6)  amoxicillin-clavulanate 875 mg-125 mg oral tablet: 1 tab(s) orally 2 times a day   aspirin 81 mg oral delayed release tablet: 1 tab(s) orally once a day  Atrovent HFA 17 mcg/inh inhalation aerosol: 2 puff(s) inhaled 4 times a day  Atrovent HFA 17 mcg/inh inhalation aerosol: 2 puff(s) inhaled 4 times a day  Eliquis 5 mg oral tablet: 1 tab(s) orally 2 times a day   ferrous sulfate 325 mg (65 mg elemental iron) oral tablet: 1 tab(s) orally once a day   folic acid 1 mg oral tablet: 1 tab(s) orally once a day  Glucotrol XL 5 mg oral tablet, extended release: 1 tab(s) orally once a day  lisinopril 5 mg oral tablet: 1 tab(s) orally once a day  metFORMIN 500 mg oral tablet: 1 tab(s) orally 2 times a day  metoprolol tartrate 25 mg oral tablet: 0.5 tab(s) orally 2 times a day  pantoprazole 40 mg oral delayed release tablet: 1 tab(s) orally once a day  Tagrisso 80 mg oral tablet: 1 tab(s) orally once a day acetaminophen 325 mg oral tablet: 2 tab(s) orally every 6 hours, As needed, Temp greater or equal to 38C (100.4F), Mild Pain (1 - 3), Moderate Pain (4 - 6)  aspirin 81 mg oral delayed release tablet: 1 tab(s) orally once a day  Atrovent HFA 17 mcg/inh inhalation aerosol: 2 puff(s) inhaled 4 times a day  Atrovent HFA 17 mcg/inh inhalation aerosol: 2 puff(s) inhaled 4 times a day  Eliquis 5 mg oral tablet: 1 tab(s) orally 2 times a day   ferrous sulfate 325 mg (65 mg elemental iron) oral tablet: 1 tab(s) orally once a day   folic acid 1 mg oral tablet: 1 tab(s) orally once a day  lisinopril 5 mg oral tablet: 1 tab(s) orally once a day  metFORMIN 500 mg oral tablet: 1 tab(s) orally 2 times a day  metoprolol tartrate 25 mg oral tablet: 0.5 tab(s) orally 2 times a day  pantoprazole 40 mg oral delayed release tablet: 1 tab(s) orally once a day  Tagrisso 80 mg oral tablet: 1 tab(s) orally once a day acetaminophen 325 mg oral tablet: 2 tab(s) orally every 6 hours, As needed, Temp greater or equal to 38C (100.4F), Mild Pain (1 - 3), Moderate Pain (4 - 6)  aluminum hydroxide-magnesium hydroxide 200 mg-200 mg/5 mL oral suspension: 30 milliliter(s) orally every 8 hours, As needed, Dyspepsia  aspirin 81 mg oral delayed release tablet: 1 tab(s) orally once a day  atorvastatin 40 mg oral tablet: 1 tab(s) orally once a day (at bedtime)  Atrovent HFA 17 mcg/inh inhalation aerosol: 2 puff(s) inhaled 4 times a day  Eliquis 5 mg oral tablet: 1 tab(s) orally 2 times a day   ferrous sulfate 325 mg (65 mg elemental iron) oral tablet: 1 tab(s) orally once a day   folic acid 1 mg oral tablet: 1 tab(s) orally once a day  lisinopril 5 mg oral tablet: 1 tab(s) orally once a day  metFORMIN 500 mg oral tablet: 1 tab(s) orally 2 times a day  metoprolol tartrate 25 mg oral tablet: 0.5 tab(s) orally 2 times a day  pantoprazole 40 mg oral delayed release tablet: 1 tab(s) orally once a day  Tagrisso 80 mg oral tablet: 1 tab(s) orally once a day

## 2020-02-24 NOTE — PROGRESS NOTE ADULT - ATTENDING COMMENTS
PATIENT WAS SEEN AND EXAMINED   PATIENT WAS SEEN AND EXAMINED  - DC PLAN TO [facility] IN AM   - DIARRHEA - due to multiple factors - RESOLVED, SUSPECT AUGMENTIN AND FESO4. LEAST SUSPICION FOR C. DIFF COLITIS. DISCONTINUED AUGMENTIN AND FESO4.  - NAUSEA AND VOMITING, DUE TO GASTRITIS ON MAALOX, PPI  - DEHYDRATION DUE TO POOR ORAL INTAKE ON IV FLUIDS  - DECONDITIONED DUE TO MULTIPLE COMORBIDITIES - OBTAIN PT AND OT EVALUATION - DC PLAN TO ? [facility]  - RESOLVING INFLUENZA A, ACUTE BRONCHITIS AND POST OBSTRUCTIVE PNEUMONIA  - ADENOCARCINOMA OF LUNG ON ORAL CHEMOTHERAPY FROM [facility]  - GI AND DVT PROPHYLAXIS  - DR. BENTON

## 2020-02-24 NOTE — DISCHARGE NOTE PROVIDER - HOSPITAL COURSE
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 Approximately 6 episodes daily of NBNB diarrhea. Associated with nausea, weight loss of 30 pounds in 2 wks, loss of appetite and chills. It is a/w Generalized abdominal pain, 10/10, cramping, constant, relieved with pain medication. Recently admitted for flu like symptoms and diagnosed with influenza A, UTI, and PNA. Discharged two days ago from  on 2/19/20 also treated with abx in Jan for PNA. Received IV rocephin and azithro and is now on po augmentin. Reports compliance with abx. Denies fevers, chest pain, shortness of breath, dysuria, hematuria,  constipation, sick contacts, recent travel or any other acute complaints.    C Full code        ED course s/p 1L ns and Vancomycin oral. CT scan showed Focal Colitis.  Pt was admitted to medicine for Colitis.        Colitis: abdominal pain, n/v, diarrhea, concerns for recent antibiotic use of Augmentin. C. diff was sent and came back negative. Pt was on ferrous sulfate which was held. Pain management with morphine, maalox, PPI and zofran prn. Pt was not starting on antibiotics as she is afebrile, no leucocytosis.         Hyponatremia: - hyponatremia likely 2/2  poor po intake vs Diarrhea and vomiting. Improved to 134 on IVF and better oral intake         Bronchoalveolar carcinoma: s/p wedge resection on Tagrisso oral chemo at [facility].         CAD (coronary artery disease): continue aspirin, statin and beta blocker.         HTN (hypertension).: patient takes lisinopril at home and was continued during the hospital stay.         Atrial fibrillation: patient is on Eliquis and metoprolol and was continued during the hospital stay.                Given patient's improved clinical status and current hemodynamic stability, decision was made to discharge.    Please refer to patient's complete medical chart with documents for a full hospital course, for this is only a brief summary. 75 y/o F from  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 Approximately 6 episodes daily of NBNB diarrhea. Associated with nausea, weight loss of 30 pounds in 2 wks, loss of appetite and chills. It is a/w Generalized abdominal pain, 10/10, cramping, constant, relieved with pain medication. Recently admitted for flu like symptoms and diagnosed with influenza A, UTI, and PNA. Discharged two days ago from  on 2/19/20 also treated with abx in Jan for PNA. Received IV rocephin and azithro and is now on po augmentin. Reports compliance with abx. Denies fevers, chest pain, shortness of breath, dysuria, hematuria,  constipation, sick contacts, recent travel or any other acute complaints.    C Full code        ED course s/p 1L ns and Vancomycin oral. CT scan showed Focal Colitis.  Pt was admitted to medicine for Colitis.        Colitis: abdominal pain, n/v, diarrhea, concerns for recent antibiotic use of Augmentin. C. diff was sent and came back negative. Pt was on ferrous sulfate which was held. Pain management with morphine, maalox, PPI and zofran prn. Pt was not starting on antibiotics as she is afebrile, no leucocytosis.         Hyponatremia: - hyponatremia likely 2/2  poor po intake vs Diarrhea and vomiting. Improved to 134 on IVF and better oral intake         Bronchoalveolar carcinoma: s/p wedge resection on Tagrisso oral chemo at [facility].         CAD (coronary artery disease): continue aspirin, statin and beta blocker.         HTN (hypertension).: patient takes lisinopril at home and was continued during the hospital stay.         Atrial fibrillation: patient is on Eliquis and metoprolol and was continued during the hospital stay.            Given patient's improved clinical status and current hemodynamic stability, decision was made to discharge.    Please refer to patient's complete medical chart with documents for a full hospital course, for this is only a brief summary. 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 Approximately 6 episodes daily of NBNB diarrhea. Associated with nausea, weight loss of 30 pounds in 2 wks, loss of appetite and chills. It is a/w Generalized abdominal pain, 10/10, cramping, constant, relieved with pain medication. Recently admitted for flu like symptoms and diagnosed with influenza A, UTI, and PNA. Discharged two days ago from  on 2/19/20 also treated with abx in Jan for PNA. Received IV rocephin and azithro and is now on po augmentin. Reports compliance with abx. Denies fevers, chest pain, shortness of breath, dysuria, hematuria,  constipation, sick contacts, recent travel or any other acute complaints.    GOC Full code        ED course s/p 1L ns and Vancomycin oral. CT scan showed Focal Colitis.  Pt was admitted to medicine for Colitis.        Colitis: abdominal pain, n/v, diarrhea, concerns for recent antibiotic use of Augmentin. C. diff was sent and came back negative. Pt was on ferrous sulfate which was held. Pain management with morphine, maalox, PPI and zofran prn. Pt was not starting on antibiotics as she is afebrile, no leucocytosis.         Hyponatremia: - hyponatremia likely 2/2  poor po intake vs Diarrhea and vomiting. Improved to 134 on IVF and better oral intake         Bronchoalveolar carcinoma: s/p wedge resection on Tagrisso oral chemo at [facility]. pt will continue upon discharge         CAD (coronary artery disease): continue aspirin, statin and beta blocker.         HTN (hypertension).: patient takes lisinopril at home and was continued during the hospital stay.         Atrial fibrillation: patient is on Eliquis and metoprolol and was continued during the hospital stay.        Physical therapy recommended EM and pt was accepted [facility]         Given patient's improved clinical status and current hemodynamic stability, decision was made to discharge.    Please refer to patient's complete medical chart with documents for a full hospital course, for this is only a brief summary.

## 2020-02-24 NOTE — PROGRESS NOTE ADULT - PROBLEM SELECTOR PLAN 2
- hyponatremia likely 2/2  poor po intake vs Diarrhea and vomiting  -Calculated serum osmo : 251  - S/P 1lt. NS bolus in ED  - pt completed IVF and Na Improved to baseline 138 2/24

## 2020-02-24 NOTE — DISCHARGE NOTE PROVIDER - NSDCCPCAREPLAN_GEN_ALL_CORE_FT
PRINCIPAL DISCHARGE DIAGNOSIS  Diagnosis: Colitis  Assessment and Plan of Treatment:       SECONDARY DISCHARGE DIAGNOSES  Diagnosis: Diarrhea  Assessment and Plan of Treatment:     Diagnosis: Hyponatremia  Assessment and Plan of Treatment: PRINCIPAL DISCHARGE DIAGNOSIS  Diagnosis: Colitis  Assessment and Plan of Treatment: You presented with severe diarrhe and abdominal pain. Your CT scan on abdomen showed colon thickening concerned for focal colitis. You were recently discharge with augmentin and we held the medication. We send cultures of c.diff infection and it came back neagtive. We also held your iron pills. Your diarrhea improved and your symptoms improved with maalox and protonix. Please follow up with your PCP after discharge      SECONDARY DISCHARGE DIAGNOSES  Diagnosis: Diabetes mellitus  Assessment and Plan of Treatment: We are holding your glucotrol as your HbAIC was found on this admission was 6.2 which is very good for your age and continuing further medication puts you on risk of hypoglycemia,  Continue with your metformin.   You must maintain a healthy diet that consist of low sugar, low fat, low sodium diet. Exercise frequently if possible. Consider repeating your Hemoglobin A1c within 3 months after discharge to monitor your average blood glucose control. Follow up with primary care physician in one week after discharge.      Diagnosis: Hyponatremia  Assessment and Plan of Treatment: You presented with low sodium levels likely from the diarrhea and hypovolemia. It improved to normal limits with IV fluids and proper hydration    Diagnosis: HTN (hypertension)  Assessment and Plan of Treatment: Continue with blood pressure medication. Maintain a healthy diet that consist of low sugar, low fat, low sodium diet. Exercise frequently if possible.  Follow up with primary care physician in one week after discharge.      Diagnosis: Bronchoalveolar carcinoma  Assessment and Plan of Treatment: You have history of bronchoalveloar carcinoma and you have had wedge resection in the past and are getting oral chemotherapy treatement. You are advised to follow up with your doctor after discharge and continue medication    Diagnosis: Atrial fibrillation  Assessment and Plan of Treatment: you have history of atrial fibrillation and your medication was continued. follow up with your PCP after discharge

## 2020-02-24 NOTE — PROGRESS NOTE ADULT - SUBJECTIVE AND OBJECTIVE BOX
PGY 1 Note discussed with supervising resident and primary attending    Patient is a 74y old  Female who presents with a chief complaint of Vomiting and Diarrhea. (24 Feb 2020 08:39)      INTERVAL HPI/OVERNIGHT EVENTS: continues to have abdominal pain, No diarrhea since 2/23    MEDICATIONS  (STANDING):  acetaminophen   Tablet .. 650 milliGRAM(s) Oral daily  apixaban 5 milliGRAM(s) Oral two times a day  aspirin enteric coated 81 milliGRAM(s) Oral daily  atorvastatin 40 milliGRAM(s) Oral at bedtime  folic acid 1 milliGRAM(s) Oral daily  insulin lispro (HumaLOG) corrective regimen sliding scale   SubCutaneous Before meals and at bedtime  ipratropium 17 MICROgram(s) HFA Inhaler 1 Puff(s) Inhalation every 6 hours  metoprolol tartrate 12.5 milliGRAM(s) Oral two times a day  nystatin Powder 1 Application(s) Topical every 12 hours  pantoprazole    Tablet 40 milliGRAM(s) Oral before breakfast    MEDICATIONS  (PRN):  aluminum hydroxide/magnesium hydroxide/simethicone Suspension 30 milliLiter(s) Oral every 8 hours PRN Dyspepsia  morphine  - Injectable 1 milliGRAM(s) IV Push every 6 hours PRN Severe Pain (7 - 10)  ondansetron Injectable 4 milliGRAM(s) IV Push every 6 hours PRN Nausea and/or Vomiting      __________________________________________________  REVIEW OF SYSTEMS:    CONSTITUTIONAL: No fever,   EYES: no acute visual disturbances  NECK: No pain or stiffness  RESPIRATORY: No cough; No shortness of breath  CARDIOVASCULAR: No chest pain, no palpitations  GASTROINTESTINAL: diffuse moderate pain. No nausea or vomiting; No diarrhea   NEUROLOGICAL: No headache or numbness, no tremors  MUSCULOSKELETAL: No joint pain, no muscle pain  GENITOURINARY: no dysuria, no frequency, no hesitancy  PSYCHIATRY: no depression , no anxiety  ALL OTHER  ROS negative        Vital Signs Last 24 Hrs  T(C): 36.8 (23 Feb 2020 23:52), Max: 36.8 (23 Feb 2020 23:52)  T(F): 98.2 (23 Feb 2020 23:52), Max: 98.2 (23 Feb 2020 23:52)  HR: 76 (24 Feb 2020 05:23) (72 - 88)  BP: 140/57 (24 Feb 2020 05:23) (138/49 - 142/51)  BP(mean): --  RR: 16 (23 Feb 2020 23:52) (16 - 16)  SpO2: 100% (23 Feb 2020 23:52) (100% - 100%)    ________________________________________________  PHYSICAL EXAM:  GENERAL: NAD  HEENT: Normocephalic;  conjunctivae and sclerae clear; moist mucous membranes;   NECK : supple  CHEST/LUNG: Clear to auscultation bilaterally with good air entry   HEART: S1 S2  regular; no murmurs, gallops or rubs  ABDOMEN: Soft, tender on palpation , Nondistended; Bowel sounds present  EXTREMITIES: no cyanosis; no edema; no calf tenderness  SKIN: warm and dry; no rash  NERVOUS SYSTEM:  Awake and alert; Oriented  person and place ; no new deficits    _________________________________________________  LABS:                        9.2    6.59  )-----------( 316      ( 24 Feb 2020 09:40 )             29.9     02-24    138  |  102  |  7   ----------------------------<  189<H>  4.3   |  30  |  1.25    Ca    9.0      24 Feb 2020 09:40  Phos  3.8     02-23  Mg     2.0     02-23    TPro  7.4  /  Alb  3.3<L>  /  TBili  0.4  /  DBili  x   /  AST  26  /  ALT  29  /  AlkPhos  62  02-22    PT/INR - ( 22 Feb 2020 13:12 )   PT: 21.2 sec;   INR: 1.87 ratio         PTT - ( 22 Feb 2020 13:12 )  PTT:45.8 sec    CAPILLARY BLOOD GLUCOSE      POCT Blood Glucose.: 105 mg/dL (24 Feb 2020 08:34)  POCT Blood Glucose.: 118 mg/dL (23 Feb 2020 21:19)  POCT Blood Glucose.: 101 mg/dL (23 Feb 2020 16:55)  POCT Blood Glucose.: 101 mg/dL (23 Feb 2020 11:33)        RADIOLOGY & ADDITIONAL TESTS:    Imaging Personally Reviewed:  YES/NO    Consultant(s) Notes Reviewed:   YES/ No    Care Discussed with Consultants :     Plan of care was discussed with patient and /or primary care giver; all questions and concerns were addressed and care was aligned with patient's wishes.

## 2020-02-25 LAB
ANION GAP SERPL CALC-SCNC: 7 MMOL/L — SIGNIFICANT CHANGE UP (ref 5–17)
BUN SERPL-MCNC: 11 MG/DL — SIGNIFICANT CHANGE UP (ref 7–18)
CALCIUM SERPL-MCNC: 9.3 MG/DL — SIGNIFICANT CHANGE UP (ref 8.4–10.5)
CHLORIDE SERPL-SCNC: 103 MMOL/L — SIGNIFICANT CHANGE UP (ref 96–108)
CO2 SERPL-SCNC: 29 MMOL/L — SIGNIFICANT CHANGE UP (ref 22–31)
CREAT SERPL-MCNC: 1.16 MG/DL — SIGNIFICANT CHANGE UP (ref 0.5–1.3)
CULTURE RESULTS: SIGNIFICANT CHANGE UP
GLUCOSE BLDC GLUCOMTR-MCNC: 132 MG/DL — HIGH (ref 70–99)
GLUCOSE BLDC GLUCOMTR-MCNC: 134 MG/DL — HIGH (ref 70–99)
GLUCOSE BLDC GLUCOMTR-MCNC: 138 MG/DL — HIGH (ref 70–99)
GLUCOSE BLDC GLUCOMTR-MCNC: 197 MG/DL — HIGH (ref 70–99)
GLUCOSE SERPL-MCNC: 119 MG/DL — HIGH (ref 70–99)
HCT VFR BLD CALC: 30.1 % — LOW (ref 34.5–45)
HGB BLD-MCNC: 9.2 G/DL — LOW (ref 11.5–15.5)
MCHC RBC-ENTMCNC: 27.4 PG — SIGNIFICANT CHANGE UP (ref 27–34)
MCHC RBC-ENTMCNC: 30.6 GM/DL — LOW (ref 32–36)
MCV RBC AUTO: 89.6 FL — SIGNIFICANT CHANGE UP (ref 80–100)
NRBC # BLD: 0 /100 WBCS — SIGNIFICANT CHANGE UP (ref 0–0)
PLATELET # BLD AUTO: 337 K/UL — SIGNIFICANT CHANGE UP (ref 150–400)
POTASSIUM SERPL-MCNC: 4.2 MMOL/L — SIGNIFICANT CHANGE UP (ref 3.5–5.3)
POTASSIUM SERPL-SCNC: 4.2 MMOL/L — SIGNIFICANT CHANGE UP (ref 3.5–5.3)
RBC # BLD: 3.36 M/UL — LOW (ref 3.8–5.2)
RBC # FLD: 15 % — HIGH (ref 10.3–14.5)
SODIUM SERPL-SCNC: 139 MMOL/L — SIGNIFICANT CHANGE UP (ref 135–145)
SPECIMEN SOURCE: SIGNIFICANT CHANGE UP
WBC # BLD: 7.04 K/UL — SIGNIFICANT CHANGE UP (ref 3.8–10.5)
WBC # FLD AUTO: 7.04 K/UL — SIGNIFICANT CHANGE UP (ref 3.8–10.5)

## 2020-02-25 RX ADMIN — NYSTATIN CREAM 1 APPLICATION(S): 100000 CREAM TOPICAL at 18:08

## 2020-02-25 RX ADMIN — Medication 1 PUFF(S): at 09:01

## 2020-02-25 RX ADMIN — Medication 1 MILLIGRAM(S): at 12:04

## 2020-02-25 RX ADMIN — Medication 650 MILLIGRAM(S): at 12:55

## 2020-02-25 RX ADMIN — Medication 12.5 MILLIGRAM(S): at 18:10

## 2020-02-25 RX ADMIN — APIXABAN 5 MILLIGRAM(S): 2.5 TABLET, FILM COATED ORAL at 07:03

## 2020-02-25 RX ADMIN — PANTOPRAZOLE SODIUM 40 MILLIGRAM(S): 20 TABLET, DELAYED RELEASE ORAL at 07:03

## 2020-02-25 RX ADMIN — Medication 1 PUFF(S): at 03:00

## 2020-02-25 RX ADMIN — Medication 1: at 12:04

## 2020-02-25 RX ADMIN — Medication 650 MILLIGRAM(S): at 12:14

## 2020-02-25 RX ADMIN — Medication 12.5 MILLIGRAM(S): at 07:03

## 2020-02-25 RX ADMIN — Medication 81 MILLIGRAM(S): at 12:04

## 2020-02-25 RX ADMIN — APIXABAN 5 MILLIGRAM(S): 2.5 TABLET, FILM COATED ORAL at 18:10

## 2020-02-25 RX ADMIN — NYSTATIN CREAM 1 APPLICATION(S): 100000 CREAM TOPICAL at 07:04

## 2020-02-25 RX ADMIN — Medication 1 PUFF(S): at 18:10

## 2020-02-25 RX ADMIN — Medication 1 PUFF(S): at 22:34

## 2020-02-25 RX ADMIN — ATORVASTATIN CALCIUM 40 MILLIGRAM(S): 80 TABLET, FILM COATED ORAL at 22:34

## 2020-02-25 NOTE — PROGRESS NOTE ADULT - SUBJECTIVE AND OBJECTIVE BOX
Patient is a 74y old  Female who presents with a chief complaint of Vomiting and Diarrhea. (24 Feb 2020 11:22)    PATIENT IS SEEN AND EXAMINED IN MEDICAL FLOOR.    ALLERGIES:  No Known Allergies    VITALS:    Vital Signs Last 24 Hrs  T(C): 36.5 (25 Feb 2020 16:00), Max: 36.6 (24 Feb 2020 23:50)  T(F): 97.7 (25 Feb 2020 16:00), Max: 97.8 (24 Feb 2020 23:50)  HR: 82 (25 Feb 2020 16:00) (66 - 84)  BP: 136/47 (25 Feb 2020 16:00) (136/47 - 156/62)  BP(mean): --  RR: 18 (25 Feb 2020 16:00) (17 - 18)  SpO2: 100% (25 Feb 2020 16:00) (99% - 100%)    LABS:    CBC Full  -  ( 25 Feb 2020 06:34 )  WBC Count : 7.04 K/uL  RBC Count : 3.36 M/uL  Hemoglobin : 9.2 g/dL  Hematocrit : 30.1 %  Platelet Count - Automated : 337 K/uL  Mean Cell Volume : 89.6 fl  Mean Cell Hemoglobin : 27.4 pg  Mean Cell Hemoglobin Concentration : 30.6 gm/dL  Auto Neutrophil # : x  Auto Lymphocyte # : x  Auto Monocyte # : x  Auto Eosinophil # : x  Auto Basophil # : x  Auto Neutrophil % : x  Auto Lymphocyte % : x  Auto Monocyte % : x  Auto Eosinophil % : x  Auto Basophil % : x      02-25    139  |  103  |  11  ----------------------------<  119<H>  4.2   |  29  |  1.16    Ca    9.3      25 Feb 2020 06:34      CAPILLARY BLOOD GLUCOSE    POCT Blood Glucose.: 134 mg/dL (25 Feb 2020 16:52)  POCT Blood Glucose.: 197 mg/dL (25 Feb 2020 11:54)  POCT Blood Glucose.: 132 mg/dL (25 Feb 2020 08:52)  POCT Blood Glucose.: 131 mg/dL (24 Feb 2020 21:20)      Creatinine Trend: 1.16<--, 1.25<--, 1.27<--, 1.05<--, 1.22<--, 1.07<--  I&O's Summary      .Stool  02-23 @ 11:15   No enteric pathogens isolated.  (Stool culture examined for Salmonella,  Shigella, Campylobacter, Aeromonas, Plesiomonas,  Vibrio, E.coli O157 and Yersinia)  --  --      .Urine  02-16 @ 22:58   50,000 - 99,000 CFU/mL Enterococcus faecalis (vancomycin resistant)  --  Gram Positive Cocci in Pairs and Chains      .Blood  02-14 @ 02:16   No growth at 5 days.  --  --      .Blood  02-14 @ 02:09   No growth at 5 days.  --  --      .Blood  01-29 @ 01:40   No growth at 5 days.  --  --      .Urine  01-29 @ 01:39   <10,000 CFU/mL Normal Urogenital Candace  --  --          MEDICATIONS:    MEDICATIONS  (STANDING):  acetaminophen   Tablet .. 650 milliGRAM(s) Oral daily  apixaban 5 milliGRAM(s) Oral two times a day  aspirin enteric coated 81 milliGRAM(s) Oral daily  atorvastatin 40 milliGRAM(s) Oral at bedtime  folic acid 1 milliGRAM(s) Oral daily  insulin lispro (HumaLOG) corrective regimen sliding scale   SubCutaneous Before meals and at bedtime  ipratropium 17 MICROgram(s) HFA Inhaler 1 Puff(s) Inhalation every 6 hours  metoprolol tartrate 12.5 milliGRAM(s) Oral two times a day  nystatin Powder 1 Application(s) Topical every 12 hours  pantoprazole    Tablet 40 milliGRAM(s) Oral before breakfast      MEDICATIONS  (PRN):  aluminum hydroxide/magnesium hydroxide/simethicone Suspension 30 milliLiter(s) Oral every 8 hours PRN Dyspepsia  ondansetron Injectable 4 milliGRAM(s) IV Push every 6 hours PRN Nausea and/or Vomiting      REVIEW OF SYSTEMS:                           ALL ROS DONE [ X   ]    CONSTITUTIONAL:  LETHARGIC [   ], FEVER [   ], UNRESPONSIVE [   ]  CVS:  CP  [   ], SOB, [   ], PALPITATIONS [   ], DIZZYNESS [   ]  RS: COUGH [   ], SPUTUM [   ]  GI: ABDOMINAL PAIN [   ], NAUSEA [   ], VOMITINGS [   ], DIARRHEA [   ], CONSTIPATION [   ]  :  DYSURIA [   ], NOCTURIA [   ], INCREASED FREQUENCY [   ], DRIBLING [   ],  SKELETAL: PAINFUL JOINTS [   ], SWOLLEN JOINTS [   ], NECK ACHE [   ], LOW BACK ACHE [   ],  SKIN : ULCERS [   ], RASH [   ], ITCHING [   ]  CNS: HEAD ACHE [   ], DOUBLE VISION [   ], BLURRED VISION [   ], AMS / CONFUSION [   ], SEIZURES [   ], WEAKNESS [   ],TINGLING / NUMBNESS [   ]      PHYSICAL EXAMINATION:  GENERAL APPEARANCE: NO DISTRESS  HEENT:  NO PALLOR, NO  JVD,  NO   NODES, NECK SUPPLE  CVS: S1 +, S2 +,   RS: AEEB,  OCCASIONAL  RALES +,   MILD B/L  RONCHI +  ABD: SOFT, NT, NO, BS +  EXT: MILD PE +  SKIN: WARM,   SKELETAL:  ROM ACCEPTABLE  CNS:  AAO X  2-3 , NO DEFICITS    RADIOLOGY :    < from: CT Abdomen and Pelvis w/ IV Cont (02.22.20 @ 15:25) >  IMPRESSION: Mild colonic thickening as noted. Progression of basilar lung infiltrates versus neoplasm. Clinical correlation is suggested.    Thank you for  this referral.    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 Approximately 6 episodes daily of NBNB diarrhea. Associated with nausea, weight loss of 30 pounds in 2 wks, loss of appetite and chills. It is a/w Generalized abdominal pain, 10/10, cramping, constant, relieved with pain medication. Recently admitted for flu like symptoms and diagnosed with influenza A, UTI, and PNA. Discharged two days ago from  on 2/19/20 also treated with abx in Jan for PNA. Received IV rocephin and azithro and is now on po augmentin. Reports compliance with abx. Denies fevers, chest pain, shortness of breath, dysuria, hematuria,  constipation, sick contacts, recent travel or any other acute complaints.  [facility] Full code (22 Feb 2020 17:40)    - DC PLAN TO [facility]. D/W PATIENT'S SISTER AND PATIENT WITH DC PLAN .  - DIARRHEA - due to multiple factors. SUSPECT AUGMENTIN AND FESO4. LEAST SUSPICION FOR C. DIFF COLITIS. DISCONTINUED AUGMENTIN AND FESO4. WILL OBSERVE  - NAUSEA AND VOMITING, DUE TO GASTRITIS ON MAALOX, PPI  - DEHYDRATION DUE TO POOR ORAL INTAKE ON IV FLUIDS  - DECONDITIONED DUE TO MULTIPLE COMORBIDITIES - OBTAIN PT AND OT EVALUATION - DC PLAN TO ? [facility]  - RESOLVING INFLUENZA A, ACUTE BRONCHITIS AND POST OBSTRUCTIVE PNEUMONIA  - ADENOCARCINOMA OF LUNG ON ORAL CHEMOTHERAPY FROM [facility]  - GI AND DVT PROPHYLAXIS  - DR. BENTON

## 2020-02-25 NOTE — ED ADULT TRIAGE NOTE - ACCOMPANIED BY
Patient father, Efren? (can't ready my own scribble hand writing) called and left a voicemail in regards to his daughter. She saw Dr. Acevedo yesterday, and there was a prescription that was supposed to be sent that was not. They are requesting a call back to sort out the missing script.   [email or web address]/Other

## 2020-02-26 LAB
GLUCOSE BLDC GLUCOMTR-MCNC: 111 MG/DL — HIGH (ref 70–99)
GLUCOSE BLDC GLUCOMTR-MCNC: 145 MG/DL — HIGH (ref 70–99)
GLUCOSE BLDC GLUCOMTR-MCNC: 157 MG/DL — HIGH (ref 70–99)
GLUCOSE BLDC GLUCOMTR-MCNC: 255 MG/DL — HIGH (ref 70–99)

## 2020-02-26 RX ORDER — ONDANSETRON 8 MG/1
4 TABLET, FILM COATED ORAL EVERY 6 HOURS
Refills: 0 | Status: DISCONTINUED | OUTPATIENT
Start: 2020-02-26 | End: 2020-02-27

## 2020-02-26 RX ADMIN — Medication 1 PUFF(S): at 21:26

## 2020-02-26 RX ADMIN — Medication 650 MILLIGRAM(S): at 12:30

## 2020-02-26 RX ADMIN — Medication 12.5 MILLIGRAM(S): at 06:42

## 2020-02-26 RX ADMIN — Medication 1: at 08:42

## 2020-02-26 RX ADMIN — APIXABAN 5 MILLIGRAM(S): 2.5 TABLET, FILM COATED ORAL at 17:12

## 2020-02-26 RX ADMIN — Medication 81 MILLIGRAM(S): at 12:31

## 2020-02-26 RX ADMIN — Medication 1 PUFF(S): at 08:43

## 2020-02-26 RX ADMIN — ONDANSETRON 4 MILLIGRAM(S): 8 TABLET, FILM COATED ORAL at 20:38

## 2020-02-26 RX ADMIN — PANTOPRAZOLE SODIUM 40 MILLIGRAM(S): 20 TABLET, DELAYED RELEASE ORAL at 06:42

## 2020-02-26 RX ADMIN — NYSTATIN CREAM 1 APPLICATION(S): 100000 CREAM TOPICAL at 17:14

## 2020-02-26 RX ADMIN — Medication 1 MILLIGRAM(S): at 12:30

## 2020-02-26 RX ADMIN — Medication 1 PUFF(S): at 15:32

## 2020-02-26 RX ADMIN — Medication 650 MILLIGRAM(S): at 13:30

## 2020-02-26 RX ADMIN — ATORVASTATIN CALCIUM 40 MILLIGRAM(S): 80 TABLET, FILM COATED ORAL at 21:26

## 2020-02-26 RX ADMIN — Medication 3: at 17:11

## 2020-02-26 RX ADMIN — Medication 1 PUFF(S): at 03:54

## 2020-02-26 RX ADMIN — APIXABAN 5 MILLIGRAM(S): 2.5 TABLET, FILM COATED ORAL at 06:42

## 2020-02-26 RX ADMIN — NYSTATIN CREAM 1 APPLICATION(S): 100000 CREAM TOPICAL at 06:42

## 2020-02-26 RX ADMIN — Medication 12.5 MILLIGRAM(S): at 17:13

## 2020-02-26 NOTE — PROGRESS NOTE ADULT - ASSESSMENT
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 ED course s/p 1L ns and Vancomycin oral. CT scan showed Focal Colitis.  Admitted to medicine for Colitis.    dispo: pt is pending ernst placement- waiting for oral chemo tx - dc plan for wednesday

## 2020-02-26 NOTE — PROGRESS NOTE ADULT - SUBJECTIVE AND OBJECTIVE BOX
PGY 1 Note discussed with supervising resident and primary attending    Patient is a 74y old  Female who presents with a chief complaint of Vomiting and Diarrhea. (25 Feb 2020 20:04)      INTERVAL HPI/OVERNIGHT EVENTS: pt complaints of diffuse abdominal discomfort and not feeling well, states she hasn't slept all night     MEDICATIONS  (STANDING):  acetaminophen   Tablet .. 650 milliGRAM(s) Oral daily  apixaban 5 milliGRAM(s) Oral two times a day  aspirin enteric coated 81 milliGRAM(s) Oral daily  atorvastatin 40 milliGRAM(s) Oral at bedtime  folic acid 1 milliGRAM(s) Oral daily  insulin lispro (HumaLOG) corrective regimen sliding scale   SubCutaneous Before meals and at bedtime  ipratropium 17 MICROgram(s) HFA Inhaler 1 Puff(s) Inhalation every 6 hours  metoprolol tartrate 12.5 milliGRAM(s) Oral two times a day  nystatin Powder 1 Application(s) Topical every 12 hours  pantoprazole    Tablet 40 milliGRAM(s) Oral before breakfast    MEDICATIONS  (PRN):  aluminum hydroxide/magnesium hydroxide/simethicone Suspension 30 milliLiter(s) Oral every 8 hours PRN Dyspepsia  ondansetron Injectable 4 milliGRAM(s) IV Push every 6 hours PRN Nausea and/or Vomiting      __________________________________________________  REVIEW OF SYSTEMS:    CONSTITUTIONAL: No fever,   EYES: no acute visual disturbances  NECK: No pain or stiffness  RESPIRATORY: No cough; No shortness of breath  CARDIOVASCULAR: No chest pain, no palpitations  GASTROINTESTINAL: + pain. No nausea or vomiting; No diarrhea   NEUROLOGICAL: No headache or numbness, no tremors  MUSCULOSKELETAL: No joint pain, no muscle pain  GENITOURINARY: no dysuria, no frequency, no hesitancy  PSYCHIATRY: no depression , no anxiety  ALL OTHER  ROS negative        Vital Signs Last 24 Hrs  T(C): 36.7 (26 Feb 2020 08:08), Max: 36.7 (26 Feb 2020 08:08)  T(F): 98 (26 Feb 2020 08:08), Max: 98 (26 Feb 2020 08:08)  HR: 66 (26 Feb 2020 08:08) (66 - 82)  BP: 154/45 (26 Feb 2020 08:08) (136/47 - 174/60)  BP(mean): --  RR: 18 (26 Feb 2020 08:08) (17 - 18)  SpO2: 99% (26 Feb 2020 08:08) (99% - 100%)    ________________________________________________  PHYSICAL EXAM:  GENERAL: NAD on nasal cannula   HEENT: Normocephalic;  conjunctivae and sclerae clear; moist mucous membranes;   NECK : supple  CHEST/LUNG: Clear to auscultation bilaterally with good air entry   HEART: S1 S2  regular; no murmurs, gallops or rubs  ABDOMEN: Soft, mildly tender on deep palpation, Nondistended; Bowel sounds present  EXTREMITIES: no cyanosis; no edema; no calf tenderness  SKIN: warm and dry; no rash  NERVOUS SYSTEM:  Awake and alert; Oriented  to place, person and time ; no new deficits    _________________________________________________  LABS:                        9.2    7.04  )-----------( 337      ( 25 Feb 2020 06:34 )             30.1     02-25    139  |  103  |  11  ----------------------------<  119<H>  4.2   |  29  |  1.16    Ca    9.3      25 Feb 2020 06:34          CAPILLARY BLOOD GLUCOSE      POCT Blood Glucose.: 157 mg/dL (26 Feb 2020 08:36)  POCT Blood Glucose.: 138 mg/dL (25 Feb 2020 21:03)  POCT Blood Glucose.: 134 mg/dL (25 Feb 2020 16:52)  POCT Blood Glucose.: 197 mg/dL (25 Feb 2020 11:54)        RADIOLOGY & ADDITIONAL TESTS:    Imaging Personally Reviewed:  YES/NO    Consultant(s) Notes Reviewed:   YES/ No    Care Discussed with Consultants :     Plan of care was discussed with patient and /or primary care giver; all questions and concerns were addressed and care was aligned with patient's wishes.

## 2020-02-26 NOTE — PROGRESS NOTE ADULT - PROBLEM SELECTOR PLAN 1
abdominal pain, n/v, diarrhea, recent antibiotic use like Augmentin  VS are stable  In ED s/p ivf and vancomycin 1dose  CT Abdomen showed focal colitis  Pain management  maalox PPI and zofran prn  Not starting on antibiotics as she is afebrile, no leucocytosis.  diarrhea resolved but pt states she has abdominal discomfort and does not feel well   will dc regular diet and start on clears

## 2020-02-26 NOTE — PROGRESS NOTE ADULT - ATTENDING COMMENTS
PATIENT WAS SEEN AND EXAMINED     - DC PLAN TO [facility]. D/W PATIENT'S SISTER AND PATIENT WITH DC PLAN . PATIENT REFUSED TO GET DISCHARGED TODAY SINCE SHE FEELS ABDOMINAL DISCOMFORT. PATIENT IS COUNSELLED THAT SHE IS SAFE FOR DISCHARGE  - RESOLVED DIARRHEA - due to multiple factors. SUSPECT AUGMENTIN AND FESO4. LEAST SUSPICION FOR C. DIFF COLITIS. DISCONTINUED AUGMENTIN AND FESO4. WILL OBSERVE  - NAUSEA AND VOMITING, DUE TO GASTRITIS ON MAALOX, PPI  - DEHYDRATION DUE TO POOR ORAL INTAKE ON IV FLUIDS  - DECONDITIONED DUE TO MULTIPLE COMORBIDITIES - OBTAIN PT AND OT EVALUATION - DC PLAN TO ? [facility]  - RESOLVING INFLUENZA A, ACUTE BRONCHITIS AND POST OBSTRUCTIVE PNEUMONIA  - ADENOCARCINOMA OF LUNG ON ORAL CHEMOTHERAPY FROM [facility]  - GI AND DVT PROPHYLAXIS  - DR. BENTON

## 2020-02-27 ENCOUNTER — TRANSCRIPTION ENCOUNTER (OUTPATIENT)
Age: 75
End: 2020-02-27

## 2020-02-27 VITALS
RESPIRATION RATE: 17 BRPM | TEMPERATURE: 97 F | OXYGEN SATURATION: 100 % | DIASTOLIC BLOOD PRESSURE: 47 MMHG | HEART RATE: 68 BPM | SYSTOLIC BLOOD PRESSURE: 155 MMHG

## 2020-02-27 LAB
ANION GAP SERPL CALC-SCNC: 4 MMOL/L — LOW (ref 5–17)
BUN SERPL-MCNC: 16 MG/DL — SIGNIFICANT CHANGE UP (ref 7–18)
CALCIUM SERPL-MCNC: 9.5 MG/DL — SIGNIFICANT CHANGE UP (ref 8.4–10.5)
CHLORIDE SERPL-SCNC: 102 MMOL/L — SIGNIFICANT CHANGE UP (ref 96–108)
CO2 SERPL-SCNC: 32 MMOL/L — HIGH (ref 22–31)
CREAT SERPL-MCNC: 0.99 MG/DL — SIGNIFICANT CHANGE UP (ref 0.5–1.3)
GLUCOSE BLDC GLUCOMTR-MCNC: 146 MG/DL — HIGH (ref 70–99)
GLUCOSE SERPL-MCNC: 148 MG/DL — HIGH (ref 70–99)
HCT VFR BLD CALC: 31.9 % — LOW (ref 34.5–45)
HGB BLD-MCNC: 9.7 G/DL — LOW (ref 11.5–15.5)
MCHC RBC-ENTMCNC: 27.6 PG — SIGNIFICANT CHANGE UP (ref 27–34)
MCHC RBC-ENTMCNC: 30.4 GM/DL — LOW (ref 32–36)
MCV RBC AUTO: 90.6 FL — SIGNIFICANT CHANGE UP (ref 80–100)
NRBC # BLD: 0 /100 WBCS — SIGNIFICANT CHANGE UP (ref 0–0)
PLATELET # BLD AUTO: 284 K/UL — SIGNIFICANT CHANGE UP (ref 150–400)
POTASSIUM SERPL-MCNC: 4.5 MMOL/L — SIGNIFICANT CHANGE UP (ref 3.5–5.3)
POTASSIUM SERPL-SCNC: 4.5 MMOL/L — SIGNIFICANT CHANGE UP (ref 3.5–5.3)
RBC # BLD: 3.52 M/UL — LOW (ref 3.8–5.2)
RBC # FLD: 15.5 % — HIGH (ref 10.3–14.5)
SODIUM SERPL-SCNC: 138 MMOL/L — SIGNIFICANT CHANGE UP (ref 135–145)
WBC # BLD: 7.16 K/UL — SIGNIFICANT CHANGE UP (ref 3.8–10.5)
WBC # FLD AUTO: 7.16 K/UL — SIGNIFICANT CHANGE UP (ref 3.8–10.5)

## 2020-02-27 PROCEDURE — 87045 FECES CULTURE AEROBIC BACT: CPT

## 2020-02-27 PROCEDURE — 83690 ASSAY OF LIPASE: CPT

## 2020-02-27 PROCEDURE — 82746 ASSAY OF FOLIC ACID SERUM: CPT

## 2020-02-27 PROCEDURE — 82962 GLUCOSE BLOOD TEST: CPT

## 2020-02-27 PROCEDURE — 99285 EMERGENCY DEPT VISIT HI MDM: CPT | Mod: 25

## 2020-02-27 PROCEDURE — 93005 ELECTROCARDIOGRAM TRACING: CPT

## 2020-02-27 PROCEDURE — 85610 PROTHROMBIN TIME: CPT

## 2020-02-27 PROCEDURE — 80053 COMPREHEN METABOLIC PANEL: CPT

## 2020-02-27 PROCEDURE — 85730 THROMBOPLASTIN TIME PARTIAL: CPT

## 2020-02-27 PROCEDURE — 94640 AIRWAY INHALATION TREATMENT: CPT

## 2020-02-27 PROCEDURE — 87493 C DIFF AMPLIFIED PROBE: CPT

## 2020-02-27 PROCEDURE — 82570 ASSAY OF URINE CREATININE: CPT

## 2020-02-27 PROCEDURE — 82607 VITAMIN B-12: CPT

## 2020-02-27 PROCEDURE — 87046 STOOL CULTR AEROBIC BACT EA: CPT

## 2020-02-27 PROCEDURE — 83036 HEMOGLOBIN GLYCOSYLATED A1C: CPT

## 2020-02-27 PROCEDURE — 96376 TX/PRO/DX INJ SAME DRUG ADON: CPT

## 2020-02-27 PROCEDURE — 85027 COMPLETE CBC AUTOMATED: CPT

## 2020-02-27 PROCEDURE — 84100 ASSAY OF PHOSPHORUS: CPT

## 2020-02-27 PROCEDURE — 83930 ASSAY OF BLOOD OSMOLALITY: CPT

## 2020-02-27 PROCEDURE — 83735 ASSAY OF MAGNESIUM: CPT

## 2020-02-27 PROCEDURE — 96361 HYDRATE IV INFUSION ADD-ON: CPT

## 2020-02-27 PROCEDURE — 80061 LIPID PANEL: CPT

## 2020-02-27 PROCEDURE — 74177 CT ABD & PELVIS W/CONTRAST: CPT

## 2020-02-27 PROCEDURE — 80048 BASIC METABOLIC PNL TOTAL CA: CPT

## 2020-02-27 PROCEDURE — 36415 COLL VENOUS BLD VENIPUNCTURE: CPT

## 2020-02-27 PROCEDURE — 82436 ASSAY OF URINE CHLORIDE: CPT

## 2020-02-27 PROCEDURE — 84560 ASSAY OF URINE/URIC ACID: CPT

## 2020-02-27 PROCEDURE — 83935 ASSAY OF URINE OSMOLALITY: CPT

## 2020-02-27 PROCEDURE — 84300 ASSAY OF URINE SODIUM: CPT

## 2020-02-27 PROCEDURE — 96375 TX/PRO/DX INJ NEW DRUG ADDON: CPT

## 2020-02-27 PROCEDURE — 96374 THER/PROPH/DIAG INJ IV PUSH: CPT

## 2020-02-27 PROCEDURE — 84156 ASSAY OF PROTEIN URINE: CPT

## 2020-02-27 PROCEDURE — 84484 ASSAY OF TROPONIN QUANT: CPT

## 2020-02-27 PROCEDURE — 84443 ASSAY THYROID STIM HORMONE: CPT

## 2020-02-27 RX ORDER — IPRATROPIUM BROMIDE 0.2 MG/ML
2 SOLUTION, NON-ORAL INHALATION
Qty: 0 | Refills: 0 | DISCHARGE

## 2020-02-27 RX ADMIN — Medication 12.5 MILLIGRAM(S): at 05:36

## 2020-02-27 RX ADMIN — PANTOPRAZOLE SODIUM 40 MILLIGRAM(S): 20 TABLET, DELAYED RELEASE ORAL at 05:36

## 2020-02-27 RX ADMIN — APIXABAN 5 MILLIGRAM(S): 2.5 TABLET, FILM COATED ORAL at 05:36

## 2020-02-27 RX ADMIN — NYSTATIN CREAM 1 APPLICATION(S): 100000 CREAM TOPICAL at 05:35

## 2020-02-27 NOTE — DISCHARGE NOTE NURSING/CASE MANAGEMENT/SOCIAL WORK - PATIENT PORTAL LINK FT
You can access the FollowMyHealth Patient Portal offered by [facility] by registering at the following website: [email or web address]. By joining [email or web address]’s FollowMyHealth portal, you will also be able to view your health information using other applications (apps) compatible with our system.

## 2020-09-06 ENCOUNTER — EMERGENCY (EMERGENCY)
Facility: HOSPITAL | Age: 75
LOS: 1 days | Discharge: HOME | End: 2020-09-06
Attending: EMERGENCY MEDICINE
Payer: MEDICAID

## 2020-09-06 VITALS
OXYGEN SATURATION: 100 % | DIASTOLIC BLOOD PRESSURE: 52 MMHG | HEART RATE: 90 BPM | SYSTOLIC BLOOD PRESSURE: 110 MMHG | WEIGHT: 130.07 LBS | RESPIRATION RATE: 16 BRPM | TEMPERATURE: 98 F

## 2020-09-06 VITALS
RESPIRATION RATE: 20 BRPM | TEMPERATURE: 98 F | SYSTOLIC BLOOD PRESSURE: 156 MMHG | OXYGEN SATURATION: 100 % | DIASTOLIC BLOOD PRESSURE: 61 MMHG | HEART RATE: 96 BPM

## 2020-09-06 DIAGNOSIS — Z98.89 OTHER SPECIFIED POSTPROCEDURAL STATES: Chronic | ICD-10-CM

## 2020-09-06 LAB
ALBUMIN SERPL ELPH-MCNC: 2.6 G/DL — LOW (ref 3.5–5)
ALP SERPL-CCNC: 67 U/L — SIGNIFICANT CHANGE UP (ref 40–120)
ALT FLD-CCNC: 33 U/L DA — SIGNIFICANT CHANGE UP (ref 10–60)
ANION GAP SERPL CALC-SCNC: 4 MMOL/L — LOW (ref 5–17)
APPEARANCE UR: ABNORMAL
APTT BLD: 47.3 SEC — HIGH (ref 27.5–35.5)
AST SERPL-CCNC: 69 U/L — HIGH (ref 10–40)
BASOPHILS # BLD AUTO: 0.03 K/UL — SIGNIFICANT CHANGE UP (ref 0–0.2)
BASOPHILS NFR BLD AUTO: 0.2 % — SIGNIFICANT CHANGE UP (ref 0–2)
BILIRUB SERPL-MCNC: 0.5 MG/DL — SIGNIFICANT CHANGE UP (ref 0.2–1.2)
BILIRUB UR-MCNC: NEGATIVE — SIGNIFICANT CHANGE UP
BUN SERPL-MCNC: 7 MG/DL — SIGNIFICANT CHANGE UP (ref 7–18)
CALCIUM SERPL-MCNC: 8.9 MG/DL — SIGNIFICANT CHANGE UP (ref 8.4–10.5)
CHLORIDE SERPL-SCNC: 101 MMOL/L — SIGNIFICANT CHANGE UP (ref 96–108)
CO2 SERPL-SCNC: 35 MMOL/L — HIGH (ref 22–31)
COLOR SPEC: YELLOW — SIGNIFICANT CHANGE UP
CREAT SERPL-MCNC: 1.08 MG/DL — SIGNIFICANT CHANGE UP (ref 0.5–1.3)
DIFF PNL FLD: ABNORMAL
EOSINOPHIL # BLD AUTO: 0.02 K/UL — SIGNIFICANT CHANGE UP (ref 0–0.5)
EOSINOPHIL NFR BLD AUTO: 0.2 % — SIGNIFICANT CHANGE UP (ref 0–6)
GLUCOSE SERPL-MCNC: 110 MG/DL — HIGH (ref 70–99)
GLUCOSE UR QL: NEGATIVE — SIGNIFICANT CHANGE UP
HCT VFR BLD CALC: 34.8 % — SIGNIFICANT CHANGE UP (ref 34.5–45)
HGB BLD-MCNC: 11.4 G/DL — LOW (ref 11.5–15.5)
IMM GRANULOCYTES NFR BLD AUTO: 0.4 % — SIGNIFICANT CHANGE UP (ref 0–1.5)
INR BLD: 2.62 RATIO — HIGH (ref 0.88–1.16)
KETONES UR-MCNC: ABNORMAL
LACTATE SERPL-SCNC: 1.1 MMOL/L — SIGNIFICANT CHANGE UP (ref 0.7–2)
LEUKOCYTE ESTERASE UR-ACNC: ABNORMAL
LYMPHOCYTES # BLD AUTO: 1.13 K/UL — SIGNIFICANT CHANGE UP (ref 1–3.3)
LYMPHOCYTES # BLD AUTO: 8.7 % — LOW (ref 13–44)
MCHC RBC-ENTMCNC: 28.9 PG — SIGNIFICANT CHANGE UP (ref 27–34)
MCHC RBC-ENTMCNC: 32.8 GM/DL — SIGNIFICANT CHANGE UP (ref 32–36)
MCV RBC AUTO: 88.3 FL — SIGNIFICANT CHANGE UP (ref 80–100)
MONOCYTES # BLD AUTO: 0.67 K/UL — SIGNIFICANT CHANGE UP (ref 0–0.9)
MONOCYTES NFR BLD AUTO: 5.2 % — SIGNIFICANT CHANGE UP (ref 2–14)
NEUTROPHILS # BLD AUTO: 11.02 K/UL — HIGH (ref 1.8–7.4)
NEUTROPHILS NFR BLD AUTO: 85.3 % — HIGH (ref 43–77)
NITRITE UR-MCNC: POSITIVE
NRBC # BLD: 0 /100 WBCS — SIGNIFICANT CHANGE UP (ref 0–0)
NT-PROBNP SERPL-SCNC: 754 PG/ML — HIGH (ref 0–450)
PH UR: 7 — SIGNIFICANT CHANGE UP (ref 5–8)
PLATELET # BLD AUTO: 193 K/UL — SIGNIFICANT CHANGE UP (ref 150–400)
POTASSIUM SERPL-MCNC: 3.2 MMOL/L — LOW (ref 3.5–5.3)
POTASSIUM SERPL-SCNC: 3.2 MMOL/L — LOW (ref 3.5–5.3)
PROT SERPL-MCNC: 6.3 G/DL — SIGNIFICANT CHANGE UP (ref 6–8.3)
PROT UR-MCNC: 100
PROTHROM AB SERPL-ACNC: 29.3 SEC — HIGH (ref 10.6–13.6)
RBC # BLD: 3.94 M/UL — SIGNIFICANT CHANGE UP (ref 3.8–5.2)
RBC # FLD: 14.3 % — SIGNIFICANT CHANGE UP (ref 10.3–14.5)
SARS-COV-2 RNA SPEC QL NAA+PROBE: SIGNIFICANT CHANGE UP
SODIUM SERPL-SCNC: 140 MMOL/L — SIGNIFICANT CHANGE UP (ref 135–145)
SP GR SPEC: 1 — LOW (ref 1.01–1.02)
TROPONIN I SERPL-MCNC: 0.04 NG/ML — SIGNIFICANT CHANGE UP (ref 0–0.04)
UROBILINOGEN FLD QL: NEGATIVE — SIGNIFICANT CHANGE UP
WBC # BLD: 12.92 K/UL — HIGH (ref 3.8–10.5)
WBC # FLD AUTO: 12.92 K/UL — HIGH (ref 3.8–10.5)

## 2020-09-06 PROCEDURE — 99284 EMERGENCY DEPT VISIT MOD MDM: CPT

## 2020-09-06 PROCEDURE — 96361 HYDRATE IV INFUSION ADD-ON: CPT

## 2020-09-06 PROCEDURE — 83605 ASSAY OF LACTIC ACID: CPT

## 2020-09-06 PROCEDURE — 83880 ASSAY OF NATRIURETIC PEPTIDE: CPT

## 2020-09-06 PROCEDURE — 84484 ASSAY OF TROPONIN QUANT: CPT

## 2020-09-06 PROCEDURE — 85027 COMPLETE CBC AUTOMATED: CPT

## 2020-09-06 PROCEDURE — 81001 URINALYSIS AUTO W/SCOPE: CPT

## 2020-09-06 PROCEDURE — 85730 THROMBOPLASTIN TIME PARTIAL: CPT

## 2020-09-06 PROCEDURE — 80053 COMPREHEN METABOLIC PANEL: CPT

## 2020-09-06 PROCEDURE — 96374 THER/PROPH/DIAG INJ IV PUSH: CPT

## 2020-09-06 PROCEDURE — 94640 AIRWAY INHALATION TREATMENT: CPT

## 2020-09-06 PROCEDURE — 36415 COLL VENOUS BLD VENIPUNCTURE: CPT

## 2020-09-06 PROCEDURE — 87186 SC STD MICRODIL/AGAR DIL: CPT

## 2020-09-06 PROCEDURE — 93970 EXTREMITY STUDY: CPT | Mod: 26

## 2020-09-06 PROCEDURE — U0003: CPT

## 2020-09-06 PROCEDURE — 93970 EXTREMITY STUDY: CPT

## 2020-09-06 PROCEDURE — 87086 URINE CULTURE/COLONY COUNT: CPT

## 2020-09-06 PROCEDURE — 99285 EMERGENCY DEPT VISIT HI MDM: CPT | Mod: 25

## 2020-09-06 PROCEDURE — 71045 X-RAY EXAM CHEST 1 VIEW: CPT

## 2020-09-06 PROCEDURE — 71045 X-RAY EXAM CHEST 1 VIEW: CPT | Mod: 26

## 2020-09-06 PROCEDURE — 93005 ELECTROCARDIOGRAM TRACING: CPT

## 2020-09-06 PROCEDURE — 85610 PROTHROMBIN TIME: CPT

## 2020-09-06 RX ORDER — POTASSIUM CHLORIDE 20 MEQ
40 PACKET (EA) ORAL ONCE
Refills: 0 | Status: COMPLETED | OUTPATIENT
Start: 2020-09-06 | End: 2020-09-06

## 2020-09-06 RX ORDER — CEFUROXIME AXETIL 250 MG
250 TABLET ORAL ONCE
Refills: 0 | Status: COMPLETED | OUTPATIENT
Start: 2020-09-06 | End: 2020-09-06

## 2020-09-06 RX ORDER — IPRATROPIUM/ALBUTEROL SULFATE 18-103MCG
3 AEROSOL WITH ADAPTER (GRAM) INHALATION
Refills: 0 | Status: COMPLETED | OUTPATIENT
Start: 2020-09-06 | End: 2020-09-06

## 2020-09-06 RX ORDER — SODIUM CHLORIDE 9 MG/ML
1000 INJECTION INTRAMUSCULAR; INTRAVENOUS; SUBCUTANEOUS ONCE
Refills: 0 | Status: COMPLETED | OUTPATIENT
Start: 2020-09-06 | End: 2020-09-06

## 2020-09-06 RX ADMIN — Medication 125 MILLIGRAM(S): at 16:41

## 2020-09-06 RX ADMIN — Medication 3 MILLILITER(S): at 17:55

## 2020-09-06 RX ADMIN — Medication 3 MILLILITER(S): at 16:41

## 2020-09-06 RX ADMIN — Medication 3 MILLILITER(S): at 18:35

## 2020-09-06 RX ADMIN — Medication 250 MILLIGRAM(S): at 19:10

## 2020-09-06 RX ADMIN — SODIUM CHLORIDE 1000 MILLILITER(S): 9 INJECTION INTRAMUSCULAR; INTRAVENOUS; SUBCUTANEOUS at 17:58

## 2020-09-06 RX ADMIN — SODIUM CHLORIDE 1000 MILLILITER(S): 9 INJECTION INTRAMUSCULAR; INTRAVENOUS; SUBCUTANEOUS at 16:41

## 2020-09-06 NOTE — ED PROVIDER NOTE - OBJECTIVE STATEMENT
74yoF with h/o COPD on 2L home [place] O2, CAD, HTN, HLD, DM, lung wedge resection, presents from [facility] with weakness and SOB. Reports chronic SOB with exacerbation today c/w her many past episodes due to COPD. Associated generalized weakness and chronic productive cough and dysuria. Denies all pain, leg swelling, fever, vomiting, and all other symptoms.

## 2020-09-06 NOTE — ED ADULT NURSE NOTE - NSFALLRSKHARMRISK_ED_ALL_ED
no

## 2020-09-06 NOTE — ED PROVIDER NOTE - NSFOLLOWUPINSTRUCTIONS_ED_ALL_ED_FT
Pt was wheezing and appears to have had a COPD exacerbation.  She was given 3 albuterol/ipratropium nebs and solumedrol and feels all better.  Please follow up with your primary care doctor in 1-2 days.  Please use albuterol every 4 hours for the next 24 hours for her COPD.  Please check her urine for a UTI - we were unable to get urine here.  Please return to the emergency department if you have trouble breathing, worsening weakness, or any other symptoms. Please give the patient 250mg cefuroxime by mouth 2 (TWO) times per day for 7 (SEVEN) days for a UTI.  Please use albuterol every 4 hours for the next 24 hours for her COPD.    Pt was wheezing and appears to have had a COPD exacerbation.  She was given 3 albuterol/ipratropium nebs and solumedrol and feels all better.  Chest Xray does not look like pneumonia.  Please follow up with your primary care doctor in 1-2 days.  Please return to the emergency department if you have trouble breathing, worsening weakness, or any other symptoms.

## 2020-09-06 NOTE — ED PROVIDER NOTE - PHYSICAL EXAMINATION
Afebrile, hemodynamically stable, saturating well on NC O2  NAD, well appearing, no WOB, speaking full sentences  Head NCAT  EOMI grossly, anicteric  MMM  No JVD  RRR, nml S1/S2, no m/r/g  Lungs generalized insp + exp wheezes, moderate air mvmt  Abd soft, NT, ND, nml BS, no rebound or guarding  AAO, CN's 3-12 grossly intact  HERNANDEZ spontaneously, no leg cyanosis or edema  Skin warm, well perfused, no rashes or hives

## 2020-09-06 NOTE — ED PROVIDER NOTE - CARE PLAN
Principal Discharge DX:	COPD exacerbation Principal Discharge DX:	COPD exacerbation  Secondary Diagnosis:	UTI (urinary tract infection)

## 2020-09-06 NOTE — ED ADULT NURSE REASSESSMENT NOTE - NS ED NURSE REASSESS COMMENT FT1
Pt received axox3 resting in bed no distress noted for discharge awaiting transportation, safety precaution maintained ed observation continues.

## 2020-09-06 NOTE — ED PROVIDER NOTE - CLINICAL SUMMARY MEDICAL DECISION MAKING FREE TEXT BOX
Pt with chronic SOB and no tachycardia or hypoxia and many presentations for identical SOB with low suspicion for PE. Character low suspicion for ACS and ECG/trop ________. No e/o fluid overload on exam/CXR. No e/o PTX. Pt with chronic SOB and no tachycardia or hypoxia and many presentations for identical SOB with low suspicion for PE. Character low suspicion for ACS and ECG/trop ________. No e/o fluid overload on exam/CXR. No e/o PTX. Noted wheezing c/w her usual COPD exacerbations. Given nebs, solumedrol with _______. Pt with chronic SOB and no tachycardia or hypoxia and many presentations for identical SOB with low suspicion for PE, and b/l DVT U/S's negative. Character low suspicion for ACS and ECG/trop negative. No e/o fluid overload on exam/CXR. No e/o PTX. Noted wheezing c/w her usual COPD exacerbations. Given nebs, solumedrol with resolution of symptoms per pt and resolution of wheezing on repeat exam and pt would like to return to facility. Pt with chronic SOB and no tachycardia or hypoxia and many presentations for identical SOB with low suspicion for PE, and b/l DVT U/S's negative. Character low suspicion for ACS and ECG/trop negative. No e/o fluid overload on exam/CXR. No e/o PTX. Noted wheezing c/w her usual COPD exacerbations. Given nebs, solumedrol with resolution of symptoms per pt and resolution of wheezing on repeat exam and pt would like to return to facility. Declined to provide urine or be straight-cathed at this time. D/w Dr. Rose who accepts pt back to facility. Patient is well appearing, NAD, afebrile, hemodynamically stable. Any available tests and studies were discussed with patient. Discharged with instructions in further symptomatic care, return precautions, and need for PMD f/u. Pt with chronic SOB and no tachycardia or hypoxia and many presentations for identical SOB with low suspicion for PE, and b/l DVT U/S's negative. Character low suspicion for ACS and ECG/trop negative. No e/o fluid overload on exam/CXR. D/w radiology and low suspicion for PNA on Xray. No e/o PTX. Noted wheezing c/w her usual COPD exacerbations. Given nebs, solumedrol with resolution of symptoms per pt and resolution of wheezing on repeat exam and pt would like to return to facility. Also noted UTI, given ceftin. D/w Dr. Rose who accepts pt back to facility. Patient is well appearing, NAD, afebrile, hemodynamically stable. Any available tests and studies were discussed with patient. Discharged with instructions in further symptomatic care, return precautions, and need for PMD f/u.

## 2020-09-06 NOTE — ED PROVIDER NOTE - PATIENT PORTAL LINK FT
You can access the FollowMyHealth Patient Portal offered by [facility] by registering at the following website: [email or web address]. By joining [employer]’s FollowMyHealth portal, you will also be able to view your health information using other applications (apps) compatible with our system.

## 2020-09-08 NOTE — ED POST DISCHARGE NOTE - DETAILS
Pt Nursing home pt at [facility]. Spoke with Nursing Supervisor Ms. Fitzgerald results given, pt on Ceftin at nursing home. Ms. Fitzgerald states pt feeling better.

## 2020-09-22 NOTE — H&P ADULT - NECK DETAILS
What Type Of Note Output Would You Prefer (Optional)?: Standard Output How Severe Are Your Spot(S)?: moderate Have Your Spot(S) Been Treated In The Past?: has not been treated Hpi Title: Evaluation of Skin Lesions supple/no JVD

## 2020-10-08 NOTE — DISCHARGE NOTE ADULT - FUNCTIONAL SCREEN CURRENT LEVEL: AMBULATION, MLM
31yo P0 POD2 s/p abdominal myomectomy complicated by significant blood loss due to the extent of the myomas removed. Postoperative course has been complicated by anemia and fevers which are now being treated with blood transfusion and antibiotics respectively.                               1. Neuro/Pain: Continue Tylenol 1000mg Q6H, oxycodone 5 &10mg prn, toradol 30mg Q6H   2  CV:  keep patient on telemetry for heart rate monitoring;   3. Pulm: Encourage ISS; decrease oxygen saturation overnight which is now improved with deep inspiration  4. GI: NPO until this morning's Hb results   5. : voiding without issue   6. Heme: Patient now status post transfusion of 3 units prbcs; AM hb pending; no evidence of active bleeding on CT angio   7. ID: Patient febrile overnight- no blood cultures at this time per attending; continue Ancef 2g q8 for 3 doses   8. DVT ppx: SCDs. No Lovenox for now.  9. Dispo: continue inpatient care 2 = assistive person

## 2020-10-12 NOTE — ED ADULT NURSE NOTE - CARDIO ASSESSMENT
Patient notified of recommendations and gave phone number for her to set up the appointment.  Lakshmi Ivan RN     WDL

## 2020-11-01 ENCOUNTER — INPATIENT (INPATIENT)
Facility: HOSPITAL | Age: 75
LOS: 2 days | Discharge: NURSING, REHAB OR LONG-TERM CARE | DRG: 193 | End: 2020-11-04
Attending: INTERNAL MEDICINE | Admitting: INTERNAL MEDICINE
Payer: MEDICAID

## 2020-11-01 VITALS
TEMPERATURE: 98 F | SYSTOLIC BLOOD PRESSURE: 100 MMHG | HEART RATE: 90 BPM | HEIGHT: 63 IN | RESPIRATION RATE: 18 BRPM | DIASTOLIC BLOOD PRESSURE: 52 MMHG | OXYGEN SATURATION: 97 %

## 2020-11-01 DIAGNOSIS — Z98.89 OTHER SPECIFIED POSTPROCEDURAL STATES: Chronic | ICD-10-CM

## 2020-11-01 DIAGNOSIS — N39.0 URINARY TRACT INFECTION, SITE NOT SPECIFIED: ICD-10-CM

## 2020-11-01 LAB
ALBUMIN SERPL ELPH-MCNC: 2 G/DL — LOW (ref 3.5–5)
ALP SERPL-CCNC: 101 U/L — SIGNIFICANT CHANGE UP (ref 40–120)
ALT FLD-CCNC: 30 U/L DA — SIGNIFICANT CHANGE UP (ref 10–60)
ANION GAP SERPL CALC-SCNC: 4 MMOL/L — LOW (ref 5–17)
APPEARANCE UR: ABNORMAL
APTT BLD: 37.4 SEC — HIGH (ref 27.5–35.5)
AST SERPL-CCNC: 50 U/L — HIGH (ref 10–40)
BACTERIA # UR AUTO: ABNORMAL /HPF
BASOPHILS # BLD AUTO: 0.02 K/UL — SIGNIFICANT CHANGE UP (ref 0–0.2)
BASOPHILS NFR BLD AUTO: 0.3 % — SIGNIFICANT CHANGE UP (ref 0–2)
BILIRUB SERPL-MCNC: 0.6 MG/DL — SIGNIFICANT CHANGE UP (ref 0.2–1.2)
BILIRUB UR-MCNC: ABNORMAL
BUN SERPL-MCNC: 26 MG/DL — HIGH (ref 7–18)
CALCIUM SERPL-MCNC: 9.1 MG/DL — SIGNIFICANT CHANGE UP (ref 8.4–10.5)
CHLORIDE SERPL-SCNC: 103 MMOL/L — SIGNIFICANT CHANGE UP (ref 96–108)
CO2 SERPL-SCNC: 34 MMOL/L — HIGH (ref 22–31)
COLOR SPEC: YELLOW — SIGNIFICANT CHANGE UP
CREAT SERPL-MCNC: 1.13 MG/DL — SIGNIFICANT CHANGE UP (ref 0.5–1.3)
DIFF PNL FLD: ABNORMAL
EOSINOPHIL # BLD AUTO: 0.06 K/UL — SIGNIFICANT CHANGE UP (ref 0–0.5)
EOSINOPHIL NFR BLD AUTO: 0.8 % — SIGNIFICANT CHANGE UP (ref 0–6)
EPI CELLS # UR: SIGNIFICANT CHANGE UP /HPF
GLUCOSE SERPL-MCNC: 81 MG/DL — SIGNIFICANT CHANGE UP (ref 70–99)
GLUCOSE UR QL: NEGATIVE — SIGNIFICANT CHANGE UP
HCT VFR BLD CALC: 32.1 % — LOW (ref 34.5–45)
HGB BLD-MCNC: 9.9 G/DL — LOW (ref 11.5–15.5)
IMM GRANULOCYTES NFR BLD AUTO: 0.1 % — SIGNIFICANT CHANGE UP (ref 0–1.5)
INR BLD: 1.72 RATIO — HIGH (ref 0.88–1.16)
KETONES UR-MCNC: ABNORMAL
LEUKOCYTE ESTERASE UR-ACNC: ABNORMAL
LYMPHOCYTES # BLD AUTO: 2.13 K/UL — SIGNIFICANT CHANGE UP (ref 1–3.3)
LYMPHOCYTES # BLD AUTO: 27.9 % — SIGNIFICANT CHANGE UP (ref 13–44)
MCHC RBC-ENTMCNC: 28.1 PG — SIGNIFICANT CHANGE UP (ref 27–34)
MCHC RBC-ENTMCNC: 30.8 GM/DL — LOW (ref 32–36)
MCV RBC AUTO: 91.2 FL — SIGNIFICANT CHANGE UP (ref 80–100)
MONOCYTES # BLD AUTO: 0.7 K/UL — SIGNIFICANT CHANGE UP (ref 0–0.9)
MONOCYTES NFR BLD AUTO: 9.2 % — SIGNIFICANT CHANGE UP (ref 2–14)
NEUTROPHILS # BLD AUTO: 4.72 K/UL — SIGNIFICANT CHANGE UP (ref 1.8–7.4)
NEUTROPHILS NFR BLD AUTO: 61.7 % — SIGNIFICANT CHANGE UP (ref 43–77)
NITRITE UR-MCNC: NEGATIVE — SIGNIFICANT CHANGE UP
NRBC # BLD: 0 /100 WBCS — SIGNIFICANT CHANGE UP (ref 0–0)
OB PNL STL: POSITIVE
PH UR: 6 — SIGNIFICANT CHANGE UP (ref 5–8)
PLATELET # BLD AUTO: 180 K/UL — SIGNIFICANT CHANGE UP (ref 150–400)
POTASSIUM SERPL-MCNC: 3.7 MMOL/L — SIGNIFICANT CHANGE UP (ref 3.5–5.3)
POTASSIUM SERPL-SCNC: 3.7 MMOL/L — SIGNIFICANT CHANGE UP (ref 3.5–5.3)
PROT SERPL-MCNC: 6.3 G/DL — SIGNIFICANT CHANGE UP (ref 6–8.3)
PROT UR-MCNC: 30 MG/DL
PROTHROM AB SERPL-ACNC: 19.9 SEC — HIGH (ref 10.6–13.6)
RAPID RVP RESULT: SIGNIFICANT CHANGE UP
RBC # BLD: 3.52 M/UL — LOW (ref 3.8–5.2)
RBC # FLD: 16.5 % — HIGH (ref 10.3–14.5)
RBC CASTS # UR COMP ASSIST: ABNORMAL /HPF (ref 0–2)
SARS-COV-2 RNA SPEC QL NAA+PROBE: SIGNIFICANT CHANGE UP
SODIUM SERPL-SCNC: 141 MMOL/L — SIGNIFICANT CHANGE UP (ref 135–145)
SP GR SPEC: 1.01 — SIGNIFICANT CHANGE UP (ref 1.01–1.02)
TROPONIN I SERPL-MCNC: 0.04 NG/ML — SIGNIFICANT CHANGE UP (ref 0–0.04)
UROBILINOGEN FLD QL: NEGATIVE — SIGNIFICANT CHANGE UP
WBC # BLD: 7.64 K/UL — SIGNIFICANT CHANGE UP (ref 3.8–10.5)
WBC # FLD AUTO: 7.64 K/UL — SIGNIFICANT CHANGE UP (ref 3.8–10.5)
WBC UR QL: >50 /HPF (ref 0–5)

## 2020-11-01 PROCEDURE — 99285 EMERGENCY DEPT VISIT HI MDM: CPT

## 2020-11-01 PROCEDURE — 71045 X-RAY EXAM CHEST 1 VIEW: CPT | Mod: 26

## 2020-11-01 RX ORDER — SODIUM CHLORIDE 9 MG/ML
1000 INJECTION INTRAMUSCULAR; INTRAVENOUS; SUBCUTANEOUS ONCE
Refills: 0 | Status: COMPLETED | OUTPATIENT
Start: 2020-11-01 | End: 2020-11-01

## 2020-11-01 RX ORDER — CEFTRIAXONE 500 MG/1
1000 INJECTION, POWDER, FOR SOLUTION INTRAMUSCULAR; INTRAVENOUS ONCE
Refills: 0 | Status: COMPLETED | OUTPATIENT
Start: 2020-11-01 | End: 2020-11-01

## 2020-11-01 RX ADMIN — SODIUM CHLORIDE 1000 MILLILITER(S): 9 INJECTION INTRAMUSCULAR; INTRAVENOUS; SUBCUTANEOUS at 18:40

## 2020-11-01 RX ADMIN — CEFTRIAXONE 100 MILLIGRAM(S): 500 INJECTION, POWDER, FOR SOLUTION INTRAMUSCULAR; INTRAVENOUS at 23:41

## 2020-11-01 RX ADMIN — SODIUM CHLORIDE 1000 MILLILITER(S): 9 INJECTION INTRAMUSCULAR; INTRAVENOUS; SUBCUTANEOUS at 23:41

## 2020-11-01 NOTE — ED ADULT TRIAGE NOTE - CHIEF COMPLAINT QUOTE
tatiana from [facility] per transfer paper sent for decreased urine output  for UTI / Pneumonia

## 2020-11-01 NOTE — ED ADULT NURSE NOTE - NSIMPLEMENTINTERV_GEN_ALL_ED
Implemented All Universal Safety Interventions:  [place] to call system. Call bell, personal items and telephone within reach. Instruct patient to call for assistance. Room bathroom lighting operational. Non-slip footwear when patient is off stretcher. Physically safe environment: no spills, clutter or unnecessary equipment. Stretcher in lowest position, wheels locked, appropriate side rails in place.

## 2020-11-01 NOTE — ED PROVIDER NOTE - CONSTITUTIONAL, MLM
normal... Well appearing, awake, A&Ox2-3 and in no apparent distress. awake, A&Ox2-3 and in no apparent distress.

## 2020-11-01 NOTE — ED ADULT NURSE NOTE - OBJECTIVE STATEMENT
Patient came to the ED alert and verbally responsive  BIBA for low urine output. Pt is started with IV fluids and will place a [place] after. Patient came to the ED alert and verbally responsive  BIBA for low urine output. Pt is started with IV fluids and will place a [place] after. Pt has a L great toe diabetic ulcer with dressing intact.

## 2020-11-01 NOTE — ED PROVIDER NOTE - NEUROLOGICAL, MLM
Alert and oriented, no focal deficits, no motor or sensory deficits. Alert,  no focal deficits, no motor or sensory deficits.

## 2020-11-01 NOTE — ED PROVIDER NOTE - CLINICAL SUMMARY MEDICAL DECISION MAKING FREE TEXT BOX
73 yo F sent in from [facility] for decrease urinary output and decreased po intake. patient with likely UTI. CXR with b/l infiltrates. Hx of lung resections. Already on ceftin at NH. patient with UTI here. Melanotic stools. Hgb 9.8.  Heme positive. Rocephin given based on urine culture results. Will admit to for further evaluation and treatment for UTI and anemia. Endorsed to Dr. Rose.

## 2020-11-01 NOTE — ED PROVIDER NOTE - CONTEXT
chest x-ray shows bilateral infiltrates in lungs lower zones with right greater than left. Sent in from [facility] for Sepsis evaluation.

## 2020-11-01 NOTE — ED PROVIDER NOTE - NS ED ROS FT
Per nursing home papers:   general: + decreased PO intake  : + malodorous urine, + urinary retention  MSK: + generalized weakness

## 2020-11-01 NOTE — ED ADULT NURSE NOTE - CHIEF COMPLAINT QUOTE
tatiana from [facility] per transfer paper sent for decreased urine output  for UTI / Pneumonia

## 2020-11-01 NOTE — ED ADULT NURSE REASSESSMENT NOTE - NS ED NURSE REASSESS COMMENT FT1
pt awake al;elrt,not in distress,with saline lock intact,no redness,no swelling noted,waiting for bed.

## 2020-11-01 NOTE — ED PROVIDER NOTE - OBJECTIVE STATEMENT
74 year old female with PMHx of DM, lung CA, CAD, ACS, COPD, AFIB (on Eliquis) and PSHx of wedge resection of lungs sent in from [facility] for evaluation of UTI and PNA. Patient is a poor historian and history obtained via nursing home notes. Patient was noticed to have decreased PO intake of food and liquids x3 days. Patient also noticed to have decrease in urine and urine is cloudy and foul smelling. Patient had urine analysis sent out with results pending, as well as a chest x-ray done on 10/30 which shows bilateral infiltrates of the lower lung zones with right greater than the left and pleural effusion on the right. Patient was then transferred here for Sepsis evaluation. Patient is on cefuroxime for UTI treatment. Covid test done on 10/29/20, result still pending.

## 2020-11-02 DIAGNOSIS — E11.9 TYPE 2 DIABETES MELLITUS WITHOUT COMPLICATIONS: ICD-10-CM

## 2020-11-02 DIAGNOSIS — D64.9 ANEMIA, UNSPECIFIED: ICD-10-CM

## 2020-11-02 DIAGNOSIS — I48.91 UNSPECIFIED ATRIAL FIBRILLATION: ICD-10-CM

## 2020-11-02 DIAGNOSIS — I25.10 ATHEROSCLEROTIC HEART DISEASE OF NATIVE CORONARY ARTERY WITHOUT ANGINA PECTORIS: ICD-10-CM

## 2020-11-02 DIAGNOSIS — J44.9 CHRONIC OBSTRUCTIVE PULMONARY DISEASE, UNSPECIFIED: ICD-10-CM

## 2020-11-02 DIAGNOSIS — E43 UNSPECIFIED SEVERE PROTEIN-CALORIE MALNUTRITION: ICD-10-CM

## 2020-11-02 DIAGNOSIS — N39.0 URINARY TRACT INFECTION, SITE NOT SPECIFIED: ICD-10-CM

## 2020-11-02 DIAGNOSIS — J18.9 PNEUMONIA, UNSPECIFIED ORGANISM: ICD-10-CM

## 2020-11-02 DIAGNOSIS — C34.90 MALIGNANT NEOPLASM OF UNSPECIFIED PART OF UNSPECIFIED BRONCHUS OR LUNG: ICD-10-CM

## 2020-11-02 DIAGNOSIS — Z51.5 ENCOUNTER FOR PALLIATIVE CARE: ICD-10-CM

## 2020-11-02 DIAGNOSIS — K92.1 MELENA: ICD-10-CM

## 2020-11-02 DIAGNOSIS — K59.00 CONSTIPATION, UNSPECIFIED: ICD-10-CM

## 2020-11-02 DIAGNOSIS — R53.81 OTHER MALAISE: ICD-10-CM

## 2020-11-02 DIAGNOSIS — Z29.9 ENCOUNTER FOR PROPHYLACTIC MEASURES, UNSPECIFIED: ICD-10-CM

## 2020-11-02 LAB
A1C WITH ESTIMATED AVERAGE GLUCOSE RESULT: 4.9 % — SIGNIFICANT CHANGE UP (ref 4–5.6)
ABO RH CONFIRMATION: SIGNIFICANT CHANGE UP
ALBUMIN SERPL ELPH-MCNC: 1.8 G/DL — LOW (ref 3.5–5)
ALBUMIN SERPL ELPH-MCNC: 1.8 G/DL — LOW (ref 3.5–5)
ALP SERPL-CCNC: 90 U/L — SIGNIFICANT CHANGE UP (ref 40–120)
ALP SERPL-CCNC: 91 U/L — SIGNIFICANT CHANGE UP (ref 40–120)
ALT FLD-CCNC: 28 U/L DA — SIGNIFICANT CHANGE UP (ref 10–60)
ALT FLD-CCNC: 29 U/L DA — SIGNIFICANT CHANGE UP (ref 10–60)
ANION GAP SERPL CALC-SCNC: 6 MMOL/L — SIGNIFICANT CHANGE UP (ref 5–17)
ANION GAP SERPL CALC-SCNC: 8 MMOL/L — SIGNIFICANT CHANGE UP (ref 5–17)
AST SERPL-CCNC: 52 U/L — HIGH (ref 10–40)
AST SERPL-CCNC: 52 U/L — HIGH (ref 10–40)
BASOPHILS # BLD AUTO: 0.02 K/UL — SIGNIFICANT CHANGE UP (ref 0–0.2)
BASOPHILS NFR BLD AUTO: 0.3 % — SIGNIFICANT CHANGE UP (ref 0–2)
BILIRUB SERPL-MCNC: 0.5 MG/DL — SIGNIFICANT CHANGE UP (ref 0.2–1.2)
BILIRUB SERPL-MCNC: 0.6 MG/DL — SIGNIFICANT CHANGE UP (ref 0.2–1.2)
BLD GP AB SCN SERPL QL: SIGNIFICANT CHANGE UP
BUN SERPL-MCNC: 15 MG/DL — SIGNIFICANT CHANGE UP (ref 7–18)
BUN SERPL-MCNC: 19 MG/DL — HIGH (ref 7–18)
CALCIUM SERPL-MCNC: 8.1 MG/DL — LOW (ref 8.4–10.5)
CALCIUM SERPL-MCNC: 8.5 MG/DL — SIGNIFICANT CHANGE UP (ref 8.4–10.5)
CHLORIDE SERPL-SCNC: 108 MMOL/L — SIGNIFICANT CHANGE UP (ref 96–108)
CHLORIDE SERPL-SCNC: 111 MMOL/L — HIGH (ref 96–108)
CHOLEST SERPL-MCNC: 146 MG/DL — SIGNIFICANT CHANGE UP
CO2 SERPL-SCNC: 26 MMOL/L — SIGNIFICANT CHANGE UP (ref 22–31)
CO2 SERPL-SCNC: 30 MMOL/L — SIGNIFICANT CHANGE UP (ref 22–31)
CREAT SERPL-MCNC: 0.7 MG/DL — SIGNIFICANT CHANGE UP (ref 0.5–1.3)
CREAT SERPL-MCNC: 0.78 MG/DL — SIGNIFICANT CHANGE UP (ref 0.5–1.3)
EOSINOPHIL # BLD AUTO: 0.07 K/UL — SIGNIFICANT CHANGE UP (ref 0–0.5)
EOSINOPHIL NFR BLD AUTO: 1.1 % — SIGNIFICANT CHANGE UP (ref 0–6)
ESTIMATED AVERAGE GLUCOSE: 94 MG/DL — SIGNIFICANT CHANGE UP (ref 68–114)
FERRITIN SERPL-MCNC: 856 NG/ML — HIGH (ref 15–150)
FOLATE SERPL-MCNC: >20 NG/ML — SIGNIFICANT CHANGE UP
GLUCOSE BLDC GLUCOMTR-MCNC: 121 MG/DL — HIGH (ref 70–99)
GLUCOSE BLDC GLUCOMTR-MCNC: 146 MG/DL — HIGH (ref 70–99)
GLUCOSE BLDC GLUCOMTR-MCNC: 67 MG/DL — LOW (ref 70–99)
GLUCOSE BLDC GLUCOMTR-MCNC: 82 MG/DL — SIGNIFICANT CHANGE UP (ref 70–99)
GLUCOSE BLDC GLUCOMTR-MCNC: 98 MG/DL — SIGNIFICANT CHANGE UP (ref 70–99)
GLUCOSE SERPL-MCNC: 118 MG/DL — HIGH (ref 70–99)
GLUCOSE SERPL-MCNC: 70 MG/DL — SIGNIFICANT CHANGE UP (ref 70–99)
HCT VFR BLD CALC: 26.5 % — LOW (ref 34.5–45)
HCT VFR BLD CALC: 27.4 % — LOW (ref 34.5–45)
HDLC SERPL-MCNC: 76 MG/DL — SIGNIFICANT CHANGE UP
HGB BLD-MCNC: 8.2 G/DL — LOW (ref 11.5–15.5)
HGB BLD-MCNC: 8.3 G/DL — LOW (ref 11.5–15.5)
IMM GRANULOCYTES NFR BLD AUTO: 0.2 % — SIGNIFICANT CHANGE UP (ref 0–1.5)
IRON SATN MFR SERPL: 24 % — SIGNIFICANT CHANGE UP (ref 15–50)
IRON SATN MFR SERPL: 29 UG/DL — LOW (ref 40–150)
IRON SATN MFR SERPL: 31 UG/DL — LOW (ref 40–150)
LIPID PNL WITH DIRECT LDL SERPL: 53 MG/DL — SIGNIFICANT CHANGE UP
LYMPHOCYTES # BLD AUTO: 1.68 K/UL — SIGNIFICANT CHANGE UP (ref 1–3.3)
LYMPHOCYTES # BLD AUTO: 25.8 % — SIGNIFICANT CHANGE UP (ref 13–44)
MAGNESIUM SERPL-MCNC: 1.5 MG/DL — LOW (ref 1.6–2.6)
MCHC RBC-ENTMCNC: 27.9 PG — SIGNIFICANT CHANGE UP (ref 27–34)
MCHC RBC-ENTMCNC: 28.2 PG — SIGNIFICANT CHANGE UP (ref 27–34)
MCHC RBC-ENTMCNC: 30.3 GM/DL — LOW (ref 32–36)
MCHC RBC-ENTMCNC: 30.9 GM/DL — LOW (ref 32–36)
MCV RBC AUTO: 91.1 FL — SIGNIFICANT CHANGE UP (ref 80–100)
MCV RBC AUTO: 91.9 FL — SIGNIFICANT CHANGE UP (ref 80–100)
MONOCYTES # BLD AUTO: 0.57 K/UL — SIGNIFICANT CHANGE UP (ref 0–0.9)
MONOCYTES NFR BLD AUTO: 8.8 % — SIGNIFICANT CHANGE UP (ref 2–14)
NEUTROPHILS # BLD AUTO: 4.16 K/UL — SIGNIFICANT CHANGE UP (ref 1.8–7.4)
NEUTROPHILS NFR BLD AUTO: 63.8 % — SIGNIFICANT CHANGE UP (ref 43–77)
NON HDL CHOLESTEROL: 70 MG/DL — SIGNIFICANT CHANGE UP
NRBC # BLD: 0 /100 WBCS — SIGNIFICANT CHANGE UP (ref 0–0)
NRBC # BLD: 0 /100 WBCS — SIGNIFICANT CHANGE UP (ref 0–0)
PHOSPHATE SERPL-MCNC: 1.7 MG/DL — LOW (ref 2.5–4.5)
PLATELET # BLD AUTO: 151 K/UL — SIGNIFICANT CHANGE UP (ref 150–400)
PLATELET # BLD AUTO: 166 K/UL — SIGNIFICANT CHANGE UP (ref 150–400)
POTASSIUM SERPL-MCNC: 3.3 MMOL/L — LOW (ref 3.5–5.3)
POTASSIUM SERPL-MCNC: 4.3 MMOL/L — SIGNIFICANT CHANGE UP (ref 3.5–5.3)
POTASSIUM SERPL-SCNC: 3.3 MMOL/L — LOW (ref 3.5–5.3)
POTASSIUM SERPL-SCNC: 4.3 MMOL/L — SIGNIFICANT CHANGE UP (ref 3.5–5.3)
PROT SERPL-MCNC: 5.5 G/DL — LOW (ref 6–8.3)
PROT SERPL-MCNC: 5.7 G/DL — LOW (ref 6–8.3)
RBC # BLD: 2.91 M/UL — LOW (ref 3.8–5.2)
RBC # BLD: 2.98 M/UL — LOW (ref 3.8–5.2)
RBC # FLD: 16.5 % — HIGH (ref 10.3–14.5)
RBC # FLD: 16.6 % — HIGH (ref 10.3–14.5)
SODIUM SERPL-SCNC: 143 MMOL/L — SIGNIFICANT CHANGE UP (ref 135–145)
SODIUM SERPL-SCNC: 146 MMOL/L — HIGH (ref 135–145)
TIBC SERPL-MCNC: 122 UG/DL — LOW (ref 250–450)
TRIGL SERPL-MCNC: 83 MG/DL — SIGNIFICANT CHANGE UP
TSH SERPL-MCNC: 0.42 UU/ML — SIGNIFICANT CHANGE UP (ref 0.34–4.82)
UIBC SERPL-MCNC: 93 UG/DL — LOW (ref 110–370)
VIT B12 SERPL-MCNC: 905 PG/ML — SIGNIFICANT CHANGE UP (ref 232–1245)
WBC # BLD: 6.51 K/UL — SIGNIFICANT CHANGE UP (ref 3.8–10.5)
WBC # BLD: 8.15 K/UL — SIGNIFICANT CHANGE UP (ref 3.8–10.5)
WBC # FLD AUTO: 6.51 K/UL — SIGNIFICANT CHANGE UP (ref 3.8–10.5)
WBC # FLD AUTO: 8.15 K/UL — SIGNIFICANT CHANGE UP (ref 3.8–10.5)

## 2020-11-02 PROCEDURE — 99223 1ST HOSP IP/OBS HIGH 75: CPT

## 2020-11-02 PROCEDURE — 99497 ADVNCD CARE PLAN 30 MIN: CPT | Mod: 25

## 2020-11-02 RX ORDER — FERROUS SULFATE 325(65) MG
325 TABLET ORAL DAILY
Refills: 0 | Status: DISCONTINUED | OUTPATIENT
Start: 2020-11-02 | End: 2020-11-04

## 2020-11-02 RX ORDER — SODIUM CHLORIDE 9 MG/ML
1000 INJECTION INTRAMUSCULAR; INTRAVENOUS; SUBCUTANEOUS
Refills: 0 | Status: DISCONTINUED | OUTPATIENT
Start: 2020-11-02 | End: 2020-11-04

## 2020-11-02 RX ORDER — IPRATROPIUM BROMIDE 0.2 MG/ML
1 SOLUTION, NON-ORAL INHALATION EVERY 6 HOURS
Refills: 0 | Status: DISCONTINUED | OUTPATIENT
Start: 2020-11-02 | End: 2020-11-04

## 2020-11-02 RX ORDER — LISINOPRIL 2.5 MG/1
5 TABLET ORAL DAILY
Refills: 0 | Status: DISCONTINUED | OUTPATIENT
Start: 2020-11-02 | End: 2020-11-04

## 2020-11-02 RX ORDER — FOLIC ACID 0.8 MG
1 TABLET ORAL DAILY
Refills: 0 | Status: DISCONTINUED | OUTPATIENT
Start: 2020-11-02 | End: 2020-11-04

## 2020-11-02 RX ORDER — FERROUS SULFATE 325(65) MG
1 TABLET ORAL
Qty: 0 | Refills: 0 | DISCHARGE

## 2020-11-02 RX ORDER — SENNA PLUS 8.6 MG/1
2 TABLET ORAL AT BEDTIME
Refills: 0 | Status: DISCONTINUED | OUTPATIENT
Start: 2020-11-02 | End: 2020-11-04

## 2020-11-02 RX ORDER — GLUCAGON INJECTION, SOLUTION 0.5 MG/.1ML
1 INJECTION, SOLUTION SUBCUTANEOUS ONCE
Refills: 0 | Status: DISCONTINUED | OUTPATIENT
Start: 2020-11-02 | End: 2020-11-04

## 2020-11-02 RX ORDER — POTASSIUM CHLORIDE 20 MEQ
40 PACKET (EA) ORAL ONCE
Refills: 0 | Status: COMPLETED | OUTPATIENT
Start: 2020-11-02 | End: 2020-11-02

## 2020-11-02 RX ORDER — DOCUSATE SODIUM 100 MG
1 CAPSULE ORAL
Qty: 0 | Refills: 0 | DISCHARGE

## 2020-11-02 RX ORDER — TROLAMINE SALICYLATE 10 %
2 CREAM (GRAM) TOPICAL
Qty: 0 | Refills: 0 | DISCHARGE

## 2020-11-02 RX ORDER — PANTOPRAZOLE SODIUM 20 MG/1
40 TABLET, DELAYED RELEASE ORAL
Refills: 0 | Status: DISCONTINUED | OUTPATIENT
Start: 2020-11-02 | End: 2020-11-04

## 2020-11-02 RX ORDER — ASPIRIN/CALCIUM CARB/MAGNESIUM 324 MG
81 TABLET ORAL DAILY
Refills: 0 | Status: DISCONTINUED | OUTPATIENT
Start: 2020-11-02 | End: 2020-11-04

## 2020-11-02 RX ORDER — ATORVASTATIN CALCIUM 80 MG/1
40 TABLET, FILM COATED ORAL AT BEDTIME
Refills: 0 | Status: DISCONTINUED | OUTPATIENT
Start: 2020-11-02 | End: 2020-11-04

## 2020-11-02 RX ORDER — CEFTRIAXONE 500 MG/1
1000 INJECTION, POWDER, FOR SOLUTION INTRAMUSCULAR; INTRAVENOUS EVERY 24 HOURS
Refills: 0 | Status: DISCONTINUED | OUTPATIENT
Start: 2020-11-02 | End: 2020-11-04

## 2020-11-02 RX ORDER — APIXABAN 2.5 MG/1
5 TABLET, FILM COATED ORAL EVERY 12 HOURS
Refills: 0 | Status: DISCONTINUED | OUTPATIENT
Start: 2020-11-02 | End: 2020-11-02

## 2020-11-02 RX ORDER — AZITHROMYCIN 500 MG/1
500 TABLET, FILM COATED ORAL DAILY
Refills: 0 | Status: DISCONTINUED | OUTPATIENT
Start: 2020-11-02 | End: 2020-11-04

## 2020-11-02 RX ORDER — METOPROLOL TARTRATE 50 MG
25 TABLET ORAL
Refills: 0 | Status: DISCONTINUED | OUTPATIENT
Start: 2020-11-02 | End: 2020-11-04

## 2020-11-02 RX ORDER — IPRATROPIUM BROMIDE 0.2 MG/ML
2 SOLUTION, NON-ORAL INHALATION
Qty: 0 | Refills: 0 | DISCHARGE

## 2020-11-02 RX ORDER — SODIUM,POTASSIUM PHOSPHATES 278-250MG
1 POWDER IN PACKET (EA) ORAL ONCE
Refills: 0 | Status: COMPLETED | OUTPATIENT
Start: 2020-11-02 | End: 2020-11-02

## 2020-11-02 RX ORDER — MAGNESIUM SULFATE 500 MG/ML
2 VIAL (ML) INJECTION
Refills: 0 | Status: COMPLETED | OUTPATIENT
Start: 2020-11-02 | End: 2020-11-02

## 2020-11-02 RX ORDER — ACETAMINOPHEN 500 MG
650 TABLET ORAL EVERY 6 HOURS
Refills: 0 | Status: DISCONTINUED | OUTPATIENT
Start: 2020-11-02 | End: 2020-11-04

## 2020-11-02 RX ORDER — SENNA PLUS 8.6 MG/1
2 TABLET ORAL
Qty: 0 | Refills: 0 | DISCHARGE

## 2020-11-02 RX ORDER — INSULIN LISPRO 100/ML
VIAL (ML) SUBCUTANEOUS EVERY 6 HOURS
Refills: 0 | Status: DISCONTINUED | OUTPATIENT
Start: 2020-11-02 | End: 2020-11-04

## 2020-11-02 RX ORDER — DEXTROSE 50 % IN WATER 50 %
25 SYRINGE (ML) INTRAVENOUS ONCE
Refills: 0 | Status: DISCONTINUED | OUTPATIENT
Start: 2020-11-02 | End: 2020-11-04

## 2020-11-02 RX ORDER — SODIUM CHLORIDE 9 MG/ML
1000 INJECTION, SOLUTION INTRAVENOUS
Refills: 0 | Status: DISCONTINUED | OUTPATIENT
Start: 2020-11-02 | End: 2020-11-04

## 2020-11-02 RX ORDER — DEXTROSE 50 % IN WATER 50 %
15 SYRINGE (ML) INTRAVENOUS ONCE
Refills: 0 | Status: DISCONTINUED | OUTPATIENT
Start: 2020-11-02 | End: 2020-11-04

## 2020-11-02 RX ORDER — SODIUM CHLORIDE 9 MG/ML
1000 INJECTION INTRAMUSCULAR; INTRAVENOUS; SUBCUTANEOUS
Refills: 0 | Status: DISCONTINUED | OUTPATIENT
Start: 2020-11-02 | End: 2020-11-02

## 2020-11-02 RX ADMIN — Medication 50 GRAM(S): at 11:59

## 2020-11-02 RX ADMIN — Medication 1 MILLIGRAM(S): at 12:01

## 2020-11-02 RX ADMIN — Medication 1 DROP(S): at 06:19

## 2020-11-02 RX ADMIN — SENNA PLUS 2 TABLET(S): 8.6 TABLET ORAL at 22:56

## 2020-11-02 RX ADMIN — LISINOPRIL 5 MILLIGRAM(S): 2.5 TABLET ORAL at 06:19

## 2020-11-02 RX ADMIN — CEFTRIAXONE 100 MILLIGRAM(S): 500 INJECTION, POWDER, FOR SOLUTION INTRAMUSCULAR; INTRAVENOUS at 11:58

## 2020-11-02 RX ADMIN — Medication 40 MILLIEQUIVALENT(S): at 12:00

## 2020-11-02 RX ADMIN — Medication 25 MILLIGRAM(S): at 18:10

## 2020-11-02 RX ADMIN — Medication 1 PUFF(S): at 18:12

## 2020-11-02 RX ADMIN — Medication 1 PUFF(S): at 13:13

## 2020-11-02 RX ADMIN — SODIUM CHLORIDE 75 MILLILITER(S): 9 INJECTION INTRAMUSCULAR; INTRAVENOUS; SUBCUTANEOUS at 04:02

## 2020-11-02 RX ADMIN — Medication 1 PACKET(S): at 10:04

## 2020-11-02 RX ADMIN — AZITHROMYCIN 255 MILLIGRAM(S): 500 TABLET, FILM COATED ORAL at 11:59

## 2020-11-02 RX ADMIN — Medication 1 DROP(S): at 18:10

## 2020-11-02 RX ADMIN — ATORVASTATIN CALCIUM 40 MILLIGRAM(S): 80 TABLET, FILM COATED ORAL at 22:55

## 2020-11-02 RX ADMIN — SODIUM CHLORIDE 75 MILLILITER(S): 9 INJECTION, SOLUTION INTRAVENOUS at 12:06

## 2020-11-02 RX ADMIN — PANTOPRAZOLE SODIUM 40 MILLIGRAM(S): 20 TABLET, DELAYED RELEASE ORAL at 06:19

## 2020-11-02 RX ADMIN — Medication 81 MILLIGRAM(S): at 13:13

## 2020-11-02 RX ADMIN — Medication 325 MILLIGRAM(S): at 12:01

## 2020-11-02 RX ADMIN — Medication 1 TABLET(S): at 13:13

## 2020-11-02 RX ADMIN — Medication 25 MILLIGRAM(S): at 06:19

## 2020-11-02 NOTE — H&P ADULT - PROBLEM SELECTOR PLAN 8
Patient has history of left lung resection on the left side  She also endorses left arm pain since surgery  She takes Tagrisso , not available in pharmacy

## 2020-11-02 NOTE — H&P ADULT - PROBLEM SELECTOR PLAN 1
Patient is sent from NH as she is lethargic and not eating for few days , Decreased urine output and Purulent foul smelling urine  - She was found to have uti in early September and was treated with oral cefuroxime  - Patient reports dysuria, burning micturition  - On PE, purulent discharge can be noticed from the vulvar area.  - UA grossly positive, Will start on Rocephin and f/u Urine cultures  - Tylenol for pain

## 2020-11-02 NOTE — CONSULT NOTE ADULT - ASSESSMENT
74F PMHx of DM, lung CA, CAD, COPD, AFIB (on Eliquis) and PSHx of wedge resection of lungs sent in from [facility] admitted for UTI and pneumonia. Her baseline Hb is 10-11, she presented with Hb 9.9, FOBT was positive, anemia panel shows anemia of chronic disease, GI consulted for anemia. Hb dropped to 8.3 today. Pt. complaints of urinary symptoms and black stool, denies abd. pain, nausea and vomiting, change in appetite/weight. As per RN, pt. had 2 episodes of black stool in AM. Pt. is on iron tablets too and eliquis for A.fib which was held due to black stool. Pt. does not remember having EGD/colonscopy.

## 2020-11-02 NOTE — H&P ADULT - NSHPPHYSICALEXAM_GEN_ALL_CORE
PHYSICAL EXAM:  GENERAL: NAD, speaks in full sentences, no signs of respiratory distress  HEAD:  Atraumatic, Normocephalic  EYES: EOMI, PERRLA, conjunctiva and sclera clear  NECK: Supple, No JVD  CHEST/LUNG: Clear to auscultation bilaterally; No wheeze; No crackles; No accessory muscles used  HEART: irregular rate and rhythm; Diastolic murmur   ABDOMEN: Soft, Nontender, Nondistended; Bowel sounds present; No guarding  EXTREMITIES:  2+ Peripheral Pulses, No cyanosis or edema, tender to touch because of arthritis  PSYCH: AAOx3  NEUROLOGY: non-focal  SKIN: No rashes or lesions

## 2020-11-02 NOTE — CONSULT NOTE ADULT - CONVERSATION DETAILS
Palliative Care NP and SW met with the pt at the bedside, discussed her oncology current clinical status and goals of care.  Pt is aoX2 some confusion.  She verbalized that her health is declining, she has become more dependent.   Regarding goals of care, explained cardiopulmonary resuscitation and intubation in the context of advanced illness.   Also addressed artificial nutrition with its risks and benefits.  Pt stated she does not want CPR, intubation or feeding tube, "if it is God will when it is my time it is my time to go".  MOL drafted; DNR/DNI/no artificial nutrition.    Updated her sister Nelsy Baldwin ([phone]) on our conversation and the pt's decisions.  She supports her sister's decisions.    All questions answered.  Support provided.

## 2020-11-02 NOTE — H&P ADULT - PROBLEM SELECTOR PLAN 5
Will hold Eliquis for now as FOBT is positive  Can restart after cardiology recommendations   KESHAWN sheikh for now Will hold Eliquis for now as FOBT is positive  SCD boots for now

## 2020-11-02 NOTE — CONSULT NOTE ADULT - SUBJECTIVE AND OBJECTIVE BOX
HPI:  74 year old female with PMHx of DM, lung CA, CAD, ACS, COPD, AFIB (on Eliquis) and PSHx of wedge resection of lungs sent in from [facility] for evaluation of UTI and PNA. Patient is AAOx 3 at baseline but slow and history is obtained from her and NH charts. Patient was noticed to have decreased PO intake of food and liquids x3 days. Patient also noticed to have decrease in urine and urine is cloudy and foul smelling. She reports burning micturition, dysuria and increased phlegm production.  Denies abdominal pain, chest pain, headaches, nausea, vomiting, fevers.   Patient was then transferred here for Sepsis evaluation. Patient is on cefuroxime for UTI treatment at NH,    Interval hx: Pt seen and examined at the bedside, AOx2.  Reports more difficult to breathe. . Palliative care to establish goals of care.     PAST MEDICAL & SURGICAL HISTORY:  Lung cancer  wedge resection of left lung     Seborrheic dermatitis    OA (osteoarthritis)    Hypercholesteremia    HTN (hypertension)    GERD (gastroesophageal reflux disease)    Constipation    DM (diabetes mellitus)    Cataract    CAD (coronary artery disease)    Bronchoalveolar carcinoma    ACS (acute coronary syndrome)    COPD (chronic obstructive pulmonary disease)    S/P ovarian cystectomy        SOCIAL HISTORY:    Admitted from:     Roman Catholic:  Jew    Surrogate/HCP/Guardian: Nelsy Absecon (sister)           Phone#: [phone]    FAMILY HISTORY:  Family history of prostate cancer      Baseline ADLs (prior to admission): dependent    Allergies    No Known Allergies    Intolerances      Present Symptoms:   Dyspnea: yes  Nausea/Vomiting: denies  Loss of appetite: denies  Pain:   denies                                    Review of Systems: [All others negative     MEDICATIONS  (STANDING):  artificial  tears Solution 1 Drop(s) Both EYES every 12 hours  aspirin enteric coated 81 milliGRAM(s) Oral daily  atorvastatin 40 milliGRAM(s) Oral at bedtime  azithromycin  IVPB 500 milliGRAM(s) IV Intermittent daily  cefTRIAXone   IVPB 1000 milliGRAM(s) IV Intermittent every 24 hours  dextrose 5% + sodium chloride 0.9%. 1000 milliLiter(s) (75 mL/Hr) IV Continuous <Continuous>  dextrose 5%. 1000 milliLiter(s) (50 mL/Hr) IV Continuous <Continuous>  dextrose 50% Injectable 25 Gram(s) IV Push once  ferrous    sulfate 325 milliGRAM(s) Oral daily  folic acid 1 milliGRAM(s) Oral daily  insulin lispro (ADMELOG) corrective regimen sliding scale   SubCutaneous every 6 hours  ipratropium 17 MICROgram(s) HFA Inhaler 1 Puff(s) Inhalation every 6 hours  lisinopril 5 milliGRAM(s) Oral daily  magnesium sulfate  IVPB 2 Gram(s) IV Intermittent every 2 hours  metoprolol tartrate 25 milliGRAM(s) Oral two times a day  multivitamin 1 Tablet(s) Oral daily  pantoprazole    Tablet 40 milliGRAM(s) Oral before breakfast  senna 2 Tablet(s) Oral at bedtime    MEDICATIONS  (PRN):  acetaminophen   Tablet .. 650 milliGRAM(s) Oral every 6 hours PRN Mild Pain (1 - 3)  aluminum hydroxide/magnesium hydroxide/simethicone Suspension 30 milliLiter(s) Oral every 8 hours PRN Dyspepsia  dextrose 40% Gel 15 Gram(s) Oral once PRN Blood Glucose LESS THAN 70 milliGRAM(s)/deciliter  glucagon  Injectable 1 milliGRAM(s) IntraMuscular once PRN Glucose LESS THAN 70 milligrams/deciliter      PHYSICAL EXAM:    Vital Signs Last 24 Hrs  T(C): 36.4 (2020 09:10), Max: 37.4 (2020 18:40)  T(F): 97.5 (2020 09:10), Max: 99.4 (2020 18:40)  HR: 92 (2020 09:10) (90 - 103)  BP: 100/58 (2020 09:10) (100/52 - 120/63)  BP(mean): --  RR: 19 (2020 09:10) (18 - 19)  SpO2: 100% (2020 09:10) (95% - 100%)    General: Chronically ill appearing woman, Cachetic with bitemporal wasting, on NC  Karnofsky Performance Score/Palliative Performance Status Version2: 40    %    HEENT: bitemporal wasting, moist oropharyx  Lungs: unlabored on RA  CV: S1S2, RRR  GI: soft, non tender on palpation, LBM today   : incontinent  Musculoskeletal: bedbound  Skin: no rash or lesions noted  Neuro: no deficits cognitive   Oral intake ability: poor po intake      LABS:                        8.3    6.51  )-----------( 166      ( 2020 06:03 )             27.4     11-02    146<H>  |  108  |  19<H>  ----------------------------<  70  3.3<L>   |  30  |  0.78    Ca    8.5      2020 06:03  Phos  1.7     11-  Mg     1.5     11-    TPro  5.5<L>  /  Alb  1.8<L>  /  TBili  0.6  /  DBili  x   /  AST  52<H>  /  ALT  28  /  AlkPhos  90  11-02    Urinalysis Basic - ( 2020 20:49 )    Color: Yellow / Appearance: Slightly Turbid / S.010 / pH: x  Gluc: x / Ketone: Small  / Bili: Small / Urobili: Negative   Blood: x / Protein: 30 mg/dL / Nitrite: Negative   Leuk Esterase: Moderate / RBC: 25-50 /HPF / WBC >50 /HPF   Sq Epi: x / Non Sq Epi: Few /HPF / Bacteria: Many /HPF    < from: Xray Chest 1 View-PORTABLE IMMEDIATE (20 @ 19:10) >  EXAM:  XR CHEST PORTABLE IMMED 1V                            PROCEDURE DATE:  2020          INTERPRETATION:  CLINICAL STATEMENT: Chest Pain.    TECHNIQUE: AP view of the chest.    COMPARISON: 2020    FINDINGS/  IMPRESSION:  Bilateral consolidations overall progressed compared to prior exam.    Small left pleural effusion.    Heart size cannot be accurately assessed in this projection.      < end of copied text >      RADIOLOGY & ADDITIONAL STUDIES: Reviewed    ADVANCE DIRECTIVES: MOLST; DNR/DNI; no artificial nutrition   HPI:  74 year old female with PMHx of DM, lung CA, CAD, ACS, COPD, AFIB (on Eliquis) and PSHx of wedge resection of lungs sent in from [facility] for evaluation of UTI and PNA. Patient is AAOx 3 at baseline but slow and history is obtained from her and NH charts. Patient was noticed to have decreased PO intake of food and liquids x3 days. Patient also noticed to have decrease in urine and urine is cloudy and foul smelling. She reports burning micturition, dysuria and increased phlegm production.  Denies abdominal pain, chest pain, headaches, nausea, vomiting, fevers.   Patient was then transferred here for Sepsis evaluation. Patient is on cefuroxime for UTI treatment at NH,    Interval hx: Pt seen and examined at the bedside, AOx2. Noted with forgetfulness.  Reports more difficult to breathe. . Palliative care to establish goals of care.     PAST MEDICAL & SURGICAL HISTORY:  Lung cancer  wedge resection of left lung     Seborrheic dermatitis    OA (osteoarthritis)    Hypercholesteremia    HTN (hypertension)    GERD (gastroesophageal reflux disease)    Constipation    DM (diabetes mellitus)    Cataract    CAD (coronary artery disease)    Bronchoalveolar carcinoma    ACS (acute coronary syndrome)    COPD (chronic obstructive pulmonary disease)    S/P ovarian cystectomy        SOCIAL HISTORY:    Admitted from:     Uatsdin:  Moravian    Surrogate/HCP/Guardian: Nelsy Hatillo (sister)           Phone#: [phone]    FAMILY HISTORY:  Family history of prostate cancer      Baseline ADLs (prior to admission): dependent    Allergies    No Known Allergies    Intolerances      Present Symptoms:   Dyspnea: yes  Nausea/Vomiting: denies  Loss of appetite: denies  Pain:   denies                                    Review of Systems: [All others negative     MEDICATIONS  (STANDING):  artificial  tears Solution 1 Drop(s) Both EYES every 12 hours  aspirin enteric coated 81 milliGRAM(s) Oral daily  atorvastatin 40 milliGRAM(s) Oral at bedtime  azithromycin  IVPB 500 milliGRAM(s) IV Intermittent daily  cefTRIAXone   IVPB 1000 milliGRAM(s) IV Intermittent every 24 hours  dextrose 5% + sodium chloride 0.9%. 1000 milliLiter(s) (75 mL/Hr) IV Continuous <Continuous>  dextrose 5%. 1000 milliLiter(s) (50 mL/Hr) IV Continuous <Continuous>  dextrose 50% Injectable 25 Gram(s) IV Push once  ferrous    sulfate 325 milliGRAM(s) Oral daily  folic acid 1 milliGRAM(s) Oral daily  insulin lispro (ADMELOG) corrective regimen sliding scale   SubCutaneous every 6 hours  ipratropium 17 MICROgram(s) HFA Inhaler 1 Puff(s) Inhalation every 6 hours  lisinopril 5 milliGRAM(s) Oral daily  magnesium sulfate  IVPB 2 Gram(s) IV Intermittent every 2 hours  metoprolol tartrate 25 milliGRAM(s) Oral two times a day  multivitamin 1 Tablet(s) Oral daily  pantoprazole    Tablet 40 milliGRAM(s) Oral before breakfast  senna 2 Tablet(s) Oral at bedtime    MEDICATIONS  (PRN):  acetaminophen   Tablet .. 650 milliGRAM(s) Oral every 6 hours PRN Mild Pain (1 - 3)  aluminum hydroxide/magnesium hydroxide/simethicone Suspension 30 milliLiter(s) Oral every 8 hours PRN Dyspepsia  dextrose 40% Gel 15 Gram(s) Oral once PRN Blood Glucose LESS THAN 70 milliGRAM(s)/deciliter  glucagon  Injectable 1 milliGRAM(s) IntraMuscular once PRN Glucose LESS THAN 70 milligrams/deciliter      PHYSICAL EXAM:    Vital Signs Last 24 Hrs  T(C): 36.4 (2020 09:10), Max: 37.4 (2020 18:40)  T(F): 97.5 (2020 09:10), Max: 99.4 (2020 18:40)  HR: 92 (2020 09:10) (90 - 103)  BP: 100/58 (2020 09:10) (100/52 - 120/63)  BP(mean): --  RR: 19 (2020 09:10) (18 - 19)  SpO2: 100% (2020 09:10) (95% - 100%)    General: Chronically ill appearing woman, Cachetic with bitemporal wasting, on NC  Karnofsky Performance Score/Palliative Performance Status Version2: 40    %    HEENT: bitemporal wasting, moist oropharynx  Lungs: unlabored on RA  CV: S1S2, RRR  GI: soft, non tender on palpation, LBM today   : incontinent  Musculoskeletal: bedbound  Skin: no rash or lesions noted  Neuro: no deficits cognitive   Oral intake ability: poor po intake      LABS:                        8.3    6.51  )-----------( 166      ( 2020 06:03 )             27.4     11-02    146<H>  |  108  |  19<H>  ----------------------------<  70  3.3<L>   |  30  |  0.78    Ca    8.5      2020 06:03  Phos  1.7     11-  Mg     1.5     11-    TPro  5.5<L>  /  Alb  1.8<L>  /  TBili  0.6  /  DBili  x   /  AST  52<H>  /  ALT  28  /  AlkPhos  90  11-02    Urinalysis Basic - ( 2020 20:49 )    Color: Yellow / Appearance: Slightly Turbid / S.010 / pH: x  Gluc: x / Ketone: Small  / Bili: Small / Urobili: Negative   Blood: x / Protein: 30 mg/dL / Nitrite: Negative   Leuk Esterase: Moderate / RBC: 25-50 /HPF / WBC >50 /HPF   Sq Epi: x / Non Sq Epi: Few /HPF / Bacteria: Many /HPF    < from: Xray Chest 1 View-PORTABLE IMMEDIATE (20 @ 19:10) >  EXAM:  XR CHEST PORTABLE IMMED 1V                            PROCEDURE DATE:  2020          INTERPRETATION:  CLINICAL STATEMENT: Chest Pain.    TECHNIQUE: AP view of the chest.    COMPARISON: 2020    FINDINGS/  IMPRESSION:  Bilateral consolidations overall progressed compared to prior exam.    Small left pleural effusion.    Heart size cannot be accurately assessed in this projection.      < end of copied text >      RADIOLOGY & ADDITIONAL STUDIES: Reviewed    ADVANCE DIRECTIVES: MOLST; DNR/DNI; no artificial nutrition   HPI:  74 year old female with PMHx of DM, lung CA, CAD, ACS, COPD, AFIB (on Eliquis) and PSHx of wedge resection of lungs sent in from [facility] for evaluation of UTI and PNA. Patient is AAOx 3 at baseline but slow and history is obtained from her and NH charts. Patient was noticed to have decreased PO intake of food and liquids x3 days. Patient also noticed to have decrease in urine and urine is cloudy and foul smelling. She reports burning micturition, dysuria and increased phlegm production.  Denies abdominal pain, chest pain, headaches, nausea, vomiting, fevers.   Patient was then transferred here for Sepsis evaluation. Patient is on cefuroxime for UTI treatment at NH,    Interval hx: Pt seen and examined at the bedside, AOx2. Noted with forgetfulness.  Reports more difficult to breathe. . Palliative care to establish goals of care.     PAST MEDICAL & SURGICAL HISTORY:  Lung cancer  wedge resection of left lung     Seborrheic dermatitis    OA (osteoarthritis)    Hypercholesteremia    HTN (hypertension)    GERD (gastroesophageal reflux disease)    Constipation    DM (diabetes mellitus)    Cataract    CAD (coronary artery disease)    Bronchoalveolar carcinoma    ACS (acute coronary syndrome)    COPD (chronic obstructive pulmonary disease)    S/P ovarian cystectomy        SOCIAL HISTORY:    Admitted from: NH    Mosque:  Religious    Surrogate/HCP/Guardian: Nelsy Cable (sister)           Phone#: [phone]    FAMILY HISTORY:  Family history of prostate cancer      Baseline ADLs (prior to admission): dependent    Allergies    No Known Allergies    Intolerances      Present Symptoms:   Dyspnea: yes  Nausea/Vomiting: denies  Loss of appetite: denies  Pain:   denies                                    Review of Systems: [All others negative     MEDICATIONS  (STANDING):  artificial  tears Solution 1 Drop(s) Both EYES every 12 hours  aspirin enteric coated 81 milliGRAM(s) Oral daily  atorvastatin 40 milliGRAM(s) Oral at bedtime  azithromycin  IVPB 500 milliGRAM(s) IV Intermittent daily  cefTRIAXone   IVPB 1000 milliGRAM(s) IV Intermittent every 24 hours  dextrose 5% + sodium chloride 0.9%. 1000 milliLiter(s) (75 mL/Hr) IV Continuous <Continuous>  dextrose 5%. 1000 milliLiter(s) (50 mL/Hr) IV Continuous <Continuous>  dextrose 50% Injectable 25 Gram(s) IV Push once  ferrous    sulfate 325 milliGRAM(s) Oral daily  folic acid 1 milliGRAM(s) Oral daily  insulin lispro (ADMELOG) corrective regimen sliding scale   SubCutaneous every 6 hours  ipratropium 17 MICROgram(s) HFA Inhaler 1 Puff(s) Inhalation every 6 hours  lisinopril 5 milliGRAM(s) Oral daily  magnesium sulfate  IVPB 2 Gram(s) IV Intermittent every 2 hours  metoprolol tartrate 25 milliGRAM(s) Oral two times a day  multivitamin 1 Tablet(s) Oral daily  pantoprazole    Tablet 40 milliGRAM(s) Oral before breakfast  senna 2 Tablet(s) Oral at bedtime    MEDICATIONS  (PRN):  acetaminophen   Tablet .. 650 milliGRAM(s) Oral every 6 hours PRN Mild Pain (1 - 3)  aluminum hydroxide/magnesium hydroxide/simethicone Suspension 30 milliLiter(s) Oral every 8 hours PRN Dyspepsia  dextrose 40% Gel 15 Gram(s) Oral once PRN Blood Glucose LESS THAN 70 milliGRAM(s)/deciliter  glucagon  Injectable 1 milliGRAM(s) IntraMuscular once PRN Glucose LESS THAN 70 milligrams/deciliter      PHYSICAL EXAM:    Vital Signs Last 24 Hrs  T(C): 36.4 (2020 09:10), Max: 37.4 (2020 18:40)  T(F): 97.5 (2020 09:10), Max: 99.4 (2020 18:40)  HR: 92 (2020 09:10) (90 - 103)  BP: 100/58 (2020 09:10) (100/52 - 120/63)  BP(mean): --  RR: 19 (2020 09:10) (18 - 19)  SpO2: 100% (2020 09:10) (95% - 100%)    General: Chronically ill appearing woman, Cachetic with bitemporal wasting, on NC  Karnofsky Performance Score/Palliative Performance Status Version2: 40    %    HEENT: bitemporal wasting, moist oropharynx  Lungs: unlabored on RA  CV: S1S2, RRR  GI: soft, non tender on palpation, LBM today   : incontinent  Musculoskeletal: bedbound  Skin: no rash or lesions noted  Neuro: no deficits cognitive   Oral intake ability: poor po intake      LABS:                        8.3    6.51  )-----------( 166      ( 2020 06:03 )             27.4     11-02    146<H>  |  108  |  19<H>  ----------------------------<  70  3.3<L>   |  30  |  0.78    Ca    8.5      2020 06:03  Phos  1.7     11-02  Mg     1.5     11-02    TPro  5.5<L>  /  Alb  1.8<L>  /  TBili  0.6  /  DBili  x   /  AST  52<H>  /  ALT  28  /  AlkPhos  90  11-02    Urinalysis Basic - ( 2020 20:49 )    Color: Yellow / Appearance: Slightly Turbid / S.010 / pH: x  Gluc: x / Ketone: Small  / Bili: Small / Urobili: Negative   Blood: x / Protein: 30 mg/dL / Nitrite: Negative   Leuk Esterase: Moderate / RBC: 25-50 /HPF / WBC >50 /HPF   Sq Epi: x / Non Sq Epi: Few /HPF / Bacteria: Many /HPF    < from: Xray Chest 1 View-PORTABLE IMMEDIATE (20 @ 19:10) >  EXAM:  XR CHEST PORTABLE IMMED 1V                            PROCEDURE DATE:  2020          INTERPRETATION:  CLINICAL STATEMENT: Chest Pain.    TECHNIQUE: AP view of the chest.    COMPARISON: 2020    FINDINGS/  IMPRESSION:  Bilateral consolidations overall progressed compared to prior exam.    Small left pleural effusion.    Heart size cannot be accurately assessed in this projection.      < end of copied text >      RADIOLOGY & ADDITIONAL STUDIES: Reviewed    ADVANCE DIRECTIVES: MOLST; DNR/DNI; no artificial nutrition   HPI:  74 year old female with PMHx of DM, lung CA, CAD, ACS, COPD, AFIB (on Eliquis) and PSHx of wedge resection of lungs sent in from [facility] for evaluation of UTI and PNA. Patient is AAOx 3 at baseline but slow and history is obtained from her and NH charts. Patient was noticed to have decreased PO intake of food and liquids x3 days. Patient also noticed to have decrease in urine and urine is cloudy and foul smelling. She reports burning micturition, dysuria and increased phlegm production.  Denies abdominal pain, chest pain, headaches, nausea, vomiting, fevers.   Patient was then transferred here for Sepsis evaluation. Patient is on cefuroxime for UTI treatment at NH,    Interval hx: Pt seen and examined at the bedside, AOx2. Noted with forgetfulness.  Reports more difficult to breathe. . Palliative care to establish goals of care.     PAST MEDICAL & SURGICAL HISTORY:  Lung cancer  wedge resection of left lung     Seborrheic dermatitis    OA (osteoarthritis)    Hypercholesteremia    HTN (hypertension)    GERD (gastroesophageal reflux disease)    Constipation    DM (diabetes mellitus)    Cataract    CAD (coronary artery disease)    Bronchoalveolar carcinoma    ACS (acute coronary syndrome)    COPD (chronic obstructive pulmonary disease)    S/P ovarian cystectomy        SOCIAL HISTORY:  has no children  Admitted from: NH  Nonsmoker    Zoroastrianism:  Sikhism    Surrogate/HCP/Guardian: Nelsy Fergus (sister)           Phone#: [phone]    FAMILY HISTORY:  Family history of prostate cancer      Baseline ADLs (prior to admission): dependent    Allergies    No Known Allergies    Intolerances      Present Symptoms:   Dyspnea: yes  Nausea/Vomiting: denies  Loss of appetite: denies  Pain:   denies                                    Review of Systems: [All others negative     MEDICATIONS  (STANDING):  artificial  tears Solution 1 Drop(s) Both EYES every 12 hours  aspirin enteric coated 81 milliGRAM(s) Oral daily  atorvastatin 40 milliGRAM(s) Oral at bedtime  azithromycin  IVPB 500 milliGRAM(s) IV Intermittent daily  cefTRIAXone   IVPB 1000 milliGRAM(s) IV Intermittent every 24 hours  dextrose 5% + sodium chloride 0.9%. 1000 milliLiter(s) (75 mL/Hr) IV Continuous <Continuous>  dextrose 5%. 1000 milliLiter(s) (50 mL/Hr) IV Continuous <Continuous>  dextrose 50% Injectable 25 Gram(s) IV Push once  ferrous    sulfate 325 milliGRAM(s) Oral daily  folic acid 1 milliGRAM(s) Oral daily  insulin lispro (ADMELOG) corrective regimen sliding scale   SubCutaneous every 6 hours  ipratropium 17 MICROgram(s) HFA Inhaler 1 Puff(s) Inhalation every 6 hours  lisinopril 5 milliGRAM(s) Oral daily  magnesium sulfate  IVPB 2 Gram(s) IV Intermittent every 2 hours  metoprolol tartrate 25 milliGRAM(s) Oral two times a day  multivitamin 1 Tablet(s) Oral daily  pantoprazole    Tablet 40 milliGRAM(s) Oral before breakfast  senna 2 Tablet(s) Oral at bedtime    MEDICATIONS  (PRN):  acetaminophen   Tablet .. 650 milliGRAM(s) Oral every 6 hours PRN Mild Pain (1 - 3)  aluminum hydroxide/magnesium hydroxide/simethicone Suspension 30 milliLiter(s) Oral every 8 hours PRN Dyspepsia  dextrose 40% Gel 15 Gram(s) Oral once PRN Blood Glucose LESS THAN 70 milliGRAM(s)/deciliter  glucagon  Injectable 1 milliGRAM(s) IntraMuscular once PRN Glucose LESS THAN 70 milligrams/deciliter      PHYSICAL EXAM:    Vital Signs Last 24 Hrs  T(C): 36.4 (2020 09:10), Max: 37.4 (2020 18:40)  T(F): 97.5 (2020 09:10), Max: 99.4 (2020 18:40)  HR: 92 (2020 09:10) (90 - 103)  BP: 100/58 (2020 09:10) (100/52 - 120/63)  BP(mean): --  RR: 19 (2020 09:10) (18 - 19)  SpO2: 100% (2020 09:10) (95% - 100%)    General: Chronically ill appearing woman, Cachetic with bitemporal wasting, on NC  Karnofsky Performance Score/Palliative Performance Status Version2: 40    %    HEENT: bitemporal wasting, moist oropharynx  Lungs: unlabored on RA  CV: S1S2, RRR  GI: soft, non tender on palpation, LBM today   : incontinent  Musculoskeletal: bedbound  Skin: no rash or lesions noted  Neuro: no deficits cognitive   Oral intake ability: poor po intake      LABS:                        8.3    6.51  )-----------( 166      ( 2020 06:03 )             27.4     11-02    146<H>  |  108  |  19<H>  ----------------------------<  70  3.3<L>   |  30  |  0.78    Ca    8.5      2020 06:03  Phos  1.7     11-02  Mg     1.5     11-02    TPro  5.5<L>  /  Alb  1.8<L>  /  TBili  0.6  /  DBili  x   /  AST  52<H>  /  ALT  28  /  AlkPhos  90  11-02    Urinalysis Basic - ( 2020 20:49 )    Color: Yellow / Appearance: Slightly Turbid / S.010 / pH: x  Gluc: x / Ketone: Small  / Bili: Small / Urobili: Negative   Blood: x / Protein: 30 mg/dL / Nitrite: Negative   Leuk Esterase: Moderate / RBC: 25-50 /HPF / WBC >50 /HPF   Sq Epi: x / Non Sq Epi: Few /HPF / Bacteria: Many /HPF    < from: Xray Chest 1 View-PORTABLE IMMEDIATE (20 @ 19:10) >  EXAM:  XR CHEST PORTABLE IMMED 1V                            PROCEDURE DATE:  2020          INTERPRETATION:  CLINICAL STATEMENT: Chest Pain.    TECHNIQUE: AP view of the chest.    COMPARISON: 2020    FINDINGS/  IMPRESSION:  Bilateral consolidations overall progressed compared to prior exam.    Small left pleural effusion.    Heart size cannot be accurately assessed in this projection.      < end of copied text >      RADIOLOGY & ADDITIONAL STUDIES: Reviewed    ADVANCE DIRECTIVES: MOLST; DNR/DNI; no artificial nutrition

## 2020-11-02 NOTE — CONSULT NOTE ADULT - SUBJECTIVE AND OBJECTIVE BOX
Patient is a 74y old  Female who presents with a chief complaint of UTI (02 Nov 2020 12:18)      HPI: 74F admitted for UTI and pneumonia. Her baseline Hb is 10-11, she presented with Hb 9.9, FOBT was positive, hence GI consulted for anemia. Hb dropped to 8.3 today.            REVIEW OF SYSTEMS  Constitutional:   No fever, no fatigue, no pallor, no night sweats, no weight loss.  HEENT:   No eye pain, no vision changes, no icterus, no mouth ulcers.  Respiratory:   No shortness of breath, no cough, no respiratory distress.   Cardiovascular:   No chest pain, no palpitations.   Gastrointestinal: No abdominal pain, no nausea, no vomiting , no diarrhea, no constipation, no hematochezia, no melena.  Skin:   No rashes, no jaundice, no eczema.   Musculoskeletal:   No joint pain, no swelling, no myalgia.   Neurologic:   No headache, no seizure, no weakness.   Genitourinary:   No dysuria, no decreased urine output.  Psychiatric:  No depression, no anxiety,   Endocrine:   No thyroid disease, no diabetes.  Heme/Lymphatic:   No anemia, no blood transfusions, no lymph node enlargement, no bleeding, no bruising.  ___________________________________________________________________________________________  Allergies    No Known Allergies    Intolerances      MEDICATIONS  (STANDING):  artificial  tears Solution 1 Drop(s) Both EYES every 12 hours  aspirin enteric coated 81 milliGRAM(s) Oral daily  atorvastatin 40 milliGRAM(s) Oral at bedtime  azithromycin  IVPB 500 milliGRAM(s) IV Intermittent daily  cefTRIAXone   IVPB 1000 milliGRAM(s) IV Intermittent every 24 hours  dextrose 5% + sodium chloride 0.9%. 1000 milliLiter(s) (75 mL/Hr) IV Continuous <Continuous>  dextrose 5%. 1000 milliLiter(s) (50 mL/Hr) IV Continuous <Continuous>  dextrose 50% Injectable 25 Gram(s) IV Push once  ferrous    sulfate 325 milliGRAM(s) Oral daily  folic acid 1 milliGRAM(s) Oral daily  insulin lispro (ADMELOG) corrective regimen sliding scale   SubCutaneous every 6 hours  ipratropium 17 MICROgram(s) HFA Inhaler 1 Puff(s) Inhalation every 6 hours  lisinopril 5 milliGRAM(s) Oral daily  magnesium sulfate  IVPB 2 Gram(s) IV Intermittent every 2 hours  metoprolol tartrate 25 milliGRAM(s) Oral two times a day  multivitamin 1 Tablet(s) Oral daily  pantoprazole    Tablet 40 milliGRAM(s) Oral before breakfast  senna 2 Tablet(s) Oral at bedtime    MEDICATIONS  (PRN):  acetaminophen   Tablet .. 650 milliGRAM(s) Oral every 6 hours PRN Mild Pain (1 - 3)  aluminum hydroxide/magnesium hydroxide/simethicone Suspension 30 milliLiter(s) Oral every 8 hours PRN Dyspepsia  dextrose 40% Gel 15 Gram(s) Oral once PRN Blood Glucose LESS THAN 70 milliGRAM(s)/deciliter  glucagon  Injectable 1 milliGRAM(s) IntraMuscular once PRN Glucose LESS THAN 70 milligrams/deciliter      PAST MEDICAL & SURGICAL HISTORY:  Lung cancer  wedge resection of left lung 2013    Seborrheic dermatitis    OA (osteoarthritis)    Hypercholesteremia    HTN (hypertension)    GERD (gastroesophageal reflux disease)    Constipation    DM (diabetes mellitus)    Cataract    CAD (coronary artery disease)    Bronchoalveolar carcinoma    ACS (acute coronary syndrome)    COPD (chronic obstructive pulmonary disease)    S/P ovarian cystectomy      FAMILY HISTORY:  Family history of prostate cancer      Social History: No hsitory of : Tobacco use, IVDA, EToH  ______________________________________________________________________________________    PHYSICAL EXAM    Daily Height in cm: 160.02 (01 Nov 2020 17:50)    Daily   BMI: 23 (11-01 @ 17:50)  Change in Weight:  Vital Signs Last 24 Hrs  T(C): 36.4 (02 Nov 2020 09:10), Max: 37.4 (01 Nov 2020 18:40)  T(F): 97.5 (02 Nov 2020 09:10), Max: 99.4 (01 Nov 2020 18:40)  HR: 92 (02 Nov 2020 09:10) (90 - 103)  BP: 100/58 (02 Nov 2020 09:10) (100/52 - 120/63)  BP(mean): --  RR: 19 (02 Nov 2020 09:10) (18 - 19)  SpO2: 100% (02 Nov 2020 09:10) (95% - 100%)    General:  Well developed, well nourished, alert and active, no pallor, NAD.  HEENT:    Normal appearance of conjunctiva, ears, nose, lips, oropharynx, and oral mucosa, anicteric.  Neck:  No masses, no asymmetry.  Lymph Nodes:  No lymphadenopathy.   Cardiovascular:  RRR normal S1/S2, no murmur.  Respiratory:  CTA B/L, normal respiratory effort.   Abdominal:   soft, no masses or tenderness, normoactive BS, NT/ND, no HSM.  Extremities:   No clubbing or cyanosis, normal capillary refill, no edema.   Skin:   No rash, jaundice, lesions, eczema.   Musculoskeletal:  No joint swelling, erythema or tenderness.   Neuro: No focal deficits.   Other:   _______________________________________________________________________________________________  Lab Results:                          8.3    6.51  )-----------( 166      ( 02 Nov 2020 06:03 )             27.4     11-02    146<H>  |  108  |  19<H>  ----------------------------<  70  3.3<L>   |  30  |  0.78    Ca    8.5      02 Nov 2020 06:03  Phos  1.7     11-02  Mg     1.5     11-02    TPro  5.5<L>  /  Alb  1.8<L>  /  TBili  0.6  /  DBili  x   /  AST  52<H>  /  ALT  28  /  AlkPhos  90  11-02    LIVER FUNCTIONS - ( 02 Nov 2020 06:03 )  Alb: 1.8 g/dL / Pro: 5.5 g/dL / ALK PHOS: 90 U/L / ALT: 28 U/L DA / AST: 52 U/L / GGT: x           PT/INR - ( 01 Nov 2020 18:41 )   PT: 19.9 sec;   INR: 1.72 ratio         PTT - ( 01 Nov 2020 18:41 )  PTT:37.4 sec  Triglycerides, Serum: 83 mg/dL (11-02 @ 06:03)    CARDIAC MARKERS ( 01 Nov 2020 18:41 )  0.035 ng/mL / x     / x     / x     / x          Stool Results:          RADIOLOGY RESULTS:    SURGICAL PATHOLOGY:      Patient is a 74y old  Female who presents with a chief complaint of UTI (02 Nov 2020 12:18)      HPI: 74F admitted for UTI and pneumonia. Her baseline Hb is 10-11, she presented with Hb 9.9, FOBT was positive, anemia panel shows anemia of chronic disease, GI consulted for anemia. Hb dropped to 8.3 today. Pt. complaints of urinary symptoms and black stool, denies abd. pain, nausea and vomiting, change in appetite/weight. As per RN, pt. had 2 episodes of black stool in AM. Pt. is on iron tablets too and eliquis for A.fib which was held due to black stool. Pt. does not remember having EGD/colonscopy.            REVIEW OF SYSTEMS  Constitutional:   No fever, no fatigue, no pallor, no night sweats, no weight loss.  HEENT:   No eye pain, no vision changes, no icterus, no mouth ulcers.  Respiratory:   No shortness of breath, no cough, no respiratory distress.   Cardiovascular:   No chest pain, no palpitations.   Gastrointestinal: No abdominal pain, no nausea, no vomiting , no diarrhea, no constipation, no hematochezia, + melena.  Skin:   No rashes, no jaundice, no eczema.   Musculoskeletal:   No joint pain, no swelling, no myalgia.   Neurologic:   No headache, no seizure, no weakness.   Genitourinary:   + dysuria, no decreased urine output.  Psychiatric:  No depression, no anxiety,   Endocrine:   No thyroid disease, no diabetes.  Heme/Lymphatic:   No anemia, no blood transfusions, no lymph node enlargement, no bleeding, no bruising.  ___________________________________________________________________________________________  Allergies    No Known Allergies    Intolerances      MEDICATIONS  (STANDING):  artificial  tears Solution 1 Drop(s) Both EYES every 12 hours  aspirin enteric coated 81 milliGRAM(s) Oral daily  atorvastatin 40 milliGRAM(s) Oral at bedtime  azithromycin  IVPB 500 milliGRAM(s) IV Intermittent daily  cefTRIAXone   IVPB 1000 milliGRAM(s) IV Intermittent every 24 hours  dextrose 5% + sodium chloride 0.9%. 1000 milliLiter(s) (75 mL/Hr) IV Continuous <Continuous>  dextrose 5%. 1000 milliLiter(s) (50 mL/Hr) IV Continuous <Continuous>  dextrose 50% Injectable 25 Gram(s) IV Push once  ferrous    sulfate 325 milliGRAM(s) Oral daily  folic acid 1 milliGRAM(s) Oral daily  insulin lispro (ADMELOG) corrective regimen sliding scale   SubCutaneous every 6 hours  ipratropium 17 MICROgram(s) HFA Inhaler 1 Puff(s) Inhalation every 6 hours  lisinopril 5 milliGRAM(s) Oral daily  magnesium sulfate  IVPB 2 Gram(s) IV Intermittent every 2 hours  metoprolol tartrate 25 milliGRAM(s) Oral two times a day  multivitamin 1 Tablet(s) Oral daily  pantoprazole    Tablet 40 milliGRAM(s) Oral before breakfast  senna 2 Tablet(s) Oral at bedtime    MEDICATIONS  (PRN):  acetaminophen   Tablet .. 650 milliGRAM(s) Oral every 6 hours PRN Mild Pain (1 - 3)  aluminum hydroxide/magnesium hydroxide/simethicone Suspension 30 milliLiter(s) Oral every 8 hours PRN Dyspepsia  dextrose 40% Gel 15 Gram(s) Oral once PRN Blood Glucose LESS THAN 70 milliGRAM(s)/deciliter  glucagon  Injectable 1 milliGRAM(s) IntraMuscular once PRN Glucose LESS THAN 70 milligrams/deciliter      PAST MEDICAL & SURGICAL HISTORY:  Lung cancer  wedge resection of left lung 2013    Seborrheic dermatitis    OA (osteoarthritis)    Hypercholesteremia    HTN (hypertension)    GERD (gastroesophageal reflux disease)    Constipation    DM (diabetes mellitus)    Cataract    CAD (coronary artery disease)    Bronchoalveolar carcinoma    ACS (acute coronary syndrome)    COPD (chronic obstructive pulmonary disease)    S/P ovarian cystectomy      FAMILY HISTORY:  Family history of prostate cancer      Social History: No hsitory of : Tobacco use, IVDA, EToH  ______________________________________________________________________________________    PHYSICAL EXAM    Daily Height in cm: 160.02 (01 Nov 2020 17:50)    Daily   BMI: 23 (11-01 @ 17:50)  Change in Weight:  Vital Signs Last 24 Hrs  T(C): 36.4 (02 Nov 2020 09:10), Max: 37.4 (01 Nov 2020 18:40)  T(F): 97.5 (02 Nov 2020 09:10), Max: 99.4 (01 Nov 2020 18:40)  HR: 92 (02 Nov 2020 09:10) (90 - 103)  BP: 100/58 (02 Nov 2020 09:10) (100/52 - 120/63)  BP(mean): --  RR: 19 (02 Nov 2020 09:10) (18 - 19)  SpO2: 100% (02 Nov 2020 09:10) (95% - 100%)    General:  Well developed, well nourished, alert and active, no pallor, NAD.  HEENT:    Normal appearance of conjunctiva, ears, nose, lips, oropharynx, and oral mucosa, anicteric.  Neck:  No masses, no asymmetry.  Lymph Nodes:  No lymphadenopathy.   Cardiovascular:  RRR normal S1/S2, no murmur.  Respiratory:  CTA B/L, normal respiratory effort.   Abdominal:   soft, no masses or tenderness, normoactive BS, NT/ND, no HSM.  Extremities:   No clubbing or cyanosis, normal capillary refill, no edema.   Skin:   No rash, jaundice, lesions, eczema.   Musculoskeletal:  No joint swelling, erythema or tenderness.   Neuro: No focal deficits.   Other:   _______________________________________________________________________________________________  Lab Results:                          8.3    6.51  )-----------( 166      ( 02 Nov 2020 06:03 )             27.4     11-02    146<H>  |  108  |  19<H>  ----------------------------<  70  3.3<L>   |  30  |  0.78    Ca    8.5      02 Nov 2020 06:03  Phos  1.7     11-02  Mg     1.5     11-02    TPro  5.5<L>  /  Alb  1.8<L>  /  TBili  0.6  /  DBili  x   /  AST  52<H>  /  ALT  28  /  AlkPhos  90  11-02    LIVER FUNCTIONS - ( 02 Nov 2020 06:03 )  Alb: 1.8 g/dL / Pro: 5.5 g/dL / ALK PHOS: 90 U/L / ALT: 28 U/L DA / AST: 52 U/L / GGT: x           PT/INR - ( 01 Nov 2020 18:41 )   PT: 19.9 sec;   INR: 1.72 ratio         PTT - ( 01 Nov 2020 18:41 )  PTT:37.4 sec  Triglycerides, Serum: 83 mg/dL (11-02 @ 06:03)    CARDIAC MARKERS ( 01 Nov 2020 18:41 )  0.035 ng/mL / x     / x     / x     / x          Stool Results:          RADIOLOGY RESULTS:        SURGICAL PATHOLOGY:

## 2020-11-02 NOTE — H&P ADULT - NSHPREVIEWOFSYSTEMS_GEN_ALL_CORE
CONSTITUTIONAL: No weakness, fevers or chills  EYES/ENT: No visual changes;  No vertigo or throat pain   NECK: No pain or stiffness  RESPIRATORY: No cough, wheezing, hemoptysis; No shortness of breath  CARDIOVASCULAR: No chest pain or palpitations  GASTROINTESTINAL: No abdominal or epigastric pain. No nausea, vomiting, or hematemesis; constipation. No melena or hematochezia.  GENITOURINARY:  dysuria, increased frequency, burning  NEUROLOGICAL: No numbness or weakness  SKIN: No itching, burning, rashes, or lesions   All other review of systems is negative unless indicated above.

## 2020-11-02 NOTE — CONSULT NOTE ADULT - PROBLEM SELECTOR RECOMMENDATION 2
p/w Hb 9.9  anemia panel shows anemia of chronic disease  Goal Hb>7
Murali 2/2 comorbidities.  Pt report poor po intake as she does not like the food at the NH. Cachetic, bitemporal wasting with muscle mass/fat loss. Albumin 1.8.   Diet as tolerated.  Nutrition consult    Pt does not want artificial nutrition.   Encouraged small frequent high protein meals.

## 2020-11-02 NOTE — CONSULT NOTE ADULT - PROBLEM SELECTOR RECOMMENDATION 9
PSHx of lung cancer s/p wedge resection of lungs.  C/w Opdivo.  Appears comfortable on NC. Pt states increased difficulty breathing, because the oxygen supply at the NH is not working.   Per NH pt had a virtual visit with oncologist 3 weeks ago.  Unable to reach oncologist, left call back information. PSHx of lung cancer s/p wedge resection of L lower lung.   C/w Opdivo.  Appears comfortable on NC. Pt states increased difficulty breathing, because the oxygen supply at the NH is not working.   Per NH pt had a virtual visit with oncologist 3 weeks ago.  Unable to reach oncologist Dr Jimenez, left call back information.

## 2020-11-02 NOTE — CONSULT NOTE ADULT - PROBLEM SELECTOR RECOMMENDATION 3
Problem: DISCHARGE PLANNING - CARE MANAGEMENT  Goal: Discharge to post-acute care or home with appropriate resources  INTERVENTIONS:  - Conduct assessment to determine patient/family and health care team treatment goals, and need for post-acute services based on payer coverage, community resources, and patient preferences, and barriers to discharge  - Address psychosocial, clinical, and financial barriers to discharge as identified in assessment in conjunction with the patient/family and health care team  - Arrange appropriate level of post-acute services according to patient's   needs and preference and payer coverage in collaboration with the physician and health care team  - Communicate with and update the patient/family, physician, and health care team regarding progress on the discharge plan  - Arrange appropriate transportation to post-acute venues   Outcome: Completed Date Met: 12/04/17  CM spoke to pt and wife Priscila Arreola at bedside  Pt to be discharged home with wife transporting  Hospital bed and rolling walker was already delivered to the home by [facility]   [facility], but wife is refusing at this time  She has hired a full time 24hr aide through [employer] who will provide assistance in the home  CM supplied phone number of the [employer] through [address] for when pt and family are ready 
treatment as per primary
Pt reports minimally ambulatory with walker.  Currently bedbound.  Dependent.   High risk for skin failure. Supportive care.  Frequent positioning.  Pt eval

## 2020-11-02 NOTE — GOALS OF CARE CONVERSATION - ADVANCED CARE PLANNING - TREATMENT GUIDELINE COMMENT
Continue current medical management up to the point of CPR, Intubation or long term artificial nutrition.

## 2020-11-02 NOTE — H&P ADULT - PROBLEM SELECTOR PLAN 6
Patient is not taking metformin as per NH charts   Will put on Sliding scale to cover in the hospital and FS  Q6 until patient is NPO

## 2020-11-02 NOTE — GOALS OF CARE CONVERSATION - ADVANCED CARE PLANNING - CONVERSATION DETAILS
PC NP and PC  went to the bedside and spoke with the patient regarding goals of care.  Patient provided an illness and life review.  PC NP provided a clinical summary which included hospice eligibility.  PC Sw also continued education regarding hospice, however patient stated she is not interested in receiving these services in [facility].  Patient stated she is a native of [place], identifies with the [employer] su[employer], is single and does not have children.  Ms. Kaplan shared pride in having raised her nieces and nephews while working as a . Patient acknowledged the decline in her health and functional status.  when asked about her oncology history the patient became tearful.  Education was provided regarding risk versus benefit of CPR, intubation and peg feeding tube.  MOLST was completed to reflect the patient's wishes as follows: DNR/DNI/ No Peg.  emotional support as well as this sw's contact information was provided.  No addl. needs were expressed.  PC  will remain available as needed.

## 2020-11-02 NOTE — CONSULT NOTE ADULT - ATTENDING COMMENTS
I was physically present for the key portions of the evaluation and management (E/M) service provided.  The patient was personally seen and examined at bedside.  I have edited the note as appropriate.   Thank you for your consultation and allowing  me to participate in the care of your patients. If you have further questions please contact me at [phone].     Boy Johnson M.D.       _________________________________________________________________________________________________  [facility]  [address] [place] [address]  Office: [phone]    Benji Wells PA-C  ___________________________________________________________________________________________________
74 y F w multiple comorbidities including  ILD on home O2, bronchoalveolar lung CA s/p LLL resection 2013, s/p chemo/xrt, on tagrisso, adm for lethargy, UTI, GIB. Foll by Dr Jimenez onc at [facility]. A&Ox2, on NC, NAD. DNR/DNI. Palliative will follow for ongoing GOC discussions pending clinical course.

## 2020-11-02 NOTE — CONSULT NOTE ADULT - PROBLEM SELECTOR RECOMMENDATION 9
p/w Hb 9.9, FOBT positive  Pt. has been having black stool, denies N/V/Abd. pain  pt. is also on iron supplements  unknown if ever had EGD/colonoscopy  Hemodynamically stable  hold eliquis  clears for now, NPO after MN tomorrow for EGD on wednesday  IV Protonix BID  CBC q12 or daily if stable p/w Hb 9.9, FOBT positive  Pt. has been having black stool, denies N/V/Abd. pain  pt. is also on iron supplements  unknown if ever had EGD/colonoscopy  Hemodynamically stable  hold eliquis  clears for now, NPO after MN tomorrow for EGD on Wednesday  IV Protonix BID  CBC q12 or daily if stable

## 2020-11-02 NOTE — H&P ADULT - HISTORY OF PRESENT ILLNESS
74 year old female with PMHx of DM, lung CA, CAD, ACS, COPD, AFIB (on Eliquis) and PSHx of wedge resection of lungs sent in from [facility] for evaluation of UTI and PNA. Patient is AAOx 3 at baseline but slow and history is obtained from her and NH charts. Patient was noticed to have decreased PO intake of food and liquids x3 days. Patient also noticed to have decrease in urine and urine is cloudy and foul smelling. She reports burning micturition, dysuria and increased phlegm production.  Denies abdominal pain, chest pain, headaches, nausea, vomiting, fevers.   Patient was then transferred here for Sepsis evaluation. Patient is on cefuroxime for UTI treatment at NH,    ED course;   s/p [place]   UA positive   WBC normal, FOBT positive  Vital Signs Last 24 Hrs  T(C): 37.2 (02 Nov 2020 02:40), Max: 37.4 (01 Nov 2020 18:40)  T(F): 99 (02 Nov 2020 02:40), Max: 99.4 (01 Nov 2020 18:40)  HR: 99 (02 Nov 2020 02:40) (90 - 99)  BP: 120/63 (02 Nov 2020 02:40) (100/52 - 120/63)  RR: 18 (02 Nov 2020 02:40) (18 - 18)  SpO2: 95% (02 Nov 2020 02:40) (95% - 97%)

## 2020-11-02 NOTE — H&P ADULT - PROBLEM SELECTOR PLAN 3
Patient had drop of 2 gm from September   FOBT positive  Patient denies Blood in stools, urine or vomiting  - Using Eliquis for A fib  - Will get Anemia panel and hold Eliquis for now, Repeat FOBT for confirmation Patient had drop of 2 gm from September   FOBT positive  Patient denies Blood in stools, urine or vomiting  - Using Eliquis for A fib  - Will get Anemia panel and hold Eliquis for now, Repeat FOBT for confirmation  - GI consult Dr byrne

## 2020-11-02 NOTE — H&P ADULT - PROBLEM SELECTOR PLAN 2
Patient endorses sputum production and choking  CXR shows left sided infiltrate and right infiltrate also increasing in size from September and February (f/u official report)  - Covid and viral panel negative  Will Start Rocephin and Zithromax for atypical organism coverage  Patient has also risk of aspiration,   Will keep NPO for now and f/u speech and swallow   f/u blood cultures

## 2020-11-02 NOTE — H&P ADULT - PROBLEM SELECTOR PLAN 10
RISK                                                          Points  [] Previous VTE                                           3  [] Thrombophilia                                        2  [] Lower limb paralysis                              2   [] Current Cancer                                       2   [x] Immobilization > 24 hrs                        1  [] ICU/CCU stay > 24 hours                       1  [x] Age > 60                                                   1  Improve Score 2  SCD boots for now, can resume chemical prophylaxis in Am RISK                                                          Points  [] Previous VTE                                           3  [] Thrombophilia                                        2  [] Lower limb paralysis                              2   [] Current Cancer                                       2   [x] Immobilization > 24 hrs                        1  [] ICU/CCU stay > 24 hours                       1  [x] Age > 60                                                   1  Improve Score 2  SCD boots for now,

## 2020-11-03 ENCOUNTER — TRANSCRIPTION ENCOUNTER (OUTPATIENT)
Age: 75
End: 2020-11-03

## 2020-11-03 DIAGNOSIS — C34.90 MALIGNANT NEOPLASM OF UNSPECIFIED PART OF UNSPECIFIED BRONCHUS OR LUNG: ICD-10-CM

## 2020-11-03 DIAGNOSIS — Z71.89 OTHER SPECIFIED COUNSELING: ICD-10-CM

## 2020-11-03 LAB
A1C WITH ESTIMATED AVERAGE GLUCOSE RESULT: 4.9 % — SIGNIFICANT CHANGE UP (ref 4–5.6)
ALBUMIN SERPL ELPH-MCNC: 1.9 G/DL — LOW (ref 3.5–5)
ALP SERPL-CCNC: 95 U/L — SIGNIFICANT CHANGE UP (ref 40–120)
ALT FLD-CCNC: 39 U/L DA — SIGNIFICANT CHANGE UP (ref 10–60)
ANION GAP SERPL CALC-SCNC: 8 MMOL/L — SIGNIFICANT CHANGE UP (ref 5–17)
AST SERPL-CCNC: 71 U/L — HIGH (ref 10–40)
BILIRUB SERPL-MCNC: 0.5 MG/DL — SIGNIFICANT CHANGE UP (ref 0.2–1.2)
BUN SERPL-MCNC: 15 MG/DL — SIGNIFICANT CHANGE UP (ref 7–18)
CALCIUM SERPL-MCNC: 8.4 MG/DL — SIGNIFICANT CHANGE UP (ref 8.4–10.5)
CHLORIDE SERPL-SCNC: 107 MMOL/L — SIGNIFICANT CHANGE UP (ref 96–108)
CO2 SERPL-SCNC: 28 MMOL/L — SIGNIFICANT CHANGE UP (ref 22–31)
CREAT SERPL-MCNC: 0.9 MG/DL — SIGNIFICANT CHANGE UP (ref 0.5–1.3)
CULTURE RESULTS: SIGNIFICANT CHANGE UP
ESTIMATED AVERAGE GLUCOSE: 94 MG/DL — SIGNIFICANT CHANGE UP (ref 68–114)
GLUCOSE BLDC GLUCOMTR-MCNC: 172 MG/DL — HIGH (ref 70–99)
GLUCOSE BLDC GLUCOMTR-MCNC: 185 MG/DL — HIGH (ref 70–99)
GLUCOSE BLDC GLUCOMTR-MCNC: 196 MG/DL — HIGH (ref 70–99)
GLUCOSE BLDC GLUCOMTR-MCNC: 198 MG/DL — HIGH (ref 70–99)
GLUCOSE SERPL-MCNC: 191 MG/DL — HIGH (ref 70–99)
HCT VFR BLD CALC: 28 % — LOW (ref 34.5–45)
HGB BLD-MCNC: 8.7 G/DL — LOW (ref 11.5–15.5)
IRON SATN MFR SERPL: 39 UG/DL — LOW (ref 40–150)
MAGNESIUM SERPL-MCNC: 2 MG/DL — SIGNIFICANT CHANGE UP (ref 1.6–2.6)
MCHC RBC-ENTMCNC: 28.5 PG — SIGNIFICANT CHANGE UP (ref 27–34)
MCHC RBC-ENTMCNC: 31.1 GM/DL — LOW (ref 32–36)
MCV RBC AUTO: 91.8 FL — SIGNIFICANT CHANGE UP (ref 80–100)
NRBC # BLD: 0 /100 WBCS — SIGNIFICANT CHANGE UP (ref 0–0)
OB PNL STL: NEGATIVE — SIGNIFICANT CHANGE UP
PHOSPHATE SERPL-MCNC: 1.1 MG/DL — LOW (ref 2.5–4.5)
PLATELET # BLD AUTO: 178 K/UL — SIGNIFICANT CHANGE UP (ref 150–400)
POTASSIUM SERPL-MCNC: 3.5 MMOL/L — SIGNIFICANT CHANGE UP (ref 3.5–5.3)
POTASSIUM SERPL-SCNC: 3.5 MMOL/L — SIGNIFICANT CHANGE UP (ref 3.5–5.3)
PROT SERPL-MCNC: 5.9 G/DL — LOW (ref 6–8.3)
RBC # BLD: 3.05 M/UL — LOW (ref 3.8–5.2)
RBC # FLD: 16.7 % — HIGH (ref 10.3–14.5)
SARS-COV-2 IGG SERPL QL IA: NEGATIVE — SIGNIFICANT CHANGE UP
SARS-COV-2 IGM SERPL IA-ACNC: 0.17 INDEX — SIGNIFICANT CHANGE UP
SODIUM SERPL-SCNC: 143 MMOL/L — SIGNIFICANT CHANGE UP (ref 135–145)
SPECIMEN SOURCE: SIGNIFICANT CHANGE UP
WBC # BLD: 9.17 K/UL — SIGNIFICANT CHANGE UP (ref 3.8–10.5)
WBC # FLD AUTO: 9.17 K/UL — SIGNIFICANT CHANGE UP (ref 3.8–10.5)

## 2020-11-03 PROCEDURE — 99232 SBSQ HOSP IP/OBS MODERATE 35: CPT

## 2020-11-03 PROCEDURE — 71045 X-RAY EXAM CHEST 1 VIEW: CPT | Mod: 26

## 2020-11-03 RX ORDER — FOLIC ACID 0.8 MG
1 TABLET ORAL
Qty: 0 | Refills: 0 | DISCHARGE
Start: 2020-11-03

## 2020-11-03 RX ORDER — PANTOPRAZOLE SODIUM 20 MG/1
1 TABLET, DELAYED RELEASE ORAL
Qty: 0 | Refills: 0 | DISCHARGE

## 2020-11-03 RX ORDER — ALBUMIN HUMAN 25 %
250 VIAL (ML) INTRAVENOUS ONCE
Refills: 0 | Status: COMPLETED | OUTPATIENT
Start: 2020-11-03 | End: 2020-11-03

## 2020-11-03 RX ORDER — PANTOPRAZOLE SODIUM 20 MG/1
1 TABLET, DELAYED RELEASE ORAL
Qty: 0 | Refills: 0 | DISCHARGE
Start: 2020-11-03

## 2020-11-03 RX ORDER — OSIMERTINIB 80 1/1
1 TABLET, FILM COATED ORAL
Qty: 0 | Refills: 0 | DISCHARGE

## 2020-11-03 RX ORDER — HALOPERIDOL DECANOATE 100 MG/ML
1 INJECTION INTRAMUSCULAR ONCE
Refills: 0 | Status: COMPLETED | OUTPATIENT
Start: 2020-11-03 | End: 2020-11-03

## 2020-11-03 RX ADMIN — CEFTRIAXONE 100 MILLIGRAM(S): 500 INJECTION, POWDER, FOR SOLUTION INTRAMUSCULAR; INTRAVENOUS at 13:13

## 2020-11-03 RX ADMIN — Medication 81 MILLIGRAM(S): at 13:13

## 2020-11-03 RX ADMIN — Medication 1 PUFF(S): at 13:15

## 2020-11-03 RX ADMIN — Medication 325 MILLIGRAM(S): at 13:13

## 2020-11-03 RX ADMIN — Medication 1 PUFF(S): at 17:52

## 2020-11-03 RX ADMIN — Medication 1 TABLET(S): at 13:15

## 2020-11-03 RX ADMIN — AZITHROMYCIN 255 MILLIGRAM(S): 500 TABLET, FILM COATED ORAL at 13:13

## 2020-11-03 RX ADMIN — HALOPERIDOL DECANOATE 1 MILLIGRAM(S): 100 INJECTION INTRAMUSCULAR at 02:30

## 2020-11-03 RX ADMIN — Medication 1 MILLIGRAM(S): at 13:13

## 2020-11-03 RX ADMIN — Medication 1 PUFF(S): at 23:58

## 2020-11-03 RX ADMIN — Medication 125 MILLILITER(S): at 16:06

## 2020-11-03 NOTE — PROGRESS NOTE ADULT - ASSESSMENT
74F PMHx of DM, lung CA, CAD, COPD, AFIB (on Eliquis) and PSHx of wedge resection of lungs sent in from [facility] admitted for UTI and pneumonia. Her baseline Hb is 10-11, she presented with Hb 9.9, FOBT was positive, anemia panel shows anemia of chronic disease, GI consulted for anemia. Hb dropped to 8.3 today. Pt. complaints of urinary symptoms and black stool, denies abd. pain, nausea and vomiting, change in appetite/weight. As per RN, pt. had 2 episodes of black stool in AM. Pt. is on iron tablets too and eliquis for A.fib which was held due to black stool. Pt. does not remember having EGD/colonscopy.

## 2020-11-03 NOTE — PROGRESS NOTE ADULT - SUBJECTIVE AND OBJECTIVE BOX
OVERNIGHT EVENTS: Pt is  incoherent, with disjointed conversation.       Present Symptoms:    Unable to obtain due to poor mentation]    MEDICATIONS  (STANDING):  artificial  tears Solution 1 Drop(s) Both EYES every 12 hours  aspirin enteric coated 81 milliGRAM(s) Oral daily  atorvastatin 40 milliGRAM(s) Oral at bedtime  azithromycin  IVPB 500 milliGRAM(s) IV Intermittent daily  cefTRIAXone   IVPB 1000 milliGRAM(s) IV Intermittent every 24 hours  dextrose 5% + sodium chloride 0.9%. 1000 milliLiter(s) (75 mL/Hr) IV Continuous <Continuous>  dextrose 5%. 1000 milliLiter(s) (50 mL/Hr) IV Continuous <Continuous>  dextrose 50% Injectable 25 Gram(s) IV Push once  ferrous    sulfate 325 milliGRAM(s) Oral daily  folic acid 1 milliGRAM(s) Oral daily  insulin lispro (ADMELOG) corrective regimen sliding scale   SubCutaneous every 6 hours  ipratropium 17 MICROgram(s) HFA Inhaler 1 Puff(s) Inhalation every 6 hours  lisinopril 5 milliGRAM(s) Oral daily  metoprolol tartrate 25 milliGRAM(s) Oral two times a day  multivitamin 1 Tablet(s) Oral daily  pantoprazole    Tablet 40 milliGRAM(s) Oral before breakfast  senna 2 Tablet(s) Oral at bedtime  sodium chloride 0.9%. 1000 milliLiter(s) (75 mL/Hr) IV Continuous <Continuous>    MEDICATIONS  (PRN):  acetaminophen   Tablet .. 650 milliGRAM(s) Oral every 6 hours PRN Mild Pain (1 - 3)  aluminum hydroxide/magnesium hydroxide/simethicone Suspension 30 milliLiter(s) Oral every 8 hours PRN Dyspepsia  dextrose 40% Gel 15 Gram(s) Oral once PRN Blood Glucose LESS THAN 70 milliGRAM(s)/deciliter  glucagon  Injectable 1 milliGRAM(s) IntraMuscular once PRN Glucose LESS THAN 70 milligrams/deciliter      PHYSICAL EXAM:  Vital Signs Last 24 Hrs  T(C): 36.3 (2020 14:16), Max: 36.4 (2020 05:23)  T(F): 97.3 (2020 14:16), Max: 97.6 (2020 05:23)  HR: 105 (2020 14:16) (67 - 105)  BP: 92/70 (2020 14:16) (92/70 - 122/66)  BP(mean): --  RR: 18 (2020 14:16) (16 - 20)  SpO2: 100% (2020 14:16) (94% - 100%)    General: Chronically ill appearing woman, Cachetic with bitemporal wasting, on NC  Karnofsky Performance Score/Palliative Performance Status Version2: 40    %    HEENT: bitemporal wasting, moist oropharynx  Lungs: unlabored on RA  CV: S1S2, RRR  GI: soft, non tender on palpation  : incontinent  Musculoskeletal: bedbound  Skin: no rash or lesions noted  Neuro: confused, able to follow simple commands  Oral intake ability: poor po intake      LABS:                          8.7    9.17  )-----------( 178      ( 2020 11:39 )             28.0     11-03    143  |  107  |  15  ----------------------------<  191<H>  3.5   |  28  |  0.90    Ca    8.4      2020 11:39  Phos  1.1     11-03  Mg     2.0     11-03    TPro  5.9<L>  /  Alb  1.9<L>  /  TBili  0.5  /  DBili  x   /  AST  71<H>  /  ALT  39  /  AlkPhos  95  11-03    Urinalysis Basic - ( 2020 20:49 )    Color: Yellow / Appearance: Slightly Turbid / S.010 / pH: x  Gluc: x / Ketone: Small  / Bili: Small / Urobili: Negative   Blood: x / Protein: 30 mg/dL / Nitrite: Negative   Leuk Esterase: Moderate / RBC: 25-50 /HPF / WBC >50 /HPF   Sq Epi: x / Non Sq Epi: Few /HPF / Bacteria: Many /HPF        RADIOLOGY & ADDITIONAL STUDIES: Reviewed    ADVANCE DIRECTIVES:  DNR/DNI; comfort measures

## 2020-11-03 NOTE — SWALLOW BEDSIDE ASSESSMENT ADULT - SWALLOW EVAL: DIAGNOSIS
PO trials of ice chips and puree textures attempted, however unable to fully assess swallowing mechanism due to poor acceptance.

## 2020-11-03 NOTE — PROGRESS NOTE ADULT - PROBLEM SELECTOR PLAN 3
Pt reports minimally ambulatory with walker.  Currently bedbound.  Dependent.   High risk for skin failure. Supportive care.  Frequent positioning.  Pt eval.

## 2020-11-03 NOTE — PROGRESS NOTE ADULT - PROBLEM SELECTOR PLAN 2
Murali 2/2 comorbidities.  Pt refusing to eat.  Cachetic, bitemporal wasting with muscle mass/fat loss. Albumin 1.8.   Diet as tolerated.  Nutrition consult    Pt does not want artificial nutrition.   Encouraged small frequent high protein meals.

## 2020-11-03 NOTE — DISCHARGE NOTE PROVIDER - HOSPITAL COURSE
74 year old female with PMHx of DM, lung CA, CAD, ACS, COPD, AFIB (on Eliquis) and PSHx of wedge resection of lungs sent in from [facility] for evaluation of UTI and PNA, sent from NH for poor PO intake and foul smelling urine, grossly positive UTI, started on  Rocephin, initial urine culture contaminated  Also found to have Anemia likely multifactorial, low baseline, metastatic ca, on PO chemo and suspected GIB, h/h remains around baseline since admission. Followed by GI Dr Gilbert, was planned for EGD which was cancelled, because of pt's worsening mental status, family opted for comfort measures only based on pt's wishes  Pt remained in rate controlled Atrial fibrillation. Eliquis was help  for suspicion of GIB  Mental status worsening, followed by palliative team, DNR/DNI, comfort measures only   Please note that this a brief summary of hospital course please refer to daily progress notes and consult notes for full course and events       74 year old female with PMHx of DM, lung CA, CAD, ACS, COPD, AFIB (on Eliquis) and PSHx of wedge resection of lungs sent in from [facility] for evaluation of UTI and PNA, sent from NH for poor PO intake and foul smelling urine, grossly positive UTI, started on  Rocephin, initial urine culture contaminated. Also found to have Anemia likely multifactorial, low baseline, metastatic ca, on PO chemo and suspected GIB, h/h remains around baseline since admission. Followed by GI Dr Gilbert, was planned for EGD which was cancelled, because of pt's worsening mental status, family opted for comfort measures only based on pt's wishes. Pt remained in rate controlled Atrial fibrillation. Eliquis was help  for suspicion of GIB  Mental status worsening, followed by palliative team, DNR/DNI, comfort measures only.     Pt is medically optimized for discharge to LTC with hospice.   Please note that this a brief summary of hospital course please refer to daily progress notes and consult notes for full course and events

## 2020-11-03 NOTE — PROGRESS NOTE ADULT - ASSESSMENT
74 year old female with PMHx of DM, lung CA, CAD, ACS, COPD, AFIB (on Eliquis) and PSHx of wedge resection of lungs sent in from [facility] for evaluation of UTI and PNA, started on abx. Also found to have +ve FOBT, GI Dr. Gilbert consulted, for EGD tomorrow

## 2020-11-03 NOTE — DISCHARGE NOTE PROVIDER - NSDCMRMEDTOKEN_GEN_ALL_CORE_FT
acetaminophen 325 mg oral tablet: 2 tab(s) orally every 6 hours, As needed, Temp greater or equal to 38C (100.4F), Mild Pain (1 - 3), Moderate Pain (4 - 6)  aluminum hydroxide-magnesium hydroxide 200 mg-200 mg/5 mL oral suspension: 30 milliliter(s) orally every 8 hours, As needed, Dyspepsia  Aspercreme 10% topical cream: Apply topically to affected area 2 times a day  aspirin 81 mg oral delayed release tablet: 1 tab(s) orally once a day  atorvastatin 40 mg oral tablet: 1 tab(s) orally once a day (at bedtime)  Atrovent HFA 17 mcg/inh inhalation aerosol: 2 puff(s) inhaled 4 times a day  docusate sodium 100 mg oral tablet: 1 tab(s) orally 2 times a day  Eliquis 5 mg oral tablet: 1 tab(s) orally 2 times a day   ferrous sulfate 300 mg (60 mg elemental iron) oral tablet: 1 tab(s) orally once a day  folic acid 1 mg oral tablet: 1 tab(s) orally once a day  lisinopril 5 mg oral tablet: 1 tab(s) orally once a day  metoprolol tartrate 25 mg oral tablet: 0.5 tab(s) orally 2 times a day  Multiple Vitamins oral tablet: 1 tab(s) orally once a day  ocular lubricant ophthalmic solution: 1 drop(s) to each affected eye every 12 hours  pantoprazole 40 mg oral delayed release tablet: 1 tab(s) orally once a day (before a meal)  Senna 8.6 mg oral tablet: 2 tab(s) orally once a day (at bedtime)   acetaminophen 325 mg oral tablet: 2 tab(s) orally every 6 hours, As needed, Temp greater or equal to 38C (100.4F), Mild Pain (1 - 3), Moderate Pain (4 - 6)  aluminum hydroxide-magnesium hydroxide 200 mg-200 mg/5 mL oral suspension: 30 milliliter(s) orally every 8 hours, As needed, Dyspepsia  Aspercreme 10% topical cream: Apply topically to affected area 2 times a day  aspirin 81 mg oral delayed release tablet: 1 tab(s) orally once a day  atorvastatin 40 mg oral tablet: 1 tab(s) orally once a day (at bedtime)  Atrovent HFA 17 mcg/inh inhalation aerosol: 2 puff(s) inhaled 4 times a day  cefuroxime 500 mg oral tablet: 1 tab(s) orally 2 times a day   docusate sodium 100 mg oral tablet: 1 tab(s) orally 2 times a day  ferrous sulfate 300 mg (60 mg elemental iron) oral tablet: 1 tab(s) orally once a day  folic acid 1 mg oral tablet: 1 tab(s) orally once a day  lisinopril 5 mg oral tablet: 1 tab(s) orally once a day  metoprolol tartrate 25 mg oral tablet: 0.5 tab(s) orally 2 times a day  Multiple Vitamins oral tablet: 1 tab(s) orally once a day  ocular lubricant ophthalmic solution: 1 drop(s) to each affected eye every 12 hours  pantoprazole 40 mg oral delayed release tablet: 1 tab(s) orally once a day (before a meal)  Senna 8.6 mg oral tablet: 2 tab(s) orally once a day (at bedtime)   acetaminophen 325 mg oral tablet: 2 tab(s) orally every 6 hours, As needed, Temp greater or equal to 38C (100.4F), Mild Pain (1 - 3), Moderate Pain (4 - 6)  aluminum hydroxide-magnesium hydroxide 200 mg-200 mg/5 mL oral suspension: 30 milliliter(s) orally every 8 hours, As needed, Dyspepsia  Aspercreme 10% topical cream: Apply topically to affected area 2 times a day  aspirin 81 mg oral delayed release tablet: 1 tab(s) orally once a day  atorvastatin 40 mg oral tablet: 1 tab(s) orally once a day (at bedtime)  Atrovent HFA 17 mcg/inh inhalation aerosol: 2 puff(s) inhaled 4 times a day  cefuroxime 500 mg oral tablet: 1 tab(s) orally 2 times a day   Last dose 11/7/2020  docusate sodium 100 mg oral tablet: 1 tab(s) orally 2 times a day  ferrous sulfate 300 mg (60 mg elemental iron) oral tablet: 1 tab(s) orally once a day  folic acid 1 mg oral tablet: 1 tab(s) orally once a day  lisinopril 5 mg oral tablet: 1 tab(s) orally once a day  metoprolol tartrate 25 mg oral tablet: 0.5 tab(s) orally 2 times a day  Multiple Vitamins oral tablet: 1 tab(s) orally once a day  ocular lubricant ophthalmic solution: 1 drop(s) to each affected eye every 12 hours  pantoprazole 40 mg oral delayed release tablet: 1 tab(s) orally once a day (before a meal)  Senna 8.6 mg oral tablet: 2 tab(s) orally once a day (at bedtime)

## 2020-11-03 NOTE — SWALLOW BEDSIDE ASSESSMENT ADULT - SLP GENERAL OBSERVATIONS
Pt found in supine postion, HOB elevated to 90 degrees, A+Ox1 (name) with gross awareness to time (knows) year, however significant confusion with frequent redirection required; not following verbal commands.

## 2020-11-03 NOTE — PROGRESS NOTE ADULT - PROBLEM SELECTOR PLAN 1
-sent from NH for poor PO intake and foul smelling urine   -grossly positive UTI   -cont Rocephin   -initial urine culture contaminated  -f/u repeat urine culture, may not be accurate since pt is on abx already

## 2020-11-03 NOTE — DISCHARGE NOTE PROVIDER - CARE PROVIDER_API CALL
Homer Rose [facility]  [id number] [address] [place] [address]  Phone: [phone]  Fax: [phone]  Follow Up Time: 1 week

## 2020-11-03 NOTE — SWALLOW BEDSIDE ASSESSMENT ADULT - SWALLOW EVAL: RECOMMENDED FEEDING/EATING TECHNIQUES
position upright (90 degrees)/crush medication (when feasible)/maintain upright posture during/after eating for 30 mins

## 2020-11-03 NOTE — PROGRESS NOTE ADULT - SUBJECTIVE AND OBJECTIVE BOX
Patient is a 74y old  Female who presents with a chief complaint of UTI (2020 11:43)    OVERNIGHT EVENTS: no acute events overnight.     REVIEW OF SYSTEMS:  CONSTITUTIONAL: No fever, chills  NECK: No pain or stiffness  RESPIRATORY: No cough, SOB  CARDIOVASCULAR: No chest pain, palpitations  GASTROINTESTINAL: No abdominal pain. No nausea, vomiting, or diarrhea  GENITOURINARY: No dysuria  NEUROLOGICAL: No HA  SKIN: No itching, burning, rashes, or lesions   MUSCULOSKELETAL: No joint pain or swelling; No muscle, back, or extremity pain      T(C): 36.4 (20 @ 08:29), Max: 36.4 (20 @ 05:23)  HR: 67 (20 @ 08:29) (67 - 91)  BP: 94/64 (20 @ 08:29) (94/64 - 122/66)  RR: 16 (20 @ 08:29) (16 - 20)  SpO2: 94% (20 @ 08:29) (94% - 100%)  Wt(kg): --Vital Signs Last 24 Hrs  T(C): 36.4 (2020 08:29), Max: 36.4 (2020 05:23)  T(F): 97.5 (2020 08:29), Max: 97.6 (2020 05:23)  HR: 67 (2020 08:29) (67 - 91)  BP: 94/64 (2020 08:29) (94/64 - 122/66)  BP(mean): --  RR: 16 (2020 08:29) (16 - 20)  SpO2: 94% (2020 08:29) (94% - 100%)    MEDICATIONS  (STANDING):  artificial  tears Solution 1 Drop(s) Both EYES every 12 hours  aspirin enteric coated 81 milliGRAM(s) Oral daily  atorvastatin 40 milliGRAM(s) Oral at bedtime  azithromycin  IVPB 500 milliGRAM(s) IV Intermittent daily  cefTRIAXone   IVPB 1000 milliGRAM(s) IV Intermittent every 24 hours  dextrose 5% + sodium chloride 0.9%. 1000 milliLiter(s) (75 mL/Hr) IV Continuous <Continuous>  dextrose 5%. 1000 milliLiter(s) (50 mL/Hr) IV Continuous <Continuous>  dextrose 50% Injectable 25 Gram(s) IV Push once  ferrous    sulfate 325 milliGRAM(s) Oral daily  folic acid 1 milliGRAM(s) Oral daily  insulin lispro (ADMELOG) corrective regimen sliding scale   SubCutaneous every 6 hours  ipratropium 17 MICROgram(s) HFA Inhaler 1 Puff(s) Inhalation every 6 hours  lisinopril 5 milliGRAM(s) Oral daily  metoprolol tartrate 25 milliGRAM(s) Oral two times a day  multivitamin 1 Tablet(s) Oral daily  pantoprazole    Tablet 40 milliGRAM(s) Oral before breakfast  senna 2 Tablet(s) Oral at bedtime  sodium chloride 0.9%. 1000 milliLiter(s) (75 mL/Hr) IV Continuous <Continuous>    MEDICATIONS  (PRN):  acetaminophen   Tablet .. 650 milliGRAM(s) Oral every 6 hours PRN Mild Pain (1 - 3)  aluminum hydroxide/magnesium hydroxide/simethicone Suspension 30 milliLiter(s) Oral every 8 hours PRN Dyspepsia  dextrose 40% Gel 15 Gram(s) Oral once PRN Blood Glucose LESS THAN 70 milliGRAM(s)/deciliter  glucagon  Injectable 1 milliGRAM(s) IntraMuscular once PRN Glucose LESS THAN 70 milligrams/deciliter      PHYSICAL EXAM:  GENERAL: NAD  EYES: clear conjunctiva  ENMT: Moist mucous membranes  NECK: Supple, No JVD  CHEST/LUNG: Clear to auscultation bilaterally; No rales, rhonchi, wheezing, or rubs  HEART: S1, S2, Regular rate and rhythm  ABDOMEN: Soft, Nontender, Nondistended; Bowel sounds present  NEURO: Alert & Oriented to person   EXTREMITIES: No LE edema, no calf tenderness  SKIN: No rashes or lesions    Consultant(s) Notes Reviewed:  [x ] YES  [ ] NO  Care Discussed with Consultants/Other Providers [ x] YES  [ ] NO    LABS:                        8.7    9.17  )-----------( 178      ( 2020 11:39 )             28.0     11-03    143  |  107  |  15  ----------------------------<  191<H>  3.5   |  28  |  0.90    Ca    8.4      2020 11:39  Phos  1.1     11-03  Mg     2.0         TPro  5.9<L>  /  Alb  1.9<L>  /  TBili  0.5  /  DBili  x   /  AST  71<H>  /  ALT  39  /  AlkPhos  95      PT/INR - ( 2020 18:41 )   PT: 19.9 sec;   INR: 1.72 ratio         PTT - ( 2020 18:41 )  PTT:37.4 sec  CAPILLARY BLOOD GLUCOSE      POCT Blood Glucose.: 185 mg/dL (2020 11:25)  POCT Blood Glucose.: 196 mg/dL (2020 08:22)  POCT Blood Glucose.: 98 mg/dL (2020 21:54)  POCT Blood Glucose.: 121 mg/dL (2020 17:13)    Urinalysis Basic - ( 2020 20:49 )    Color: Yellow / Appearance: Slightly Turbid / S.010 / pH: x  Gluc: x / Ketone: Small  / Bili: Small / Urobili: Negative   Blood: x / Protein: 30 mg/dL / Nitrite: Negative   Leuk Esterase: Moderate / RBC: 25-50 /HPF / WBC >50 /HPF   Sq Epi: x / Non Sq Epi: Few /HPF / Bacteria: Many /HPF    RADIOLOGY & ADDITIONAL TESTS:    c< from: Xray Chest 1 View-PORTABLE IMMEDIATE (20 @ 19:10) >    EXAM:  XR CHEST PORTABLE IMMED 1V                            PROCEDURE DATE:  2020          INTERPRETATION:  CLINICAL STATEMENT: Chest Pain.    TECHNIQUE: AP view of the chest.    COMPARISON: 2020    FINDINGS/  IMPRESSION:  Bilateral consolidations overall progressed compared to prior exam.    Small left pleural effusion.    Heart size cannot be accurately assessed in this projection.            < end of copied text >      Imaging Personally Reviewed:  [ ] YES  [ ] NO   Patient is a 74y old  Female who presents with a chief complaint of UTI (2020 11:43)    OVERNIGHT EVENTS: no acute events overnight.     REVIEW OF SYSTEMS:  CONSTITUTIONAL: No fever, chills  NECK: No pain or stiffness  RESPIRATORY: No cough, SOB  CARDIOVASCULAR: No chest pain, palpitations  GASTROINTESTINAL: No abdominal pain. No nausea, vomiting, or diarrhea  GENITOURINARY: No dysuria  NEUROLOGICAL: No HA  SKIN: No itching, burning, rashes, or lesions   MUSCULOSKELETAL: No joint pain or swelling; No muscle, back, or extremity pain      T(C): 36.4 (20 @ 08:29), Max: 36.4 (20 @ 05:23)  HR: 67 (20 @ 08:29) (67 - 91)  BP: 94/64 (20 @ 08:29) (94/64 - 122/66)  RR: 16 (20 @ 08:29) (16 - 20)  SpO2: 94% (20 @ 08:29) (94% - 100%)  Wt(kg): --Vital Signs Last 24 Hrs  T(C): 36.4 (2020 08:29), Max: 36.4 (2020 05:23)  T(F): 97.5 (2020 08:29), Max: 97.6 (2020 05:23)  HR: 67 (2020 08:29) (67 - 91)  BP: 94/64 (2020 08:29) (94/64 - 122/66)  BP(mean): --  RR: 16 (2020 08:29) (16 - 20)  SpO2: 94% (2020 08:29) (94% - 100%)    MEDICATIONS  (STANDING):  artificial  tears Solution 1 Drop(s) Both EYES every 12 hours  aspirin enteric coated 81 milliGRAM(s) Oral daily  atorvastatin 40 milliGRAM(s) Oral at bedtime  azithromycin  IVPB 500 milliGRAM(s) IV Intermittent daily  cefTRIAXone   IVPB 1000 milliGRAM(s) IV Intermittent every 24 hours  dextrose 5% + sodium chloride 0.9%. 1000 milliLiter(s) (75 mL/Hr) IV Continuous <Continuous>  dextrose 5%. 1000 milliLiter(s) (50 mL/Hr) IV Continuous <Continuous>  dextrose 50% Injectable 25 Gram(s) IV Push once  ferrous    sulfate 325 milliGRAM(s) Oral daily  folic acid 1 milliGRAM(s) Oral daily  insulin lispro (ADMELOG) corrective regimen sliding scale   SubCutaneous every 6 hours  ipratropium 17 MICROgram(s) HFA Inhaler 1 Puff(s) Inhalation every 6 hours  lisinopril 5 milliGRAM(s) Oral daily  metoprolol tartrate 25 milliGRAM(s) Oral two times a day  multivitamin 1 Tablet(s) Oral daily  pantoprazole    Tablet 40 milliGRAM(s) Oral before breakfast  senna 2 Tablet(s) Oral at bedtime  sodium chloride 0.9%. 1000 milliLiter(s) (75 mL/Hr) IV Continuous <Continuous>    MEDICATIONS  (PRN):  acetaminophen   Tablet .. 650 milliGRAM(s) Oral every 6 hours PRN Mild Pain (1 - 3)  aluminum hydroxide/magnesium hydroxide/simethicone Suspension 30 milliLiter(s) Oral every 8 hours PRN Dyspepsia  dextrose 40% Gel 15 Gram(s) Oral once PRN Blood Glucose LESS THAN 70 milliGRAM(s)/deciliter  glucagon  Injectable 1 milliGRAM(s) IntraMuscular once PRN Glucose LESS THAN 70 milligrams/deciliter      PHYSICAL EXAM:  GENERAL: b/l temporal wasting  EYES: clear conjunctiva  ENMT: Moist mucous membranes  NECK: Supple, No JVD  CHEST/LUNG: Clear to auscultation bilaterally; No rales, rhonchi, wheezing, or rubs  HEART: S1, S2, Regular rate and rhythm  ABDOMEN: Soft, Nontender, Nondistended; Bowel sounds present  NEURO: Alert & Oriented to person only, lethargic   EXTREMITIES: No LE edema, no calf tenderness  SKIN: No rashes or lesions    Consultant(s) Notes Reviewed:  [x ] YES  [ ] NO  Care Discussed with Consultants/Other Providers [ x] YES  [ ] NO    LABS:                        8.7    9.17  )-----------( 178      ( 2020 11:39 )             28.0     11-03    143  |  107  |  15  ----------------------------<  191<H>  3.5   |  28  |  0.90    Ca    8.4      2020 11:39  Phos  1.1     11-  Mg     2.0     11-03    TPro  5.9<L>  /  Alb  1.9<L>  /  TBili  0.5  /  DBili  x   /  AST  71<H>  /  ALT  39  /  AlkPhos  95  11-03    PT/INR - ( 2020 18:41 )   PT: 19.9 sec;   INR: 1.72 ratio         PTT - ( 2020 18:41 )  PTT:37.4 sec  CAPILLARY BLOOD GLUCOSE      POCT Blood Glucose.: 185 mg/dL (2020 11:25)  POCT Blood Glucose.: 196 mg/dL (2020 08:22)  POCT Blood Glucose.: 98 mg/dL (2020 21:54)  POCT Blood Glucose.: 121 mg/dL (2020 17:13)    Urinalysis Basic - ( 2020 20:49 )    Color: Yellow / Appearance: Slightly Turbid / S.010 / pH: x  Gluc: x / Ketone: Small  / Bili: Small / Urobili: Negative   Blood: x / Protein: 30 mg/dL / Nitrite: Negative   Leuk Esterase: Moderate / RBC: 25-50 /HPF / WBC >50 /HPF   Sq Epi: x / Non Sq Epi: Few /HPF / Bacteria: Many /HPF    RADIOLOGY & ADDITIONAL TESTS:    c< from: Xray Chest 1 View-PORTABLE IMMEDIATE (20 @ 19:10) >    EXAM:  XR CHEST PORTABLE IMMED 1V                            PROCEDURE DATE:  2020          INTERPRETATION:  CLINICAL STATEMENT: Chest Pain.    TECHNIQUE: AP view of the chest.    COMPARISON: 2020    FINDINGS/  IMPRESSION:  Bilateral consolidations overall progressed compared to prior exam.    Small left pleural effusion.    Heart size cannot be accurately assessed in this projection.            < end of copied text >      Imaging Personally Reviewed:  [ ] YES  [ ] NO

## 2020-11-03 NOTE — PROGRESS NOTE ADULT - SUBJECTIVE AND OBJECTIVE BOX
Summary: Patient is a 74y old  Female who presents with a chief complaint of UTI (2020 12:33)      Subjective: Pt. more confused and incoherent today, denies any black stool or abd. pain, nausea, vomiting.    Objective:        MEDICATIONS  (STANDING):  artificial  tears Solution 1 Drop(s) Both EYES every 12 hours  aspirin enteric coated 81 milliGRAM(s) Oral daily  atorvastatin 40 milliGRAM(s) Oral at bedtime  azithromycin  IVPB 500 milliGRAM(s) IV Intermittent daily  cefTRIAXone   IVPB 1000 milliGRAM(s) IV Intermittent every 24 hours  dextrose 5% + sodium chloride 0.9%. 1000 milliLiter(s) (75 mL/Hr) IV Continuous <Continuous>  dextrose 5%. 1000 milliLiter(s) (50 mL/Hr) IV Continuous <Continuous>  dextrose 50% Injectable 25 Gram(s) IV Push once  ferrous    sulfate 325 milliGRAM(s) Oral daily  folic acid 1 milliGRAM(s) Oral daily  insulin lispro (ADMELOG) corrective regimen sliding scale   SubCutaneous every 6 hours  ipratropium 17 MICROgram(s) HFA Inhaler 1 Puff(s) Inhalation every 6 hours  lisinopril 5 milliGRAM(s) Oral daily  metoprolol tartrate 25 milliGRAM(s) Oral two times a day  multivitamin 1 Tablet(s) Oral daily  pantoprazole    Tablet 40 milliGRAM(s) Oral before breakfast  senna 2 Tablet(s) Oral at bedtime  sodium chloride 0.9%. 1000 milliLiter(s) (75 mL/Hr) IV Continuous <Continuous>    MEDICATIONS  (PRN):  acetaminophen   Tablet .. 650 milliGRAM(s) Oral every 6 hours PRN Mild Pain (1 - 3)  aluminum hydroxide/magnesium hydroxide/simethicone Suspension 30 milliLiter(s) Oral every 8 hours PRN Dyspepsia  dextrose 40% Gel 15 Gram(s) Oral once PRN Blood Glucose LESS THAN 70 milliGRAM(s)/deciliter  glucagon  Injectable 1 milliGRAM(s) IntraMuscular once PRN Glucose LESS THAN 70 milligrams/deciliter              Vital Signs Last 24 Hrs  T(C): 36.4 (2020 05:23), Max: 36.4 (2020 05:23)  T(F): 97.6 (2020 05:23), Max: 97.6 (2020 05:23)  HR: 84 (2020 05:23) (84 - 91)  BP: 122/66 (2020 05:23) (108/95 - 122/66)  BP(mean): --  RR: 18 (2020 05:23) (18 - 20)  SpO2: 96% (2020 05:23) (96% - 100%)      General:  Well developed, no pallor, NAD, +confused  HEENT:  Normal appearance of conjunctiva, ears, nose, lips, oropharynx, and oral mucosa, anicteric.  Neck:  No masses, no asymmetry.  Lymph Nodes:  No lymphadenopathy.   Cardiovascular:  RRR normal S1/S2, no murmur.  Respiratory:  CTA B/L, normal respiratory effort.   Abdominal:   soft, no masses or tenderness, normoactive BS, NT/ND, no HSM.  Extremities:   No clubbing or cyanosis, normal capillary refill, no edema.   Skin:   No rash, jaundice, lesions, eczema.   Musculoskeletal:  No joint swelling, erythema or tenderness.   Neuro: No focal deficits.       LABS:                        8.2    8.15  )-----------( 151      ( 2020 19:22 )             26.5     11-02    143  |  111<H>  |  15  ----------------------------<  118<H>  4.3   |  26  |  0.70    Ca    8.1<L>      2020 19:22  Phos  1.1     11-03  Mg     2.0     11-03    TPro  5.7<L>  /  Alb  1.8<L>  /  TBili  0.5  /  DBili  x   /  AST  52<H>  /  ALT  29  /  AlkPhos  91  11-02    PT/INR - ( 2020 18:41 )   PT: 19.9 sec;   INR: 1.72 ratio         PTT - ( 2020 18:41 )  PTT:37.4 sec  Urinalysis Basic - ( 2020 20:49 )    Color: Yellow / Appearance: Slightly Turbid / S.010 / pH: x  Gluc: x / Ketone: Small  / Bili: Small / Urobili: Negative   Blood: x / Protein: 30 mg/dL / Nitrite: Negative   Leuk Esterase: Moderate / RBC: 25-50 /HPF / WBC >50 /HPF   Sq Epi: x / Non Sq Epi: Few /HPF / Bacteria: Many /HPF        RADIOLOGY & ADDITIONAL TESTS:

## 2020-11-03 NOTE — PROGRESS NOTE ADULT - CONVERSATION DETAILS
Patient's mentation waxing and waning at the time of exam.  Spoke with the pt's sister Nelsy Baldwin updated her as to her sister's change in mentation; WhatsApp utilized so the sister's could see each other and visit.  The pt continued with incoherent  statements to her sister; Mrs. Baldwin was very surprised to see how debilitated the pt is.  DNR/DNI on file.  Explained that given her progressive decline she is appropriate for hospice.  Educated her about the hospice philosophy and locations of care.  She stated she would like her sister to be discharged back to [facility] with hospice.  Sister wishes to focus on comfort measures only. SW referral made.   All questions answered.  Support provided.

## 2020-11-03 NOTE — PROGRESS NOTE ADULT - PROBLEM SELECTOR PLAN 1
PSHx of lung cancer s/p wedge resection of L lower lung.   C/w Opdivo.  Appears comfortable on NC. Pt states increased difficulty breathing, because the oxygen supply at the NH is not working.   Per NH pt had a virtual visit with oncologist 3 weeks ago.  Unable to reach oncologist Dr Jimenez, left call back information.    Pt is DNR/DNi.  Sister agrees with LTC with hospice at [facility].  SW referral made.

## 2020-11-03 NOTE — PROGRESS NOTE ADULT - PROBLEM SELECTOR PLAN 1
p/w Hb 9.9, FOBT positive  pt. is also on iron supplements  unknown if ever had EGD/colonoscopy  Hemodynamically stable  hold eliquis  clears for now, NPO after MN today for EGD tomorrow  IV Protonix BID  CBC q12 or daily if stable.

## 2020-11-03 NOTE — CHART NOTE - NSCHARTNOTEFT_GEN_A_CORE
PC SW and PC NP went to the bedside to assess the patient's symptoms and provide emotional support.  PC NP facilitated a visit between patient and her sister Nelsy Baldwin utilizing [employer].  Patient's sister expressed shock regarding  the patient's debilitated state and after further education, outside of the patient's room, agreed hospice at [facility] would be in the patient's best interest.  Ms. Baldwin was informed the patient's mentation has been waxing and waning as evidenced during their conversation. Patient's HCP acknowledged her sisters' decline, is aware of her advance directives as well as her oncologic diagnosis.  Questions and concerns were addressed.  Emotional support was provided by this  as well as her contact information.  PC  will remain available as needed.

## 2020-11-03 NOTE — DISCHARGE NOTE PROVIDER - NSDCCPCAREPLAN_GEN_ALL_CORE_FT
PRINCIPAL DISCHARGE DIAGNOSIS  Diagnosis: UTI (urinary tract infection)  Assessment and Plan of Treatment: you were sent from nursing home for poor oral intake, likely due to UTI   you were started on IV antibiotics   continue medications as prescribed   SEEK IMMEDIATE MEDICAL CARE IF:  You have severe back pain or lower abdominal pain.  You develop chills.  You have nausea or vomiting.  You have continued burning or discomfort with urination.        SECONDARY DISCHARGE DIAGNOSES  Diagnosis: Bronchoalveolar carcinoma  Assessment and Plan of Treatment: comfort measures only   DNR/DNI       Diagnosis: Severe protein-calorie malnutrition  Assessment and Plan of Treatment: continue small frequent meals   continue nutritional supplements   aspiration precautions    Diagnosis: Anemia  Assessment and Plan of Treatment: your blood count was low due to cancer, chemptherapy and low baseline   continue comfort measures only        PRINCIPAL DISCHARGE DIAGNOSIS  Diagnosis: UTI (urinary tract infection)  Assessment and Plan of Treatment:   Iou were sent from nursing home for poor oral intake, likely due to UTI   you were started on IV antibiotics   continue medications as prescribed   SEEK IMMEDIATE MEDICAL CARE IF:  You have severe back pain or lower abdominal pain.  You develop chills.  You have nausea or vomiting.  You have continued burning or discomfort with urination.        SECONDARY DISCHARGE DIAGNOSES  Diagnosis: Severe protein-calorie malnutrition  Assessment and Plan of Treatment: continue small frequent meals   continue nutritional supplements   aspiration precautions    Diagnosis: Bronchoalveolar carcinoma  Assessment and Plan of Treatment: comfort measures only   DNR/DNI       Diagnosis: Anemia  Assessment and Plan of Treatment: your blood count was low due to cancer, chemptherapy and low baseline   continue comfort measures only        PRINCIPAL DISCHARGE DIAGNOSIS  Diagnosis: UTI (urinary tract infection)  Assessment and Plan of Treatment: You were sent from nursing home for poor oral intake, likely due to UTI   you were started on IV antibiotics. Complete your course with 4 more days of ceftin 500mg BID.   Seek medical care if:  You have severe back pain or lower abdominal pain.  You develop chills.  You have nausea or vomiting.  You have continued burning or discomfort with urination.        SECONDARY DISCHARGE DIAGNOSES  Diagnosis: Bronchoalveolar carcinoma  Assessment and Plan of Treatment: Hold tagrisso due to side effects  comfort measures only   DNR/DNI   Plan for       Diagnosis: Severe protein-calorie malnutrition  Assessment and Plan of Treatment: continue small frequent meals   continue nutritional supplements   aspiration precautions    Diagnosis: Anemia  Assessment and Plan of Treatment: your blood count was low due to cancer, chemptherapy and low baseline   continue comfort measures only        PRINCIPAL DISCHARGE DIAGNOSIS  Diagnosis: UTI (urinary tract infection)  Assessment and Plan of Treatment: You were sent from nursing home for poor oral intake, likely due to UTI   you were started on IV antibiotics. Complete your course with 3 more days of ceftin 500mg BID  Seek medical care if:  You have severe back pain or lower abdominal pain.  You develop chills.  You have nausea or vomiting.  You have continued burning or discomfort with urination.        SECONDARY DISCHARGE DIAGNOSES  Diagnosis: Pneumonia  Assessment and Plan of Treatment: You were found to have pneumonia and you have completed your course of antibiotics.    Diagnosis: Bronchoalveolar carcinoma  Assessment and Plan of Treatment: Hold tagrisso due to side effects  comfort measures only   DNR/DNI   Plan for discharge to LTC with hospice      Diagnosis: Severe protein-calorie malnutrition  Assessment and Plan of Treatment: continue small frequent meals   continue nutritional supplements   aspiration precautions    Diagnosis: Anemia  Assessment and Plan of Treatment: your blood count was low due to cancer, chemptherapy and low baseline   continue comfort measures only

## 2020-11-03 NOTE — PROGRESS NOTE ADULT - PROBLEM SELECTOR PLAN 2
-multifactorial, low baseline, metastatic ca, on PO chemo and suspected GIB   -h/h remains around baseline since admission   -GI Dr Gilbert   -for EGD tomorrow   -NPO after midnight  -mon CBC

## 2020-11-03 NOTE — SWALLOW BEDSIDE ASSESSMENT ADULT - ASR SWALLOW ASPIRATION MONITOR
position upright (90Y)/cough/change of breathing pattern/throat clearing/upper respiratory infection/fever/pneumonia

## 2020-11-04 ENCOUNTER — TRANSCRIPTION ENCOUNTER (OUTPATIENT)
Age: 75
End: 2020-11-04

## 2020-11-04 VITALS
TEMPERATURE: 98 F | HEART RATE: 113 BPM | OXYGEN SATURATION: 100 % | DIASTOLIC BLOOD PRESSURE: 55 MMHG | SYSTOLIC BLOOD PRESSURE: 88 MMHG | RESPIRATION RATE: 18 BRPM

## 2020-11-04 LAB
ALBUMIN SERPL ELPH-MCNC: 2.1 G/DL — LOW (ref 3.5–5)
ALP SERPL-CCNC: 84 U/L — SIGNIFICANT CHANGE UP (ref 40–120)
ALT FLD-CCNC: 34 U/L DA — SIGNIFICANT CHANGE UP (ref 10–60)
ANION GAP SERPL CALC-SCNC: 8 MMOL/L — SIGNIFICANT CHANGE UP (ref 5–17)
AST SERPL-CCNC: 63 U/L — HIGH (ref 10–40)
BILIRUB SERPL-MCNC: 0.4 MG/DL — SIGNIFICANT CHANGE UP (ref 0.2–1.2)
BUN SERPL-MCNC: 10 MG/DL — SIGNIFICANT CHANGE UP (ref 7–18)
CALCIUM SERPL-MCNC: 8.8 MG/DL — SIGNIFICANT CHANGE UP (ref 8.4–10.5)
CHLORIDE SERPL-SCNC: 109 MMOL/L — HIGH (ref 96–108)
CO2 SERPL-SCNC: 27 MMOL/L — SIGNIFICANT CHANGE UP (ref 22–31)
CREAT SERPL-MCNC: 0.61 MG/DL — SIGNIFICANT CHANGE UP (ref 0.5–1.3)
GLUCOSE BLDC GLUCOMTR-MCNC: 107 MG/DL — HIGH (ref 70–99)
GLUCOSE BLDC GLUCOMTR-MCNC: 72 MG/DL — SIGNIFICANT CHANGE UP (ref 70–99)
GLUCOSE BLDC GLUCOMTR-MCNC: 88 MG/DL — SIGNIFICANT CHANGE UP (ref 70–99)
GLUCOSE BLDC GLUCOMTR-MCNC: 92 MG/DL — SIGNIFICANT CHANGE UP (ref 70–99)
GLUCOSE SERPL-MCNC: 89 MG/DL — SIGNIFICANT CHANGE UP (ref 70–99)
HCT VFR BLD CALC: 27.2 % — LOW (ref 34.5–45)
HGB BLD-MCNC: 8.6 G/DL — LOW (ref 11.5–15.5)
MAGNESIUM SERPL-MCNC: 1.9 MG/DL — SIGNIFICANT CHANGE UP (ref 1.6–2.6)
MCHC RBC-ENTMCNC: 28.5 PG — SIGNIFICANT CHANGE UP (ref 27–34)
MCHC RBC-ENTMCNC: 31.6 GM/DL — LOW (ref 32–36)
MCV RBC AUTO: 90.1 FL — SIGNIFICANT CHANGE UP (ref 80–100)
NRBC # BLD: 0 /100 WBCS — SIGNIFICANT CHANGE UP (ref 0–0)
PHOSPHATE SERPL-MCNC: 1.7 MG/DL — LOW (ref 2.5–4.5)
PLATELET # BLD AUTO: 164 K/UL — SIGNIFICANT CHANGE UP (ref 150–400)
POTASSIUM SERPL-MCNC: 3.6 MMOL/L — SIGNIFICANT CHANGE UP (ref 3.5–5.3)
POTASSIUM SERPL-SCNC: 3.6 MMOL/L — SIGNIFICANT CHANGE UP (ref 3.5–5.3)
PROT SERPL-MCNC: 5.6 G/DL — LOW (ref 6–8.3)
RBC # BLD: 3.02 M/UL — LOW (ref 3.8–5.2)
RBC # FLD: 16.8 % — HIGH (ref 10.3–14.5)
SODIUM SERPL-SCNC: 144 MMOL/L — SIGNIFICANT CHANGE UP (ref 135–145)
WBC # BLD: 7.75 K/UL — SIGNIFICANT CHANGE UP (ref 3.8–10.5)
WBC # FLD AUTO: 7.75 K/UL — SIGNIFICANT CHANGE UP (ref 3.8–10.5)

## 2020-11-04 PROCEDURE — 87086 URINE CULTURE/COLONY COUNT: CPT

## 2020-11-04 PROCEDURE — 92610 EVALUATE SWALLOWING FUNCTION: CPT

## 2020-11-04 PROCEDURE — 82962 GLUCOSE BLOOD TEST: CPT

## 2020-11-04 PROCEDURE — 85610 PROTHROMBIN TIME: CPT

## 2020-11-04 PROCEDURE — 86901 BLOOD TYPING SEROLOGIC RH(D): CPT

## 2020-11-04 PROCEDURE — 80053 COMPREHEN METABOLIC PANEL: CPT

## 2020-11-04 PROCEDURE — 80061 LIPID PANEL: CPT

## 2020-11-04 PROCEDURE — 36415 COLL VENOUS BLD VENIPUNCTURE: CPT

## 2020-11-04 PROCEDURE — 85027 COMPLETE CBC AUTOMATED: CPT

## 2020-11-04 PROCEDURE — 85730 THROMBOPLASTIN TIME PARTIAL: CPT

## 2020-11-04 PROCEDURE — 84484 ASSAY OF TROPONIN QUANT: CPT

## 2020-11-04 PROCEDURE — 83735 ASSAY OF MAGNESIUM: CPT

## 2020-11-04 PROCEDURE — 83550 IRON BINDING TEST: CPT

## 2020-11-04 PROCEDURE — 99285 EMERGENCY DEPT VISIT HI MDM: CPT | Mod: 25

## 2020-11-04 PROCEDURE — 87040 BLOOD CULTURE FOR BACTERIA: CPT

## 2020-11-04 PROCEDURE — 81001 URINALYSIS AUTO W/SCOPE: CPT

## 2020-11-04 PROCEDURE — 85025 COMPLETE CBC W/AUTO DIFF WBC: CPT

## 2020-11-04 PROCEDURE — 82728 ASSAY OF FERRITIN: CPT

## 2020-11-04 PROCEDURE — P9045: CPT

## 2020-11-04 PROCEDURE — 82607 VITAMIN B-12: CPT

## 2020-11-04 PROCEDURE — 82272 OCCULT BLD FECES 1-3 TESTS: CPT

## 2020-11-04 PROCEDURE — 83540 ASSAY OF IRON: CPT

## 2020-11-04 PROCEDURE — 86900 BLOOD TYPING SEROLOGIC ABO: CPT

## 2020-11-04 PROCEDURE — 83036 HEMOGLOBIN GLYCOSYLATED A1C: CPT

## 2020-11-04 PROCEDURE — 71045 X-RAY EXAM CHEST 1 VIEW: CPT

## 2020-11-04 PROCEDURE — 94640 AIRWAY INHALATION TREATMENT: CPT

## 2020-11-04 PROCEDURE — 82746 ASSAY OF FOLIC ACID SERUM: CPT

## 2020-11-04 PROCEDURE — 86850 RBC ANTIBODY SCREEN: CPT

## 2020-11-04 PROCEDURE — 0225U NFCT DS DNA&RNA 21 SARSCOV2: CPT

## 2020-11-04 PROCEDURE — 84443 ASSAY THYROID STIM HORMONE: CPT

## 2020-11-04 PROCEDURE — 84100 ASSAY OF PHOSPHORUS: CPT

## 2020-11-04 PROCEDURE — 97162 PT EVAL MOD COMPLEX 30 MIN: CPT

## 2020-11-04 PROCEDURE — 86769 SARS-COV-2 COVID-19 ANTIBODY: CPT

## 2020-11-04 RX ORDER — CEFUROXIME AXETIL 250 MG
1 TABLET ORAL
Qty: 6 | Refills: 0
Start: 2020-11-04 | End: 2020-11-06

## 2020-11-04 RX ORDER — POTASSIUM PHOSPHATE, MONOBASIC POTASSIUM PHOSPHATE, DIBASIC 236; 224 MG/ML; MG/ML
15 INJECTION, SOLUTION INTRAVENOUS ONCE
Refills: 0 | Status: COMPLETED | OUTPATIENT
Start: 2020-11-04 | End: 2020-11-04

## 2020-11-04 RX ADMIN — AZITHROMYCIN 255 MILLIGRAM(S): 500 TABLET, FILM COATED ORAL at 13:31

## 2020-11-04 RX ADMIN — Medication 325 MILLIGRAM(S): at 13:32

## 2020-11-04 RX ADMIN — POTASSIUM PHOSPHATE, MONOBASIC POTASSIUM PHOSPHATE, DIBASIC 62.5 MILLIMOLE(S): 236; 224 INJECTION, SOLUTION INTRAVENOUS at 13:53

## 2020-11-04 RX ADMIN — Medication 1 PUFF(S): at 05:26

## 2020-11-04 RX ADMIN — CEFTRIAXONE 100 MILLIGRAM(S): 500 INJECTION, POWDER, FOR SOLUTION INTRAMUSCULAR; INTRAVENOUS at 13:32

## 2020-11-04 RX ADMIN — Medication 1 DROP(S): at 06:24

## 2020-11-04 RX ADMIN — Medication 1 PUFF(S): at 13:32

## 2020-11-04 RX ADMIN — Medication 1 MILLIGRAM(S): at 13:32

## 2020-11-04 RX ADMIN — Medication 81 MILLIGRAM(S): at 13:32

## 2020-11-04 RX ADMIN — Medication 1 TABLET(S): at 13:32

## 2020-11-04 NOTE — PROGRESS NOTE ADULT - PROBLEM SELECTOR PLAN 1
Afebrile, no leukocytosis   Continue Rocephin day 3  Urine culture contaminated   Blood cultures testing Afebrile, no leukocytosis   Continue Rocephin day 3, will complete course with ceftin 500mg BID x 4 more days  Urine culture contaminated   Blood cultures testing

## 2020-11-04 NOTE — PROGRESS NOTE ADULT - ASSESSMENT
74F PMHx of DM, lung CA, CAD, COPD, AFIB (on Eliquis) and PSHx of wedge resection of lungs sent in from [facility] admitted for UTI and pneumonia. Her baseline Hb is 10-11, she presented with Hb 9.9, FOBT was positive, anemia panel shows anemia of chronic disease, GI consulted for anemia. Hb is stable. Pt. complaints of urinary symptoms and black stool, denies abd. pain, nausea and vomiting, change in appetite/weight. Pt. is on iron tablets too and eliquis for A.fib which was held due to black stool. Pt. does not remember having EGD/colonscopy.

## 2020-11-04 NOTE — PHYSICAL THERAPY INITIAL EVALUATION ADULT - GENERAL OBSERVATIONS, REHAB EVAL
Consult received, chart reviewed. Patient received supine in bed, NAD, +primafit. Patient agreed to EVALUATION from Physical Therapist.

## 2020-11-04 NOTE — PROGRESS NOTE ADULT - ASSESSMENT
74 year old female with PMHx of DM, lung CA, CAD, ACS, COPD, AFIB (on Eliquis) and PSHx of wedge resection of lungs sent in from [facility] for evaluation of UTI and PNA, started on rocephin. Also found to be occult positive, GI Dr. Gilbert consulted, for EGD today. 74 year old female with PMHx of DM, lung CA, CAD, ACS, COPD, AFIB (on Eliquis) and PSHx of wedge resection of lungs sent in from [facility] for evaluation of UTI and PNA, started on rocephin. Also found to be occult positive, GI Dr. Gilbert consulted, recommended EGD, however, not in line with GOC. Plan for DC to LTC with hospice.

## 2020-11-04 NOTE — DISCHARGE NOTE NURSING/CASE MANAGEMENT/SOCIAL WORK - PATIENT PORTAL LINK FT
You can access the FollowMyHealth Patient Portal offered by [facility] by registering at the following website: [email or web address]. By joining [facility]’s FollowMyHealth portal, you will also be able to view your health information using other applications (apps) compatible with our system.

## 2020-11-04 NOTE — PROGRESS NOTE ADULT - PROBLEM SELECTOR PLAN 4
-rate controlled  -hold Eliquis for suspicion of GIB   -cont BB Rate controlled  Continue to hold Eliquis in setting of GIB   Continue beta blocker

## 2020-11-04 NOTE — PROGRESS NOTE ADULT - PROBLEM SELECTOR PLAN 9
-dvt ppx- hold chemo ppx until GIB is ruled out, SCDs   -gi ppx- cont PPI DVT ppx - SCD boots   GI ppx - continue protonix

## 2020-11-04 NOTE — PROGRESS NOTE ADULT - PROBLEM SELECTOR PLAN 10
-DNR/DNI   -MOLST in chart reviwed  -Palliative following DNR/DNI   MOLST in chart   DC to LTC with hospice   Palliative following

## 2020-11-04 NOTE — PHYSICAL THERAPY INITIAL EVALUATION ADULT - IMPAIRMENTS FOUND, PT EVAL
cognitive impairment/muscle strength/gait, locomotion, and balance/posture/ROM/aerobic capacity/endurance

## 2020-11-04 NOTE — PHYSICAL THERAPY INITIAL EVALUATION ADULT - ACTIVE RANGE OF MOTION EXAMINATION, REHAB EVAL
grossly 3/4 range BUE and 1/2 range BUE, but limited as pt deferred sitting up at EOB/bilateral upper extremity Active ROM was WFL (within functional limits)/bilateral  lower extremity Active ROM was WFL (within functional limits)

## 2020-11-04 NOTE — CHART NOTE - NSCHARTNOTEFT_GEN_A_CORE
Upon Nutritional Assessment by the Registered Dietitian your patient was determined to meet criteria / has evidence of the following diagnosis/diagnoses:          [ ]  Mild Protein Calorie Malnutrition        [ ]  Moderate Protein Calorie Malnutrition        [ x] Severe Protein Calorie Malnutrition        [ ] Unspecified Protein Calorie Malnutrition        [ x] Underweight / BMI <19        [ ] Morbid Obesity / BMI > 40      Findings as based on:  •  Comprehensive nutrition assessment and consultation  •  Calorie counts (nutrient intake analysis)  •  Food acceptance and intake status from observations by staff  •  Follow up  •  Patient education  •  Intervention secondary to interdisciplinary rounds  •   concerns      Treatment:    The following diet has been recommended:  Ensure clear TID.       PROVIDER Section:     By signing this assessment you are acknowledging and agree with the diagnosis/diagnoses assigned by the Registered Dietitian    Comments:

## 2020-11-04 NOTE — PROGRESS NOTE ADULT - PROBLEM SELECTOR PROBLEM 3
Debility
CAD (coronary artery disease)
CAD (coronary artery disease)
UTI (urinary tract infection)
UTI (urinary tract infection)

## 2020-11-04 NOTE — PHYSICAL THERAPY INITIAL EVALUATION ADULT - ADDITIONAL COMMENTS
Report being ambulatory w/walker ~2 months ago, presenting with progressive debility.    She reports receiving therapy services as NH a few days a week.

## 2020-11-04 NOTE — DIETITIAN INITIAL EVALUATION ADULT. - PERTINENT MEDS FT
MEDICATIONS  (STANDING):  artificial  tears Solution 1 Drop(s) Both EYES every 12 hours  aspirin enteric coated 81 milliGRAM(s) Oral daily  atorvastatin 40 milliGRAM(s) Oral at bedtime  azithromycin  IVPB 500 milliGRAM(s) IV Intermittent daily  cefTRIAXone   IVPB 1000 milliGRAM(s) IV Intermittent every 24 hours  dextrose 5% + sodium chloride 0.9%. 1000 milliLiter(s) (75 mL/Hr) IV Continuous <Continuous>  dextrose 5%. 1000 milliLiter(s) (50 mL/Hr) IV Continuous <Continuous>  dextrose 50% Injectable 25 Gram(s) IV Push once  ferrous    sulfate 325 milliGRAM(s) Oral daily  folic acid 1 milliGRAM(s) Oral daily  insulin lispro (ADMELOG) corrective regimen sliding scale   SubCutaneous every 6 hours  ipratropium 17 MICROgram(s) HFA Inhaler 1 Puff(s) Inhalation every 6 hours  lisinopril 5 milliGRAM(s) Oral daily  metoprolol tartrate 25 milliGRAM(s) Oral two times a day  multivitamin 1 Tablet(s) Oral daily  pantoprazole    Tablet 40 milliGRAM(s) Oral before breakfast  potassium phosphate IVPB 15 milliMole(s) IV Intermittent once  senna 2 Tablet(s) Oral at bedtime  sodium chloride 0.9%. 1000 milliLiter(s) (75 mL/Hr) IV Continuous <Continuous>    MEDICATIONS  (PRN):  acetaminophen   Tablet .. 650 milliGRAM(s) Oral every 6 hours PRN Mild Pain (1 - 3)  aluminum hydroxide/magnesium hydroxide/simethicone Suspension 30 milliLiter(s) Oral every 8 hours PRN Dyspepsia  dextrose 40% Gel 15 Gram(s) Oral once PRN Blood Glucose LESS THAN 70 milliGRAM(s)/deciliter  glucagon  Injectable 1 milliGRAM(s) IntraMuscular once PRN Glucose LESS THAN 70 milligrams/deciliter

## 2020-11-04 NOTE — DIETITIAN INITIAL EVALUATION ADULT. - OTHER INFO
Pt visited. Pt is On O2. Pt appears weak.  Pt is alert  and confused. Pt is from NH -  Diet BLU, NCS ,Diabetic PM snack qid. Pt visited. Pt is On O2. Pt appears weak.  Loss Of Muscle mass ( Temporal wasting )  Noted.  Pt is Unable to Provide weight Hx. But suspect weight loss PTA.    Pt is alert  and confused. Pt is from NH -  Diet BLU, NCS ,Diabetic PM snack qid. Per NH pt not eating for few days in NH. Palliative care consult noted Pt  is DNR /DNI comfort measures. Plan is for LTC with Hospice. Bed scale 104 LB with out SCD device. Ht 5 feet 3 inches. BMI 18. Pt visited. Pt is On O2. Pt appears weak.  Loss Of Muscle mass ( Temporal wasting )  Noted.  Pt is Unable to Provide weight Hx. But suspect weight loss PTA.    Pt is alert  and confused. Pt is from NH -  Diet BLU, NCS ,Diabetic PM snack qid. Per NH pt not eating for few days in NH. Palliative care consult noted Pt  is DNR / DNI  comfort measures. Plan is for LTC with Hospice. Bed scale 104 LB with out SCD device. Ht 5 feet 3 inches. BMI 18. S/P SLP eval - ice chips for QOL.

## 2020-11-04 NOTE — PROGRESS NOTE ADULT - PROBLEM SELECTOR PLAN 2
-multifactorial, low baseline, metastatic ca, on PO chemo and suspected GIB   -h/h remains around baseline since admission   -GI Dr Gilbert   -for EGD tomorrow   -NPO after midnight  -mon CBC Multifactorial, low baseline, metastatic ca, on PO chemo and suspected GIB   Hemoglobin at baseline   Continue to hold Eliquis   Was planned for EGD, cancelled as not in line with [facility]  GI Dr Gilbert

## 2020-11-04 NOTE — PHYSICAL THERAPY INITIAL EVALUATION ADULT - DIAGNOSIS, PT EVAL
impaired strength, endurance ROM. Additional diagnoses pending further assessment of Pt. functional mobility.

## 2020-11-04 NOTE — PHYSICAL THERAPY INITIAL EVALUATION ADULT - CRITERIA FOR SKILLED THERAPEUTIC INTERVENTIONS
therapy frequency/predicted duration of therapy intervention/impairments found/functional limitations in following categories/risk reduction/prevention/anticipated discharge recommendation/rehab potential

## 2020-11-04 NOTE — DIETITIAN INITIAL EVALUATION ADULT. - PERTINENT LABORATORY DATA
11-04 Na144 mmol/L Glu 89 mg/dL K+ 3.6 mmol/L Cr  0.61 mg/dL BUN 10 mg/dL   11-04 Phos 1.7 mg/dL<L>   11-04 Alb 2.1 g/dL<L>       11-02 Chol 146 mg/dL LDL --    HDL 76 mg/dL Trig 83 mg/dL  11-03-20 @ 09:08 HgbA1C 4.9 [4.0 - 5.6]  11-02-20 @ 09:54 HgbA1C 4.9 [4.0 - 5.6]

## 2020-11-04 NOTE — DIETITIAN INITIAL EVALUATION ADULT. - PROBLEM SELECTOR PLAN 3
Patient had drop of 2 gm from September   FOBT positive  Patient denies Blood in stools, urine or vomiting  - Using Eliquis for A fib  - Will get Anemia panel and hold Eliquis for now, Repeat FOBT for confirmation  - GI consult Dr byrne

## 2020-11-04 NOTE — DIETITIAN INITIAL EVALUATION ADULT. - PROBLEM SELECTOR PLAN 10
RISK                                                          Points  [] Previous VTE                                           3  [] Thrombophilia                                        2  [] Lower limb paralysis                              2   [] Current Cancer                                       2   [x] Immobilization > 24 hrs                        1  [] ICU/CCU stay > 24 hours                       1  [x] Age > 60                                                   1  Improve Score 2  SCD boots for now,

## 2020-11-04 NOTE — PROGRESS NOTE ADULT - SUBJECTIVE AND OBJECTIVE BOX
NP Note discussed with  Primary Attending    Patient is a 74y old  Female who presents with a chief complaint of UTI (04 Nov 2020 11:55)    INTERVAL HPI/OVERNIGHT EVENTS: no new complaints    MEDICATIONS  (STANDING):  artificial  tears Solution 1 Drop(s) Both EYES every 12 hours  aspirin enteric coated 81 milliGRAM(s) Oral daily  atorvastatin 40 milliGRAM(s) Oral at bedtime  azithromycin  IVPB 500 milliGRAM(s) IV Intermittent daily  cefTRIAXone   IVPB 1000 milliGRAM(s) IV Intermittent every 24 hours  dextrose 5% + sodium chloride 0.9%. 1000 milliLiter(s) (75 mL/Hr) IV Continuous <Continuous>  dextrose 5%. 1000 milliLiter(s) (50 mL/Hr) IV Continuous <Continuous>  dextrose 50% Injectable 25 Gram(s) IV Push once  ferrous    sulfate 325 milliGRAM(s) Oral daily  folic acid 1 milliGRAM(s) Oral daily  insulin lispro (ADMELOG) corrective regimen sliding scale   SubCutaneous every 6 hours  ipratropium 17 MICROgram(s) HFA Inhaler 1 Puff(s) Inhalation every 6 hours  lisinopril 5 milliGRAM(s) Oral daily  metoprolol tartrate 25 milliGRAM(s) Oral two times a day  multivitamin 1 Tablet(s) Oral daily  pantoprazole    Tablet 40 milliGRAM(s) Oral before breakfast  potassium phosphate IVPB 15 milliMole(s) IV Intermittent once  senna 2 Tablet(s) Oral at bedtime  sodium chloride 0.9%. 1000 milliLiter(s) (75 mL/Hr) IV Continuous <Continuous>    MEDICATIONS  (PRN):  acetaminophen   Tablet .. 650 milliGRAM(s) Oral every 6 hours PRN Mild Pain (1 - 3)  aluminum hydroxide/magnesium hydroxide/simethicone Suspension 30 milliLiter(s) Oral every 8 hours PRN Dyspepsia  dextrose 40% Gel 15 Gram(s) Oral once PRN Blood Glucose LESS THAN 70 milliGRAM(s)/deciliter  glucagon  Injectable 1 milliGRAM(s) IntraMuscular once PRN Glucose LESS THAN 70 milligrams/deciliter      __________________________________________________  REVIEW OF SYSTEMS:    CONSTITUTIONAL: No fever,   EYES: no acute visual disturbances  NECK: No pain or stiffness  RESPIRATORY: No cough; No shortness of breath  CARDIOVASCULAR: No chest pain, no palpitations  GASTROINTESTINAL: No pain. No nausea or vomiting; No diarrhea   NEUROLOGICAL: No headache or numbness, no tremors  MUSCULOSKELETAL: No joint pain, no muscle pain  GENITOURINARY: no dysuria, no frequency, no hesitancy  PSYCHIATRY: no depression , no anxiety  ALL OTHER  ROS negative        Vital Signs Last 24 Hrs  T(C): 36.2 (04 Nov 2020 06:09), Max: 36.3 (03 Nov 2020 14:16)  T(F): 97.1 (04 Nov 2020 06:09), Max: 97.4 (03 Nov 2020 20:25)  HR: 84 (04 Nov 2020 06:09) (84 - 105)  BP: 101/71 (04 Nov 2020 06:09) (88/58 - 101/71)  BP(mean): --  RR: 16 (04 Nov 2020 06:09) (16 - 19)  SpO2: 99% (04 Nov 2020 06:09) (99% - 100%)    ________________________________________________  PHYSICAL EXAM:  GENERAL: NAD  HEENT: Normocephalic;  conjunctivae and sclerae clear; moist mucous membranes;   NECK : supple  CHEST/LUNG: Clear to auscultation bilaterally with good air entry   HEART: S1 S2  regular; no murmurs, gallops or rubs  ABDOMEN: Soft, Nontender, Nondistended; Bowel sounds present  EXTREMITIES: no cyanosis; no edema; no calf tenderness  SKIN: warm and dry; no rash  NERVOUS SYSTEM:  Awake and alert; Oriented  to place, person and time ; no new deficits    _________________________________________________  LABS:                        8.6    7.75  )-----------( 164      ( 04 Nov 2020 07:09 )             27.2     11-04    144  |  109<H>  |  10  ----------------------------<  89  3.6   |  27  |  0.61    Ca    8.8      04 Nov 2020 07:09  Phos  1.7     11-04  Mg     1.9     11-04    TPro  5.6<L>  /  Alb  2.1<L>  /  TBili  0.4  /  DBili  x   /  AST  63<H>  /  ALT  34  /  AlkPhos  84  11-04        CAPILLARY BLOOD GLUCOSE      POCT Blood Glucose.: 72 mg/dL (04 Nov 2020 11:49)  POCT Blood Glucose.: 92 mg/dL (04 Nov 2020 05:55)  POCT Blood Glucose.: 88 mg/dL (04 Nov 2020 00:06)  POCT Blood Glucose.: 172 mg/dL (03 Nov 2020 17:50)  POCT Blood Glucose.: 198 mg/dL (03 Nov 2020 17:08)    RADIOLOGY & ADDITIONAL TESTS:    Imaging  Reviewed:  YES    Consultant(s) Notes Reviewed:   YES      Plan of care was discussed with patient and /or primary care giver; all questions and concerns were addressed

## 2020-11-04 NOTE — PROGRESS NOTE ADULT - PROBLEM SELECTOR PLAN 7
-s/p left side wedge resection on Tagrisso at facility   -outpt heme f/u S/p left side wedge resection on Tagrisso at facility   Will likely not restart tagrisso due to side effects   CXR with right perihilar mass, no need for further workup as not in line with GOC

## 2020-11-04 NOTE — PROGRESS NOTE ADULT - SUBJECTIVE AND OBJECTIVE BOX
Summary: Patient is a 74y old  Female who presents with a chief complaint of UTI (03 Nov 2020 17:57)      Subjective: Pt. is for comfort measures only.     Objective:        MEDICATIONS  (STANDING):  artificial  tears Solution 1 Drop(s) Both EYES every 12 hours  aspirin enteric coated 81 milliGRAM(s) Oral daily  atorvastatin 40 milliGRAM(s) Oral at bedtime  azithromycin  IVPB 500 milliGRAM(s) IV Intermittent daily  cefTRIAXone   IVPB 1000 milliGRAM(s) IV Intermittent every 24 hours  dextrose 5% + sodium chloride 0.9%. 1000 milliLiter(s) (75 mL/Hr) IV Continuous <Continuous>  dextrose 5%. 1000 milliLiter(s) (50 mL/Hr) IV Continuous <Continuous>  dextrose 50% Injectable 25 Gram(s) IV Push once  ferrous    sulfate 325 milliGRAM(s) Oral daily  folic acid 1 milliGRAM(s) Oral daily  insulin lispro (ADMELOG) corrective regimen sliding scale   SubCutaneous every 6 hours  ipratropium 17 MICROgram(s) HFA Inhaler 1 Puff(s) Inhalation every 6 hours  lisinopril 5 milliGRAM(s) Oral daily  metoprolol tartrate 25 milliGRAM(s) Oral two times a day  multivitamin 1 Tablet(s) Oral daily  pantoprazole    Tablet 40 milliGRAM(s) Oral before breakfast  senna 2 Tablet(s) Oral at bedtime  sodium chloride 0.9%. 1000 milliLiter(s) (75 mL/Hr) IV Continuous <Continuous>    MEDICATIONS  (PRN):  acetaminophen   Tablet .. 650 milliGRAM(s) Oral every 6 hours PRN Mild Pain (1 - 3)  aluminum hydroxide/magnesium hydroxide/simethicone Suspension 30 milliLiter(s) Oral every 8 hours PRN Dyspepsia  dextrose 40% Gel 15 Gram(s) Oral once PRN Blood Glucose LESS THAN 70 milliGRAM(s)/deciliter  glucagon  Injectable 1 milliGRAM(s) IntraMuscular once PRN Glucose LESS THAN 70 milligrams/deciliter              Vital Signs Last 24 Hrs  T(C): 36.2 (04 Nov 2020 06:09), Max: 36.3 (03 Nov 2020 14:16)  T(F): 97.1 (04 Nov 2020 06:09), Max: 97.4 (03 Nov 2020 20:25)  HR: 84 (04 Nov 2020 06:09) (84 - 105)  BP: 101/71 (04 Nov 2020 06:09) (88/58 - 101/71)  BP(mean): --  RR: 16 (04 Nov 2020 06:09) (16 - 19)  SpO2: 99% (04 Nov 2020 06:09) (99% - 100%)      General:  Well developed, no pallor, NAD.  HEENT:    Normal appearance of conjunctiva, ears, nose, lips, oropharynx, and oral mucosa, anicteric.  Neck:  No masses, no asymmetry.  Lymph Nodes:  No lymphadenopathy.   Cardiovascular:  RRR normal S1/S2, no murmur.  Respiratory:  CTA B/L, normal respiratory effort.   Abdominal:   soft, no masses or tenderness, normoactive BS, NT/ND, no HSM.  Extremities:   No clubbing or cyanosis, normal capillary refill, no edema.   Skin:   No rash, jaundice, lesions, eczema.   Musculoskeletal:  No joint swelling, erythema or tenderness.   Neuro: No focal deficits.       LABS:                        8.6    7.75  )-----------( 164      ( 04 Nov 2020 07:09 )             27.2     11-04    144  |  109<H>  |  10  ----------------------------<  89  3.6   |  27  |  0.61    Ca    8.8      04 Nov 2020 07:09  Phos  1.1     11-03  Mg     2.0     11-03    TPro  5.6<L>  /  Alb  2.1<L>  /  TBili  0.4  /  DBili  x   /  AST  63<H>  /  ALT  34  /  AlkPhos  84  11-04          RADIOLOGY & ADDITIONAL TESTS:

## 2020-11-04 NOTE — PROGRESS NOTE ADULT - PROBLEM SELECTOR PLAN 1
p/w Hb 9.9, FOBT positive  pt. is also on iron supplements  unknown if ever had EGD/colonoscopy  hold eliquis  c/w protonix  PT. IS FOR COMFORT MEASURES ONLY p/w Hb 9.9, FOBT positive  pt. is also on iron supplements  unknown if ever had EGD/colonoscopy  c/w protonix  PT. IS FOR COMFORT MEASURES ONLY

## 2020-11-04 NOTE — PROGRESS NOTE ADULT - ATTENDING COMMENTS
I was physically present for the key portions of the evaluation and management (E/M) service provided.  The patient was personally seen and examined at bedside.  I have edited the note as appropriate.     Palliative care will hold on endoscopy for now   Thank you for your consultation and allowing  me to participate in the care of your patients. If you have further questions please contact me at [phone].     Boy Johnson M.D.       _________________________________________________________________________________________________  [facility]  [address] [place] [address]  Office: [phone]    Benji Wells PA-C  ___________________________________________________________________________________________________
Pt confused, but calm. On antibiotics for UTI. DNR/DNI on file. Sister agreeable for LTC w hospice.
74 year old female with PMHx of DM, lung CA, CAD, ACS, COPD, AFIB (on Eliquis) and PSHx of wedge resection of lungs sent in from [facility] for evaluation of UTI and PNA. Patient is AAOx 3 at baseline but slow and history is obtained from her and NH charts. Patient was noticed to have decreased PO intake of food and liquids x3 days. Patient also noticed to have decrease in urine and urine is cloudy and foul smelling. She reports burning micturition, dysuria and increased phlegm production.  Denies abdominal pain, chest pain, headaches, nausea, vomiting, fevers.   Patient was then transferred here for Sepsis evaluation. Patient is on cefuroxime for UTI treatment at NH,    ED course;   s/p Rocpehin   UA positive   WBC normal, FOBT positive  Vital Signs Last 24 Hrs  T(C): 37.2 (02 Nov 2020 02:40), Max: 37.4 (01 Nov 2020 18:40)  T(F): 99 (02 Nov 2020 02:40), Max: 99.4 (01 Nov 2020 18:40)  HR: 99 (02 Nov 2020 02:40) (90 - 99)  BP: 120/63 (02 Nov 2020 02:40) (100/52 - 120/63)  RR: 18 (02 Nov 2020 02:40) (18 - 18)  SpO2: 95% (02 Nov 2020 02:40) (95% - 97%)    PLAN:  # SUSPECTED UTI - ON ROCEPHIN, F/U UCX AND BCX  # SUSPECTED PNEUMONIA + UNDERLYING LUNG CA - ON ROCEPHIN AND AZITHROMYCIN, F/U BCX; ON TAGRISSO   # NORMOCYTIC ANEMIA W/ POSITIVE FOBT - ELIQUIS HELD, GASTROENTEROLOGY CONSULT IN PROGRESS   # CAD  # AFIB ON ELIQUIS  # DM  # COPD  # CONSTIPATION  # PALLIATIVE CARE CONSULT IN PROGRESS - PATIENT AND SISTER MS. JAVIER HUMMEL [[phone]]  - AGREED TO DNR/DNI/NO PEG - MOLST WAS FILLED. SISTER, MS. HUMMEL REQUESTED THAT PATIENT BE PLACED ON HOSPICE AFTER GOC CONVERSATION W/ PALLIATIVE CARE TEAM.   # CASE D/W SISTER AT LENGTH [11/03].   # D/C PLAN TO [facility] [place] ON HOSPICE  # GI PPX    BECCA YETURU M.JEAN BENTON M.D.
HPI:    74 year old female with PMHx of DM, lung CA, CAD, ACS, COPD, AFIB (on Eliquis) and PSHx of wedge resection of lungs sent in from [facility] for evaluation of UTI and PNA. Patient is AAOx 3 at baseline but slow and history is obtained from her and NH charts. Patient was noticed to have decreased PO intake of food and liquids x3 days. Patient also noticed to have decrease in urine and urine is cloudy and foul smelling. She reports burning micturition, dysuria and increased phlegm production.  Denies abdominal pain, chest pain, headaches, nausea, vomiting, fevers.   Patient was then transferred here for Sepsis evaluation. Patient is on cefuroxime for UTI treatment at NH,    ED course;   s/p Rocpehin   UA positive   WBC normal, FOBT positive  Vital Signs Last 24 Hrs  T(C): 37.2 (02 Nov 2020 02:40), Max: 37.4 (01 Nov 2020 18:40)  T(F): 99 (02 Nov 2020 02:40), Max: 99.4 (01 Nov 2020 18:40)  HR: 99 (02 Nov 2020 02:40) (90 - 99)  BP: 120/63 (02 Nov 2020 02:40) (100/52 - 120/63)  RR: 18 (02 Nov 2020 02:40) (18 - 18)  SpO2: 95% (02 Nov 2020 02:40) (95% - 97%)    PLAN:  # SUSPECTED UTI - ON ROCEPHIN, REVIEWED UCX AND BCX  # SUSPECTED PNEUMONIA + UNDERLYING LUNG CA - ON ROCEPHIN AND AZITHROMYCIN, REVIEWED BCX; ON TAGRISSO   # NORMOCYTIC ANEMIA W/ POSITIVE FOBT - ELIQUIS HELD, GASTROENTEROLOGY CONSULT IN PROGRESS   # CAD  # AFIB ON ELIQUIS - HOLDING ELIQUIS  # DM  # COPD  # CONSTIPATION  # PALLIATIVE CARE CONSULT IN PROGRESS - PATIENT AND SISTER MS. JAVIER HUMMEL [[phone]]  - AGREED TO DNR/DNI/NO PEG - MOLST WAS FILLED. SISTER, MS. HUMMEL REQUESTED THAT PATIENT BE PLACED ON HOSPICE AFTER GOC CONVERSATION W/ PALLIATIVE CARE TEAM.   # CASE D/W SISTER AT LENGTH [11/03].   # D/C PLAN TO [facility] ON HOSPICE  # GI PPX    BECCA YETURLAZ BENTON M.D.

## 2020-11-04 NOTE — PROGRESS NOTE ADULT - PROBLEM SELECTOR PLAN 8
-likely in setting of advanced co morbidities   -supportive care Likely in setting of advanced co morbidities   failed s/s   Comfort feeds

## 2020-12-08 NOTE — CONSULT NOTE ADULT - NEGATIVE OPHTHALMOLOGIC SYMPTOMS
Anticoagulation Clinic Progress Note  Indication: afib  Referring Provider: Em  Goal INR: 2-3   Current Drug Interactions: aspirin, simvastatin, trazodone  Other: no bleeding per patient  [id number]: 4 (age, HTN, CAD)    Diet: iceburg salads every now and then/ peas    Anticoagulation Clinic INR History:    Date 10/3 10/17 10/24 10/31 11/14 12/12 1/9 1/23 2/13   Total Weekly Dose 20 mg? 20mg? 20mg 20mg 20mg 20mg 20mg 20mg 20mg   INR 3.1 3.5 2.4 2.6 2.7 2.9 3.4 2.8 2.7   Notes   dec GLV     dec GLV       Date 3/13 3/27 4/24 5/22 6/19 7/10 7/24 8/1 8/12   Total Weekly Dose 20mg 20mg 20 mg 20mg 20mg 20mg 20mg 17.5 mg 17.5 mg   INR 3.2 2.7 2.8 2.9 3.1/ 2.9 3.8 4.0 3.0 2.3   Notes               Date 9/3 9/17 10/9 10/30 12/2 1/6/20 1/20 2/17 3/16 5/12 6/9 6/22 7/7 7/14 8/11   Total Weekly Dose 17.5 mg  ??? 17.5mg 17.5 mg 17.5 mg 17.5 mg 17.5 mg 17.5 mg 17.5mg 17.5mg 17.5mg 17.5mg 17.5 mg 17.5 mg 21.25 mg 20 mg   INR 2.3 2.9 2.4 2.1 2.4 1.9 2.0 2.3 2.1 2.0 3.1 2 1.6 2.6 2.6   Notes       Inc. GLV Boost x1      Missed dose boostx1      Date 9/8 10/5 11/3 12/1 12/8     Total Weekly Dose 20mg 20mg 20mg 13.75mg 22.5mg     INR 2.3 2.3 2.4 1.6 2.7     Notes    2x miss Boost 1x doxy      Clinic Interview:  Verbal Release Authorization signed on 6/25/18 -- may speak with Glenis Nic (wife), Marge Cooneylivan (daughter)  Tablet Strength: 2.5mg tablets   Patient contact: speak with wife Glenis home: [phone], mobile: [phone]    Patient Findings    Positives:  Change in medications   Negatives:  Signs/symptoms of thrombosis, Signs/symptoms of bleeding, Laboratory test error suspected, Change in health, Change in alcohol use, Change in activity, Upcoming invasive procedure, Emergency department visit, Upcoming dental procedure, Missed doses, Extra doses, Change in diet/appetite, Hospital admission, Bruising, Other complaints   Comments:  Will start doxycycline 100mg BID for 7 days today.  And Mrs. Lucero confirmed dosing  schedule from last week. Mr. Lucero denies any other changes.     Plan:   1. INR back WNL and therapeutic today at 2.7. However, Mr. Lucero will be starting doxycycline today for 7 days. Instructed Mr. Lucero and Mrs. Lucero to resume warfarin 2.5 mg oral daily except 3.75 mg on MonThur until re-check.   2. Repeat INR on  12/15 to ensure WNL after abx therapy.    3. Written and verbal information provided in clinic. Mr. Lucero expresses understanding with teach back and has no further questions at this time. Also, discussed plan with Mrs. Lucero via phone.    Spoke with Glenis - appointment has been re-scheduled for 12/15.      Karen Sosa, PharmD Candidate 2021 12/08/2020  14:33 Colleen HEWITT, PharmD, have reviewed the note in full and agree with the assessment and plan.  12/08/20  16:07 EST       no photophobia/no diplopia

## 2021-06-17 NOTE — H&P ADULT - PROBLEM SELECTOR PLAN 3
Wheezing on examination  - C/w home ICS + Duoneb + Methylprednisone 40 Q8 None -Currently not in exacerbation  -c/w inhaler therapy  -no need for steroids at this time

## 2021-07-09 NOTE — DISCHARGE NOTE ADULT - FUNCTIONAL STATUS DATE
18-Dec-2017 Asc Procedure Text (A): After obtaining clear surgical margins the patient was sent to an ASC for surgical repair.  The patient understands they will receive post-surgical care and follow-up from the ASC physician.
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
## 2021-10-27 NOTE — PHYSICAL THERAPY INITIAL EVALUATION ADULT - PATIENT PROFILE REVIEW, REHAB EVAL
Anesthesia Pre Eval Note    Anesthesia ROS/Med Hx    Overall Review:  EKG was reviewed       Neuro/Psych Review:  Positive for seizures   Positive for headaches  Positive for psychiatric history    Cardiovascular Review:    Positive for CHF  Positive for hypertension  Positive for hyperlipidemia    GI/HEPATIC/RENAL Review:    Positive for GERD    End/Other Review:    Positive for obesity class II - 35.00 - 39.99  Positive for chronic pain  Positive for cancer  Additional Results:     ALLERGIES:   -- Sulfa Antibiotics -- ANAPHYLAXIS       Lab Results       Component                Value               Date                       WBC                      9.8                 10/25/2021                 WBC                      11.7 (H)            11/04/2019                 RBC                      3.84 (L)            10/25/2021                 RBC                      3.92 (L)            11/04/2019                 HGB                      12.6                10/25/2021                 HGB                      13.4                11/04/2019                 HCT                      39.9                10/25/2021                 HCT                      40.3                11/04/2019                 MCHC                     31.6 (L)            10/25/2021                 MCHC                     33.3                11/04/2019                 SODIUM                   138                 10/25/2021                 SODIUM                   143                 11/04/2019                 POTASSIUM                4.2                 10/27/2021                 POTASSIUM                3.5                 11/04/2019                 CHLORIDE                 100                 10/25/2021                 CHLORIDE                 97 (L)              11/04/2019                 CO2                      35 (H)              10/25/2021                 CO2                      34 (H)              11/04/2019                 GLUCOSE              yes      112 (H)             10/25/2021                 GLUCOSE                  86                  11/04/2019                 BUN                      15                  10/25/2021                 BUN                      16                  11/04/2019                 CREATININE               0.95                10/25/2021                 CREATININE               1.10 (H)            11/04/2019                 GFRESTIMATE              58 (L)              10/25/2021                 GFRA                     57                  11/04/2019                 GFRNA                    49                  11/04/2019                 CALCIUM                  10.0                10/25/2021                 CALCIUM                  9.8                 11/04/2019                 PLT                      266                 10/25/2021                 PLT                      264                 11/04/2019                 PLT                      249                 12/09/2013                 PTT                      57 (H)              09/21/2019                 INR                      1.2                 10/27/2021                 INR                      2.0                 09/08/2021                 INR                      2.1                 01/20/2020                 INR                      2.2                 12/16/2019             Past Medical History:  No date: Ataxia      Comment:  mild  No date: Atrial fibrillation (CMS/HCC)      Comment:  intermittent  2010: Cerebral infarction (CMS/HCC)      Comment:  TIA, rt eye vision decreased  No date: Esophageal reflux  No date: Essential (primary) hypertension  No date: Hypercholesterolemia  No date: Major depressive disorder  No date: Malignant neoplasm (CMS/HCC)      Comment:  left breast  No date: Neuropathy  No date: Obesity  No date: Personal history of traumatic fracture      Comment:  lt ankle  No date: SVT (supraventricular tachycardia) (CMS/HCC)    Past Surgical  History:  No date: Abdomen surgery      Comment:  gallbladder 1970s  No date: Appendectomy  09/12/2008: Atrial ablation surgery      Comment:  ablation of atrial fib  10/27/2021: Av node ablation/implant  03/04/2010: Breast lumpectomy      Comment:  left, re-excision lumpectomy  No date: Breast surgery      Comment:  left  09/03/2013: Cardiac catheterization/possible ptca/possible stent  No date: Cholecystectomy  06/01/2017: Colonoscopy diagnostic      Comment:  Dr. Jimmy Romero. Normal  12/10/2013: Ep cardioversion      Comment:  successful  07/24/2014: Ep cardioversion  No date: Fracture surgery      Comment:  ORIF of lt ankle, plates and pins placed  15 yrs ago: Ptca  No date: Removal gallbladder  03/04/2010: [place] lymph node biopsy      Comment:  left  08/08/2013: Stress test      Comment:  negative for ST depression. Asymptomatic  09/23/2017: Neri/cardioversion  No date: Tonsillectomy and adenoidectomy       Prior to Admission medications :  Medication metOLazone (ZAROXOLYN) 2.5 MG tablet, Sig Take 2.5 mg by mouth 1 day a week., Start Date 10/11/21, End Date , Taking? Yes, Authorizing Provider Oscar Rivera MD    Medication DULoxetine (CYMBALTA) 60 MG capsule, Sig Take 1 capsule by mouth every morning., Start Date 9/29/21, End Date , Taking? Yes, Authorizing Provider Dara Harden MD    Medication DULoxetine (CYMBALTA) 30 MG capsule, Sig Take 1 capsule by mouth every evening., Start Date 9/29/21, End Date , Taking? Yes, Authorizing Provider Dara Harden MD    Medication divalproex (DEPAKOTE ER) 500 MG 24 hr ER tablet, Sig Take 1 tablet by mouth nightly., Start Date 9/29/21, End Date , Taking? Yes, Authorizing Provider Dara Harden MD    Medication clonazePAM (KlonoPIN) 0.5 MG tablet, Sig Take 0.5 tablets by mouth 2 times daily as needed for Anxiety., Start Date 9/29/21, End Date , Taking? Yes, Authorizing Provider Dara Harden MD    Medication mirtazapine (REMERON) 30 MG tablet, Sig Take  1 tablet by mouth nightly., Start Date 9/29/21, End Date , Taking? Yes, Authorizing Provider Dara Harden MD    Medication furosemide (LASIX) 80 MG tablet, Sig Take 1 tablet by mouth 2 times daily., Start Date 8/30/21, End Date , Taking? Yes, Authorizing Provider Oscar Rivera MD    Medication Vitamin D, Ergocalciferol, 1.25 mg (50,000 units) capsule, Sig TAKE 1 CAPSULE BY MOUTH EVERY 14 DAYS, Start Date 8/27/21, End Date , Taking? Yes, Authorizing Provider Mika Riddle MD    Medication potassium chloride (K-TAB) 20 MEQ ER tablet, Sig Take 2 tablets by mouth daily, Start Date 7/9/21, End Date , Taking? Yes, Authorizing Provider Oscar Rivera MD    Medication warfarin (COUMADIN) 5 MG tablet, Sig TAKE 1 TABLET BY MOUTH  EVERY TUESDAY AND ONE-HALF  TAB ON ALL OF THE REMAINING DAYS OF THE WEEK OR AS  DIRECTED BY COUMADIN CLINIC, Start Date 7/8/21, End Date , Taking? Yes, Authorizing Provider Jhony Spring, DO    Medication Cholecalciferol 25 mcg (1,000 units) capsule, Sig Take 1 capsule by mouth daily., Start Date 5/11/21, End Date , Taking? Yes, Authorizing Provider Terra Alvarenga PA-C    Medication dilTIAZem (CARDIZEM CD) 180 MG 24 hr capsule, Sig Take 1 capsule by mouth daily., Start Date 4/7/21, End Date , Taking? Yes, Authorizing Provider Jeremiah Prater MD    Medication gabapentin (NEURONTIN) 300 MG capsule, Sig TAKE 2 CAPSULES BY MOUTH AT BEDTIME, Start Date 3/15/21, End Date , Taking? Yes, Authorizing Provider Jhony Spring, DO    Medication metoPROLOL succinate (TOPROL-XL) 100 MG 24 hr tablet, Sig TAKE 1 TABLET BY MOUTH TWO  TIMES DAILY, Start Date 11/4/20, End Date , Taking? Yes, Authorizing Provider Oscar Rivera MD    Medication aspirin 81 MG chewable tablet, Sig Chew 1 tablet by mouth daily., Start Date 9/1/17, End Date , Taking? Yes, Authorizing Provider Pablo Ambriz MD    Medication Multiple Vitamins-Minerals (MULTIVITAMIN PO), Sig Take 1 tablet by mouth daily., Start Date , End  Date , Taking? Yes, Authorizing Provider Outside Provider    Medication calcitRIOL (ROCALTROL) 0.25 MCG capsule, Sig Take 1 capsule by mouth 3 days a week., Start Date 5/12/21, End Date , Taking? , Authorizing Provider Terra Alvarenga PA-C    Medication metoCLOPramide (REGLAN) 5 MG tablet, Sig Take 1 tablet by mouth 4 times daily as needed for Nausea or Vomiting., Start Date 8/14/20, End Date , Taking? , Authorizing Provider Jhony Spring, DO         Patient Vitals in the past 24 hrs:  10/27/21 1100, BP:92/52, Pulse:83, Resp:18, SpO2:97 %  10/27/21 1029, Height:5' 9\" (1.753 m), Weight:116 kg (255 lb 11.7 oz)      Relevant Problems   No relevant active problems       Physical Exam     Airway   Mallampati: II  TM Distance: >3 FB  Neck ROM: Full    Cardiovascular    Cardio Rhythm: Irregular    Head Assessment  Head assessment: Normocephalic    General Assessment  General Assessment: Alert and oriented and No acute distress    Dental Exam    Patient has:  Upper dentures and Lower dentures    Pulmonary Exam  Pulmonary exam normal    Abdominal Exam    Patient Demonstrates:  Obese      Anesthesia Plan:    ASA Status: 3  Anesthesia Type: MAC    Induction: Intravenous  Preferred Airway Type: MaskPatient does not have a difficult airway or is not at risk of aspiration.   Maintenance: TIVA  Premedication: None      Post-op Pain Management: Per Surgeon      Checklist  Reviewed: Lab Results, Patient Summary, Care Everywhere, Allergies, Past Med History, Medications, EKG, Nursing Notes, Problem list, NPO Status, Outside Records and Consultations  Consent/Risks Discussed Statement:  The proposed anesthetic plan, including its risks and benefits, have been discussed with the Patient along with the risks and benefits of alternatives. Questions were encouraged and answered and the patient and/or representative understands and agrees to proceed.    I have discussed elements of the patient's history or examination, as noted above  and/or as follows, that place the patient at higher risk of complications; age, BMI> 30 (obesity), heart disease, hematological disease and kidney disease.    I discussed with the patient (and/or patient's legal representative) the risks and benefits of the proposed anesthesia plan, MAC, which may include services performed by other anesthesia providers.    Alternative anesthesia plans, if available, were reviewed with the patient (and/or patient's legal representative). Discussion has been held with the patient (and/or patient's legal representative) regarding risks of anesthesia, which include allergic reaction, anxiety, aspiration, back pain, bleeding/hematoma, conversion to general anesthesia, dental injury, depressed breathing, emergence delirium, eye injury, headache, hypotension, sore throat, vomiting, oral injury, nerve injury and nausea and emergent situations that may require change in anesthesia plan.    The patient (and/or patient's legal representative) has indicated understanding, his/her questions have been answered, and he/she wishes to proceed with the planned anesthetic.      Blood Products: Not Anticipated

## 2022-01-19 NOTE — ED ADULT NURSE NOTE - NSSISCREENINGQ5_ED_A_ED
S/w pt  AO made aware of previous  Scripts resent for baclofen, Lyrica and Norco  Pt is at pharmacy and made aware of same  No

## 2022-01-20 NOTE — PATIENT PROFILE ADULT - NSPROMUTPARTICIPCAREFT_GEN_A_NUR
Patient has appointment today with Maite Randle
Patient returned to unit from procedure at 1412. All equipment returned with patient.  
Patients mother called stating that patient has a scheduled appointment with Maria Esther Cooper on 10/22/20 patient is out of 2 medications. Patient was seeing a pediatric gastroenterologist, but the new provider they will not be seeing any longer. Mother wanted to know if we could fill he medications until they find a new provider for gastro.  Please advise     Medication [facility] Former Dr. [facility] Patient   NOV 10/22/2020    No results found for: CREATININE
emotional

## 2022-02-01 NOTE — DIETITIAN INITIAL EVALUATION ADULT. - PROBLEM SELECTOR PLAN 9
Continue Senna an Miralax Consent: Written consent obtained.  The risks were reviewed with the patient including but not limited to: pigmentary changes, pain, blistering, scabbing, redness, and the remote possibility of scarring.

## 2022-03-03 NOTE — DISCHARGE NOTE ADULT - ADMISSION DATE +STARTOFVISITDATE
Prescription approved per Encompass Health Rehabilitation Statement Selected

## 2022-04-23 NOTE — H&P ADULT - PROBLEM SELECTOR PROBLEM 5
Diagnosis: DLBCL  Transplant Date: 04/11/2022  Day: (+/-) +12. Chemo regimen used: BEAM  BMT assessments and toxicities completed. WBC/ANC= 4.8/0.8  Prophylactic medications started D+1 04/12/2022  G-CSF started on D+6 04/17/2022  Toxicities greater than 2 :none. Patient seen by MD/NP daily until engraftment. New orders received: stopped TPN  Issues:diarrhea    END OF SHIFT NOTE:    Intake/Output  04/23 0701 - 04/23 1900  In: 948 [P.O.:120; I.V.:546]  Out: 1150 [Urine:650]   Voiding: YES  Catheter: NO  Drain:              Stool:  1 occurrences. Stool Assessment  Stool Color: Green;Yellow (04/23/22 1138)  Stool Appearance: Loose (04/23/22 1138)  Stool Amount: Large (04/23/22 1138)  Stool Source/Status: Rectum (04/23/22 1138)    Emesis:  0 occurrences. Emesis Assessment  Appearance: Yellow (04/12/22 1138)  Emesis Amount: Small (04/12/22 1138)    VITAL SIGNS  Patient Vitals for the past 12 hrs:   Temp Pulse Resp BP SpO2   04/23/22 1503 97.9 °F (36.6 °C) 85 18 (!) 110/59 98 %   04/23/22 1114 98.6 °F (37 °C) 92 18 99/63 97 %   04/23/22 0736 98.1 °F (36.7 °C) 93 18 109/63 96 %       Pain Assessment  Pain 1  Pain Scale 1: Numeric (0 - 10) (04/23/22 1425)  Pain Intensity 1: 0 (04/23/22 1425)  Patient Stated Pain Goal: 0 (04/23/22 1425)  Pain Reassessment 1: Patient resting w/respiratory rate greater than 10 (04/19/22 0231)    Ambulating  Yes    Additional Information:   -Pt had shower and linens changed  -TPN stopped at 1800  -VSS  -C/O N/V, zofran ODT given  -mouth care charted x3    Shift report given to oncoming nurse at the bedside.     Tamika Madrigal HTN (hypertension)

## 2022-11-30 NOTE — PATIENT PROFILE ADULT - NSPROPTRIGHTSUPPORTPERSON_GEN_A_NUR
[de-identified] : Cervical Physical Exam\par \par Gait -slightly antalgic\par \par Station - Normal\par \par Sagittal balance - Normal \par \par Compensatory mechanism? - None\par \par Horizontal gaze - Maintained\par \par \par Reflexes\par Biceps -hyperreflexic\par Triceps - Normal\par Brachioradialis -hyperreflexic\par Patellar - Normal\par Gastroc - Normal\par \par Shelton´s -positive\par \par Shoulder Exam - Normal\par \par Spurling´s - None\par \par Wrist Pulses -2+ radial/ulnar\par \par Foot Pulses -2+ DP/PT\par \par Cervical range of motion -decreased but can turn had approximately 15 degrees bilaterally\par \par Sensation \par C5-T1 sensation intact to light touch bilaterally\par \par L1-S1 sensation intact to light touch bilaterally\par \par Motor\par \par \par 	Deltoid	Biceps	Triceps	WF	WE	IO	\par Right	4/5	5/5	5/5	5/5	5/5	3/5	3/5\par Left	5/5	5/5	5/5	5/5	5/5	5/5	5/5\par \par As compared to prior to surgery the patient's overall exam has dramatically improved\par \par Incision is healed\par \par There is a seroma present, wound is healed, wound is not painful\par 	IP	Quad	HS	TA	Gastroc	EHL\par Right	5/5	5/5	5/5	5/5	5/5	5/5\par Left	5/5	5/5	5/5	5/5	5/5	5/5 [de-identified] : Cervical radiographs\par Hardware in appropriate position\par No acute complication same name as above

## 2022-12-28 NOTE — DISCHARGE NOTE ADULT - NS AS DC STROKE DX YN
Xena Roland was evaluated today in the Orthopaedic Trauma clinic 2 weeks s/p open treatment of her left patella fracture. Overall, she is doing well. Incision sites were checked and healing well without concern at this time. Orders:  - Physical therapy: No formal physical therapy at this time. Remain in the knee immobilizer at all  times except for removal for hygiene. Okay for weightbearing as tolerated to  the left lower extremity with the knee  immobilizer on. No active or passive  range of motion of the knee. - Pain control: If you feel it would be appropriate, I would recommend a multimodal pain protocol of    - Tylenol 1000 mg q 6 h   - Naproxen 500 mg q 12 h   - Tramadol 50 mg q 6-8 h prn    - Cyclobenzaprine 10 mg q 6 h prn muscle spasms     - Wound care: Staples removed, steri strips applied. Clean incisions and/or wounds daily with soap and water then apply clean dressings until wounds are healed and dry, then leave open to air. Avoid soaks of any kind (bath, hot tub, pool, etc.).  Apply compression wrap for swelling.     - Follow up: 4 weeks with Dr Arthur Richey on       Thank you in working with us to provide the best care for this patient,       Sunita Segura CNP  12/28/2022 1:48 PM   [address], [address]  [phone] no

## 2023-02-02 NOTE — ED PROVIDER NOTE - EYES NEGATIVE STATEMENT, MLM
[FreeTextEntry2] : NI - LT Knee, RT shin no discharge, no irritation, no pain, no redness, and no visual changes.

## 2023-03-13 NOTE — CONSULT NOTE ADULT - CARDIOVASCULAR SYMPTOMS
Addended by: GUSTABO COTTER on: 3/13/2023 11:49 AM     Modules accepted: Orders    
dyspnea on exertion

## 2023-04-03 NOTE — PATIENT PROFILE ADULT - NSPROHMCANCERSYMPCOND_GEN_A_NUR
BP at goal  Continue current medication  Daily BP check; bring log all clinic visits  Instructed to report to ED for any BP greater than 200/100, dizziness, syncope, CP, or SOB  Keep appt. With PCP for follow up  Patient is agreeable to plan and verbalizes understanding      
Stable  Continue Levaquin and Lasix as prescribed   Keep appt with Dr. Yu on 4/6/2023 for reevaluation  Instructed to continue to monitor symptoms  Report to ED for any CP, SOB, and/or worsening symptoms  Pt is agreeable to plan and verbalizes understanding      
lung

## 2023-06-14 NOTE — PATIENT PROFILE ADULT. - TEACHING/LEARNING FACTORS IMPACT ABILITY TO LEARN
normal appearance , without tenderness upon palpation , no deformities , trachea midline , Thyroid normal size , no masses , thyroid nontender
none

## 2023-07-06 NOTE — ED ADULT NURSE NOTE - CAS EDN INTEG ASSESS
- - - Xolair Counseling:  Patient informed of potential adverse effects including but not limited to fever, muscle aches, rash and allergic reactions.  The patient verbalized understanding of the proper use and possible adverse effects of Xolair.  All of the patient's questions and concerns were addressed.

## 2023-08-04 NOTE — DISCHARGE NOTE NURSING/CASE MANAGEMENT/SOCIAL WORK - NSDCVIVACCINE_GEN_ALL_CORE_FT
recommendations    Psoas release  On lacrosse ball    Cat camel  10 reps    Don pose  5 reps    Sciatic nerve glide  Supine 10 each side                  Treatment/Session Summary:    >Treatment Assessment:   Pt reported good understanding of plan and home program.   Communication/Consultation:  None today  Equipment provided today:  HEP  Recommendations/Intent for next treatment session: Next visit will focus on progression of hip mobility and strengthening.     >Total Treatment Billable Duration:  15 minutes  Time In: 0900  Time Out: 1000    Ilda Isela, PT       Charge Capture  }Post Session Pain  PT Visit Info  [employer] Portal  MD Guidelines  Scanned Media  Benefits  MyChart    Future Appointments   Date Time Provider [address]   8/10/2023  2:30 PM Ilda Isela, [facility]   8/11/2023  7:00 PM Ilda Isela, PT [facility]   8/14/2023  8:00 AM Ilda Isela, PT [facility]   8/17/2023 12:00 PM Ilda Isela, PT [facility]   8/18/2023  9:30 AM MD [facility]
No Vaccines Administered.

## 2023-10-04 NOTE — H&P ADULT - NEGATIVE RESPIRATORY AND THORAX SYMPTOMS
Body Location Override (Optional - Billing Will Still Be Based On Selected Body Map Location If Applicable): Right forehead Detail Level: Detailed Add 52579 Cpt? (Important Note: In 2017 The Use Of 40854 Is Being Tracked By Cms To Determine Future Global Period Reimbursement For Global Periods): no no hemoptysis/no dyspnea/no wheezing

## 2023-12-15 NOTE — PHYSICAL THERAPY INITIAL EVALUATION ADULT - REHAB POTENTIAL, PT EVAL
Specialty Pharmacy Patient Management Program  Call Center Refill Outreach      Rosa is a 52 y.o. female contacted today regarding refills of her medication(s).    Specialty medication(s) and dose(s) confirmed: Emgality 120mg/ml      Refill Questions      Flowsheet Row Most Recent Value   Changes to allergies? No   Changes to medications? No   New conditions or infections since last clinic visit No   Unplanned office visit, urgent care, ED, or hospital admission in the last 4 weeks  No   How does patient/caregiver feel medication is working? Very good   Financial problems or insurance changes  No   Since the previous refill, were any specialty medication doses or scheduled injections missed or delayed?  No   Does this patient require a clinical escalation to a pharmacist? No            Delivery Questions      Flowsheet Row Most Recent Value   Delivery method Beeline   Delivery address verified with patient/caregiver? Yes   Delivery address Home   Number of medications in delivery 1   Medication(s) being filled and delivered Galcanezumab-[facility]   Doses left of specialty medications 0   Copay verified? Yes   Copay amount $0   Copay form of payment No copayment ($0)                   Follow-up: 21 Days     Rox Hernandez CPhT  Care Coordinator  [facility]  [phone]  12/15/2023  09:27 EST          
good, to achieve stated therapy goals

## 2024-01-24 NOTE — DISCHARGE NOTE ADULT - FUNCTIONAL STATUS DATE
[employer] does not have Trulicity because it's on back order. I advised her to check to different pharmacies to see if they have it in stock and let us know.     15-Oct-2018 16-Oct-2018

## 2024-05-09 NOTE — H&P ADULT - PROBLEM SELECTOR PLAN 4
A ([facility]ATH CEREBROVASCULAR 135CM MAMBA FLEX STRL DISP) catheter was inserted. continue aspirin, statin and beta blocker.

## 2024-07-21 NOTE — DISCHARGE NOTE ADULT - CARE PLAN
normal Principal Discharge DX:	PNA (pneumonia)  Goal:	prevent Sepsis  Assessment and plan of treatment:	You were diagnosed with Pneumonia based on xrsy chest and CT chest, which was appropriately treated with IV Zosyn. Continue taking the PO Augmentin as prescribed. Follow up with your Primary Care Doctor after 1 week  Secondary Diagnosis:	UTI (urinary tract infection)  Goal:	prevent pyelonephritis  Assessment and plan of treatment:	You were diagnosed with UTI based on urinalysis and treated with antibiotics. keep taking the prescribed antibiotic and Follow up with your Primary Care Doctor  after 1 week  Secondary Diagnosis:	COPD (chronic obstructive pulmonary disease)  Goal:	prevent COPD exacerbation  Assessment and plan of treatment:	You have history of COPD . You were treated with inhaled steroids, Duoneb. oxygen and methyl prednisone. keep taking the prescribed medications and Follow up with your Primary Care Doctor in 2 weeks  Secondary Diagnosis:	Lung cancer  Goal:	Remission  Assessment and plan of treatment:	You have history of Lung cancer. keep taking the prescribed home medication Tagrisso and Follow up with your Primary Care Doctor after 2 weeks  Secondary Diagnosis:	DM (diabetes mellitus)  Goal:	HbA1c 6.5%  Assessment and plan of treatment:	You have history of Diabetes . keep taking the prescribed medications, low carb diet and Follow up with your Primary Care Doctor after 2 weeks  Secondary Diagnosis:	HTN (hypertension)  Goal:	BP <140/90  Assessment and plan of treatment:	You have history of HTN . keep taking the prescribed medication. low salt diet and Follow up with your Primary Care Doctor after 2 weeks Principal Discharge DX:	PNA (pneumonia)  Goal:	prevent Sepsis  Assessment and plan of treatment:	You were diagnosed with Pneumonia based on xrsy chest and CT chest, which was appropriately treated with IV Zosyn. Continue taking the PO Augmentin  for 5 more days as prescribed. Follow up with your Primary Care Doctor after 1 week  Secondary Diagnosis:	UTI (urinary tract infection)  Goal:	prevent pyelonephritis  Assessment and plan of treatment:	You were diagnosed with UTI based on urinalysis and treated with antibiotics. keep taking the prescribed antibiotics for 5 more days and Follow up with your Primary Care Doctor  after 1 week  Secondary Diagnosis:	COPD (chronic obstructive pulmonary disease)  Goal:	prevent COPD exacerbation  Assessment and plan of treatment:	You have history of COPD . You were treated with inhaled steroids, Duoneb. oxygen and methyl prednisone. keep taking the prescribed medications and Follow up with your Primary Care Doctor in 2 weeks  Secondary Diagnosis:	Lung cancer  Goal:	Remission  Assessment and plan of treatment:	You have history of Lung cancer. keep taking the prescribed home medication Tagrisso and Follow up with your Primary Care Doctor after 2 weeks  Secondary Diagnosis:	DM (diabetes mellitus)  Goal:	HbA1c 6.5%  Assessment and plan of treatment:	You have history of Diabetes . Your HbA1c is 6.3% which is well controlled on metformin which you are taking at home. In the  hospital , your random blood glucose reading were found to be high, which is most likely steroid induced hyperglycemia. You are on steroid sliding dose, so blood glucose will get normalised off the steroids .keep taking the prescribed medications, low carb diet, monitor glucose regularly and Follow up with your Primary Care Doctor after 1 week  Secondary Diagnosis:	HTN (hypertension)  Goal:	BP <140/90  Assessment and plan of treatment:	You have history of HTN . keep taking the prescribed medication. low salt diet and Follow up with your Primary Care Doctor after 1weeks Principal Discharge DX:	PNA (pneumonia)  Goal:	prevent Sepsis  Assessment and plan of treatment:	You were diagnosed with Pneumonia based on X-ray chest and CT chest, which was appropriately treated with IV Zosyn. Continue taking the PO Augmentin for 5 more days as prescribed. Follow up with your Primary Care Doctor after 1 week  Secondary Diagnosis:	UTI (urinary tract infection)  Goal:	prevent pyelonephritis  Assessment and plan of treatment:	You were diagnosed with UTI based on urinalysis and treated with antibiotics. keep taking the prescribed antibiotics for 5 more days and Follow up with your Primary Care Doctor  after 1 week  Secondary Diagnosis:	COPD (chronic obstructive pulmonary disease)  Goal:	prevent COPD exacerbation  Assessment and plan of treatment:	You have history of COPD . You were treated with inhaled steroids, Duoneb. oxygen and methyl prednisone. keep taking the prescribed medications and Follow up with your Primary Care Doctor in 2 weeks  Secondary Diagnosis:	Lung cancer  Goal:	Remission  Assessment and plan of treatment:	You have history of Lung cancer. keep taking the prescribed home medication Tagrisso and Follow up with your Primary Care Doctor within 2 weeks  Secondary Diagnosis:	DM (diabetes mellitus)  Goal:	HbA1c 6.5%  Assessment and plan of treatment:	You have history of Diabetes . Your HbA1c is 6.3% which is well controlled on metformin which you are taking at home. In the  hospital , your random blood glucose reading were found to be high, likely due to steroid-induced hyperglycemia. You will continue to be tapered on PO prednisone as above. Continue to monitor your blood sugar daily, as your sugar levels are expected to come down. Keep taking your medications for diabetes, continue low carb diet, monitor glucose regularly and follow up with your Primary Care Doctor after 1 week.  Secondary Diagnosis:	HTN (hypertension)  Goal:	BP <140/90  Assessment and plan of treatment:	You have history of HTN. Your metoprolol and lisinopril doses were held due to your blood pressure being within normal limits. Continue to monitor your blood pressure daily and see your doctor in one week regarding restarting blood pressure medicatinos. In the meanwhile, continue with low salt intake along with fruit and vegetable intake

## 2025-01-13 NOTE — ED ADULT NURSE NOTE - NSFALLRSKINDICATORS_ED_ALL_ED
no

## 2025-03-12 NOTE — ED ADULT TRIAGE NOTE - CADM TRG TX PRIOR TO ARRIVAL
Requesting a refill of:     Methylphenidate 10 mg - Take 1.5 pills PO BID    Last order:  2/18/25 for 90 pills with 0 refills    Per PDMP, medication was picked up on 2/20/25    Last appt:  1/7/25 -- Next appt:  7/8/25    Pharmacy:  [facility] in [place]    NOTE:  Please see message from patient    none

## 2025-04-22 NOTE — PATIENT PROFILE ADULT - NSPROGENPREVTRANSF_GEN_A_NUR
Continue to drink plenty of fluids.  Return to the ER for fever, ongoing decreased urine output, development abdominal pain and/or flank pain  
no

## 2025-05-24 NOTE — PHYSICAL THERAPY INITIAL EVALUATION ADULT - DIAGNOSIS, PT EVAL
Systolic (24hrs), Av , Min:106 , Max:186   Diastolic (24hrs), Av, Min:54, Max:144       Meeting criteria for GHTN on 25 (1445-140/86, 1900-124/93)  CBC/CMP WNL   Monitor BP throughout admission    Aerobic Loss, Prevention Of Hospital Acquired Deconditioning